# Patient Record
Sex: FEMALE | Race: WHITE | HISPANIC OR LATINO | Employment: UNEMPLOYED | URBAN - METROPOLITAN AREA
[De-identification: names, ages, dates, MRNs, and addresses within clinical notes are randomized per-mention and may not be internally consistent; named-entity substitution may affect disease eponyms.]

---

## 2020-01-14 NOTE — PROGRESS NOTES
Assessment/Plan:       Diagnoses and all orders for this visit:    Annual physical exam  -     Comprehensive metabolic panel; Future  -     Lipid panel; Future    Mild intermittent asthma without complication    Cigarette nicotine dependence without complication    Lipid screening  -     Comprehensive metabolic panel; Future  -     Lipid panel; Future    Screening for human immunodeficiency virus  -     Human Immunodeficiency Virus 1/2 Antigen / Antibody ( Fourth Generation) with Reflex Testing; Future  -     Comprehensive metabolic panel; Future  -     Lipid panel; Future    Need for pneumococcal vaccination  -     PNEUMOCOCCAL POLYSACCHARIDE VACCINE 23-VALENT =>1YO SQ IM    Screening for diabetes mellitus  -     Hemoglobin A1C; Future    Other orders  -     Cancel: Mammo screening bilateral w 3d & cad; Future  -     albuterol (PROVENTIL HFA,VENTOLIN HFA) 90 mcg/act inhaler; Inhale 2 puffs every 6 (six) hours as needed for wheezing        No problem-specific Assessment & Plan notes found for this encounter  Subjective:      Patient ID: Maryann Moralez is a 45 y o  female  Patient is here to establish Care  NO issues or concerns  She does smoke cigarettes and is not interested in quitting  Overweight- no purposeful exercise  She does consume fruits and veggies  She has had a recent URI with cough  She has been wheezing  She does smoke cigarrettes and she is asthmatic      The following portions of the patient's history were reviewed and updated as appropriate:   She has a past medical history of Arthritis and Asthma ,  does not have any pertinent problems on file  ,   has a past surgical history that includes  section and Tubal ligation  ,  family history includes Breast cancer in her mother; Cancer in her father and mother; Colon cancer in her mother; Coronary artery disease in her mother; Diabetes in her mother; Prostate cancer in her father  ,   reports that she has been smoking cigarettes   She has been smoking about 0 50 packs per day  She has never used smokeless tobacco  She reports that she does not drink alcohol or use drugs  ,  has No Known Allergies     Current Outpatient Medications   Medication Sig Dispense Refill    albuterol (PROVENTIL HFA,VENTOLIN HFA) 90 mcg/act inhaler Inhale 2 puffs every 6 (six) hours as needed for wheezing       No current facility-administered medications for this visit  Review of Systems   Constitutional: Negative  Negative for fatigue and fever  HENT: Negative  Negative for congestion  Eyes: Negative  Negative for visual disturbance  Respiratory: Negative for cough, chest tightness, shortness of breath and wheezing  Cardiovascular: Negative  Gastrointestinal: Negative  Negative for abdominal pain, blood in stool, diarrhea and nausea  Endocrine: Negative for polydipsia, polyphagia and polyuria  Genitourinary: Negative for difficulty urinating and flank pain  Musculoskeletal: Negative  Negative for arthralgias, back pain and myalgias  Skin: Negative  Negative for color change, pallor and rash  Allergic/Immunologic: Negative for immunocompromised state  Neurological: Negative  Negative for dizziness, weakness, light-headedness, numbness and headaches  Hematological: Negative for adenopathy  Psychiatric/Behavioral: Negative  Negative for confusion, decreased concentration and sleep disturbance  All other systems reviewed and are negative  Objective:  Vitals:    01/15/20 0913   BP: 122/76   BP Location: Left arm   Patient Position: Sitting   Pulse: 77   Resp: 18   Temp: 98 4 °F (36 9 °C)   SpO2: 97%   Weight: 81 2 kg (179 lb)   Height: 5' 6" (1 676 m)     Body mass index is 28 89 kg/m²  Physical Exam   Constitutional: She is oriented to person, place, and time  She appears well-developed and well-nourished  No distress  HENT:   Head: Normocephalic and atraumatic     Right Ear: External ear normal    Left Ear: External ear normal    Nose: Nose normal    Mouth/Throat: Oropharynx is clear and moist  No oropharyngeal exudate  Eyes: Pupils are equal, round, and reactive to light  Conjunctivae and EOM are normal  Right eye exhibits no discharge  Left eye exhibits no discharge  No scleral icterus  Neck: Normal range of motion  Neck supple  No JVD present  No tracheal deviation present  No thyromegaly present  Cardiovascular: Normal rate, regular rhythm, normal heart sounds and intact distal pulses  Exam reveals no gallop and no friction rub  No murmur heard  Pulmonary/Chest: Effort normal  No respiratory distress  She has wheezes  She exhibits no tenderness  Expiratory wheezing throughout   Abdominal: Soft  Bowel sounds are normal  She exhibits no distension and no mass  There is no tenderness  There is no rebound and no guarding  No hernia  Musculoskeletal: Normal range of motion  She exhibits no edema, tenderness or deformity  Lymphadenopathy:     She has no cervical adenopathy  Neurological: She is alert and oriented to person, place, and time  She displays normal reflexes  No cranial nerve deficit or sensory deficit  She exhibits normal muscle tone  Coordination normal    Skin: Skin is warm and dry  Capillary refill takes less than 2 seconds  No rash noted  She is not diaphoretic  Psychiatric: She has a normal mood and affect  Her behavior is normal  Judgment and thought content normal    Nursing note and vitals reviewed  BMI Counseling: Body mass index is 28 89 kg/m²  The BMI is above normal  Nutrition recommendations include decreasing portion sizes, encouraging healthy choices of fruits and vegetables, decreasing fast food intake, consuming healthier snacks, limiting drinks that contain sugar, moderation in carbohydrate intake, increasing intake of lean protein, reducing intake of saturated and trans fat and reducing intake of cholesterol   Exercise recommendations include exercising 3-5 times per week and strength training exercises  No pharmacotherapy was ordered  Tobacco Cessation Counseling: Tobacco cessation counseling was provided  The patient is sincerely urged to quit consumption of tobacco  She is not ready to quit tobacco  Medication options and side effects of medication discussed  Patient refused medication

## 2020-01-15 ENCOUNTER — OFFICE VISIT (OUTPATIENT)
Dept: FAMILY MEDICINE CLINIC | Facility: CLINIC | Age: 45
End: 2020-01-15
Payer: COMMERCIAL

## 2020-01-15 VITALS
DIASTOLIC BLOOD PRESSURE: 76 MMHG | OXYGEN SATURATION: 97 % | HEIGHT: 66 IN | HEART RATE: 77 BPM | RESPIRATION RATE: 18 BRPM | SYSTOLIC BLOOD PRESSURE: 122 MMHG | BODY MASS INDEX: 28.77 KG/M2 | TEMPERATURE: 98.4 F | WEIGHT: 179 LBS

## 2020-01-15 DIAGNOSIS — Z00.00 ANNUAL PHYSICAL EXAM: Primary | ICD-10-CM

## 2020-01-15 DIAGNOSIS — Z13.1 SCREENING FOR DIABETES MELLITUS: ICD-10-CM

## 2020-01-15 DIAGNOSIS — F17.210 CIGARETTE NICOTINE DEPENDENCE WITHOUT COMPLICATION: ICD-10-CM

## 2020-01-15 DIAGNOSIS — Z11.4 SCREENING FOR HUMAN IMMUNODEFICIENCY VIRUS: ICD-10-CM

## 2020-01-15 DIAGNOSIS — J45.20 MILD INTERMITTENT ASTHMA WITHOUT COMPLICATION: ICD-10-CM

## 2020-01-15 DIAGNOSIS — Z23 NEED FOR PNEUMOCOCCAL VACCINATION: ICD-10-CM

## 2020-01-15 DIAGNOSIS — Z13.220 LIPID SCREENING: ICD-10-CM

## 2020-01-15 PROBLEM — Z12.39 SCREENING FOR BREAST CANCER: Status: ACTIVE | Noted: 2020-01-15

## 2020-01-15 PROBLEM — Z76.89 ENCOUNTER TO ESTABLISH CARE: Status: ACTIVE | Noted: 2020-01-15

## 2020-01-15 PROCEDURE — 90471 IMMUNIZATION ADMIN: CPT

## 2020-01-15 PROCEDURE — 90732 PPSV23 VACC 2 YRS+ SUBQ/IM: CPT

## 2020-01-15 PROCEDURE — 99386 PREV VISIT NEW AGE 40-64: CPT | Performed by: NURSE PRACTITIONER

## 2020-01-15 RX ORDER — ALBUTEROL SULFATE 90 UG/1
2 AEROSOL, METERED RESPIRATORY (INHALATION) EVERY 6 HOURS PRN
COMMUNITY
End: 2021-01-02 | Stop reason: SDUPTHER

## 2020-01-15 NOTE — PATIENT INSTRUCTIONS
Establish care visit  Normal CPE  Obtain labs and our office will call with results  Pneumococcal vaccination given today  Patient to bring report indicating that she has osteoporosis      Wellness Visit for Adults   AMBULATORY CARE:   A wellness visit  is when you see your healthcare provider to get screened for health problems  You can also get advice on how to stay healthy  Write down your questions so you remember to ask them  Ask your healthcare provider how often you should have a wellness visit  What happens at a wellness visit:  Your healthcare provider will ask about your health, and your family history of health problems  This includes high blood pressure, heart disease, and cancer  He or she will ask if you have symptoms that concern you, if you smoke, and about your mood  You may also be asked about your intake of medicines, supplements, food, and alcohol  Any of the following may be done:  · Your weight  will be checked  Your height may also be checked so your body mass index (BMI) can be calculated  Your BMI shows if you are at a healthy weight  · Your blood pressure  and heart rate will be checked  Your temperature may also be checked  · Blood and urine tests  may be done  Blood tests may be done to check your cholesterol levels  Abnormal cholesterol levels increase your risk for heart disease and stroke  You may also need a blood or urine test to check for diabetes if you are at increased risk  Urine tests may be done to look for signs of an infection or kidney disease  · A physical exam  includes checking your heartbeat and lungs with a stethoscope  Your healthcare provider may also check your skin to look for sun damage  · Screening tests  may be recommended  A screening test is done to check for diseases that may not cause symptoms  The screening tests you may need depend on your age, gender, family history, and lifestyle habits   For example, colorectal screening may be recommended if you are 48years old or older  Screening tests you need if you are a woman:   · A Pap smear  is used to screen for cervical cancer  Pap smears are usually done every 3 to 5 years depending on your age  You may need them more often if you have had abnormal Pap smear test results in the past  Ask your healthcare provider how often you should have a Pap smear  · A mammogram  is an x-ray of your breasts to screen for breast cancer  Experts recommend mammograms every 2 years starting at age 48 years  You may need a mammogram at age 52 years or younger if you have an increased risk for breast cancer  Talk to your healthcare provider about when you should start having mammograms and how often you need them  Vaccines you may need:   · Get an influenza vaccine  every year  The influenza vaccine protects you from the flu  Several types of viruses cause the flu  The viruses change over time, so new vaccines are made each year  · Get a tetanus-diphtheria (Td) booster vaccine  every 10 years  This vaccine protects you against tetanus and diphtheria  Tetanus is a severe infection that may cause painful muscle spasms and lockjaw  Diphtheria is a severe bacterial infection that causes a thick covering in the back of your mouth and throat  · Get a human papillomavirus (HPV) vaccine  if you are female and aged 23 to 32 or male 23 to 24 and never received it  This vaccine protects you from HPV infection  HPV is the most common infection spread by sexual contact  HPV may also cause vaginal, penile, and anal cancers  · Get a pneumococcal vaccine  if you are aged 72 years or older  The pneumococcal vaccine is an injection given to protect you from pneumococcal disease  Pneumococcal disease is an infection caused by pneumococcal bacteria  The infection may cause pneumonia, meningitis, or an ear infection  · Get a shingles vaccine  if you are aged 61 or older, even if you have had shingles before   The shingles vaccine is an injection to protect you from the varicella-zoster virus  This is the same virus that causes chickenpox  Shingles is a painful rash that develops in people who had chickenpox or have been exposed to the virus  How to eat healthy:  My Plate is a model for planning healthy meals  It shows the types and amounts of foods that should go on your plate  Fruits and vegetables make up about half of your plate, and grains and protein make up the other half  A serving of dairy is included on the side of your plate  The amount of calories and serving sizes you need depends on your age, gender, weight, and height  Examples of healthy foods are listed below:  · Eat a variety of vegetables  such as dark green, red, and orange vegetables  You can also include canned vegetables low in sodium (salt) and frozen vegetables without added butter or sauces  · Eat a variety of fresh fruits , canned fruit in 100% juice, frozen fruit, and dried fruit  · Include whole grains  At least half of the grains you eat should be whole grains  Examples include whole-wheat bread, wheat pasta, brown rice, and whole-grain cereals such as oatmeal     · Eat a variety of protein foods such as seafood (fish and shellfish), lean meat, and poultry without skin (turkey and chicken)  Examples of lean meats include pork leg, shoulder, or tenderloin, and beef round, sirloin, tenderloin, and extra lean ground beef  Other protein foods include eggs and egg substitutes, beans, peas, soy products, nuts, and seeds  · Choose low-fat dairy products such as skim or 1% milk or low-fat yogurt, cheese, and cottage cheese  · Limit unhealthy fats  such as butter, hard margarine, and shortening  Exercise:  Exercise at least 30 minutes per day on most days of the week  Some examples of exercise include walking, biking, dancing, and swimming   You can also fit in more physical activity by taking the stairs instead of the elevator or parking farther away from stores  Include muscle strengthening activities 2 days each week  Regular exercise provides many health benefits  It helps you manage your weight, and decreases your risk for type 2 diabetes, heart disease, stroke, and high blood pressure  Exercise can also help improve your mood  Ask your healthcare provider about the best exercise plan for you  General health and safety guidelines:   · Do not smoke  Nicotine and other chemicals in cigarettes and cigars can cause lung damage  Ask your healthcare provider for information if you currently smoke and need help to quit  E-cigarettes or smokeless tobacco still contain nicotine  Talk to your healthcare provider before you use these products  · Limit alcohol  A drink of alcohol is 12 ounces of beer, 5 ounces of wine, or 1½ ounces of liquor  · Lose weight, if needed  Being overweight increases your risk of certain health conditions  These include heart disease, high blood pressure, type 2 diabetes, and certain types of cancer  · Protect your skin  Do not sunbathe or use tanning beds  Use sunscreen with a SPF 15 or higher  Apply sunscreen at least 15 minutes before you go outside  Reapply sunscreen every 2 hours  Wear protective clothing, hats, and sunglasses when you are outside  · Drive safely  Always wear your seatbelt  Make sure everyone in your car wears a seatbelt  A seatbelt can save your life if you are in an accident  Do not use your cell phone when you are driving  This could distract you and cause an accident  Pull over if you need to make a call or send a text message  · Practice safe sex  Use latex condoms if are sexually active and have more than one partner  Your healthcare provider may recommend screening tests for sexually transmitted infections (STIs)  · Wear helmets, lifejackets, and protective gear    Always wear a helmet when you ride a bike or motorcycle, go skiing, or play sports that could cause a head injury  Wear protective equipment when you play sports  Wear a lifejacket when you are on a boat or doing water sports  © 2017 [employer]  Information is for End User's use only and may not be sold, redistributed or otherwise used for commercial purposes  All illustrations and images included in CareNotes® are the copyrighted property of [facility] , [employer]  or [employer]old[employer]iles  The above information is an  only  It is not intended as medical advice for individual conditions or treatments  Talk to your doctor, nurse or pharmacist before following any medical regimen to see if it is safe and effective for you

## 2020-01-16 ENCOUNTER — LAB (OUTPATIENT)
Dept: LAB | Facility: CLINIC | Age: 45
End: 2020-01-16
Payer: COMMERCIAL

## 2020-01-16 DIAGNOSIS — Z13.220 LIPID SCREENING: ICD-10-CM

## 2020-01-16 DIAGNOSIS — Z11.4 SCREENING FOR HUMAN IMMUNODEFICIENCY VIRUS: ICD-10-CM

## 2020-01-16 DIAGNOSIS — Z13.1 SCREENING FOR DIABETES MELLITUS: ICD-10-CM

## 2020-01-16 DIAGNOSIS — Z00.00 ANNUAL PHYSICAL EXAM: ICD-10-CM

## 2020-01-16 LAB
ALBUMIN SERPL BCP-MCNC: 3.9 G/DL (ref 3.5–5)
ALP SERPL-CCNC: 84 U/L (ref 46–116)
ALT SERPL W P-5'-P-CCNC: 25 U/L (ref 12–78)
ANION GAP SERPL CALCULATED.3IONS-SCNC: 4 MMOL/L (ref 4–13)
AST SERPL W P-5'-P-CCNC: 9 U/L (ref 5–45)
BILIRUB SERPL-MCNC: 0.34 MG/DL (ref 0.2–1)
BUN SERPL-MCNC: 11 MG/DL (ref 5–25)
CALCIUM SERPL-MCNC: 9.4 MG/DL (ref 8.3–10.1)
CHLORIDE SERPL-SCNC: 105 MMOL/L (ref 100–108)
CHOLEST SERPL-MCNC: 169 MG/DL (ref 50–200)
CO2 SERPL-SCNC: 27 MMOL/L (ref 21–32)
CREAT SERPL-MCNC: 0.59 MG/DL (ref 0.6–1.3)
EST. AVERAGE GLUCOSE BLD GHB EST-MCNC: 103 MG/DL
GFR SERPL CREATININE-BSD FRML MDRD: 113 ML/MIN/1.73SQ M
GLUCOSE P FAST SERPL-MCNC: 88 MG/DL (ref 65–99)
HBA1C MFR BLD: 5.2 % (ref 4.2–6.3)
HDLC SERPL-MCNC: 49 MG/DL
LDLC SERPL CALC-MCNC: 104 MG/DL (ref 0–100)
NONHDLC SERPL-MCNC: 120 MG/DL
POTASSIUM SERPL-SCNC: 3.9 MMOL/L (ref 3.5–5.3)
PROT SERPL-MCNC: 8 G/DL (ref 6.4–8.2)
SODIUM SERPL-SCNC: 136 MMOL/L (ref 136–145)
TRIGL SERPL-MCNC: 80 MG/DL

## 2020-01-16 PROCEDURE — 36415 COLL VENOUS BLD VENIPUNCTURE: CPT

## 2020-01-16 PROCEDURE — 83036 HEMOGLOBIN GLYCOSYLATED A1C: CPT

## 2020-01-16 PROCEDURE — 80061 LIPID PANEL: CPT

## 2020-01-16 PROCEDURE — 87389 HIV-1 AG W/HIV-1&-2 AB AG IA: CPT

## 2020-01-16 PROCEDURE — 80053 COMPREHEN METABOLIC PANEL: CPT

## 2020-01-18 LAB — HIV 1+2 AB+HIV1 P24 AG SERPL QL IA: NORMAL

## 2020-01-22 ENCOUNTER — OFFICE VISIT (OUTPATIENT)
Dept: OBGYN CLINIC | Facility: CLINIC | Age: 45
End: 2020-01-22
Payer: COMMERCIAL

## 2020-01-22 VITALS — BODY MASS INDEX: 30.67 KG/M2 | WEIGHT: 190 LBS | SYSTOLIC BLOOD PRESSURE: 128 MMHG | DIASTOLIC BLOOD PRESSURE: 76 MMHG

## 2020-01-22 DIAGNOSIS — Z11.51 ENCOUNTER FOR SCREENING FOR HUMAN PAPILLOMAVIRUS (HPV): ICD-10-CM

## 2020-01-22 DIAGNOSIS — Z11.3 SCREENING FOR STDS (SEXUALLY TRANSMITTED DISEASES): ICD-10-CM

## 2020-01-22 DIAGNOSIS — Z01.419 ENCOUNTER FOR GYNECOLOGICAL EXAMINATION (GENERAL) (ROUTINE) WITHOUT ABNORMAL FINDINGS: Primary | ICD-10-CM

## 2020-01-22 DIAGNOSIS — Z80.3 FAMILY HISTORY OF BREAST CANCER: ICD-10-CM

## 2020-01-22 DIAGNOSIS — Z12.31 ENCOUNTER FOR SCREENING MAMMOGRAM FOR MALIGNANT NEOPLASM OF BREAST: ICD-10-CM

## 2020-01-22 PROCEDURE — 87624 HPV HI-RISK TYP POOLED RSLT: CPT | Performed by: STUDENT IN AN ORGANIZED HEALTH CARE EDUCATION/TRAINING PROGRAM

## 2020-01-22 PROCEDURE — 99386 PREV VISIT NEW AGE 40-64: CPT | Performed by: STUDENT IN AN ORGANIZED HEALTH CARE EDUCATION/TRAINING PROGRAM

## 2020-01-22 PROCEDURE — 87591 N.GONORRHOEAE DNA AMP PROB: CPT | Performed by: STUDENT IN AN ORGANIZED HEALTH CARE EDUCATION/TRAINING PROGRAM

## 2020-01-22 PROCEDURE — G0145 SCR C/V CYTO,THINLAYER,RESCR: HCPCS | Performed by: STUDENT IN AN ORGANIZED HEALTH CARE EDUCATION/TRAINING PROGRAM

## 2020-01-22 PROCEDURE — 87491 CHLMYD TRACH DNA AMP PROBE: CPT | Performed by: STUDENT IN AN ORGANIZED HEALTH CARE EDUCATION/TRAINING PROGRAM

## 2020-01-22 RX ORDER — MULTIVITAMIN
1 CAPSULE ORAL DAILY
COMMUNITY

## 2020-01-22 NOTE — PROGRESS NOTES
Assessment/Plan:     40 yo F here for annual exam, doing well  F/u pap smear and STD testing  Mammo rx given  Extensive family h/o cancer (see HPI) - referral for genetic counseling given  F/u in 1 yr or PRN  Encounter for gynecological examination (general) (routine) without abnormal findings  -     Liquid-based pap, screening    Encounter for screening mammogram for malignant neoplasm of breast  -     Mammo screening bilateral w 3d & cad; Future    Screening for STDs (sexually transmitted diseases)  -     Chlamydia/GC amplified DNA by PCR  -     Hepatitis B surface antigen; Future  -     Hepatitis C antibody; Future  -     RPR; Future    Encounter for screening for human papillomavirus (HPV)  -     Liquid-based pap, screening    Family history of breast cancer  -     Ambulatory referral to Gynecologic Oncology; Future    Other orders  -     Multiple Vitamin (MULTIVITAMIN) capsule; Take 1 capsule by mouth daily        Subjective:      Patient ID: Alcides Kinney is a 39 y o  female  This is a 39 y o  [id number] with LMP of 12/20/19 currently using tubal ligation for contraception  She has regular periods of normal amount and duration  She is sexually active and requests STD testing today  She has does report vaginal dryness and irritation, especially with intercourse  She has changed soaps to Aveeno sensitive, wears cotton underwear  Reports she is allergic to lubricants so is unable to use these  Last pap smear: 2018, negative per pt report  Did have abnormal several years ago and is requesting repeat pap today  Last mammo: 2018 - normal per pt    Pt reports extensive hx of cancer in her family - unsure of specific details: Mother: breast ca in her 46s, colon ca, and ? ovarian ca - had cervix/uterus/ovaries removed - and reports she had "brackets" placed in vagina for radiation for 3 days - which sounds more consistent with brachytherapy for cervical cancer?  Pt thought cervical/ovarian were the same and so unclear  Mother is still alive  Did not have genetic testing that she is aware of  Maternal aunt - cancer of unknown type  Maternal uncle - brain cancer  Sister had ovaries removed - per pt had multiple cysts and due to fam hx had ovaries removed for prophylaxis          The following portions of the patient's history were reviewed and updated as appropriate: allergies, current medications, past family history, past medical history, past social history, past surgical history and problem list     Review of Systems   Constitutional: Negative  HENT: Negative  Eyes: Negative  Respiratory: Negative  Cardiovascular: Negative  Gastrointestinal: Negative  Endocrine: Negative  Genitourinary: Negative for dyspareunia, dysuria, frequency, menstrual problem, pelvic pain, vaginal discharge and vaginal pain  Musculoskeletal: Negative  Skin: Negative  Allergic/Immunologic: Negative  Neurological: Negative  Hematological: Negative  Psychiatric/Behavioral: Negative  Objective:      /76 (BP Location: Left arm, Patient Position: Sitting, Cuff Size: Standard)   Wt 86 2 kg (190 lb)   LMP 12/20/2019 (Exact Date)   BMI 30 67 kg/m²          Physical Exam   Constitutional: She is oriented to person, place, and time  She appears well-nourished  Neck: Normal range of motion  Cardiovascular: Normal rate  Pulmonary/Chest: Effort normal  Right breast exhibits no mass, no nipple discharge, no skin change and no tenderness  Left breast exhibits no mass, no nipple discharge, no skin change and no tenderness  Breasts are symmetrical    Abdominal: Soft  Genitourinary: Vagina normal and uterus normal  Rectal exam shows no external hemorrhoid  There is no rash or lesion on the right labia  There is no rash or lesion on the left labia  Uterus is not enlarged and not fixed  Cervix exhibits no motion tenderness, no discharge and no friability  Right adnexum displays no mass   Left adnexum displays no mass  No signs of injury around the vagina  No vaginal discharge found  Neurological: She is alert and oriented to person, place, and time  Skin: Skin is warm and dry  Psychiatric: She has a normal mood and affect  Vitals reviewed

## 2020-01-23 LAB
HPV HR 12 DNA CVX QL NAA+PROBE: NEGATIVE
HPV16 DNA CVX QL NAA+PROBE: NEGATIVE
HPV18 DNA CVX QL NAA+PROBE: NEGATIVE

## 2020-01-24 ENCOUNTER — TELEPHONE (OUTPATIENT)
Dept: SURGICAL ONCOLOGY | Facility: CLINIC | Age: 45
End: 2020-01-24

## 2020-01-24 ENCOUNTER — APPOINTMENT (OUTPATIENT)
Dept: LAB | Facility: CLINIC | Age: 45
End: 2020-01-24
Payer: COMMERCIAL

## 2020-01-24 DIAGNOSIS — Z11.3 SCREENING FOR STDS (SEXUALLY TRANSMITTED DISEASES): ICD-10-CM

## 2020-01-24 LAB
C TRACH DNA SPEC QL NAA+PROBE: NEGATIVE
HBV SURFACE AG SER QL: NORMAL
HCV AB SER QL: NORMAL
N GONORRHOEA DNA SPEC QL NAA+PROBE: NEGATIVE

## 2020-01-24 PROCEDURE — 36415 COLL VENOUS BLD VENIPUNCTURE: CPT

## 2020-01-24 PROCEDURE — 86592 SYPHILIS TEST NON-TREP QUAL: CPT

## 2020-01-24 PROCEDURE — 87340 HEPATITIS B SURFACE AG IA: CPT

## 2020-01-24 PROCEDURE — 86803 HEPATITIS C AB TEST: CPT

## 2020-01-24 NOTE — TELEPHONE ENCOUNTER
Genetics New Patient Intake Form   Patient Details:     Jovon Burroughs    1975    [id number]    Background Information:       Who is calling to schedule? If not self, relationship to patient? self   Referring Provider Yris Zayas    Is this a personal or family history?  family   If personal history, what age were you at diagnosis? Breast and ovarian    What is the type of tumor? breast   Did the patient schedule an appointment? Yes   Date and Provider Name  Anali cho    Scheduling Information:   Preferred [place]: [place]   Are there any dates/time the patient cannot be seen?  Tuesday and Thursday    Miscellaneous: na   After completing the above information, please route to Oncology Genetics

## 2020-01-27 LAB — RPR SER QL: NORMAL

## 2020-01-28 ENCOUNTER — TELEPHONE (OUTPATIENT)
Dept: OBGYN CLINIC | Facility: CLINIC | Age: 45
End: 2020-01-28

## 2020-01-28 DIAGNOSIS — N83.202 LEFT OVARIAN CYST: Primary | ICD-10-CM

## 2020-01-28 LAB
LAB AP GYN PRIMARY INTERPRETATION: NORMAL
Lab: NORMAL
PATH INTERP SPEC-IMP: NORMAL

## 2020-01-28 NOTE — TELEPHONE ENCOUNTER
----- Message from Tracy Hameed MD sent at 1/28/2020 12:55 PM EST -----  Regarding: pelvic US  Please let Kamini Collins know that I reviewed her records from her previous doctor  She had an ovarian cyst back in 2017  It was recommended that she have a repeat US, but I don't see that one was ever completed  I have ordered a pelvic US to be completed at her convenience   Thanks!  ----- Message -----  From: Josiane Transcription Incoming  Sent: 1/22/2020   5:34 PM EST  To: Tracy Hameed MD

## 2020-01-29 ENCOUNTER — TELEPHONE (OUTPATIENT)
Dept: OBGYN CLINIC | Facility: CLINIC | Age: 45
End: 2020-01-29

## 2020-01-29 NOTE — TELEPHONE ENCOUNTER
----- Message from Kimberlee Gutierrez MD sent at 1/28/2020 12:17 PM EST -----  Please let Donya Us know that her pap smear was normal  I spoke to her about the rest of her test results  Thanks!

## 2020-02-06 ENCOUNTER — HOSPITAL ENCOUNTER (OUTPATIENT)
Dept: ULTRASOUND IMAGING | Facility: HOSPITAL | Age: 45
Discharge: HOME | End: 2020-02-06
Attending: STUDENT IN AN ORGANIZED HEALTH CARE EDUCATION/TRAINING PROGRAM
Payer: COMMERCIAL

## 2020-02-06 DIAGNOSIS — N83.202 LEFT OVARIAN CYST: ICD-10-CM

## 2020-02-06 PROCEDURE — 76830 TRANSVAGINAL US NON-OB: CPT

## 2020-02-06 PROCEDURE — 76856 US EXAM PELVIC COMPLETE: CPT

## 2020-03-09 ENCOUNTER — TELEPHONE (OUTPATIENT)
Dept: HEMATOLOGY ONCOLOGY | Facility: CLINIC | Age: 45
End: 2020-03-09

## 2020-03-09 NOTE — TELEPHONE ENCOUNTER
I called and spoke to patient to obtain fhx to complete pedigree for tomorrow's appointment with [facility], patient said this was not a good time if I can call her later   Will call patient around 10:00 am 
I called patient back as directed by patient to obtain fhx, no answer left message for patient to return phone call at [phone]
I will SWITCH the dose or number of times a day I take the medications listed below when I get home from the hospital:  None

## 2020-05-08 ENCOUNTER — TELEMEDICINE (OUTPATIENT)
Dept: GYNECOLOGIC ONCOLOGY | Facility: CLINIC | Age: 45
End: 2020-05-08

## 2020-05-08 DIAGNOSIS — Z80.41 FAMILY HISTORY OF OVARIAN CANCER: ICD-10-CM

## 2020-05-08 DIAGNOSIS — Z80.0 FAMILY HISTORY OF COLON CANCER: Primary | ICD-10-CM

## 2020-05-08 DIAGNOSIS — Z80.42 FAMILY HISTORY OF PROSTATE CANCER: ICD-10-CM

## 2020-05-08 DIAGNOSIS — Z80.3 FAMILY HISTORY OF BREAST CANCER: ICD-10-CM

## 2020-05-08 PROCEDURE — NC001 PR NO CHARGE: Performed by: GENETIC COUNSELOR, MS

## 2020-06-01 ENCOUNTER — TELEPHONE (OUTPATIENT)
Dept: HEMATOLOGY ONCOLOGY | Facility: CLINIC | Age: 45
End: 2020-06-01

## 2020-07-08 ENCOUNTER — TELEMEDICINE (OUTPATIENT)
Dept: FAMILY MEDICINE CLINIC | Facility: CLINIC | Age: 45
End: 2020-07-08
Payer: COMMERCIAL

## 2020-07-08 DIAGNOSIS — H10.33 ACUTE BACTERIAL CONJUNCTIVITIS OF BOTH EYES: Primary | ICD-10-CM

## 2020-07-08 PROCEDURE — 99213 OFFICE O/P EST LOW 20 MIN: CPT | Performed by: PHYSICIAN ASSISTANT

## 2020-07-08 PROCEDURE — 4004F PT TOBACCO SCREEN RCVD TLK: CPT | Performed by: PHYSICIAN ASSISTANT

## 2020-07-08 RX ORDER — OFLOXACIN 3 MG/ML
1 SOLUTION/ DROPS OPHTHALMIC 4 TIMES DAILY
Qty: 5 ML | Refills: 0 | Status: SHIPPED | OUTPATIENT
Start: 2020-07-08 | End: 2020-07-15

## 2020-07-08 NOTE — PROGRESS NOTES
Virtual Regular Visit      Assessment/Plan:    Problem List Items Addressed This Visit        Other    Acute bacterial conjunctivitis of both eyes - Primary     ofloxacin ggts for 1 week, avoid contact lens wearing until sx resolve  Wash hands thoroughly after handling animals  Follow up with PCP as needed if sx persist or worsen  Relevant Medications    ofloxacin (OCUFLOX) 0 3 % ophthalmic solution               Reason for visit is   Chief Complaint   Patient presents with    Conjunctivitis    Virtual Regular Visit        Encounter provider Alena Nj PA-C    Provider located at [address]  [address] [place] [phone]      Recent Visits  No visits were found meeting these conditions  Showing recent visits within past 7 days and meeting all other requirements     Today's Visits  Date Type Provider Dept   07/08/20 Telemedicine [facility]    Showing today's visits and meeting all other requirements     Future Appointments  No visits were found meeting these conditions  Showing future appointments within next 150 days and meeting all other requirements        The patient was identified by name and date of birth  Shweta Leonard was informed that this is a telemedicine visit and that the visit is being conducted through [address] [place] [address] and patient was informed that this is not a secure, HIPAA-complaint platform  She agrees to proceed     My office door was closed  No one else was in the room  She acknowledged consent and understanding of privacy and security of the video platform  The patient has agreed to participate and understands they can discontinue the visit at any time  Patient is aware this is a billable service  Subjective  Shweta Leonard is a 39 y o  female    Pt presents with complaints of redness and itching of both eyes x 5 days  Shares this started since she bought a pet lizard   She shares her eyes became very itchy and burned over the holiday weekend  She wakes in the morning with her eyes crusted  She has been using OTC drops with some releif but is still experiencing bilateral eye redness, itching, and discharge  Her vision is occasionally blurry with the tearing/discharge  No pain with eye movement, no periorbital swelling/redness, no fevers, chills, HA  She does wear contacts but has been wearing glasses since sx onset  Past Medical History:   Diagnosis Date    Anxiety     Arthritis     Asthma     Depression     Osteoporosis     Urinary tract infection        Past Surgical History:   Procedure Laterality Date     SECTION      TUBAL LIGATION         Current Outpatient Medications   Medication Sig Dispense Refill    albuterol (PROVENTIL HFA,VENTOLIN HFA) 90 mcg/act inhaler Inhale 2 puffs every 6 (six) hours as needed for wheezing      Multiple Vitamin (MULTIVITAMIN) capsule Take 1 capsule by mouth daily      ofloxacin (OCUFLOX) 0 3 % ophthalmic solution Administer 1 drop to both eyes 4 (four) times a day for 7 days 5 mL 0     No current facility-administered medications for this visit  No Known Allergies    Review of Systems   Constitutional: Negative for chills, fatigue and fever  HENT: Negative for congestion, ear pain, hearing loss, nosebleeds, postnasal drip, rhinorrhea, sinus pressure, sinus pain, sneezing and sore throat  Eyes: Positive for discharge, redness and itching  Negative for pain and visual disturbance  Respiratory: Negative for cough, chest tightness, shortness of breath and wheezing  Cardiovascular: Negative for chest pain, palpitations and leg swelling  Gastrointestinal: Negative for abdominal pain, blood in stool, constipation, diarrhea, nausea and vomiting  Genitourinary: Negative for frequency and urgency  Neurological: Negative for dizziness, light-headedness and numbness         Video Exam    There were no vitals filed for this visit     Physical Exam   Constitutional: She appears well-developed and well-nourished  No distress  HENT:   Head: Normocephalic and atraumatic  Eyes: EOM are normal  Right eye exhibits discharge  Left eye exhibits discharge  Right conjunctiva is injected  Left conjunctiva is injected  Neck: Normal range of motion  Pulmonary/Chest: Effort normal  No respiratory distress  Musculoskeletal: Normal range of motion  Neurological: She is alert  Psychiatric: She has a normal mood and affect  Her behavior is normal    Nursing note and vitals reviewed  I spent 15 minutes directly with the patient during this visit      VIRTUAL VISIT DISCLAIMER    Nicole Lou acknowledges that she has consented to an online visit or consultation  She understands that the online visit is based solely on information provided by her, and that, in the absence of a face-to-face physical evaluation by the physician, the diagnosis she receives is both limited and provisional in terms of accuracy and completeness  This is not intended to replace a full medical face-to-face evaluation by the physician  Nicole Lou understands and accepts these terms

## 2020-07-08 NOTE — ASSESSMENT & PLAN NOTE
ofloxacin ggts for 1 week, avoid contact lens wearing until sx resolve  Wash hands thoroughly after handling animals  Follow up with PCP as needed if sx persist or worsen

## 2020-07-29 ENCOUNTER — OFFICE VISIT (OUTPATIENT)
Dept: FAMILY MEDICINE CLINIC | Facility: CLINIC | Age: 45
End: 2020-07-29
Payer: COMMERCIAL

## 2020-07-29 VITALS
WEIGHT: 178 LBS | SYSTOLIC BLOOD PRESSURE: 118 MMHG | OXYGEN SATURATION: 97 % | HEIGHT: 66 IN | BODY MASS INDEX: 28.61 KG/M2 | HEART RATE: 82 BPM | RESPIRATION RATE: 18 BRPM | DIASTOLIC BLOOD PRESSURE: 74 MMHG

## 2020-07-29 DIAGNOSIS — H10.13 ALLERGIC CONJUNCTIVITIS OF BOTH EYES: Primary | ICD-10-CM

## 2020-07-29 DIAGNOSIS — L25.9 CONTACT DERMATITIS, UNSPECIFIED CONTACT DERMATITIS TYPE, UNSPECIFIED TRIGGER: ICD-10-CM

## 2020-07-29 PROCEDURE — 4004F PT TOBACCO SCREEN RCVD TLK: CPT | Performed by: NURSE PRACTITIONER

## 2020-07-29 PROCEDURE — 99213 OFFICE O/P EST LOW 20 MIN: CPT | Performed by: NURSE PRACTITIONER

## 2020-07-29 PROCEDURE — 3008F BODY MASS INDEX DOCD: CPT | Performed by: NURSE PRACTITIONER

## 2020-07-29 RX ORDER — MOXIFLOXACIN 5 MG/ML
1 SOLUTION/ DROPS OPHTHALMIC 3 TIMES DAILY PRN
Qty: 3 ML | Refills: 3 | Status: SHIPPED | OUTPATIENT
Start: 2020-07-29 | End: 2020-09-04 | Stop reason: ALTCHOICE

## 2020-07-29 RX ORDER — CLOBETASOL PROPIONATE 0.5 MG/G
OINTMENT TOPICAL 2 TIMES DAILY
Qty: 30 G | Refills: 0 | Status: SHIPPED | OUTPATIENT
Start: 2020-07-29 | End: 2020-12-14

## 2020-07-29 NOTE — PROGRESS NOTES
Assessment/Plan:       Diagnoses and all orders for this visit:    Allergic conjunctivitis of both eyes  -     moxifloxacin (VIGAMOX) 0 5 % ophthalmic solution; Administer 1 drop to both eyes 3 (three) times a day as needed (itchy eyes)    Contact dermatitis, unspecified contact dermatitis type, unspecified trigger  -     clobetasol (TEMOVATE) 0 05 % ointment; Apply topically 2 (two) times a day        No problem-specific Assessment & Plan notes found for this encounter  Subjective:      Patient ID: Diego Williamson is a 39 y o  female  For the past three months, she has had blisters on her left hand palm, bottom of foot and right arm  The lesions itch  They then burst  They itch and bleed when scratched  She was treated for pink eye two weeks ago  She continues to have itching of the eyes and discharge at times  The following portions of the patient's history were reviewed and updated as appropriate:   She has a past medical history of Anxiety, Arthritis, Asthma, Depression, Osteoporosis, and Urinary tract infection  ,  does not have any pertinent problems on file  ,   has a past surgical history that includes  section and Tubal ligation  ,  family history includes Breast cancer in her mother; Cancer in her father and mother; Colon cancer in her mother; Coronary artery disease in her mother; Diabetes in her mother; Ovarian cancer in her mother; Prostate cancer in her father  ,   reports that she has been smoking cigarettes  She has been smoking about 0 50 packs per day  She has never used smokeless tobacco  She reports that she drinks alcohol  She reports that she has current or past drug history  ,  has No Known Allergies     Current Outpatient Medications   Medication Sig Dispense Refill    albuterol (PROVENTIL HFA,VENTOLIN HFA) 90 mcg/act inhaler Inhale 2 puffs every 6 (six) hours as needed for wheezing      clobetasol (TEMOVATE) 0 05 % ointment Apply topically 2 (two) times a day 30 g 0  moxifloxacin (VIGAMOX) 0 5 % ophthalmic solution Administer 1 drop to both eyes 3 (three) times a day as needed (itchy eyes) 3 mL 3    Multiple Vitamin (MULTIVITAMIN) capsule Take 1 capsule by mouth daily       No current facility-administered medications for this visit  Review of Systems   Constitutional: Negative  Negative for fatigue and fever  HENT: Negative  Negative for congestion  Eyes: Positive for discharge, redness and itching  Negative for visual disturbance  Respiratory: Negative for cough, chest tightness, shortness of breath and wheezing  Cardiovascular: Negative  Gastrointestinal: Negative  Negative for abdominal pain, blood in stool, diarrhea and nausea  Endocrine: Negative for polydipsia, polyphagia and polyuria  Genitourinary: Negative for difficulty urinating and flank pain  Musculoskeletal: Negative  Negative for arthralgias, back pain and myalgias  Skin: Positive for rash  Negative for color change and pallor  Clear fluid filled lesions on left hand    Allergic/Immunologic: Negative for immunocompromised state  Neurological: Negative  Negative for dizziness, weakness, light-headedness, numbness and headaches  Hematological: Negative for adenopathy  Psychiatric/Behavioral: Negative  Negative for confusion, decreased concentration and sleep disturbance  All other systems reviewed and are negative  Objective:  Vitals:    07/29/20 1500   BP: 118/74   BP Location: Left arm   Patient Position: Sitting   Pulse: 82   Resp: 18   SpO2: 97%   Weight: 80 7 kg (178 lb)   Height: 5' 6" (1 676 m)     Body mass index is 28 73 kg/m²  Physical Exam   Constitutional: She is oriented to person, place, and time  She appears well-developed and well-nourished  No distress  HENT:   Head: Normocephalic and atraumatic     Right Ear: External ear normal    Left Ear: External ear normal    Nose: Nose normal    Mouth/Throat: Oropharynx is clear and moist  No oropharyngeal exudate  Eyes: Pupils are equal, round, and reactive to light  Conjunctivae and EOM are normal  Right eye exhibits discharge  Left eye exhibits discharge  No scleral icterus  Watery eyes, puffiness extraorbital   Neck: Normal range of motion  Neck supple  Cardiovascular: Normal rate, regular rhythm and normal heart sounds  Exam reveals no gallop and no friction rub  No murmur heard  Pulmonary/Chest: Effort normal and breath sounds normal  No respiratory distress  She has no wheezes  She has no rales  Abdominal: Soft  Musculoskeletal: Normal range of motion  Lymphadenopathy:     She has no cervical adenopathy  Neurological: She is alert and oriented to person, place, and time  Skin: Skin is warm and dry  Rash noted  She is not diaphoretic  Clear filled vesicles left hand palm  Scabs on foot and forearm   Psychiatric: She has a normal mood and affect  Her behavior is normal  Judgment and thought content normal    Nursing note and vitals reviewed

## 2020-07-29 NOTE — PATIENT INSTRUCTIONS
Allergic conjunctivitis try to not rub the yes  May apply cool compresses for comfort  Use eye drops as directed  Dermatitis- unknown trigger  Clear filled vesicles  Start ointment as prescribed  Call office in one week if no improvement

## 2020-09-04 ENCOUNTER — TELEPHONE (OUTPATIENT)
Dept: FAMILY MEDICINE CLINIC | Facility: CLINIC | Age: 45
End: 2020-09-04

## 2020-09-04 DIAGNOSIS — T78.40XD ALLERGIC STATE, SUBSEQUENT ENCOUNTER: Primary | ICD-10-CM

## 2020-09-04 RX ORDER — OLOPATADINE HYDROCHLORIDE 1 MG/ML
1 SOLUTION/ DROPS OPHTHALMIC 2 TIMES DAILY
Qty: 5 ML | Refills: 3 | Status: SHIPPED | OUTPATIENT
Start: 2020-09-04 | End: 2021-11-09 | Stop reason: SDUPTHER

## 2020-11-19 ENCOUNTER — OFFICE VISIT (OUTPATIENT)
Dept: FAMILY MEDICINE CLINIC | Facility: CLINIC | Age: 45
End: 2020-11-19
Payer: COMMERCIAL

## 2020-11-19 VITALS
TEMPERATURE: 97.1 F | WEIGHT: 181 LBS | RESPIRATION RATE: 18 BRPM | HEIGHT: 66 IN | BODY MASS INDEX: 29.09 KG/M2 | HEART RATE: 102 BPM | SYSTOLIC BLOOD PRESSURE: 132 MMHG | DIASTOLIC BLOOD PRESSURE: 68 MMHG | OXYGEN SATURATION: 97 %

## 2020-11-19 DIAGNOSIS — G89.29 CHRONIC NECK PAIN: ICD-10-CM

## 2020-11-19 DIAGNOSIS — G89.29 CHRONIC PAIN OF MULTIPLE JOINTS: Primary | ICD-10-CM

## 2020-11-19 DIAGNOSIS — F17.200 NICOTINE USE DISORDER: ICD-10-CM

## 2020-11-19 DIAGNOSIS — G62.9 NEUROPATHY: ICD-10-CM

## 2020-11-19 DIAGNOSIS — L30.9 DERMATITIS: ICD-10-CM

## 2020-11-19 DIAGNOSIS — Z13.220 LIPID SCREENING: ICD-10-CM

## 2020-11-19 DIAGNOSIS — M25.50 CHRONIC PAIN OF MULTIPLE JOINTS: Primary | ICD-10-CM

## 2020-11-19 DIAGNOSIS — M54.2 CHRONIC NECK PAIN: ICD-10-CM

## 2020-11-19 PROCEDURE — 99214 OFFICE O/P EST MOD 30 MIN: CPT | Performed by: NURSE PRACTITIONER

## 2020-11-19 RX ORDER — GABAPENTIN 100 MG/1
100 CAPSULE ORAL 3 TIMES DAILY
Qty: 90 CAPSULE | Refills: 2 | Status: SHIPPED | OUTPATIENT
Start: 2020-11-19 | End: 2020-12-03

## 2020-11-19 RX ORDER — VARENICLINE TARTRATE 25 MG
KIT ORAL
Qty: 53 TABLET | Refills: 0 | Status: SHIPPED | OUTPATIENT
Start: 2020-11-19 | End: 2021-01-29

## 2020-11-20 ENCOUNTER — LAB (OUTPATIENT)
Dept: LAB | Facility: CLINIC | Age: 45
End: 2020-11-20
Payer: COMMERCIAL

## 2020-11-20 ENCOUNTER — APPOINTMENT (OUTPATIENT)
Dept: RADIOLOGY | Facility: CLINIC | Age: 45
End: 2020-11-20
Payer: COMMERCIAL

## 2020-11-20 DIAGNOSIS — Z13.220 LIPID SCREENING: ICD-10-CM

## 2020-11-20 DIAGNOSIS — G89.29 CHRONIC PAIN OF MULTIPLE JOINTS: ICD-10-CM

## 2020-11-20 DIAGNOSIS — Z13.1 SCREENING FOR DIABETES MELLITUS: ICD-10-CM

## 2020-11-20 DIAGNOSIS — G89.29 CHRONIC PAIN OF MULTIPLE JOINTS: Primary | ICD-10-CM

## 2020-11-20 DIAGNOSIS — G62.9 NEUROPATHY: ICD-10-CM

## 2020-11-20 DIAGNOSIS — M25.50 CHRONIC PAIN OF MULTIPLE JOINTS: Primary | ICD-10-CM

## 2020-11-20 DIAGNOSIS — G89.29 CHRONIC NECK PAIN: ICD-10-CM

## 2020-11-20 DIAGNOSIS — M54.2 CHRONIC NECK PAIN: ICD-10-CM

## 2020-11-20 DIAGNOSIS — M25.50 CHRONIC PAIN OF MULTIPLE JOINTS: ICD-10-CM

## 2020-11-20 LAB
ALBUMIN SERPL BCP-MCNC: 4 G/DL (ref 3.5–5)
ALP SERPL-CCNC: 81 U/L (ref 46–116)
ALT SERPL W P-5'-P-CCNC: 18 U/L (ref 12–78)
ANION GAP SERPL CALCULATED.3IONS-SCNC: 6 MMOL/L (ref 4–13)
AST SERPL W P-5'-P-CCNC: 6 U/L (ref 5–45)
BASOPHILS # BLD AUTO: 0.08 THOUSANDS/ΜL (ref 0–0.1)
BASOPHILS NFR BLD AUTO: 1 % (ref 0–1)
BILIRUB SERPL-MCNC: 0.46 MG/DL (ref 0.2–1)
BUN SERPL-MCNC: 12 MG/DL (ref 5–25)
CALCIUM SERPL-MCNC: 9.2 MG/DL (ref 8.3–10.1)
CHLORIDE SERPL-SCNC: 107 MMOL/L (ref 100–108)
CHOLEST SERPL-MCNC: 163 MG/DL (ref 50–200)
CO2 SERPL-SCNC: 26 MMOL/L (ref 21–32)
CREAT SERPL-MCNC: 0.61 MG/DL (ref 0.6–1.3)
CRP SERPL QL: 4.3 MG/L
EOSINOPHIL # BLD AUTO: 0.28 THOUSAND/ΜL (ref 0–0.61)
EOSINOPHIL NFR BLD AUTO: 4 % (ref 0–6)
ERYTHROCYTE [DISTWIDTH] IN BLOOD BY AUTOMATED COUNT: 13.6 % (ref 11.6–15.1)
ERYTHROCYTE [SEDIMENTATION RATE] IN BLOOD: 21 MM/HOUR (ref 0–19)
FERRITIN SERPL-MCNC: 47 NG/ML (ref 8–388)
GFR SERPL CREATININE-BSD FRML MDRD: 111 ML/MIN/1.73SQ M
GLUCOSE P FAST SERPL-MCNC: 88 MG/DL (ref 65–99)
HCT VFR BLD AUTO: 39.9 % (ref 34.8–46.1)
HDLC SERPL-MCNC: 40 MG/DL
HGB BLD-MCNC: 12.8 G/DL (ref 11.5–15.4)
IMM GRANULOCYTES # BLD AUTO: 0.02 THOUSAND/UL (ref 0–0.2)
IMM GRANULOCYTES NFR BLD AUTO: 0 % (ref 0–2)
IRON SATN MFR SERPL: 33 %
IRON SERPL-MCNC: 95 UG/DL (ref 50–170)
LDLC SERPL CALC-MCNC: 105 MG/DL (ref 0–100)
LYMPHOCYTES # BLD AUTO: 2.46 THOUSANDS/ΜL (ref 0.6–4.47)
LYMPHOCYTES NFR BLD AUTO: 31 % (ref 14–44)
MCH RBC QN AUTO: 27.4 PG (ref 26.8–34.3)
MCHC RBC AUTO-ENTMCNC: 32.1 G/DL (ref 31.4–37.4)
MCV RBC AUTO: 85 FL (ref 82–98)
MONOCYTES # BLD AUTO: 0.68 THOUSAND/ΜL (ref 0.17–1.22)
MONOCYTES NFR BLD AUTO: 9 % (ref 4–12)
NEUTROPHILS # BLD AUTO: 4.33 THOUSANDS/ΜL (ref 1.85–7.62)
NEUTS SEG NFR BLD AUTO: 55 % (ref 43–75)
NONHDLC SERPL-MCNC: 123 MG/DL
NRBC BLD AUTO-RTO: 0 /100 WBCS
PLATELET # BLD AUTO: 318 THOUSANDS/UL (ref 149–390)
PMV BLD AUTO: 10.8 FL (ref 8.9–12.7)
POTASSIUM SERPL-SCNC: 3.8 MMOL/L (ref 3.5–5.3)
PROT SERPL-MCNC: 7.7 G/DL (ref 6.4–8.2)
RBC # BLD AUTO: 4.67 MILLION/UL (ref 3.81–5.12)
SODIUM SERPL-SCNC: 139 MMOL/L (ref 136–145)
TIBC SERPL-MCNC: 289 UG/DL (ref 250–450)
TRIGL SERPL-MCNC: 88 MG/DL
URATE SERPL-MCNC: 3.8 MG/DL (ref 2–6.8)
WBC # BLD AUTO: 7.85 THOUSAND/UL (ref 4.31–10.16)

## 2020-11-20 PROCEDURE — 86038 ANTINUCLEAR ANTIBODIES: CPT

## 2020-11-20 PROCEDURE — 86430 RHEUMATOID FACTOR TEST QUAL: CPT

## 2020-11-20 PROCEDURE — 83550 IRON BINDING TEST: CPT

## 2020-11-20 PROCEDURE — 85652 RBC SED RATE AUTOMATED: CPT

## 2020-11-20 PROCEDURE — 84550 ASSAY OF BLOOD/URIC ACID: CPT

## 2020-11-20 PROCEDURE — 86200 CCP ANTIBODY: CPT

## 2020-11-20 PROCEDURE — 80053 COMPREHEN METABOLIC PANEL: CPT

## 2020-11-20 PROCEDURE — 72050 X-RAY EXAM NECK SPINE 4/5VWS: CPT

## 2020-11-20 PROCEDURE — 36415 COLL VENOUS BLD VENIPUNCTURE: CPT

## 2020-11-20 PROCEDURE — 82728 ASSAY OF FERRITIN: CPT

## 2020-11-20 PROCEDURE — 86618 LYME DISEASE ANTIBODY: CPT

## 2020-11-20 PROCEDURE — 86140 C-REACTIVE PROTEIN: CPT

## 2020-11-20 PROCEDURE — 85025 COMPLETE CBC W/AUTO DIFF WBC: CPT

## 2020-11-20 PROCEDURE — 86592 SYPHILIS TEST NON-TREP QUAL: CPT

## 2020-11-20 PROCEDURE — 83540 ASSAY OF IRON: CPT

## 2020-11-20 PROCEDURE — 80061 LIPID PANEL: CPT

## 2020-11-21 DIAGNOSIS — G89.29 CHRONIC PAIN OF MULTIPLE JOINTS: ICD-10-CM

## 2020-11-21 DIAGNOSIS — R79.82 ELEVATED C-REACTIVE PROTEIN (CRP): ICD-10-CM

## 2020-11-21 DIAGNOSIS — M25.50 CHRONIC PAIN OF MULTIPLE JOINTS: ICD-10-CM

## 2020-11-21 DIAGNOSIS — R70.0 ELEVATED SED RATE: Primary | ICD-10-CM

## 2020-11-21 LAB — B BURGDOR IGG+IGM SER-ACNC: 70

## 2020-11-23 ENCOUNTER — LAB (OUTPATIENT)
Dept: LAB | Facility: CLINIC | Age: 45
End: 2020-11-23
Payer: COMMERCIAL

## 2020-11-23 LAB
RHEUMATOID FACT SER QL LA: NEGATIVE
RPR SER QL: NORMAL
RYE IGE QN: NEGATIVE

## 2020-11-23 PROCEDURE — 36415 COLL VENOUS BLD VENIPUNCTURE: CPT

## 2020-11-23 PROCEDURE — 81372 HLA I TYPING COMPLETE LR: CPT

## 2020-11-24 LAB — CCP IGA+IGG SERPL IA-ACNC: 5 UNITS (ref 0–19)

## 2020-12-01 LAB
HLA-A: NORMAL
HLA-A: NORMAL
HLA-B: NORMAL
HLA-B: NORMAL
HLA-CW: NORMAL
HLA-CW: NORMAL
REF LAB TEST METHOD: NORMAL

## 2020-12-03 ENCOUNTER — TELEPHONE (OUTPATIENT)
Dept: FAMILY MEDICINE CLINIC | Facility: CLINIC | Age: 45
End: 2020-12-03

## 2020-12-03 DIAGNOSIS — G89.29 CHRONIC PAIN OF MULTIPLE JOINTS: ICD-10-CM

## 2020-12-03 DIAGNOSIS — R79.82 ELEVATED C-REACTIVE PROTEIN (CRP): ICD-10-CM

## 2020-12-03 DIAGNOSIS — M25.50 CHRONIC PAIN OF MULTIPLE JOINTS: ICD-10-CM

## 2020-12-03 DIAGNOSIS — R70.0 ELEVATED SED RATE: Primary | ICD-10-CM

## 2020-12-12 DIAGNOSIS — L25.9 CONTACT DERMATITIS, UNSPECIFIED CONTACT DERMATITIS TYPE, UNSPECIFIED TRIGGER: ICD-10-CM

## 2020-12-14 RX ORDER — CLOBETASOL PROPIONATE 0.5 MG/G
OINTMENT TOPICAL
Qty: 30 G | Refills: 0 | Status: SHIPPED | OUTPATIENT
Start: 2020-12-14 | End: 2021-03-31 | Stop reason: SDUPTHER

## 2021-01-02 ENCOUNTER — OFFICE VISIT (OUTPATIENT)
Dept: URGENT CARE | Facility: CLINIC | Age: 46
End: 2021-01-02
Payer: COMMERCIAL

## 2021-01-02 ENCOUNTER — APPOINTMENT (OUTPATIENT)
Dept: RADIOLOGY | Facility: CLINIC | Age: 46
End: 2021-01-02
Payer: COMMERCIAL

## 2021-01-02 VITALS
HEART RATE: 77 BPM | HEIGHT: 66 IN | TEMPERATURE: 97.3 F | OXYGEN SATURATION: 100 % | BODY MASS INDEX: 28.93 KG/M2 | WEIGHT: 180 LBS

## 2021-01-02 DIAGNOSIS — R43.2 LOSS OF TASTE: ICD-10-CM

## 2021-01-02 DIAGNOSIS — R06.2 WHEEZING: ICD-10-CM

## 2021-01-02 DIAGNOSIS — M79.671 RIGHT FOOT PAIN: ICD-10-CM

## 2021-01-02 DIAGNOSIS — R09.81 NASAL CONGESTION: Primary | ICD-10-CM

## 2021-01-02 DIAGNOSIS — R05.9 COUGH: ICD-10-CM

## 2021-01-02 PROCEDURE — U0003 INFECTIOUS AGENT DETECTION BY NUCLEIC ACID (DNA OR RNA); SEVERE ACUTE RESPIRATORY SYNDROME CORONAVIRUS 2 (SARS-COV-2) (CORONAVIRUS DISEASE [COVID-19]), AMPLIFIED PROBE TECHNIQUE, MAKING USE OF HIGH THROUGHPUT TECHNOLOGIES AS DESCRIBED BY CMS-2020-01-R: HCPCS

## 2021-01-02 PROCEDURE — G0382 LEV 3 HOSP TYPE B ED VISIT: HCPCS | Performed by: NURSE PRACTITIONER

## 2021-01-02 PROCEDURE — 99283 EMERGENCY DEPT VISIT LOW MDM: CPT | Performed by: NURSE PRACTITIONER

## 2021-01-02 PROCEDURE — 73630 X-RAY EXAM OF FOOT: CPT

## 2021-01-02 PROCEDURE — 71046 X-RAY EXAM CHEST 2 VIEWS: CPT

## 2021-01-02 RX ORDER — VARENICLINE TARTRATE
KIT
COMMUNITY
Start: 2020-11-19 | End: 2021-01-29

## 2021-01-02 RX ORDER — ALBUTEROL SULFATE 90 UG/1
2 AEROSOL, METERED RESPIRATORY (INHALATION) EVERY 6 HOURS PRN
Qty: 1 INHALER | Refills: 0 | Status: SHIPPED | OUTPATIENT
Start: 2021-01-02

## 2021-01-02 RX ORDER — AZITHROMYCIN 250 MG/1
TABLET, FILM COATED ORAL
Qty: 6 TABLET | Refills: 0 | Status: SHIPPED | OUTPATIENT
Start: 2021-01-02 | End: 2021-01-06

## 2021-01-02 RX ORDER — GABAPENTIN 100 MG/1
100 CAPSULE ORAL
COMMUNITY
Start: 2020-11-19 | End: 2021-01-29

## 2021-01-02 RX ORDER — MULTIVITAMIN
1 CAPSULE ORAL
COMMUNITY
End: 2021-01-02 | Stop reason: SDUPTHER

## 2021-01-02 RX ORDER — BIOTIN 1 MG
TABLET ORAL
COMMUNITY

## 2021-01-02 NOTE — PROGRESS NOTES
[facility]        NAME: Madelaine Johnson is a 39 y o  female  : 1975    MRN: [id number]  DATE: 2021  TIME: 7:32 PM    Assessment and Plan   Nasal congestion [R09 81]  1  Nasal congestion  Novel Coronavirus (COVID-19), PCR [facility] - Collected in Office    azithromycin (ZITHROMAX) 250 mg tablet   2  Loss of taste  Novel Coronavirus (COVID-19), PCR [facility] - Collected in Office   3  Cough  Novel Coronavirus (COVID-19), PCR [facility] - Collected in Office    XR chest pa & lateral    albuterol (PROVENTIL HFA,VENTOLIN HFA) 90 mcg/act inhaler    azithromycin (ZITHROMAX) 250 mg tablet   4  Wheezing  XR chest pa & lateral    albuterol (PROVENTIL HFA,VENTOLIN HFA) 90 mcg/act inhaler    azithromycin (ZITHROMAX) 250 mg tablet   5  Right foot pain  XR foot 3+ vw right         Patient Instructions       Follow up with PCP in 3-5 days  Proceed to  ER if symptoms worsen  Patient Instructions    Suspicious findings on chest x-ray with concern of wheezing, cough  Please begin oral antibiotic therapy as prescribed  CoVid swab performed in office today  Results to be reviewed and treatment plan adjusted as needed  Encouraged adequate hydration  Monitor for persistent or worsening symptoms including cough, shortness of breath and follow up with PCP as needed  Right foot x-ray appearance of severe arthritis symptoms  Advised to follow-up with PCP for consideration of MRI to rule out stress fracture  Contact pain management on Monday for virtual visit to discuss medication management to reduce chronic pain symptoms  Chief Complaint     Chief Complaint   Patient presents with    Foot Pain     RIGHT 4 DAYS         History of Present Illness       Here today with c/o nasal congestion, cough, loss of taste and smell x 2-3 days ago  Also c/o chronic foot pain currently taking gabapentin          Review of Systems   Review of Systems   Constitutional: Negative for activity change, appetite change and fever  HENT: Positive for congestion  Negative for ear pain, sneezing and sore throat  Loss of taste and smell     Eyes: Negative for discharge and redness  Respiratory: Positive for cough  Negative for chest tightness and shortness of breath  Cardiovascular: Negative for chest pain  Gastrointestinal: Negative for abdominal pain, diarrhea, nausea and vomiting  Genitourinary: Negative for decreased urine volume  Musculoskeletal: Negative for myalgias  Skin: Negative for rash  Allergic/Immunologic: Negative for environmental allergies  Neurological: Negative for dizziness, syncope and headaches  Hematological: Negative for adenopathy  Psychiatric/Behavioral: Negative for sleep disturbance  Current Medications       Current Outpatient Medications:     albuterol (PROVENTIL HFA,VENTOLIN HFA) 90 mcg/act inhaler, Inhale 2 puffs every 6 (six) hours as needed for wheezing (cough), Disp: 1 Inhaler, Rfl: 0    Cholecalciferol (Vitamin D3) 25 MCG (1000 UT) CAPS, Take by mouth, Disp: , Rfl:     clobetasol (TEMOVATE) 0 05 % ointment, APPLY TO AFFECTED AREA TWICE A DAY, Disp: 30 g, Rfl: 0    Cyanocobalamin 1000 MCG/ML LIQD, Take by mouth, Disp: , Rfl:     gabapentin (NEURONTIN) 100 mg capsule, Take 100 mg by mouth, Disp: , Rfl:     Multiple Vitamin (MULTIVITAMIN) capsule, Take 1 capsule by mouth daily, Disp: , Rfl:     olopatadine (PATANOL) 0 1 % ophthalmic solution, Administer 1 drop to both eyes 2 (two) times a day, Disp: 5 mL, Rfl: 3    varenicline (CHANTIX JAY) 0 5 MG X 11 & 1 MG X 42 tablet, Take one 0 5 mg tablet by mouth once daily for 3 days, then one 0 5 mg tablet by mouth twice daily for 4 days, then one 1 mg tablet by mouth twice daily  , Disp: 53 tablet, Rfl: 0    azithromycin (ZITHROMAX) 250 mg tablet, Take 2 tablets today then 1 tablet daily x 4 days, Disp: 6 tablet, Rfl: 0    varenicline (Chantix Starting Month Jay) 0 5 MG X 11 & 1 MG X 42 tablet, PLEASE SEE ATTACHED FOR DETAILED DIRECTIONS, Disp: , Rfl:     Vit C-Cholecalciferol-Alessandra Hip (Vitamin C & D3/Alessandra Hips) 500-1000-20 MG-UNIT-MG CAPS, Take by mouth, Disp: , Rfl:     Current Allergies     Allergies as of 2021 - Reviewed 2021   Allergen Reaction Noted    Pollen extract  12/10/2020            The following portions of the patient's history were reviewed and updated as appropriate: allergies, current medications, past family history, past medical history, past social history, past surgical history and problem list      Past Medical History:   Diagnosis Date    Anxiety     Arthritis     Asthma     Depression     Osteoporosis     Urinary tract infection        Past Surgical History:   Procedure Laterality Date     SECTION      TUBAL LIGATION         Family History   Problem Relation Age of Onset    Cancer Mother     Colon cancer Mother     Breast cancer Mother     Diabetes Mother     Coronary artery disease Mother     Ovarian cancer Mother     Cancer Father     Prostate cancer Father          Medications have been verified  Objective   Pulse 77   Temp (!) 97 3 °F (36 3 °C)   Ht 5' 6" (1 676 m)   Wt 81 6 kg (180 lb)   SpO2 100%   BMI 29 05 kg/m²   No LMP recorded  Physical Exam     Physical Exam  Vitals signs reviewed  Constitutional:       General: She is not in acute distress  Appearance: She is well-developed and well-groomed  She is not ill-appearing  HENT:      Head: Normocephalic  Right Ear: Tympanic membrane and ear canal normal       Left Ear: Tympanic membrane and ear canal normal       Nose: Congestion present  Mouth/Throat:      Lips: Pink  Mouth: Mucous membranes are moist       Pharynx: Oropharynx is clear  Eyes:      General: Lids are normal          Right eye: No discharge  Left eye: No discharge  Neck:      Musculoskeletal: Normal range of motion  Cardiovascular:      Rate and Rhythm: Regular rhythm  Pulses:           Dorsalis pedis pulses are 2+ on the right side and 2+ on the left side  Heart sounds: Normal heart sounds, S1 normal and S2 normal    Pulmonary:      Effort: Pulmonary effort is normal  No respiratory distress  Breath sounds: Examination of the right-lower field reveals wheezing and rhonchi  Examination of the left-lower field reveals wheezing and rhonchi  Wheezing and rhonchi (faint) present  No decreased breath sounds  Musculoskeletal:      Right foot: Normal range of motion and normal capillary refill  Bony tenderness present  Feet:    Lymphadenopathy:      Cervical: No cervical adenopathy  Skin:     General: Skin is warm and dry  Capillary Refill: Capillary refill takes less than 2 seconds  Neurological:      Mental Status: She is alert  Psychiatric:         Attention and Perception: Attention normal          Mood and Affect: Mood normal          Speech: Speech normal          Behavior: Behavior is cooperative

## 2021-01-03 NOTE — PATIENT INSTRUCTIONS
Suspicious findings on chest x-ray with concern of wheezing, cough  Please begin oral antibiotic therapy as prescribed  CoVid swab performed in office today  Results to be reviewed and treatment plan adjusted as needed  Encouraged adequate hydration  Monitor for persistent or worsening symptoms including cough, shortness of breath and follow up with PCP as needed  Right foot x-ray appearance of severe arthritis symptoms  Advised to follow-up with PCP for consideration of MRI to rule out stress fracture  Contact pain management on Monday for virtual visit to discuss medication management to reduce chronic pain symptoms

## 2021-01-05 ENCOUNTER — TELEMEDICINE (OUTPATIENT)
Dept: FAMILY MEDICINE CLINIC | Facility: CLINIC | Age: 46
End: 2021-01-05
Payer: COMMERCIAL

## 2021-01-05 DIAGNOSIS — M25.571 CHRONIC PAIN OF RIGHT ANKLE: ICD-10-CM

## 2021-01-05 DIAGNOSIS — U07.1 COVID-19: ICD-10-CM

## 2021-01-05 DIAGNOSIS — M79.7 FIBROMYALGIA: Primary | ICD-10-CM

## 2021-01-05 DIAGNOSIS — G89.29 CHRONIC PAIN OF RIGHT ANKLE: ICD-10-CM

## 2021-01-05 LAB — SARS-COV-2 RNA SPEC QL NAA+PROBE: DETECTED

## 2021-01-05 PROCEDURE — 99214 OFFICE O/P EST MOD 30 MIN: CPT | Performed by: PHYSICIAN ASSISTANT

## 2021-01-05 RX ORDER — DULOXETIN HYDROCHLORIDE 30 MG/1
30 CAPSULE, DELAYED RELEASE ORAL DAILY
Qty: 30 CAPSULE | Refills: 0 | Status: SHIPPED | OUTPATIENT
Start: 2021-01-05 | End: 2021-01-29

## 2021-01-05 NOTE — PROGRESS NOTES
COVID-19 Virtual Visit     Assessment/Plan:    Problem List Items Addressed This Visit        Other    COVID-19    Fibromyalgia - Primary    Relevant Medications    DULoxetine (CYMBALTA) 30 mg delayed release capsule    Chronic pain of right ankle    Relevant Orders    Ambulatory referral to Physical Therapy    Ambulatory referral to Sports Medicine         Disposition:     I recommended continued isolation until at least 24 hours have passed since recovery defined as resolution of fever without the use of fever-reducing medications and improvement in respiratory symptoms (e g , cough, shortness of breath) AND 10 days have passed since onset of symptoms  Sx day 7 covid day 5, continue albuterol and supportive care, close follow ups     As for her ankle pain and fibromyalgia, discussed starting cymbalta, PT and referrals to ortho for further evaluation     I have spent 20 minutes directly with the patient  Encounter provider Paxton Oconnor PA-C    Provider located at [address]  [address] [place] [address]    Recent Visits  No visits were found meeting these conditions  Showing recent visits within past 7 days and meeting all other requirements     Today's Visits  Date Type Provider Dept   01/05/21 Telemedicine [facility]ll[facility]    Showing today's visits and meeting all other requirements     Future Appointments  No visits were found meeting these conditions  Showing future appointments within next 150 days and meeting all other requirements        Patient agrees to participate in a virtual check in via telephone or video visit instead of presenting to the office to address urgent/immediate medical needs  Patient is aware this is a billable service  After connecting through Telephone, the patient was identified by name and date of birth   James Ellington was informed that this was a telemedicine visit and that the exam was being conducted confidentially over secure lines  My office door was closed  No one else was in the room  Bethany Shetty acknowledged consent and understanding of privacy and security of the telemedicine visit  I informed the patient that I have reviewed her record in Epic and presented the opportunity for her to ask any questions regarding the visit today  The patient agreed to participate  It was my intent to perform this visit via video technology but the patient was not able to do a video connection so the visit was completed via audio telephone only  Subjective:   Bethany Shetty is a 39 y o  female who has been screened for COVID-19  Symptom change since last report: unchanged  Patient is asymptomatic  Date of symptom onset: 12/29/2020    Patient's symptoms include fatigue, anosmia, loss of taste, cough and chest tightness  Patient denies fever, chills, malaise, congestion, rhinorrhea, sore throat, shortness of breath, abdominal pain, nausea, vomiting, diarrhea, myalgias and headaches  Tony Cobian has been staying home and has isolated themselves in her home  She is taking care to not share personal items and is cleaning all surfaces that are touched often, like counters, tabletops, and doorknobs using household cleaning sprays or wipes  She is wearing a mask when she leaves her room  Sx day 7 covid day 5  Some chest tightness and cough continuing  Ongoing loss of taste and smell with fatigue  Afebrile  No overt SOB or acute distress      Ongoing pain of R ankle, chronic since childhood but worsened since her diagnosis of fibromyalgia  She does not like the way gabapentin makes her feel and it does not help her pain  She has been avoiding NSAIDS due to stomach pain   She has a normal XRay of the R ankle 1/2/21    Lab Results   Component Value Date    SARSCOV2 Detected (A) 01/02/2021     Past Medical History:   Diagnosis Date    Anxiety     Arthritis     Asthma     Depression     Osteoporosis     Urinary tract infection      Past Surgical History:   Procedure Laterality Date     SECTION      TUBAL LIGATION       Current Outpatient Medications   Medication Sig Dispense Refill    albuterol (PROVENTIL HFA,VENTOLIN HFA) 90 mcg/act inhaler Inhale 2 puffs every 6 (six) hours as needed for wheezing (cough) 1 Inhaler 0    azithromycin (ZITHROMAX) 250 mg tablet Take 2 tablets today then 1 tablet daily x 4 days 6 tablet 0    Cholecalciferol (Vitamin D3) 25 MCG (1000 UT) CAPS Take by mouth      clobetasol (TEMOVATE) 0 05 % ointment APPLY TO AFFECTED AREA TWICE A DAY 30 g 0    Cyanocobalamin 1000 MCG/ML LIQD Take by mouth      DULoxetine (CYMBALTA) 30 mg delayed release capsule Take 1 capsule (30 mg total) by mouth daily 30 capsule 0    gabapentin (NEURONTIN) 100 mg capsule Take 100 mg by mouth      Multiple Vitamin (MULTIVITAMIN) capsule Take 1 capsule by mouth daily      olopatadine (PATANOL) 0 1 % ophthalmic solution Administer 1 drop to both eyes 2 (two) times a day 5 mL 3    varenicline (CHANTIX JAY) 0 5 MG X 11 & 1 MG X 42 tablet Take one 0 5 mg tablet by mouth once daily for 3 days, then one 0 5 mg tablet by mouth twice daily for 4 days, then one 1 mg tablet by mouth twice daily  53 tablet 0    varenicline (Chantix Starting Month Jay) 0 5 MG X 11 & 1 MG X 42 tablet PLEASE SEE ATTACHED FOR DETAILED DIRECTIONS      Vit C-Cholecalciferol-Alessandra Hip (Vitamin C & D3/Alessandra Hips) 500-1000-20 MG-UNIT-MG CAPS Take by mouth       No current facility-administered medications for this visit  Allergies   Allergen Reactions    Pollen Extract        Review of Systems   Constitutional: Positive for fatigue  Negative for chills and fever  HENT: Negative for congestion, rhinorrhea and sore throat  Respiratory: Positive for cough and chest tightness  Negative for shortness of breath  Gastrointestinal: Negative for abdominal pain, diarrhea, nausea and vomiting     Musculoskeletal: Positive for arthralgias  Negative for myalgias  Neurological: Negative for headaches  Objective: There were no vitals filed for this visit  Physical Exam  Vitals signs and nursing note reviewed  Constitutional:       General: She is not in acute distress  Pulmonary:      Effort: Pulmonary effort is normal  No respiratory distress  Neurological:      Mental Status: She is alert and oriented to person, place, and time  Psychiatric:         Mood and Affect: Mood normal          Behavior: Behavior normal        VIRTUAL VISIT DISCLAIMER    Jesse Rey acknowledges that she has consented to an online visit or consultation  She understands that the online visit is based solely on information provided by her, and that, in the absence of a face-to-face physical evaluation by the physician, the diagnosis she receives is both limited and provisional in terms of accuracy and completeness  This is not intended to replace a full medical face-to-face evaluation by the physician  Jesse Rey understands and accepts these terms

## 2021-01-07 ENCOUNTER — TELEMEDICINE (OUTPATIENT)
Dept: FAMILY MEDICINE CLINIC | Facility: CLINIC | Age: 46
End: 2021-01-07
Payer: COMMERCIAL

## 2021-01-07 DIAGNOSIS — U07.1 COVID-19: Primary | ICD-10-CM

## 2021-01-07 PROCEDURE — 99213 OFFICE O/P EST LOW 20 MIN: CPT | Performed by: PHYSICIAN ASSISTANT

## 2021-01-07 NOTE — PROGRESS NOTES
COVID-19 Virtual Visit     Assessment/Plan:    Problem List Items Addressed This Visit        Other    COVID-19 - Primary         Disposition:     I recommended continued isolation until at least 24 hours have passed since recovery defined as resolution of fever without the use of fever-reducing medications AND improvement in COVID symptoms AND 10 days have passed since onset of symptoms (or 10 days have passed since date of first positive viral diagnostic test for asymptomatic patients)  Sx day 9 covid day 7, N/V/D persisting, some chest tightness  Continue supportive care, isolation, follow ups     I have spent 10 minutes directly with the patient  Encounter provider Feliberto Pisano PA-C    Provider located at [place] [address]  [address] [place] [id number]    Recent Visits  Date Type Provider Dept   01/05/21 Telemedicine [facility]IAM [facility]   Showing recent visits within past 7 days and meeting all other requirements     Today's Visits  Date Type Provider Dept   01/07/21 Telemedicine [facility]   Showing today's visits and meeting all other requirements     Future Appointments  No visits were found meeting these conditions  Showing future appointments within next 150 days and meeting all other requirements      This virtual check-in was done via [employer] and patient was informed that this is not a secure, HIPAA-compliant platform  She agrees to proceed  Patient agrees to participate in a virtual check in via telephone or video visit instead of presenting to the office to address urgent/immediate medical needs  Patient is aware this is a billable service  After connecting through [facility], the patient was identified by name and date of birth  Abdirahman Pennington was informed that this was a telemedicine visit and that the exam was being conducted confidentially over secure lines   My office door was closed  No one else was in the room  Lizzie Cornell acknowledged consent and understanding of privacy and security of the telemedicine visit  I informed the patient that I have reviewed her record in Epic and presented the opportunity for her to ask any questions regarding the visit today  The patient agreed to participate  Subjective:   Lizzie Cornell is a 39 y o  female who has been screened for COVID-19  Symptom change since last report: unchanged  Patient is currently asymptomatic  Patient's symptoms include anosmia, loss of taste, chest tightness, nausea, vomiting and diarrhea  Patient denies fever, chills, fatigue, malaise, congestion, rhinorrhea, sore throat, cough, shortness of breath, abdominal pain, myalgias and headaches  Jose Holder has been staying home and has isolated themselves in her home  She is taking care to not share personal items and is cleaning all surfaces that are touched often, like counters, tabletops, and doorknobs using household cleaning sprays or wipes  She is wearing a mask when she leaves her room  Date of symptom onset: 2020    Sx day 9 covid day 7  Pt experiencing N/V/D, although she has been eating a bland diet with fewer sx  Chest tightness relieved with inhalers  Has been afebrile       Lab Results   Component Value Date    SARSCOV2 Detected (A) 2021     Past Medical History:   Diagnosis Date    Anxiety     Arthritis     Asthma     Depression     Osteoporosis     Urinary tract infection      Past Surgical History:   Procedure Laterality Date     SECTION      TUBAL LIGATION       Current Outpatient Medications   Medication Sig Dispense Refill    albuterol (PROVENTIL HFA,VENTOLIN HFA) 90 mcg/act inhaler Inhale 2 puffs every 6 (six) hours as needed for wheezing (cough) 1 Inhaler 0    Cholecalciferol (Vitamin D3) 25 MCG (1000 UT) CAPS Take by mouth      clobetasol (TEMOVATE) 0 05 % ointment APPLY TO AFFECTED AREA TWICE A DAY 30 g 0    Cyanocobalamin 1000 MCG/ML LIQD Take by mouth      DULoxetine (CYMBALTA) 30 mg delayed release capsule Take 1 capsule (30 mg total) by mouth daily 30 capsule 0    gabapentin (NEURONTIN) 100 mg capsule Take 100 mg by mouth      Multiple Vitamin (MULTIVITAMIN) capsule Take 1 capsule by mouth daily      olopatadine (PATANOL) 0 1 % ophthalmic solution Administer 1 drop to both eyes 2 (two) times a day 5 mL 3    varenicline (CHANTIX JAY) 0 5 MG X 11 & 1 MG X 42 tablet Take one 0 5 mg tablet by mouth once daily for 3 days, then one 0 5 mg tablet by mouth twice daily for 4 days, then one 1 mg tablet by mouth twice daily  53 tablet 0    varenicline (Chantix Starting Month Jay) 0 5 MG X 11 & 1 MG X 42 tablet PLEASE SEE ATTACHED FOR DETAILED DIRECTIONS      Vit C-Cholecalciferol-Alessandra Hip (Vitamin C & D3/Alessandra Hips) 500-1000-20 MG-UNIT-MG CAPS Take by mouth       No current facility-administered medications for this visit  Allergies   Allergen Reactions    Pollen Extract        Review of Systems   Constitutional: Negative for chills, fatigue and fever  HENT: Negative for congestion, rhinorrhea and sore throat  Respiratory: Positive for chest tightness  Negative for cough and shortness of breath  Gastrointestinal: Positive for diarrhea, nausea and vomiting  Negative for abdominal pain  Musculoskeletal: Negative for myalgias  Neurological: Negative for headaches  Objective: There were no vitals filed for this visit  Physical Exam  Vitals signs and nursing note reviewed  Constitutional:       General: She is not in acute distress  Appearance: Normal appearance  She is not ill-appearing  HENT:      Head: Normocephalic and atraumatic  Nose: Nose normal    Eyes:      Conjunctiva/sclera: Conjunctivae normal    Neck:      Musculoskeletal: Normal range of motion  Cardiovascular:      Pulses: Normal pulses  Pulmonary:      Effort: Pulmonary effort is normal  No respiratory distress  Skin:     Coloration: Skin is not pale  Findings: No erythema or rash  Neurological:      Mental Status: She is alert and oriented to person, place, and time  Psychiatric:         Mood and Affect: Mood normal          Behavior: Behavior normal        VIRTUAL VISIT DISCLAIMER    Abdirahman Aditi acknowledges that she has consented to an online visit or consultation  She understands that the online visit is based solely on information provided by her, and that, in the absence of a face-to-face physical evaluation by the physician, the diagnosis she receives is both limited and provisional in terms of accuracy and completeness  This is not intended to replace a full medical face-to-face evaluation by the physician  Abdirahman Pennington understands and accepts these terms

## 2021-01-08 ENCOUNTER — TELEMEDICINE (OUTPATIENT)
Dept: FAMILY MEDICINE CLINIC | Facility: CLINIC | Age: 46
End: 2021-01-08
Payer: COMMERCIAL

## 2021-01-08 DIAGNOSIS — U07.1 COVID-19: Primary | ICD-10-CM

## 2021-01-08 PROCEDURE — 99213 OFFICE O/P EST LOW 20 MIN: CPT | Performed by: PHYSICIAN ASSISTANT

## 2021-01-08 NOTE — PROGRESS NOTES
COVID-19 Virtual Visit     Assessment/Plan:    Problem List Items Addressed This Visit        Other    COVID-19 - Primary         Disposition:         Sx day 10 covid day 7  Sx much improved today  Now meets criteria for lifting of isolation  Continue social precautions and follow up as needed  I have spent 10 minutes directly with the patient  Encounter provider Chloe Hadley PA-C    Provider located at [address]  [address] [place] [phone]    Recent Visits  Date Type Provider Dept   01/07/21 Telemedicine [facility]IAM [facility]   01/05/21 Telemedicine WILLIAM Fuentes [facility]   Showing recent visits within past 7 days and meeting all other requirements     Today's Visits  Date Type Provider Dept   01/08/21 Telemedicine WILLIAM [facility]   Showing today's visits and meeting all other requirements     Future Appointments  No visits were found meeting these conditions  Showing future appointments within next 150 days and meeting all other requirements      This virtual check-in was done via [employer] and patient was informed that this is not a secure, HIPAA-compliant platform  She agrees to proceed  Patient agrees to participate in a virtual check in via telephone or video visit instead of presenting to the office to address urgent/immediate medical needs  Patient is aware this is a billable service  After connecting through [facility], the patient was identified by name and date of birth  Steve Cervantes was informed that this was a telemedicine visit and that the exam was being conducted confidentially over secure lines  My office door was closed  No one else was in the room  Steve Cervantes acknowledged consent and understanding of privacy and security of the telemedicine visit   I informed the patient that I have reviewed her record in Epic and presented the opportunity for her to ask any questions regarding the visit today  The patient agreed to participate  Subjective:   Cate Spears is a 39 y o  female who has been screened for COVID-19  Symptom change since last report: resolving  Patient is currently asymptomatic  Patient's symptoms include cough  Patient denies fever, chills, fatigue, malaise, congestion, rhinorrhea, sore throat, anosmia, loss of taste, shortness of breath, chest tightness, abdominal pain, nausea, vomiting, diarrhea, myalgias and headaches  Radha Siegel has been staying home and has isolated themselves in her home  She is taking care to not share personal items and is cleaning all surfaces that are touched often, like counters, tabletops, and doorknobs using household cleaning sprays or wipes  She is wearing a mask when she leaves her room  Date of symptom onset: 2020    Sx day 10 covid day 7  N/V/D resolved, feels much improved  Lingering slight cough, continues to improve  No SOB  No fevers in 3+ days       Lab Results   Component Value Date    SARSCOV2 Detected (A) 2021     Past Medical History:   Diagnosis Date    Anxiety     Arthritis     Asthma     Depression     Osteoporosis     Urinary tract infection      Past Surgical History:   Procedure Laterality Date     SECTION      TUBAL LIGATION       Current Outpatient Medications   Medication Sig Dispense Refill    albuterol (PROVENTIL HFA,VENTOLIN HFA) 90 mcg/act inhaler Inhale 2 puffs every 6 (six) hours as needed for wheezing (cough) 1 Inhaler 0    Cholecalciferol (Vitamin D3) 25 MCG (1000 UT) CAPS Take by mouth      clobetasol (TEMOVATE) 0 05 % ointment APPLY TO AFFECTED AREA TWICE A DAY 30 g 0    Cyanocobalamin 1000 MCG/ML LIQD Take by mouth      DULoxetine (CYMBALTA) 30 mg delayed release capsule Take 1 capsule (30 mg total) by mouth daily 30 capsule 0    gabapentin (NEURONTIN) 100 mg capsule Take 100 mg by mouth      Multiple Vitamin (MULTIVITAMIN) capsule Take 1 capsule by mouth daily      olopatadine (PATANOL) 0 1 % ophthalmic solution Administer 1 drop to both eyes 2 (two) times a day 5 mL 3    varenicline (CHANTIX ANSELMO) 0 5 MG X 11 & 1 MG X 42 tablet Take one 0 5 mg tablet by mouth once daily for 3 days, then one 0 5 mg tablet by mouth twice daily for 4 days, then one 1 mg tablet by mouth twice daily  53 tablet 0    varenicline (Chantix Starting Month Anselmo) 0 5 MG X 11 & 1 MG X 42 tablet PLEASE SEE ATTACHED FOR DETAILED DIRECTIONS      Vit C-Cholecalciferol-Alessandra Hip (Vitamin C & D3/Alessandra Hips) 500-1000-20 MG-UNIT-MG CAPS Take by mouth       No current facility-administered medications for this visit  Allergies   Allergen Reactions    Pollen Extract        Review of Systems   Constitutional: Negative for chills, fatigue and fever  HENT: Negative for congestion, rhinorrhea and sore throat  Respiratory: Positive for cough  Negative for chest tightness and shortness of breath  Gastrointestinal: Negative for abdominal pain, diarrhea, nausea and vomiting  Musculoskeletal: Negative for myalgias  Neurological: Negative for headaches  Objective: There were no vitals filed for this visit  Physical Exam  Vitals signs and nursing note reviewed  Constitutional:       General: She is not in acute distress  Appearance: Normal appearance  She is not ill-appearing  HENT:      Head: Normocephalic and atraumatic  Nose: Nose normal    Eyes:      Conjunctiva/sclera: Conjunctivae normal    Neck:      Musculoskeletal: Normal range of motion  Pulmonary:      Effort: Pulmonary effort is normal  No respiratory distress  Skin:     Coloration: Skin is not pale  Findings: No erythema or rash  Neurological:      Mental Status: She is alert and oriented to person, place, and time     Psychiatric:         Mood and Affect: Mood normal          Behavior: Behavior normal        VIRTUAL VISIT DISCLAIMER    Sharath Duffsonido acknowledges that she has consented to an online visit or consultation  She understands that the online visit is based solely on information provided by her, and that, in the absence of a face-to-face physical evaluation by the physician, the diagnosis she receives is both limited and provisional in terms of accuracy and completeness  This is not intended to replace a full medical face-to-face evaluation by the physician  Steve Cervantes understands and accepts these terms

## 2021-01-21 ENCOUNTER — EVALUATION (OUTPATIENT)
Dept: PHYSICAL THERAPY | Facility: CLINIC | Age: 46
End: 2021-01-21
Payer: COMMERCIAL

## 2021-01-21 DIAGNOSIS — M25.571 CHRONIC PAIN OF RIGHT ANKLE: ICD-10-CM

## 2021-01-21 DIAGNOSIS — G89.29 CHRONIC PAIN OF RIGHT ANKLE: ICD-10-CM

## 2021-01-21 DIAGNOSIS — M79.671 RIGHT FOOT PAIN: Primary | ICD-10-CM

## 2021-01-21 PROCEDURE — 97161 PT EVAL LOW COMPLEX 20 MIN: CPT | Performed by: PHYSICAL THERAPIST

## 2021-01-21 PROCEDURE — 97110 THERAPEUTIC EXERCISES: CPT | Performed by: PHYSICAL THERAPIST

## 2021-01-21 NOTE — LETTER
2021    Se Smith, [facility]  [address]    Patient: Jesse Rey   YOB: 1975   Date of Visit: 2021     Encounter Diagnosis     ICD-10-CM    1  Right foot pain  M79 671    2  Chronic pain of right ankle  M25 571 Ambulatory referral to Physical Therapy    G89 29        Dear Dr Seven Leggett: Thank you for your recent referral of Jesse Rey  Please review the attached evaluation summary from Jami's recent visit  Please verify that you agree with the plan of care by signing the attached order  If you have any questions or concerns, please do not hesitate to call  I sincerely appreciate the opportunity to share in the care of one of your patients and hope to have another opportunity to work with you in the near future  Sincerely,    Keyur Nye, PT      Referring Provider:      I certify that I have read the below Plan of Care and certify the need for these services furnished under this plan of treatment while under my care  Se Smith PA-C  [address] [facility]  [address]  [place] [place]          PT Evaluation     Today's date: 2021  Patient name: Jesse Rey  : 1975  MRN: [id number]  Referring provider: Tang Davenport PA-C  Dx:   Encounter Diagnosis     ICD-10-CM    1  Right foot pain  M79 671    2   Chronic pain of right ankle  M25 571 Ambulatory referral to Physical Therapy    G89 29        Start Time: 1100  Stop Time: 1145  Total time in clinic (min): 45 minutes    Assessment  Assessment details: Patient presents with chronic R ankle/foot pain which recently worsened, demonstrates (+) tenderness R dorsum of foot, decreased Arom and Strength, reports difficulty driving, walking, stairclimbing; patient would benefit from therapeutic exercise, manual therapy, gait, balance, and stability training, patient education regarding joint protection, modalities PRN; patient issued HEP  Impairments: abnormal or restricted ROM, impaired physical strength and pain with function  Understanding of Dx/Px/POC: good   Prognosis: good    Goals  STG  1  Decrease pain 50% in 3 weeks  2  Increase Rom 50% in 3 weeks  3  Increase Strength half grade in 3 weeks  4  Compliant with HEP in 3 weeks  LTG  1  Decrease pain to mild to none in 6 weeks  2  Increase Rom WNL in 6 weeks  3  Increase Strength WNL in 6 weeks  4  Able to drive with mild to no difficulty in 6 weeks  5  Able to walk with mild to no difficulty in 6 weeks  6  Able to ascend/descend stairs with mild to no difficulty in 6 weeks    Plan  Patient would benefit from: skilled physical therapy  Planned modality interventions: unattended electrical stimulation  Planned therapy interventions: patient education, neuromuscular re-education, manual therapy, therapeutic exercise, home exercise program, functional ROM exercises and balance  Frequency: 2x week  Duration in weeks: 6        Subjective Evaluation    History of Present Illness  Mechanism of injury: Chronic, recently worsened          Recurrent probem    Quality of life: good    Pain  Current pain ratin  At best pain rating: 3  At worst pain rating: 10  Quality: squeezing, pressure and sharp  Relieving factors: medications  Aggravating factors: walking and stair climbing    Social Support  Steps to enter house: yes  Stairs in house: yes   Lives in: multiple-level home  Lives with: spouse and adult children    Employment status: working    Diagnostic Tests  X-ray: abnormal  Treatments  Current treatment: medication  Patient Goals  Patient goals for therapy: increased strength, decreased pain and increased motion  Patient goal: drive, walk, ascend/descend stairs        Objective     Tenderness     Right Ankle/Foot   Tenderness in the dorsum foot       Active Range of Motion     Right Ankle/Foot   Great toe flexion: 10 degrees     Additional Active Range of Motion Details  R ankle Df 0    Strength/Myotome Testing     Right Ankle/Foot   Dorsiflexion: 4-  Plantar flexion: 4+  Inversion: 4  Eversion: 4  Great toe flexion: 4-  Great toe extension: 4+      Flowsheet Rows      Most Recent Value   PT/OT G-Codes   Current Score  48   Projected Score  65             Precautions: RA, no heat      Manual 1/21            MFR             Jt mobs                                       Neuro Re-Ed             Stance on foam             Stability disc             Foam balance beam                                                                 Ther Ex             Pro stretch             Prom/stretch             Bike             CS/HS             // bars 5-way             Pt ed - HEP 20                                      Ther Activity                                       Gait Training             gym             TM             Modalities             E-Stim

## 2021-01-21 NOTE — PROGRESS NOTES
PT Evaluation     Today's date: 2021  Patient name: Steve Cervantes  : 1975  MRN: [id number]  Referring provider: Christina Nogueira PA-C  Dx:   Encounter Diagnosis     ICD-10-CM    1  Right foot pain  M79 671    2  Chronic pain of right ankle  M25 571 Ambulatory referral to Physical Therapy    G89 29        Start Time: 1100  Stop Time: 1145  Total time in clinic (min): 45 minutes    Assessment  Assessment details: Patient presents with chronic R ankle/foot pain which recently worsened, demonstrates (+) tenderness R dorsum of foot, decreased Arom and Strength, reports difficulty driving, walking, stairclimbing; patient would benefit from therapeutic exercise, manual therapy, gait, balance, and stability training, patient education regarding joint protection, modalities PRN; patient issued HEP  Impairments: abnormal or restricted ROM, impaired physical strength and pain with function  Understanding of Dx/Px/POC: good   Prognosis: good    Goals  STG  1  Decrease pain 50% in 3 weeks  2  Increase Rom 50% in 3 weeks  3  Increase Strength half grade in 3 weeks  4  Compliant with HEP in 3 weeks  LTG  1  Decrease pain to mild to none in 6 weeks  2  Increase Rom WNL in 6 weeks  3  Increase Strength WNL in 6 weeks  4  Able to drive with mild to no difficulty in 6 weeks  5  Able to walk with mild to no difficulty in 6 weeks  6   Able to ascend/descend stairs with mild to no difficulty in 6 weeks    Plan  Patient would benefit from: skilled physical therapy  Planned modality interventions: unattended electrical stimulation  Planned therapy interventions: patient education, neuromuscular re-education, manual therapy, therapeutic exercise, home exercise program, functional ROM exercises and balance  Frequency: 2x week  Duration in weeks: 6        Subjective Evaluation    History of Present Illness  Mechanism of injury: Chronic, recently worsened          Recurrent probem    Quality of life: good    Pain  Current pain ratin  At best pain rating: 3  At worst pain rating: 10  Quality: squeezing, pressure and sharp  Relieving factors: medications  Aggravating factors: walking and stair climbing    Social Support  Steps to enter house: yes  Stairs in house: yes   Lives in: multiple-level home  Lives with: spouse and adult children    Employment status: working    Diagnostic Tests  X-ray: abnormal  Treatments  Current treatment: medication  Patient Goals  Patient goals for therapy: increased strength, decreased pain and increased motion  Patient goal: drive, walk, ascend/descend stairs        Objective     Tenderness     Right Ankle/Foot   Tenderness in the dorsum foot       Active Range of Motion     Right Ankle/Foot   Great toe flexion: 10 degrees     Additional Active Range of Motion Details  R ankle Df 0    Strength/Myotome Testing     Right Ankle/Foot   Dorsiflexion: 4-  Plantar flexion: 4+  Inversion: 4  Eversion: 4  Great toe flexion: 4-  Great toe extension: 4+      Flowsheet Rows      Most Recent Value   PT/OT G-Codes   Current Score  48   Projected Score  65             Precautions: RA, no heat      Manual             MFR             Jt mobs                                       Neuro Re-Ed             Stance on foam             Stability disc             Foam balance beam                                                                 Ther Ex             Pro stretch             Prom/stretch             Bike             CS/HS             // bars 5-way             Pt ed - HEP 20                                      Ther Activity                                       Gait Training             gym             TM             Modalities             E-Stim

## 2021-01-25 ENCOUNTER — ANNUAL EXAM (OUTPATIENT)
Dept: OBGYN CLINIC | Facility: CLINIC | Age: 46
End: 2021-01-25
Payer: COMMERCIAL

## 2021-01-25 VITALS
HEIGHT: 66 IN | WEIGHT: 189 LBS | BODY MASS INDEX: 30.37 KG/M2 | DIASTOLIC BLOOD PRESSURE: 84 MMHG | SYSTOLIC BLOOD PRESSURE: 128 MMHG

## 2021-01-25 DIAGNOSIS — Z12.31 ENCOUNTER FOR SCREENING MAMMOGRAM FOR MALIGNANT NEOPLASM OF BREAST: ICD-10-CM

## 2021-01-25 DIAGNOSIS — Z80.9 MATERNAL FAMILY HISTORY OF CANCER: ICD-10-CM

## 2021-01-25 DIAGNOSIS — Z01.419 ENCOUNTER FOR ANNUAL ROUTINE GYNECOLOGICAL EXAMINATION: Primary | ICD-10-CM

## 2021-01-25 PROCEDURE — 99396 PREV VISIT EST AGE 40-64: CPT | Performed by: STUDENT IN AN ORGANIZED HEALTH CARE EDUCATION/TRAINING PROGRAM

## 2021-01-25 NOTE — PROGRESS NOTES
Assessment/Plan:    41 yo  here for annual exam      Problem List Items Addressed This Visit     None      Visit Diagnoses     Encounter for annual routine gynecological examination    -  Primary  Pap due     Encounter for screening mammogram for malignant neoplasm of breast        Relevant Orders    Mammo screening bilateral w 3d & cad    Maternal family history of cancer   Had genetic counseling/testing over the past year but returned insufficient  Will contact [facility] to figure out next steps - pt would like to be retested  Subjective:      Patient ID: Nela Yao is a 42 y o  female  This is a 42 y o  [id number] with last menstrual period was 01/15/2021 (exact date) currently using tubal for contraception  She has regular periods of normal amount and duration  She is sexually active and declines STD testing today  She has no issues with intercourse  No bowel or bladder issues  Recently recovered from covid  Quit smoking on 21 and is doing well!!    Screening:  Last pap smear: 2020 neg/neg  Last mammo:  - negative    Family history: Mom - breast/colon/ovarian cancer  Genetic testing returned insufficient  The following portions of the patient's history were reviewed and updated as appropriate: allergies, current medications, past family history, past medical history, past social history, past surgical history and problem list     Review of Systems   Constitutional: Negative  HENT: Negative  Eyes: Negative  Respiratory: Negative  Cardiovascular: Negative  Gastrointestinal: Negative  Endocrine: Negative  Genitourinary: Negative for dyspareunia, dysuria, frequency, menstrual problem, pelvic pain, vaginal discharge and vaginal pain  Musculoskeletal: Negative  Skin: Negative  Allergic/Immunologic: Negative  Neurological: Negative  Hematological: Negative  Psychiatric/Behavioral: Negative            Objective:      /84 (BP Location: Left arm, Patient Position: Sitting, Cuff Size: Standard)   Ht 5' 6" (1 676 m)   Wt 85 7 kg (189 lb)   LMP 01/15/2021 (Exact Date)   BMI 30 51 kg/m²          Physical Exam  Vitals signs reviewed  Exam conducted with a chaperone present  Neck:      Musculoskeletal: Normal range of motion  Cardiovascular:      Rate and Rhythm: Normal rate  Pulmonary:      Effort: Pulmonary effort is normal    Chest:      Breasts: Breasts are symmetrical          Right: No mass, nipple discharge, skin change or tenderness  Left: No mass, nipple discharge, skin change or tenderness  Abdominal:      Palpations: Abdomen is soft  Genitourinary:     Labia:         Right: No rash  Left: No rash  Vagina: Normal  No signs of injury  Cervix: No cervical motion tenderness or friability  Uterus: Not enlarged and not fixed  Adnexa:         Right: No mass  Left: No mass  Comments: +nabothian cysts  Skin:     General: Skin is warm and dry  Neurological:      Mental Status: She is alert and oriented to person, place, and time

## 2021-01-26 ENCOUNTER — APPOINTMENT (OUTPATIENT)
Dept: PHYSICAL THERAPY | Facility: CLINIC | Age: 46
End: 2021-01-26
Payer: COMMERCIAL

## 2021-01-28 ENCOUNTER — TELEPHONE (OUTPATIENT)
Dept: GENETICS | Facility: CLINIC | Age: 46
End: 2021-01-28

## 2021-01-28 ENCOUNTER — OFFICE VISIT (OUTPATIENT)
Dept: PHYSICAL THERAPY | Facility: CLINIC | Age: 46
End: 2021-01-28
Payer: COMMERCIAL

## 2021-01-28 DIAGNOSIS — M25.571 CHRONIC PAIN OF RIGHT ANKLE: Primary | ICD-10-CM

## 2021-01-28 DIAGNOSIS — M79.671 RIGHT FOOT PAIN: ICD-10-CM

## 2021-01-28 DIAGNOSIS — G89.29 CHRONIC PAIN OF RIGHT ANKLE: Primary | ICD-10-CM

## 2021-01-28 PROCEDURE — 97110 THERAPEUTIC EXERCISES: CPT

## 2021-01-28 PROCEDURE — 97140 MANUAL THERAPY 1/> REGIONS: CPT

## 2021-01-28 NOTE — TELEPHONE ENCOUNTER
Contacted patient to discuss obtaining a sample for Genetic Testing  Patient would like blood kit sent  Email sent to [employer] with [employer], [place], [place] and [employer], and Cover letter

## 2021-01-28 NOTE — PROGRESS NOTES
Daily Note     Today's date: 2021  Patient name: Noe Zarate  : 1975  MRN: [id number]  Referring provider: Fito Valdivia PA-C  Dx:   Encounter Diagnosis     ICD-10-CM    1  Chronic pain of right ankle  M25 571     G89 29    2  Right foot pain  M79 671        Start Time: 1500  Stop Time: 1545  Total time in clinic (min): 45 minutes    Subjective: Patient stated pain in ankle/foot is about a 7/10  Objective: See treatment diary below      Assessment: Patient tolerated session well  MFR and PROM to ankle and foot to reduce pain and tightness  Improved ankle mobility post PROM  Plan: Continue per plan of care        Precautions: RA, no heat      Manual            MFR  15'           Jt mobs                                       Neuro Re-Ed             Stance on foam             Stability disc             Foam balance beam                                                                 Ther Ex             Pro stretch  30" 3x           Prom/stretch  10'           Bike  12'           CS/HS  10x           // bars 5-way             Pt ed - HEP 20                                      Ther Activity                                       Gait Training             gym             TM             Modalities             E-Stim

## 2021-01-29 ENCOUNTER — OFFICE VISIT (OUTPATIENT)
Dept: FAMILY MEDICINE CLINIC | Facility: CLINIC | Age: 46
End: 2021-01-29
Payer: COMMERCIAL

## 2021-01-29 VITALS
OXYGEN SATURATION: 98 % | WEIGHT: 190 LBS | BODY MASS INDEX: 30.53 KG/M2 | SYSTOLIC BLOOD PRESSURE: 122 MMHG | TEMPERATURE: 96.9 F | HEART RATE: 72 BPM | DIASTOLIC BLOOD PRESSURE: 86 MMHG | HEIGHT: 66 IN

## 2021-01-29 DIAGNOSIS — G62.9 NEUROPATHY: ICD-10-CM

## 2021-01-29 DIAGNOSIS — H61.21 HEARING LOSS OF RIGHT EAR DUE TO CERUMEN IMPACTION: Primary | ICD-10-CM

## 2021-01-29 DIAGNOSIS — G89.29 CHRONIC PAIN OF RIGHT ANKLE: ICD-10-CM

## 2021-01-29 DIAGNOSIS — M25.571 CHRONIC PAIN OF RIGHT ANKLE: ICD-10-CM

## 2021-01-29 DIAGNOSIS — M79.7 FIBROMYALGIA: ICD-10-CM

## 2021-01-29 PROCEDURE — 99214 OFFICE O/P EST MOD 30 MIN: CPT | Performed by: PHYSICIAN ASSISTANT

## 2021-01-29 PROCEDURE — 69210 REMOVE IMPACTED EAR WAX UNI: CPT | Performed by: PHYSICIAN ASSISTANT

## 2021-01-29 RX ORDER — DULOXETIN HYDROCHLORIDE 30 MG/1
30 CAPSULE, DELAYED RELEASE ORAL DAILY
COMMUNITY
End: 2021-03-31 | Stop reason: ALTCHOICE

## 2021-01-29 NOTE — PROGRESS NOTES
Assessment/Plan:         Problem List Items Addressed This Visit        Nervous and Auditory    Neuropathy       Other    Fibromyalgia    Chronic pain of right ankle      Other Visit Diagnoses     Hearing loss of right ear due to cerumen impaction    -  Primary    Relevant Orders    Ear cerumen removal        r ear cerumen impaction irrigated and resolved without complication  Doing well on cymbalta 30mg, will continue, continue PT and sports med referral  Regular follow ups    Subjective:      Patient ID: Lizzie Cornell is a 42 y o  female  Pt presents for follow up    She has been doing well on newly initiated cymbalta 30mg for her fibromyalgia and foot pain, she continues with PT for chronic right foot and ankle pain    She recently had covid, now feels well and is without acute complaints related to covid    Her R ear continues to feel "clogged"    Otherwise no major changes  Recently had unremarkable labs in 2020  The following portions of the patient's history were reviewed and updated as appropriate:   She  has a past medical history of Anxiety, Arthritis, Asthma, Depression, Osteoporosis, and Urinary tract infection  She   Patient Active Problem List    Diagnosis Date Noted    COVID-19 2021    Fibromyalgia 2021    Chronic pain of right ankle 2021    Chronic pain of multiple joints 2020    BMI 29 0-29 9,adult 2020    Neuropathy 2020    Chronic neck pain 2020    Dermatitis 2020    Allergic conjunctivitis of both eyes 2020    Contact dermatitis 2020    Acute bacterial conjunctivitis of both eyes 2020    Lipid screening 01/15/2020    Need for pneumococcal vaccination 01/15/2020    Mild intermittent asthma without complication     Nicotine use disorder 01/15/2020     She  has a past surgical history that includes  section and Tubal ligation    Her family history includes Breast cancer in her mother; Cancer in her father and mother; Colon cancer in her mother; Coronary artery disease in her mother; Diabetes in her mother; Ovarian cancer in her mother; Prostate cancer in her father  She  reports that she has quit smoking  Her smoking use included cigarettes  She smoked 0 50 packs per day  She has never used smokeless tobacco  She reports current alcohol use  She reports previous drug use  Current Outpatient Medications   Medication Sig Dispense Refill    DULoxetine (CYMBALTA) 30 mg delayed release capsule Take 30 mg by mouth daily      albuterol (PROVENTIL HFA,VENTOLIN HFA) 90 mcg/act inhaler Inhale 2 puffs every 6 (six) hours as needed for wheezing (cough) 1 Inhaler 0    Cholecalciferol (Vitamin D3) 25 MCG (1000 UT) CAPS Take by mouth      clobetasol (TEMOVATE) 0 05 % ointment APPLY TO AFFECTED AREA TWICE A DAY 30 g 0    Cyanocobalamin 1000 MCG/ML LIQD Take by mouth      Multiple Vitamin (MULTIVITAMIN) capsule Take 1 capsule by mouth daily      olopatadine (PATANOL) 0 1 % ophthalmic solution Administer 1 drop to both eyes 2 (two) times a day 5 mL 3    Vit C-Cholecalciferol-Alessandra Hip (Vitamin C & D3/Alessandra Hips) 500-1000-20 MG-UNIT-MG CAPS Take by mouth       No current facility-administered medications for this visit        Current Outpatient Medications on File Prior to Visit   Medication Sig    DULoxetine (CYMBALTA) 30 mg delayed release capsule Take 30 mg by mouth daily    albuterol (PROVENTIL HFA,VENTOLIN HFA) 90 mcg/act inhaler Inhale 2 puffs every 6 (six) hours as needed for wheezing (cough)    Cholecalciferol (Vitamin D3) 25 MCG (1000 UT) CAPS Take by mouth    clobetasol (TEMOVATE) 0 05 % ointment APPLY TO AFFECTED AREA TWICE A DAY    Cyanocobalamin 1000 MCG/ML LIQD Take by mouth    Multiple Vitamin (MULTIVITAMIN) capsule Take 1 capsule by mouth daily    olopatadine (PATANOL) 0 1 % ophthalmic solution Administer 1 drop to both eyes 2 (two) times a day    Vit C-Cholecalciferol-Alessandra Hip (Vitamin C & D3/Alessandra Hips) 500-1000-20 MG-UNIT-MG CAPS Take by mouth    [DISCONTINUED] DULoxetine (CYMBALTA) 30 mg delayed release capsule Take 1 capsule (30 mg total) by mouth daily (Patient not taking: Reported on 1/25/2021)    [DISCONTINUED] gabapentin (NEURONTIN) 100 mg capsule Take 100 mg by mouth    [DISCONTINUED] varenicline (CHANTIX JAY) 0 5 MG X 11 & 1 MG X 42 tablet Take one 0 5 mg tablet by mouth once daily for 3 days, then one 0 5 mg tablet by mouth twice daily for 4 days, then one 1 mg tablet by mouth twice daily  (Patient not taking: Reported on 1/25/2021)    [DISCONTINUED] varenicline (Chantix Starting Month Pak) 0 5 MG X 11 & 1 MG X 42 tablet PLEASE SEE ATTACHED FOR DETAILED DIRECTIONS     No current facility-administered medications on file prior to visit  She is allergic to pollen extract       Review of Systems   Constitutional: Negative for chills, fatigue and fever  HENT: Positive for ear pain  Negative for congestion, hearing loss, nosebleeds, postnasal drip, rhinorrhea, sinus pressure, sinus pain, sneezing and sore throat  Eyes: Negative for pain, discharge, itching and visual disturbance  Respiratory: Negative for cough, chest tightness, shortness of breath and wheezing  Cardiovascular: Negative for chest pain, palpitations and leg swelling  Gastrointestinal: Negative for abdominal pain, blood in stool, constipation, diarrhea, nausea and vomiting  Genitourinary: Negative for frequency and urgency  Musculoskeletal: Positive for arthralgias (R foot/ankle)  Neurological: Negative for dizziness, light-headedness and numbness  Objective:      /86   Pulse 72   Temp (!) 96 9 °F (36 1 °C)   Ht 5' 6" (1 676 m)   Wt 86 2 kg (190 lb)   LMP 01/15/2021 (Exact Date)   SpO2 98%   BMI 30 67 kg/m²          Physical Exam  Vitals signs and nursing note reviewed  Constitutional:       General: She is not in acute distress  Appearance: Normal appearance  HENT:      Head: Normocephalic and atraumatic  Right Ear: Tympanic membrane, ear canal and external ear normal  There is impacted cerumen  Left Ear: Tympanic membrane, ear canal and external ear normal       Nose: Nose normal  No congestion or rhinorrhea  Mouth/Throat:      Mouth: Mucous membranes are moist       Pharynx: Oropharynx is clear  No oropharyngeal exudate or posterior oropharyngeal erythema  Eyes:      Conjunctiva/sclera: Conjunctivae normal       Pupils: Pupils are equal, round, and reactive to light  Neck:      Musculoskeletal: Normal range of motion and neck supple  Cardiovascular:      Rate and Rhythm: Normal rate and regular rhythm  Heart sounds: Normal heart sounds  No murmur  Pulmonary:      Effort: Pulmonary effort is normal  No respiratory distress  Breath sounds: Normal breath sounds  No wheezing, rhonchi or rales  Abdominal:      General: Bowel sounds are normal  There is no distension  Palpations: Abdomen is soft  There is no mass  Tenderness: There is no abdominal tenderness  There is no guarding  Musculoskeletal: Normal range of motion  Lymphadenopathy:      Cervical: No cervical adenopathy  Skin:     General: Skin is warm and dry  Neurological:      Mental Status: She is alert and oriented to person, place, and time  Psychiatric:         Mood and Affect: Mood and affect normal              Ear cerumen removal    Date/Time: 1/29/2021 9:14 AM  Performed by: Fouzia Gauthier PA-C  Authorized by: Fouzia Gauthier PA-C   Universal Protocol:  Consent: Verbal consent obtained    Risks and benefits: risks, benefits and alternatives were discussed  Consent given by: patient  Timeout called at: 1/29/2021 9:14 AM   Patient understanding: patient states understanding of the procedure being performed  Patient consent: the patient's understanding of the procedure matches consent given  Patient identity confirmed: verbally with patient      Patient location:  Bedside  Procedure details:     Location:  R ear    Procedure type: irrigation with instrumentation      Instrumentation: loop      Approach:  External  Post-procedure details:     Complication:  None    Hearing quality:  Normal    Patient tolerance of procedure:   Tolerated well, no immediate complications

## 2021-02-04 ENCOUNTER — OFFICE VISIT (OUTPATIENT)
Dept: PHYSICAL THERAPY | Facility: CLINIC | Age: 46
End: 2021-02-04
Payer: COMMERCIAL

## 2021-02-04 DIAGNOSIS — M79.671 RIGHT FOOT PAIN: ICD-10-CM

## 2021-02-04 DIAGNOSIS — G89.29 CHRONIC PAIN OF RIGHT ANKLE: Primary | ICD-10-CM

## 2021-02-04 DIAGNOSIS — M25.571 CHRONIC PAIN OF RIGHT ANKLE: Primary | ICD-10-CM

## 2021-02-04 PROCEDURE — 97140 MANUAL THERAPY 1/> REGIONS: CPT | Performed by: PHYSICAL THERAPIST

## 2021-02-04 PROCEDURE — 97110 THERAPEUTIC EXERCISES: CPT | Performed by: PHYSICAL THERAPIST

## 2021-02-04 NOTE — PROGRESS NOTES
Daily Note     Today's date: 2021  Patient name: Irina Jeter  : 1975  MRN: [id number]  Referring provider: Michaela Olivas PA-C  Dx:   Encounter Diagnosis     ICD-10-CM    1  Chronic pain of right ankle  M25 571     G89 29    2  Right foot pain  M79 671        Start Time: 1230  Stop Time: 1315  Total time in clinic (min): 45 minutes    Subjective: patient reports R ankle/foot pain -8/10 today      Objective: See treatment diary below      Assessment: increased ankle and foot mobility post manual therapy, great toe aligned post joint distraction; tolerated exercise without increase in pain      Plan: Continue per plan of care        Precautions: RA, no heat      Manual  2/4          MFR  15' 10          Jt mobs   5                                    Neuro Re-Ed             Stance on foam             Stability disc             Foam balance beam                                                                 Ther Ex             Pro stretch  30" 3x           Prom/stretch  10' 10'          Bike  12' 15'          CS/HS  10x 10x          // bars 5-way             Pt ed - HEP 20                                      Ther Activity                                       Gait Training             gym             TM             Modalities             E-Stim

## 2021-02-08 ENCOUNTER — OFFICE VISIT (OUTPATIENT)
Dept: OBGYN CLINIC | Facility: CLINIC | Age: 46
End: 2021-02-08
Payer: COMMERCIAL

## 2021-02-08 VITALS
WEIGHT: 190 LBS | HEIGHT: 66 IN | DIASTOLIC BLOOD PRESSURE: 67 MMHG | HEART RATE: 65 BPM | SYSTOLIC BLOOD PRESSURE: 100 MMHG | BODY MASS INDEX: 30.53 KG/M2

## 2021-02-08 DIAGNOSIS — M53.3 SACROILIAC JOINT DYSFUNCTION OF BOTH SIDES: ICD-10-CM

## 2021-02-08 DIAGNOSIS — R29.898 WEAKNESS OF BOTH HIPS: ICD-10-CM

## 2021-02-08 DIAGNOSIS — S96.911A STRAIN OF RIGHT ANKLE, INITIAL ENCOUNTER: ICD-10-CM

## 2021-02-08 DIAGNOSIS — M54.16 RADICULOPATHY, LUMBAR REGION: ICD-10-CM

## 2021-02-08 DIAGNOSIS — M22.2X1 PATELLOFEMORAL SYNDROME OF BOTH KNEES: Primary | ICD-10-CM

## 2021-02-08 DIAGNOSIS — M22.2X2 PATELLOFEMORAL SYNDROME OF BOTH KNEES: Primary | ICD-10-CM

## 2021-02-08 PROCEDURE — 99243 OFF/OP CNSLTJ NEW/EST LOW 30: CPT | Performed by: FAMILY MEDICINE

## 2021-02-08 NOTE — PROGRESS NOTES
Assessment/Plan:  Assessment/Plan   Diagnoses and all orders for this visit:    Patellofemoral syndrome of both knees  -     Ambulatory referral to Physical Therapy; Future    Radiculopathy, lumbar region  -     Ambulatory referral to Physical Therapy; Future    Sacroiliac joint dysfunction of both sides  -     Ambulatory referral to Physical Therapy; Future    Weakness of both hips  -     Ambulatory referral to Physical Therapy; Future    Strain of right ankle, initial encounter  -     Ambulatory referral to Physical Therapy; Future        80-year-old female bilateral foot bilateral knee pain of many years duration  Discussed with patient physical exam, radiographs, impression and plan  X-rays right foot are unremarkable for osseous abnormality  Exam both feet noted for maintained arches  She has tenderness right foot dorsum of the foot over the navicular and proximal aspect of the 1st metatarsal   There is mild tenderness plantar aspect of the right foot  She has intact range of motion, strength, sensation, and dorsalis pedis pulse in both feet  She has tenderness both knees medial joint line and patellar undersurface  There is positive patellar inhibition and grind of both knees  Clinical impression that she is symptomatic from combination of generalized inflammation and abnormal musculoskeletal mechanics  Recommend she continue with formal physical therapy home exercises  She is also to continue with taking tumeric supplement  She is to start taking glucosamine-chondroitin at least 2 to 3 times a day and start taking tart cherry supplement at least 1000 mg daily  She may also consider using topical CBD containing less than 0 3% THC or she may also try CBD gummies  She will follow up in 8 weeks at which point she will be re-evaluated  Subjective:   Patient ID: Xi Morin is a 42 y o  female    Chief Complaint   Patient presents with    Left Foot - Pain    Right Foot - Pain 57-year-old female presents for evaluation of pain and both ankles and feet, right worse than left of many years duration  She denies any particular trauma or inciting event  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  She has been experiencing pain described as generalized to the dorsum of the right foot, achy and throbbing, worse with standing and ambulation, associated numbness/tingling, and improved with resting  She reports have been diagnosed with fibromyalgia and is also symptomatic from restless leg  She was also diagnosed with lumbar spine pathology and underwent injections to the lumbar spine  She was recently seen by primary care provider and referred to formal physical therapy  She was also started on Cymbalta  She has been attending physical therapy and do home exercises as directed  She reports that since starting physical therapy range of motion in the right great toe has improved, as previously she was unable to flex at the IP joint  She does report that she typically ambulates with most of her weight shifted to the lateral aspect of her feet  The following portions of the patient's history were reviewed and updated as appropriate: She  has a past medical history of Anxiety, Arthritis, Asthma, Depression, Osteoporosis, and Urinary tract infection  She  has a past surgical history that includes  section and Tubal ligation  Her family history includes Breast cancer in her mother; Cancer in her father and mother; Colon cancer in her mother; Coronary artery disease in her mother; Diabetes in her mother; Ovarian cancer in her mother; Prostate cancer in her father  She  reports that she has quit smoking  Her smoking use included cigarettes  She smoked 0 50 packs per day  She has never used smokeless tobacco  She reports current alcohol use  She reports previous drug use  She is allergic to pollen extract       Review of Systems   Constitutional: Negative for chills and fever  HENT: Negative for sore throat  Eyes: Negative for visual disturbance  Respiratory: Negative for shortness of breath  Cardiovascular: Negative for chest pain  Gastrointestinal: Negative for abdominal pain  Genitourinary: Negative for flank pain  Musculoskeletal: Positive for arthralgias and joint swelling  Skin: Negative for rash and wound  Neurological: Positive for numbness  Negative for weakness  Hematological: Does not bruise/bleed easily  Psychiatric/Behavioral: Negative for self-injury  Objective:  Vitals:    02/08/21 1444   BP: 100/67   Pulse: 65   Weight: 86 2 kg (190 lb)   Height: 5' 6" (1 676 m)     Right Ankle Exam     Muscle Strength   Dorsiflexion:  5/5  Plantar flexion:  5/5      Left Ankle Exam     Muscle Strength   Dorsiflexion:  5/5   Plantar flexion:  5/5       Right Knee Exam     Muscle Strength   The patient has normal right knee strength  Tenderness   The patient is experiencing tenderness in the lateral joint line and medial joint line (Patellar undersurface)  Range of Motion   Extension: normal   Flexion: 110     Other   Swelling: none    Comments:  Positive patellar inhibition and grind      Left Knee Exam     Muscle Strength   The patient has normal left knee strength  Tenderness   The patient is experiencing tenderness in the lateral joint line and medial joint line (Patellar undersurface)  Range of Motion   Extension: normal   Flexion: 110     Other   Swelling: none    Comments:  Positive patellar inhibition and grind          Observations   Left Ankle/Foot   Negative for deformity  Right Ankle/Foot   Negative for deformity       Tenderness   Left Ankle/Foot   No tenderness in the Achilles insertion, anterior ankle, anterior talofibular ligament, fifth metatarsal base, calcaneofibular ligament, deltoid ligament, dorsum foot, first metatarsal head, lateral malleolus, medial malleolus, mid-plantar aspect, navicular, peroneal tendon, plantar fascia, posterior tibial tendon, posterior talofibular ligament, proximal Achilles and talar dome  Right Ankle/Foot   Tenderness in the dorsum foot and navicular  No tenderness in the Achilles insertion, anterior ankle, anterior talofibular ligament, fifth metatarsal base, calcaneofibular ligament, deltoid ligament, first metatarsal head, lateral malleolus, medial malleolus, metatarsal head, mid-plantar aspect, peroneal tendon, plantar fascia, posterior tibial tendon, posterior talofibular ligament, proximal Achilles and talar dome  Active Range of Motion   Left Ankle/Foot   Normal active range of motion    Right Ankle/Foot   Normal active range of motion    Strength/Myotome Testing     Left Ankle/Foot   Dorsiflexion: 5  Plantar flexion: 5  Inversion: 4+  Eversion: 4+  Great toe flexion: 4+    Right Ankle/Foot   Dorsiflexion: 5  Plantar flexion: 5  Inversion: 4+  Eversion: 4+  Great toe flexion: 4+  Great toe extension: 4+    Tests   Left Ankle/Foot   Negative for anterior drawer, calcaneal squeeze, metatarsal squeeze, posterior drawer, syndesmosis squeeze and syndesmosis external rotation  Right Ankle/Foot   Positive for metatarsal squeeze  Negative for anterior drawer, calcaneal squeeze, posterior drawer, syndesmosis squeeze and syndesmosis external rotation  Physical Exam  Vitals signs and nursing note reviewed  Constitutional:       General: She is not in acute distress  Appearance: She is well-developed  HENT:      Head: Normocephalic  Right Ear: External ear normal       Left Ear: External ear normal    Eyes:      Conjunctiva/sclera: Conjunctivae normal    Neck:      Trachea: No tracheal deviation  Cardiovascular:      Rate and Rhythm: Normal rate  Pulmonary:      Effort: Pulmonary effort is normal  No respiratory distress  Abdominal:      General: There is no distension     Musculoskeletal:         General: Tenderness present  No swelling  Right ankle: No lateral malleolus, no medial malleolus, no CF ligament and no posterior TFL tenderness found  Left ankle: No lateral malleolus, no medial malleolus, no CF ligament and no posterior TFL tenderness found  Right foot: No deformity  Left foot: No deformity  Skin:     General: Skin is warm and dry  Neurological:      Mental Status: She is alert and oriented to person, place, and time  Psychiatric:         Behavior: Behavior normal          I have personally reviewed pertinent films in PACS and my interpretation is No osseous abnormality of the right foot

## 2021-02-08 NOTE — LETTER
February 8, 2021     Marisa Laird, [address] [facility]  [address]    Patient: Griffin Brooke   YOB: 1975   Date of Visit: 2/8/2021       Dear Dr Sunni Herrmann: Thank you for referring Griffin Brooke to me for evaluation  Below are my notes for this consultation  If you have questions, please do not hesitate to call me  I look forward to following your patient along with you  Sincerely,        [employer], DO        CC: No Recipients  [employer], DO  2/8/2021  4:55 PM  Sign when Signing Visit  Assessment/Plan:  Assessment/Plan   Diagnoses and all orders for this visit:    Patellofemoral syndrome of both knees  -     Ambulatory referral to Physical Therapy; Future    Radiculopathy, lumbar region  -     Ambulatory referral to Physical Therapy; Future    Sacroiliac joint dysfunction of both sides  -     Ambulatory referral to Physical Therapy; Future    Weakness of both hips  -     Ambulatory referral to Physical Therapy; Future    Strain of right ankle, initial encounter  -     Ambulatory referral to Physical Therapy; Future        81-year-old female bilateral foot bilateral knee pain of many years duration  Discussed with patient physical exam, radiographs, impression and plan  X-rays right foot are unremarkable for osseous abnormality  Exam both feet noted for maintained arches  She has tenderness right foot dorsum of the foot over the navicular and proximal aspect of the 1st metatarsal   There is mild tenderness plantar aspect of the right foot  She has intact range of motion, strength, sensation, and dorsalis pedis pulse in both feet  She has tenderness both knees medial joint line and patellar undersurface  There is positive patellar inhibition and grind of both knees  Clinical impression that she is symptomatic from combination of generalized inflammation and abnormal musculoskeletal mechanics    Recommend she continue with formal physical therapy home exercises  She is also to continue with taking tumeric supplement  She is to start taking glucosamine-chondroitin at least 2 to 3 times a day and start taking tart cherry supplement at least 1000 mg daily  She may also consider using topical CBD containing less than 0 3% THC or she may also try CBD gummies  She will follow up in 8 weeks at which point she will be re-evaluated  Subjective:   Patient ID: Abdirahman Pennington is a 42 y o  female  Chief Complaint   Patient presents with    Left Foot - Pain    Right Foot - Pain       65-year-old female presents for evaluation of pain and both ankles and feet, right worse than left of many years duration  She denies any particular trauma or inciting event  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  She has been experiencing pain described as generalized to the dorsum of the right foot, achy and throbbing, worse with standing and ambulation, associated numbness/tingling, and improved with resting  She reports have been diagnosed with fibromyalgia and is also symptomatic from restless leg  She was also diagnosed with lumbar spine pathology and underwent injections to the lumbar spine  She was recently seen by primary care provider and referred to formal physical therapy  She was also started on Cymbalta  She has been attending physical therapy and do home exercises as directed  She reports that since starting physical therapy range of motion in the right great toe has improved, as previously she was unable to flex at the IP joint  She does report that she typically ambulates with most of her weight shifted to the lateral aspect of her feet  The following portions of the patient's history were reviewed and updated as appropriate: She  has a past medical history of Anxiety, Arthritis, Asthma, Depression, Osteoporosis, and Urinary tract infection    She  has a past surgical history that includes  section and Tubal ligation  Her family history includes Breast cancer in her mother; Cancer in her father and mother; Colon cancer in her mother; Coronary artery disease in her mother; Diabetes in her mother; Ovarian cancer in her mother; Prostate cancer in her father  She  reports that she has quit smoking  Her smoking use included cigarettes  She smoked 0 50 packs per day  She has never used smokeless tobacco  She reports current alcohol use  She reports previous drug use  She is allergic to pollen extract       Review of Systems   Constitutional: Negative for chills and fever  HENT: Negative for sore throat  Eyes: Negative for visual disturbance  Respiratory: Negative for shortness of breath  Cardiovascular: Negative for chest pain  Gastrointestinal: Negative for abdominal pain  Genitourinary: Negative for flank pain  Musculoskeletal: Positive for arthralgias and joint swelling  Skin: Negative for rash and wound  Neurological: Positive for numbness  Negative for weakness  Hematological: Does not bruise/bleed easily  Psychiatric/Behavioral: Negative for self-injury  Objective:  Vitals:    21 1444   BP: 100/67   Pulse: 65   Weight: 86 2 kg (190 lb)   Height: 5' 6" (1 676 m)     Right Ankle Exam     Muscle Strength   Dorsiflexion:  5/5  Plantar flexion:  5/5      Left Ankle Exam     Muscle Strength   Dorsiflexion:  5/5   Plantar flexion:  5/5       Right Knee Exam     Muscle Strength   The patient has normal right knee strength  Tenderness   The patient is experiencing tenderness in the lateral joint line and medial joint line (Patellar undersurface)  Range of Motion   Extension: normal   Flexion: 110     Other   Swelling: none    Comments:  Positive patellar inhibition and grind      Left Knee Exam     Muscle Strength   The patient has normal left knee strength      Tenderness   The patient is experiencing tenderness in the lateral joint line and medial joint line (Patellar undersurface)  Range of Motion   Extension: normal   Flexion: 110     Other   Swelling: none    Comments:  Positive patellar inhibition and grind          Observations   Left Ankle/Foot   Negative for deformity  Right Ankle/Foot   Negative for deformity  Tenderness   Left Ankle/Foot   No tenderness in the Achilles insertion, anterior ankle, anterior talofibular ligament, fifth metatarsal base, calcaneofibular ligament, deltoid ligament, dorsum foot, first metatarsal head, lateral malleolus, medial malleolus, mid-plantar aspect, navicular, peroneal tendon, plantar fascia, posterior tibial tendon, posterior talofibular ligament, proximal Achilles and talar dome  Right Ankle/Foot   Tenderness in the dorsum foot and navicular  No tenderness in the Achilles insertion, anterior ankle, anterior talofibular ligament, fifth metatarsal base, calcaneofibular ligament, deltoid ligament, first metatarsal head, lateral malleolus, medial malleolus, metatarsal head, mid-plantar aspect, peroneal tendon, plantar fascia, posterior tibial tendon, posterior talofibular ligament, proximal Achilles and talar dome  Active Range of Motion   Left Ankle/Foot   Normal active range of motion    Right Ankle/Foot   Normal active range of motion    Strength/Myotome Testing     Left Ankle/Foot   Dorsiflexion: 5  Plantar flexion: 5  Inversion: 4+  Eversion: 4+  Great toe flexion: 4+    Right Ankle/Foot   Dorsiflexion: 5  Plantar flexion: 5  Inversion: 4+  Eversion: 4+  Great toe flexion: 4+  Great toe extension: 4+    Tests   Left Ankle/Foot   Negative for anterior drawer, calcaneal squeeze, metatarsal squeeze, posterior drawer, syndesmosis squeeze and syndesmosis external rotation  Right Ankle/Foot   Positive for metatarsal squeeze  Negative for anterior drawer, calcaneal squeeze, posterior drawer, syndesmosis squeeze and syndesmosis external rotation  Physical Exam  Vitals signs and nursing note reviewed  Constitutional:       General: She is not in acute distress  Appearance: She is well-developed  HENT:      Head: Normocephalic  Right Ear: External ear normal       Left Ear: External ear normal    Eyes:      Conjunctiva/sclera: Conjunctivae normal    Neck:      Trachea: No tracheal deviation  Cardiovascular:      Rate and Rhythm: Normal rate  Pulmonary:      Effort: Pulmonary effort is normal  No respiratory distress  Abdominal:      General: There is no distension  Musculoskeletal:         General: Tenderness present  No swelling  Right ankle: No lateral malleolus, no medial malleolus, no CF ligament and no posterior TFL tenderness found  Left ankle: No lateral malleolus, no medial malleolus, no CF ligament and no posterior TFL tenderness found  Right foot: No deformity  Left foot: No deformity  Skin:     General: Skin is warm and dry  Neurological:      Mental Status: She is alert and oriented to person, place, and time  Psychiatric:         Behavior: Behavior normal          I have personally reviewed pertinent films in PACS and my interpretation is No osseous abnormality of the right foot

## 2021-02-09 ENCOUNTER — APPOINTMENT (OUTPATIENT)
Dept: PHYSICAL THERAPY | Facility: CLINIC | Age: 46
End: 2021-02-09
Payer: COMMERCIAL

## 2021-02-11 ENCOUNTER — OFFICE VISIT (OUTPATIENT)
Dept: PHYSICAL THERAPY | Facility: CLINIC | Age: 46
End: 2021-02-11
Payer: COMMERCIAL

## 2021-02-11 DIAGNOSIS — M79.671 RIGHT FOOT PAIN: ICD-10-CM

## 2021-02-11 DIAGNOSIS — S96.911A STRAIN OF RIGHT ANKLE, INITIAL ENCOUNTER: ICD-10-CM

## 2021-02-11 DIAGNOSIS — M22.2X2 PATELLOFEMORAL SYNDROME OF BOTH KNEES: ICD-10-CM

## 2021-02-11 DIAGNOSIS — M53.3 SACROILIAC JOINT DYSFUNCTION OF BOTH SIDES: ICD-10-CM

## 2021-02-11 DIAGNOSIS — M22.2X1 PATELLOFEMORAL SYNDROME OF BOTH KNEES: ICD-10-CM

## 2021-02-11 DIAGNOSIS — M25.571 CHRONIC PAIN OF RIGHT ANKLE: Primary | ICD-10-CM

## 2021-02-11 DIAGNOSIS — M54.16 RADICULOPATHY, LUMBAR REGION: ICD-10-CM

## 2021-02-11 DIAGNOSIS — R29.898 WEAKNESS OF BOTH HIPS: ICD-10-CM

## 2021-02-11 DIAGNOSIS — G89.29 CHRONIC PAIN OF RIGHT ANKLE: Primary | ICD-10-CM

## 2021-02-11 PROCEDURE — 97140 MANUAL THERAPY 1/> REGIONS: CPT | Performed by: PHYSICAL THERAPIST

## 2021-02-11 PROCEDURE — 97110 THERAPEUTIC EXERCISES: CPT | Performed by: PHYSICAL THERAPIST

## 2021-02-16 ENCOUNTER — APPOINTMENT (OUTPATIENT)
Dept: PHYSICAL THERAPY | Facility: CLINIC | Age: 46
End: 2021-02-16
Payer: COMMERCIAL

## 2021-02-17 ENCOUNTER — APPOINTMENT (OUTPATIENT)
Dept: PHYSICAL THERAPY | Facility: CLINIC | Age: 46
End: 2021-02-17
Payer: COMMERCIAL

## 2021-02-18 ENCOUNTER — APPOINTMENT (OUTPATIENT)
Dept: PHYSICAL THERAPY | Facility: CLINIC | Age: 46
End: 2021-02-18
Payer: COMMERCIAL

## 2021-02-23 ENCOUNTER — APPOINTMENT (OUTPATIENT)
Dept: PHYSICAL THERAPY | Facility: CLINIC | Age: 46
End: 2021-02-23
Payer: COMMERCIAL

## 2021-02-25 ENCOUNTER — APPOINTMENT (OUTPATIENT)
Dept: PHYSICAL THERAPY | Facility: CLINIC | Age: 46
End: 2021-02-25
Payer: COMMERCIAL

## 2021-03-01 ENCOUNTER — APPOINTMENT (OUTPATIENT)
Dept: PHYSICAL THERAPY | Facility: CLINIC | Age: 46
End: 2021-03-01
Payer: COMMERCIAL

## 2021-03-01 ENCOUNTER — TELEPHONE (OUTPATIENT)
Dept: GENETICS | Facility: CLINIC | Age: 46
End: 2021-03-01

## 2021-03-01 NOTE — TELEPHONE ENCOUNTER
Spoke with patient regarding sample collection  She will have blood sample drawn on 3 4 since she has an appointment that day

## 2021-03-03 ENCOUNTER — OFFICE VISIT (OUTPATIENT)
Dept: PHYSICAL THERAPY | Facility: CLINIC | Age: 46
End: 2021-03-03
Payer: COMMERCIAL

## 2021-03-03 DIAGNOSIS — M54.16 RADICULOPATHY, LUMBAR REGION: ICD-10-CM

## 2021-03-03 DIAGNOSIS — R29.898 WEAKNESS OF BOTH HIPS: ICD-10-CM

## 2021-03-03 DIAGNOSIS — M22.2X2 PATELLOFEMORAL SYNDROME OF BOTH KNEES: Primary | ICD-10-CM

## 2021-03-03 DIAGNOSIS — S96.911A STRAIN OF RIGHT ANKLE, INITIAL ENCOUNTER: ICD-10-CM

## 2021-03-03 DIAGNOSIS — M53.3 SACROILIAC JOINT DYSFUNCTION OF BOTH SIDES: ICD-10-CM

## 2021-03-03 DIAGNOSIS — G89.29 CHRONIC PAIN OF RIGHT ANKLE: ICD-10-CM

## 2021-03-03 DIAGNOSIS — M25.571 CHRONIC PAIN OF RIGHT ANKLE: ICD-10-CM

## 2021-03-03 DIAGNOSIS — M22.2X1 PATELLOFEMORAL SYNDROME OF BOTH KNEES: Primary | ICD-10-CM

## 2021-03-03 DIAGNOSIS — M79.671 RIGHT FOOT PAIN: ICD-10-CM

## 2021-03-03 PROCEDURE — 97140 MANUAL THERAPY 1/> REGIONS: CPT | Performed by: PHYSICAL THERAPIST

## 2021-03-03 PROCEDURE — 97110 THERAPEUTIC EXERCISES: CPT | Performed by: PHYSICAL THERAPIST

## 2021-03-03 NOTE — PROGRESS NOTES
RE-CERTIFICATION    Today's date: 3/3/2021  Patient name: James Ellington  : 1975  MRN: [id number]  Referring provider: Radha Rodriguez PA-C  Dx:   Encounter Diagnosis     ICD-10-CM    1  Patellofemoral syndrome of both knees  M22 2X1     M22 2X2    2  Strain of right ankle, initial encounter  S96 911A    3  Radiculopathy, lumbar region  M54 16    4  Chronic pain of right ankle  M25 571     G89 29    5  Sacroiliac joint dysfunction of both sides  M53 3    6  Right foot pain  M79 671    7  Weakness of both hips  R29 898        Start Time: 1015  Stop Time: 1100  Total time in clinic (min): 45 minutes    Subjective: patient reports 3/10 foot pain, 7/10 LE pain, 10/10 low back      Objective: R LE ankle foot Arom great toe extension 50 degrees; Df 0 degrees; Strength Df 4/5; Pf 4+/5; ev 4+/5 inv 4+/5; great toe extension 5/5; flexion4+/5; BL LE Knee flexion 4+/5; extension 4+/5;  Hip flexion 4-/5; abd 4/5; add 4+/5; lumbar Arom extension max restriction; decreased lumbar lordosis; SI aligned; (+) paraspinal tightness      Assessment: increased R ankle/foot Arom and Strength; BL knee, hip, and lumbar spine tested secondary to new script from physician      Plan: Cont POC 2x/week-6 weeks; STGs 75% Met; LTGs 30% Met (goals remain the same)     Precautions: RA, no heat      Manual  2/ 33        MFR  15' 10 10 30        Jt mobs   5 5                                   Neuro Re-Ed             Stance on foam             Stability disc             Foam balance beam             Glides with ball    x30ea         LTR     x30                                  Ther Ex             Pro stretch  30" 3x           Prom/stretch  10' 10' 5         Bike  12' 15' 10         CS/HS  10x 10x 10x         // bars 5-way             Pt ed - HEP 20    10        Circuit LEs    x20                      Ther Activity                                       Gait Training             gym             TM             Modalities E-Stim

## 2021-03-03 NOTE — LETTER
March 3, 2021    Stefanie Robins, [address] [facility] [address]    Patient: Cate Spears   YOB: 1975   Date of Visit: 3/3/2021     Encounter Diagnosis     ICD-10-CM    1  Patellofemoral syndrome of both knees  M22 2X1     M22 2X2    2  Strain of right ankle, initial encounter  S96 911A    3  Radiculopathy, lumbar region  M54 16    4  Chronic pain of right ankle  M25 571     G89 29    5  Sacroiliac joint dysfunction of both sides  M53 3    6  Right foot pain  M79 671    7  Weakness of both hips  R29 898        Dear Dr Danette Woo: Thank you for your recent referral of Cate Spears  Please review the attached evaluation summary from Jami's recent visit  Please verify that you agree with the plan of care by signing the attached order  If you have any questions or concerns, please do not hesitate to call  I sincerely appreciate the opportunity to share in the care of one of your patients and hope to have another opportunity to work with you in the near future  Sincerely,    Sushma Richards PT      Referring Provider:      I certify that I have read the below Plan of Care and certify the need for these services furnished under this plan of treatment while under my care  Stefanie Robins PA-C  [address] [address]  [place] [place]          RE-CERTIFICATION    Today's date: 3/3/2021  Patient name: Cate Spears  : 1975  MRN: [id number]  Referring provider: Tiffany Jackson PA-C  Dx:   Encounter Diagnosis     ICD-10-CM    1  Patellofemoral syndrome of both knees  M22 2X1     M22 2X2    2  Strain of right ankle, initial encounter  S96 911A    3  Radiculopathy, lumbar region  M54 16    4  Chronic pain of right ankle  M25 571     G89 29    5  Sacroiliac joint dysfunction of both sides  M53 3    6  Right foot pain  M79 671    7   Weakness of both hips  R29 898        Start Time: 1015  Stop Time: 1100  Total time in clinic (min): 45 minutes    Subjective: patient reports 3/10 foot pain, 7/10 LE pain, 10/10 low back      Objective: R LE ankle foot Arom great toe extension 50 degrees; Df 0 degrees; Strength Df 4/5; Pf 4+/5; ev 4+/5 inv 4+/5; great toe extension 5/5; flexion4+/5; BL LE Knee flexion 4+/5; extension 4+/5;  Hip flexion 4-/5; abd 4/5; add 4+/5; lumbar Arom extension max restriction; decreased lumbar lordosis; SI aligned; (+) paraspinal tightness      Assessment: increased R ankle/foot Arom and Strength; BL knee, hip, and lumbar spine tested secondary to new script from physician      Plan: Cont POC 2x/week-6 weeks; STGs 75% Met; LTGs 30% Met (goals remain the same)     Precautions: RA, no heat      Manual 1/21 1/28 2/4 2/11 3/3        MFR  15' 10 10 30        Jt mobs   5 5                                   Neuro Re-Ed             Stance on foam             Stability disc             Foam balance beam             Glides with ball    x30ea         LTR     x30                                  Ther Ex             Pro stretch  30" 3x           Prom/stretch  10' 10' 5         Bike  12' 15' 10         CS/HS  10x 10x 10x         // bars 5-way             Pt ed - HEP 20    10        Circuit LEs    x20                      Ther Activity                                       Gait Training             gym             TM             Modalities             E-Stim

## 2021-03-15 ENCOUNTER — OFFICE VISIT (OUTPATIENT)
Dept: PHYSICAL THERAPY | Facility: CLINIC | Age: 46
End: 2021-03-15
Payer: COMMERCIAL

## 2021-03-15 DIAGNOSIS — M53.3 SACROILIAC JOINT DYSFUNCTION OF BOTH SIDES: ICD-10-CM

## 2021-03-15 DIAGNOSIS — M25.571 CHRONIC PAIN OF RIGHT ANKLE: ICD-10-CM

## 2021-03-15 DIAGNOSIS — G89.29 CHRONIC PAIN OF RIGHT ANKLE: ICD-10-CM

## 2021-03-15 DIAGNOSIS — M79.671 RIGHT FOOT PAIN: ICD-10-CM

## 2021-03-15 DIAGNOSIS — S96.911A STRAIN OF RIGHT ANKLE, INITIAL ENCOUNTER: ICD-10-CM

## 2021-03-15 DIAGNOSIS — M22.2X1 PATELLOFEMORAL SYNDROME OF BOTH KNEES: Primary | ICD-10-CM

## 2021-03-15 DIAGNOSIS — M54.16 RADICULOPATHY, LUMBAR REGION: ICD-10-CM

## 2021-03-15 DIAGNOSIS — R29.898 WEAKNESS OF BOTH HIPS: ICD-10-CM

## 2021-03-15 DIAGNOSIS — M22.2X2 PATELLOFEMORAL SYNDROME OF BOTH KNEES: Primary | ICD-10-CM

## 2021-03-15 PROCEDURE — 97140 MANUAL THERAPY 1/> REGIONS: CPT | Performed by: PHYSICAL THERAPIST

## 2021-03-15 PROCEDURE — 97110 THERAPEUTIC EXERCISES: CPT | Performed by: PHYSICAL THERAPIST

## 2021-03-15 PROCEDURE — 97112 NEUROMUSCULAR REEDUCATION: CPT | Performed by: PHYSICAL THERAPIST

## 2021-03-15 NOTE — PROGRESS NOTES
Daily Note    Today's date: 3/15/2021  Patient name: Ann-Marie Hills  : 1975  MRN: [id number]  Referring provider: Zee Motley PA-C  Dx:   Encounter Diagnosis     ICD-10-CM    1  Patellofemoral syndrome of both knees  M22 2X1     M22 2X2    2  Sacroiliac joint dysfunction of both sides  M53 3    3  Strain of right ankle, initial encounter  S96 911A    4  Right foot pain  M79 671    5  Radiculopathy, lumbar region  M54 16    6  Weakness of both hips  R29 898    7   Chronic pain of right ankle  M25 571     G89 29        Start Time: 0930  Stop Time: 1015  Total time in clinic (min): 45 minutes    Subjective: patient reports 8/10 pain - all areas      Objective: see treatment diary below      Assessment: decreased lumbar tightness post manual therapy, tolerated new exercises with tactile cueing for proper muscle activation      Plan: Cont POC     Precautions: RA, no heat      Manual 1/21 1/28 2/4 2/11 3/3 3/15       MFR  15' 10 10 30 15       Jt mobs   5 5                                   Neuro Re-Ed             Stance on foam             Stability disc             Foam balance beam             Glides with ball    x30ea  x30       LTR     x30 x30       Bridges with ball      x30                    Ther Ex             Pro stretch  30" 3x           Prom/stretch  10' 10' 5  5'       Bike  12' 15' 10  10       CS/HS  10x 10x 10x         // bars 5-way             Pt ed - HEP 20    10        Circuit LEs    x20                      Ther Activity                                       Gait Training             gym             TM             Modalities             E-Stim

## 2021-03-17 ENCOUNTER — APPOINTMENT (OUTPATIENT)
Dept: PHYSICAL THERAPY | Facility: CLINIC | Age: 46
End: 2021-03-17
Payer: COMMERCIAL

## 2021-03-18 ENCOUNTER — OFFICE VISIT (OUTPATIENT)
Dept: PHYSICAL THERAPY | Facility: CLINIC | Age: 46
End: 2021-03-18
Payer: COMMERCIAL

## 2021-03-18 DIAGNOSIS — R29.898 WEAKNESS OF BOTH HIPS: ICD-10-CM

## 2021-03-18 DIAGNOSIS — S96.911A STRAIN OF RIGHT ANKLE, INITIAL ENCOUNTER: ICD-10-CM

## 2021-03-18 DIAGNOSIS — M54.16 RADICULOPATHY, LUMBAR REGION: ICD-10-CM

## 2021-03-18 DIAGNOSIS — M79.671 RIGHT FOOT PAIN: ICD-10-CM

## 2021-03-18 DIAGNOSIS — M25.571 CHRONIC PAIN OF RIGHT ANKLE: ICD-10-CM

## 2021-03-18 DIAGNOSIS — M22.2X1 PATELLOFEMORAL SYNDROME OF BOTH KNEES: Primary | ICD-10-CM

## 2021-03-18 DIAGNOSIS — G89.29 CHRONIC PAIN OF RIGHT ANKLE: ICD-10-CM

## 2021-03-18 DIAGNOSIS — M22.2X2 PATELLOFEMORAL SYNDROME OF BOTH KNEES: Primary | ICD-10-CM

## 2021-03-18 DIAGNOSIS — M53.3 SACROILIAC JOINT DYSFUNCTION OF BOTH SIDES: ICD-10-CM

## 2021-03-18 PROCEDURE — 97110 THERAPEUTIC EXERCISES: CPT | Performed by: PHYSICAL THERAPIST

## 2021-03-18 PROCEDURE — 97140 MANUAL THERAPY 1/> REGIONS: CPT | Performed by: PHYSICAL THERAPIST

## 2021-03-18 NOTE — PROGRESS NOTES
Daily Note     Today's date: 3/18/2021  Patient name: Flavia Anderson  : 1975  MRN: [id number]  Referring provider: Dayami Verdin PA-C  Dx:   Encounter Diagnosis     ICD-10-CM    1  Patellofemoral syndrome of both knees  M22 2X1     M22 2X2    2  Sacroiliac joint dysfunction of both sides  M53 3    3  Strain of right ankle, initial encounter  S96 911A    4  Right foot pain  M79 671    5  Radiculopathy, lumbar region  M54 16    6  Weakness of both hips  R29 898    7  Chronic pain of right ankle  M25 571     G89 29                   Subjective: The patient reports that she is sore today from the rainy weather and her fibromyalgia is acting up  Objective: See treatment diary below  Assessment: Good tolerance to TE as outlined below  She requested to hold MT on her lower back today, only completed on her R foot  No increase in pain noted during session today  Continued PT would be beneficial to improve function  Plan: Continue per plan of care  Progress as able in upcoming visits         Precautions: RA, no heat      Manual  3/3 3/15 3/18      MFR  15' 10 10 30 15 10' STM R foot      Jt mobs   5 5                                   Neuro Re-Ed             Stance on foam             Stability disc             Foam balance beam             Glides with ball    x30ea  x30 30x      LTR     x30 x30 30x      Bridges with ball      x30 30x                   Ther Ex             Pro stretch  30" 3x           Prom/stretch  10' 10' 5  5' 5' R foot      Bike  12' 15' 10  10 10'      CS/HS  10x 10x 10x         // bars 5-way             Pt ed - HEP 20    10        Circuit LEs    x20                      Ther Activity                                       Gait Training             gym             TM             Modalities             E-Stim

## 2021-03-22 ENCOUNTER — APPOINTMENT (OUTPATIENT)
Dept: PHYSICAL THERAPY | Facility: CLINIC | Age: 46
End: 2021-03-22
Payer: COMMERCIAL

## 2021-03-24 ENCOUNTER — OFFICE VISIT (OUTPATIENT)
Dept: PHYSICAL THERAPY | Facility: CLINIC | Age: 46
End: 2021-03-24
Payer: COMMERCIAL

## 2021-03-24 DIAGNOSIS — M53.3 SACROILIAC JOINT DYSFUNCTION OF BOTH SIDES: ICD-10-CM

## 2021-03-24 DIAGNOSIS — G89.29 CHRONIC PAIN OF RIGHT ANKLE: ICD-10-CM

## 2021-03-24 DIAGNOSIS — S96.911A STRAIN OF RIGHT ANKLE, INITIAL ENCOUNTER: ICD-10-CM

## 2021-03-24 DIAGNOSIS — M25.571 CHRONIC PAIN OF RIGHT ANKLE: ICD-10-CM

## 2021-03-24 DIAGNOSIS — M22.2X1 PATELLOFEMORAL SYNDROME OF BOTH KNEES: Primary | ICD-10-CM

## 2021-03-24 DIAGNOSIS — M54.16 RADICULOPATHY, LUMBAR REGION: ICD-10-CM

## 2021-03-24 DIAGNOSIS — M79.671 RIGHT FOOT PAIN: ICD-10-CM

## 2021-03-24 DIAGNOSIS — R29.898 WEAKNESS OF BOTH HIPS: ICD-10-CM

## 2021-03-24 DIAGNOSIS — M22.2X2 PATELLOFEMORAL SYNDROME OF BOTH KNEES: Primary | ICD-10-CM

## 2021-03-24 PROCEDURE — 97140 MANUAL THERAPY 1/> REGIONS: CPT

## 2021-03-24 PROCEDURE — 97112 NEUROMUSCULAR REEDUCATION: CPT

## 2021-03-25 ENCOUNTER — TRANSCRIBE ORDERS (OUTPATIENT)
Dept: ADMINISTRATIVE | Facility: HOSPITAL | Age: 46
End: 2021-03-25

## 2021-03-25 ENCOUNTER — APPOINTMENT (OUTPATIENT)
Dept: LAB | Facility: HOSPITAL | Age: 46
End: 2021-03-25
Attending: OBSTETRICS & GYNECOLOGY
Payer: COMMERCIAL

## 2021-03-25 DIAGNOSIS — Z80.3 FAMILY HISTORY OF MALIGNANT NEOPLASM OF BREAST: Primary | ICD-10-CM

## 2021-03-25 DIAGNOSIS — Z80.3 FAMILY HISTORY OF MALIGNANT NEOPLASM OF BREAST: ICD-10-CM

## 2021-03-25 DIAGNOSIS — Z80.41 FAMILY HISTORY OF MALIGNANT NEOPLASM OF OVARY: ICD-10-CM

## 2021-03-25 DIAGNOSIS — Z80.42 FAMILY HISTORY OF MALIGNANT NEOPLASM OF PROSTATE: ICD-10-CM

## 2021-03-25 DIAGNOSIS — Z80.0 FAMILY HISTORY OF MALIGNANT NEOPLASM OF GASTROINTESTINAL TRACT: ICD-10-CM

## 2021-03-25 PROCEDURE — 36415 COLL VENOUS BLD VENIPUNCTURE: CPT

## 2021-03-29 ENCOUNTER — APPOINTMENT (OUTPATIENT)
Dept: PHYSICAL THERAPY | Facility: CLINIC | Age: 46
End: 2021-03-29
Payer: COMMERCIAL

## 2021-03-31 ENCOUNTER — OFFICE VISIT (OUTPATIENT)
Dept: PHYSICAL THERAPY | Facility: CLINIC | Age: 46
End: 2021-03-31
Payer: COMMERCIAL

## 2021-03-31 ENCOUNTER — OFFICE VISIT (OUTPATIENT)
Dept: FAMILY MEDICINE CLINIC | Facility: CLINIC | Age: 46
End: 2021-03-31
Payer: COMMERCIAL

## 2021-03-31 ENCOUNTER — APPOINTMENT (OUTPATIENT)
Dept: RADIOLOGY | Facility: CLINIC | Age: 46
End: 2021-03-31
Payer: COMMERCIAL

## 2021-03-31 VITALS
HEART RATE: 69 BPM | WEIGHT: 190.4 LBS | TEMPERATURE: 97.6 F | OXYGEN SATURATION: 99 % | BODY MASS INDEX: 30.6 KG/M2 | DIASTOLIC BLOOD PRESSURE: 72 MMHG | SYSTOLIC BLOOD PRESSURE: 119 MMHG | HEIGHT: 66 IN

## 2021-03-31 DIAGNOSIS — S96.911A STRAIN OF RIGHT ANKLE, INITIAL ENCOUNTER: ICD-10-CM

## 2021-03-31 DIAGNOSIS — M25.461 PAIN AND SWELLING OF RIGHT KNEE: ICD-10-CM

## 2021-03-31 DIAGNOSIS — M25.571 CHRONIC PAIN OF RIGHT ANKLE: ICD-10-CM

## 2021-03-31 DIAGNOSIS — L25.9 CONTACT DERMATITIS, UNSPECIFIED CONTACT DERMATITIS TYPE, UNSPECIFIED TRIGGER: ICD-10-CM

## 2021-03-31 DIAGNOSIS — M53.3 SACROILIAC JOINT DYSFUNCTION OF BOTH SIDES: ICD-10-CM

## 2021-03-31 DIAGNOSIS — M25.561 PAIN AND SWELLING OF RIGHT KNEE: ICD-10-CM

## 2021-03-31 DIAGNOSIS — R29.898 WEAKNESS OF BOTH HIPS: ICD-10-CM

## 2021-03-31 DIAGNOSIS — M22.2X2 PATELLOFEMORAL SYNDROME OF BOTH KNEES: Primary | ICD-10-CM

## 2021-03-31 DIAGNOSIS — G89.29 CHRONIC PAIN OF RIGHT ANKLE: ICD-10-CM

## 2021-03-31 DIAGNOSIS — R44.2 PHANTOSMIA: Primary | ICD-10-CM

## 2021-03-31 DIAGNOSIS — M79.7 FIBROMYALGIA: ICD-10-CM

## 2021-03-31 DIAGNOSIS — M79.671 RIGHT FOOT PAIN: ICD-10-CM

## 2021-03-31 DIAGNOSIS — M22.2X1 PATELLOFEMORAL SYNDROME OF BOTH KNEES: Primary | ICD-10-CM

## 2021-03-31 DIAGNOSIS — M54.16 RADICULOPATHY, LUMBAR REGION: ICD-10-CM

## 2021-03-31 DIAGNOSIS — R43.1 PAROSMIA: ICD-10-CM

## 2021-03-31 DIAGNOSIS — G89.29 CHRONIC PAIN OF MULTIPLE JOINTS: ICD-10-CM

## 2021-03-31 DIAGNOSIS — M25.50 CHRONIC PAIN OF MULTIPLE JOINTS: ICD-10-CM

## 2021-03-31 DIAGNOSIS — L30.9 IDIOPATHIC DERMATITIS: ICD-10-CM

## 2021-03-31 PROCEDURE — 87205 SMEAR GRAM STAIN: CPT | Performed by: PHYSICIAN ASSISTANT

## 2021-03-31 PROCEDURE — 97140 MANUAL THERAPY 1/> REGIONS: CPT | Performed by: PHYSICAL THERAPIST

## 2021-03-31 PROCEDURE — 73562 X-RAY EXAM OF KNEE 3: CPT

## 2021-03-31 PROCEDURE — 99214 OFFICE O/P EST MOD 30 MIN: CPT | Performed by: PHYSICIAN ASSISTANT

## 2021-03-31 PROCEDURE — 97112 NEUROMUSCULAR REEDUCATION: CPT | Performed by: PHYSICAL THERAPIST

## 2021-03-31 PROCEDURE — 87070 CULTURE OTHR SPECIMN AEROBIC: CPT | Performed by: PHYSICIAN ASSISTANT

## 2021-03-31 RX ORDER — CLOBETASOL PROPIONATE 0.5 MG/G
1 OINTMENT TOPICAL 2 TIMES DAILY
Qty: 30 G | Refills: 0 | Status: SHIPPED | OUTPATIENT
Start: 2021-03-31

## 2021-03-31 NOTE — LETTER
2021    Dominique Estrella, [address] [facility]  [address]    Patient: Bethany Shetty   YOB: 1975   Date of Visit: 3/31/2021     Encounter Diagnosis     ICD-10-CM    1  Patellofemoral syndrome of both knees  M22 2X1     M22 2X2    2  Weakness of both hips  R29 898    3  Sacroiliac joint dysfunction of both sides  M53 3    4  Radiculopathy, lumbar region  M54 16    5  Strain of right ankle, initial encounter  S96 911A    6  Right foot pain  M79 671    7  Chronic pain of right ankle  M25 571     G89 29        Dear Dr Shaila Devries: Thank you for your recent referral of Bethany Shetty  Please review the attached evaluation summary from Jami's recent visit  Please verify that you agree with the plan of care by signing the attached order  If you have any questions or concerns, please do not hesitate to call  I sincerely appreciate the opportunity to share in the care of one of your patients and hope to have another opportunity to work with you in the near future  Sincerely,    Rod Mcnulty, PT      Referring Provider:      I certify that I have read the below Plan of Care and certify the need for these services furnished under this plan of treatment while under my care  Dominique Estrella PA-C  [address] [address]  Via In Basket          RE-Certification     Today's date: 3/31/2021  Patient name: Bethany Shetty  : 1975  MRN: [id number]  Referring provider: Hans Mac PA-C  Dx:   Encounter Diagnosis     ICD-10-CM    1  Patellofemoral syndrome of both knees  M22 2X1     M22 2X2    2  Weakness of both hips  R29 898    3  Sacroiliac joint dysfunction of both sides  M53 3    4  Radiculopathy, lumbar region  M54 16    5  Strain of right ankle, initial encounter  S96 911A    6  Right foot pain  M79 671    7   Chronic pain of right ankle  M25 571     G89 29        Start Time: 1100  Stop Time: 1145  Total time in clinic (min): 45 minutes    Subjective: patient reports lumbar and R knee pain today      Objective: R LE Arom Great Toe flexion/extension WNL; Strength ankle WNL; great toe flexion/extension WNL;  Knee flexion 4+/5; extension 4-/5; hip flexion 4-/5; add 4+/5      Assessment: increased Arom and Strength R ankle and foot; R knee painful to touch - edema noted, lumbar mobility slightly improved; STGs 75% Met; LTGs not yet Met      Plan: Cont PT 2x/week - 6 weeks     Precautions: RA, no heat      Manual 1/21 1/28 2/4 2/11 3/3 3/15 3/18 3/24 3/31    MFR  15' 10 10 30 15 10' STM R foot 10' STM L/S 30'    Jt mobs   5 5                                   Neuro Re-Ed             Stance on foam             Stability disc        5'     Foam balance beam             Glides with ball    x30ea  x30 30x 30x 30x    LTR     x30 x30 30x 30x 30x    Bridges with ball      x30 30x 30x 30x                 Ther Ex             Pro stretch  30" 3x           Prom/stretch  10' 10' 5  5' 5' R foot      Bike  12' 15' 10  10 10' 10'     CS/HS  10x 10x 10x    10x     // bars 5-way             Pt ed - HEP 20    10        Circuit LEs    x20                      Ther Activity                                       Gait Training             gym             TM             Modalities             E-Stim

## 2021-03-31 NOTE — PROGRESS NOTES
RE-Certification     Today's date: 3/31/2021  Patient name: Yumiko Sanchez  : 1975  MRN: [id number]  Referring provider: Sarahy Clay PA-C  Dx:   Encounter Diagnosis     ICD-10-CM    1  Patellofemoral syndrome of both knees  M22 2X1     M22 2X2    2  Weakness of both hips  R29 898    3  Sacroiliac joint dysfunction of both sides  M53 3    4  Radiculopathy, lumbar region  M54 16    5  Strain of right ankle, initial encounter  S96 911A    6  Right foot pain  M79 671    7  Chronic pain of right ankle  M25 571     G89 29        Start Time: 1100  Stop Time: 1145  Total time in clinic (min): 45 minutes    Subjective: patient reports lumbar and R knee pain today      Objective: R LE Arom Great Toe flexion/extension WNL; Strength ankle WNL; great toe flexion/extension WNL;  Knee flexion 4+/5; extension 4-/5; hip flexion 4-/5; add 4+/5      Assessment: increased Arom and Strength R ankle and foot; R knee painful to touch - edema noted, lumbar mobility slightly improved; STGs 75% Met; LTGs not yet Met      Plan: Cont PT 2x/week - 6 weeks     Precautions: RA, no heat      Manual 1/21 1/28 2/4 2/11 3/3 3/15 3/18 3/24 3/31    MFR  15' 10 10 30 15 10' STM R foot 10' STM L/S 30'    Jt mobs   5 5                                   Neuro Re-Ed             Stance on foam             Stability disc        5'     Foam balance beam             Glides with ball    x30ea  x30 30x 30x 30x    LTR     x30 x30 30x 30x 30x    Bridges with ball      x30 30x 30x 30x                 Ther Ex             Pro stretch  30" 3x           Prom/stretch  10' 10' 5  5' 5' R foot      Bike  12' 15' 10  10 10' 10'     CS/HS  10x 10x 10x    10x     // bars 5-way             Pt ed - HEP 20    10        Circuit LEs    x20                      Ther Activity                                       Gait Training             gym             TM             Modalities             E-Stim

## 2021-03-31 NOTE — PROGRESS NOTES
Assessment/Plan:       Problem List Items Addressed This Visit        Musculoskeletal and Integument    Contact dermatitis    Relevant Medications    clobetasol (TEMOVATE) 0 05 % ointment       Other    Chronic pain of multiple joints    Fibromyalgia      Other Visit Diagnoses     Phantosmia    -  Primary    Relevant Orders    Wound culture and Gram stain    Parosmia        Relevant Orders    Wound culture and Gram stain    Idiopathic dermatitis        Relevant Medications    clobetasol (TEMOVATE) 0 05 % ointment    Other Relevant Orders    Ambulatory referral to Dermatology    Wound culture and Gram stain    Pain and swelling of right knee        Relevant Orders    XR knee 3 vw right non injury        unclear etiology of foul odor, unlike to be from the small clot removed from the nose, however will send this for culture  Likely phantosmia after covid 19  Stop cymbalta due to lack of benefit  Will xray R knee, suggest ice, compression, rest, tylenol  Referral to derm for recurrent rash not responsive to long course of topical high potency steroids     Subjective:      Patient ID: Yassine Jenkins is a 42 y o  female  Pt presents to the office for follow up and a few concerns    Hx of fibromyalgia, she is also going to PT for bilateral patellofemoral syndrome, today her right knee is acutely painful and swollen  The knee is TTP  She has limited ROM  Denies falls or imaging  No leg swelling or calf pain  She shares her knee has become swollen and painful seemingly at random in the past  No recent imaging    She also notes since she had covid in January 2021 she has been having a constant smell of rotting, eggs and "death"  Her smell has fully recovered  She shares she has to ask her daughters if she smells because the odor is so strong  No fevers, URI sx persisting  This has been ongoing for a period of weeks     She also shares she has a chronic rash that has been ongoing for years on her hands and elbows   She has hard blisters/nodules which then can errupt and then are extremly itchy and then can bleed  She has been using clobetasol with some benefit but the rash has since returned    She also had no benefit with Cymbalta and she would like to stop due to weight gain           The following portions of the patient's history were reviewed and updated as appropriate:   She  has a past medical history of Anxiety, Arthritis, Asthma, Depression, Osteoporosis, and Urinary tract infection  She   Patient Active Problem List    Diagnosis Date Noted    COVID-19 2021    Fibromyalgia 2021    Chronic pain of right ankle 2021    Chronic pain of multiple joints 2020    BMI 29 0-29 9,adult 2020    Neuropathy 2020    Chronic neck pain 2020    Dermatitis 2020    Allergic conjunctivitis of both eyes 2020    Contact dermatitis 2020    Acute bacterial conjunctivitis of both eyes 2020    Lipid screening 01/15/2020    Need for pneumococcal vaccination 01/15/2020    Mild intermittent asthma without complication     Nicotine use disorder 01/15/2020     She  has a past surgical history that includes  section and Tubal ligation  Her family history includes Breast cancer in her mother; Cancer in her father and mother; Colon cancer in her mother; Coronary artery disease in her mother; Diabetes in her mother; Ovarian cancer in her mother; Prostate cancer in her father  She  reports that she has quit smoking  Her smoking use included cigarettes  She smoked 0 50 packs per day  She has never used smokeless tobacco  She reports current alcohol use  She reports previous drug use    Current Outpatient Medications   Medication Sig Dispense Refill    albuterol (PROVENTIL HFA,VENTOLIN HFA) 90 mcg/act inhaler Inhale 2 puffs every 6 (six) hours as needed for wheezing (cough) 1 Inhaler 0    Cholecalciferol (Vitamin D3) 25 MCG (1000 UT) CAPS Take by mouth      clobetasol (TEMOVATE) 0 05 % ointment Apply 1 application topically 2 (two) times a day To affected area 30 g 0    Cyanocobalamin 1000 MCG/ML LIQD Take by mouth      Multiple Vitamin (MULTIVITAMIN) capsule Take 1 capsule by mouth daily      olopatadine (PATANOL) 0 1 % ophthalmic solution Administer 1 drop to both eyes 2 (two) times a day 5 mL 3    TURMERIC PO Take by mouth      Vit C-Cholecalciferol-Alessandra Hip (Vitamin C & D3/Alessandra Hips) 500-1000-20 MG-UNIT-MG CAPS Take by mouth       No current facility-administered medications for this visit  Current Outpatient Medications on File Prior to Visit   Medication Sig    albuterol (PROVENTIL HFA,VENTOLIN HFA) 90 mcg/act inhaler Inhale 2 puffs every 6 (six) hours as needed for wheezing (cough)    Cholecalciferol (Vitamin D3) 25 MCG (1000 UT) CAPS Take by mouth    Cyanocobalamin 1000 MCG/ML LIQD Take by mouth    Multiple Vitamin (MULTIVITAMIN) capsule Take 1 capsule by mouth daily    olopatadine (PATANOL) 0 1 % ophthalmic solution Administer 1 drop to both eyes 2 (two) times a day    TURMERIC PO Take by mouth    Vit C-Cholecalciferol-Alessandra Hip (Vitamin C & D3/Alessandra Hips) 500-1000-20 MG-UNIT-MG CAPS Take by mouth    [DISCONTINUED] clobetasol (TEMOVATE) 0 05 % ointment APPLY TO AFFECTED AREA TWICE A DAY    [DISCONTINUED] DULoxetine (CYMBALTA) 30 mg delayed release capsule Take 30 mg by mouth daily     No current facility-administered medications on file prior to visit  She is allergic to pollen extract       Review of Systems   Constitutional: Negative for chills, fatigue and fever  HENT: Negative for congestion, ear pain, hearing loss, nosebleeds, postnasal drip, rhinorrhea, sinus pressure, sinus pain, sneezing and sore throat  Foul odor   Eyes: Negative for pain, discharge, itching and visual disturbance  Respiratory: Negative for cough, chest tightness, shortness of breath and wheezing      Cardiovascular: Negative for chest pain, palpitations and leg swelling  Gastrointestinal: Negative for abdominal pain, blood in stool, constipation, diarrhea, nausea and vomiting  Genitourinary: Negative for frequency and urgency  Musculoskeletal: Positive for arthralgias, gait problem and joint swelling  Skin: Negative  Neurological: Negative for dizziness, light-headedness and numbness  Psychiatric/Behavioral: Negative  Objective:      /72   Pulse 69   Temp 97 6 °F (36 4 °C)   Ht 5' 6" (1 676 m)   Wt 86 4 kg (190 lb 6 4 oz)   SpO2 99%   BMI 30 73 kg/m²          Physical Exam  Vitals signs and nursing note reviewed  Constitutional:       General: She is not in acute distress  Appearance: Normal appearance  HENT:      Head: Normocephalic and atraumatic  Nose: Nose normal  No nasal deformity, congestion or rhinorrhea  Right Nostril: No foreign body, epistaxis, septal hematoma or occlusion  Left Nostril: No foreign body, epistaxis, septal hematoma or occlusion  Comments: There is a small amount of friable tissue with small clot-appearing substance with some mucous of the R nasal cavity, removed and the passages are widely patent  Not an obvious foreign body      Mouth/Throat:      Mouth: Mucous membranes are moist       Pharynx: Oropharynx is clear  No oropharyngeal exudate or posterior oropharyngeal erythema  Eyes:      Pupils: Pupils are equal, round, and reactive to light  Neck:      Musculoskeletal: Normal range of motion and neck supple  Cardiovascular:      Rate and Rhythm: Normal rate and regular rhythm  Heart sounds: Normal heart sounds  No murmur  Pulmonary:      Effort: Pulmonary effort is normal  No respiratory distress  Breath sounds: Normal breath sounds  No wheezing, rhonchi or rales  Abdominal:      General: Bowel sounds are normal       Palpations: Abdomen is soft  Musculoskeletal:      Right knee: She exhibits decreased range of motion and swelling  Tenderness found  Medial joint line and lateral joint line tenderness noted  Skin:     General: Skin is warm and dry  Neurological:      Mental Status: She is alert and oriented to person, place, and time     Psychiatric:         Mood and Affect: Mood and affect normal

## 2021-04-01 ENCOUNTER — OFFICE VISIT (OUTPATIENT)
Dept: OBGYN CLINIC | Facility: CLINIC | Age: 46
End: 2021-04-01
Payer: COMMERCIAL

## 2021-04-01 VITALS
HEART RATE: 61 BPM | SYSTOLIC BLOOD PRESSURE: 117 MMHG | HEIGHT: 66 IN | WEIGHT: 190 LBS | BODY MASS INDEX: 30.53 KG/M2 | DIASTOLIC BLOOD PRESSURE: 75 MMHG

## 2021-04-01 DIAGNOSIS — M53.3 SACROILIAC JOINT DYSFUNCTION OF BOTH SIDES: ICD-10-CM

## 2021-04-01 DIAGNOSIS — R29.898 WEAKNESS OF BOTH HIPS: ICD-10-CM

## 2021-04-01 DIAGNOSIS — M17.11 PRIMARY OSTEOARTHRITIS OF RIGHT KNEE: Primary | ICD-10-CM

## 2021-04-01 DIAGNOSIS — M22.2X2 PATELLOFEMORAL SYNDROME OF LEFT KNEE: ICD-10-CM

## 2021-04-01 DIAGNOSIS — M22.2X1 PATELLOFEMORAL SYNDROME OF RIGHT KNEE: ICD-10-CM

## 2021-04-01 DIAGNOSIS — M25.461 EFFUSION OF RIGHT KNEE: ICD-10-CM

## 2021-04-01 DIAGNOSIS — S96.911D RIGHT ANKLE STRAIN, SUBSEQUENT ENCOUNTER: ICD-10-CM

## 2021-04-01 DIAGNOSIS — M54.16 RADICULOPATHY, LUMBAR REGION: ICD-10-CM

## 2021-04-01 PROCEDURE — 20610 DRAIN/INJ JOINT/BURSA W/O US: CPT | Performed by: FAMILY MEDICINE

## 2021-04-01 PROCEDURE — 99214 OFFICE O/P EST MOD 30 MIN: CPT | Performed by: FAMILY MEDICINE

## 2021-04-01 RX ORDER — LIDOCAINE HYDROCHLORIDE 10 MG/ML
5 INJECTION, SOLUTION INFILTRATION; PERINEURAL
Status: COMPLETED | OUTPATIENT
Start: 2021-04-01 | End: 2021-04-01

## 2021-04-01 RX ORDER — LIDOCAINE HYDROCHLORIDE 10 MG/ML
4 INJECTION, SOLUTION INFILTRATION; PERINEURAL
Status: COMPLETED | OUTPATIENT
Start: 2021-04-01 | End: 2021-04-01

## 2021-04-01 RX ORDER — TRIAMCINOLONE ACETONIDE 40 MG/ML
40 INJECTION, SUSPENSION INTRA-ARTICULAR; INTRAMUSCULAR
Status: COMPLETED | OUTPATIENT
Start: 2021-04-01 | End: 2021-04-01

## 2021-04-01 RX ADMIN — LIDOCAINE HYDROCHLORIDE 5 ML: 10 INJECTION, SOLUTION INFILTRATION; PERINEURAL at 09:19

## 2021-04-01 RX ADMIN — LIDOCAINE HYDROCHLORIDE 4 ML: 10 INJECTION, SOLUTION INFILTRATION; PERINEURAL at 09:19

## 2021-04-01 RX ADMIN — TRIAMCINOLONE ACETONIDE 40 MG: 40 INJECTION, SUSPENSION INTRA-ARTICULAR; INTRAMUSCULAR at 09:19

## 2021-04-01 NOTE — PROGRESS NOTES
Assessment/Plan:  Assessment/Plan   Diagnoses and all orders for this visit:    Primary osteoarthritis of right knee  -     Large joint arthrocentesis: R knee  -     Ambulatory referral to Physical Therapy; Future    Effusion of right knee  -     Large joint arthrocentesis: R knee    Patellofemoral syndrome of right knee  -     Brace  -     Ambulatory referral to Physical Therapy; Future    Patellofemoral syndrome of left knee  -     Ambulatory referral to Physical Therapy; Future    Radiculopathy, lumbar region  -     Ambulatory referral to Physical Therapy; Future    Sacroiliac joint dysfunction of both sides  -     Ambulatory referral to Physical Therapy; Future    Weakness of both hips  -     Ambulatory referral to Physical Therapy; Future    Right ankle strain, subsequent encounter  -     Ambulatory referral to Physical Therapy; Future               62-year-old female with bilateral lower extremity pain of many years duration  Discussed with patient physical exam, impression and plan  Right knee is noted for effusion  She has tenderness at the medial and lateral joint line  She has range of motion limited to extension of -5° and flexion to 90°  There is no appreciable collateral ligament laxity  Clinical impression that she is symptomatic from degenerative changes in the knee and possible internal derangement such as meniscus tear  I discussed treatment in form of aspiration and corticosteroid injection to which she agreed  I aspirated 27 cc of yellow fluid and administered mixture of 4 cc 1% lidocaine and 1 cc Kenalog to right knee without complication  I provided with patellar stabilizing knee brace  She is to continue with formal physical therapy and doing home exercises as directed  She will follow up in 6 weeks at which point she will be re-evaluated  Subjective:   Patient ID: Izabela Andrea is a 42 y o  female    Chief Complaint   Patient presents with    Right Knee - Follow-up, Pain 30-year-old female following up for bilateral lower extremity pain of many years duration  She was last seen by me 8 weeks ago at which point she was referred to physical therapy  She was advised on supplements and topical CBD  She has been attending formal physical therapy and doing home exercises since 01/21/2021  She states the right ankle pain has been improving  Right knee pain has worsened  She has pain described as generalized to the knee but worse at the anterior and lateral aspects, throbbing and sometimes sharp, associated swelling, worse with bearing weight and ambulating, worse with bending and twisting, and improved with rest   She had difficulty with physical therapy yesterday, and massaging was done to help relieve swelling and pain  Knee Pain  This is a chronic problem  The current episode started more than 1 year ago  The problem occurs daily  The problem has been gradually worsening  Associated symptoms include arthralgias and joint swelling  Pertinent negatives include no numbness or weakness  The symptoms are aggravated by twisting and bending  She has tried rest and position changes (Physical therapy, home exercise) for the symptoms  The treatment provided mild relief  Review of Systems   Musculoskeletal: Positive for arthralgias and joint swelling  Neurological: Negative for weakness and numbness  Objective:  Vitals:    04/01/21 0846   BP: 117/75   Pulse: 61   Weight: 86 2 kg (190 lb)   Height: 5' 6" (1 676 m)     Right Knee Exam     Muscle Strength   The patient has normal right knee strength  Tenderness   The patient is experiencing tenderness in the lateral joint line and medial joint line  Range of Motion   Extension: -5   Flexion: 90     Tests   Varus: negative Valgus: negative    Other   Effusion: effusion present          Observations     Right Knee   Positive for effusion  Physical Exam  Vitals signs and nursing note reviewed  Constitutional:       General: She is not in acute distress  Appearance: She is well-developed  She is not ill-appearing or diaphoretic  HENT:      Head: Normocephalic  Right Ear: External ear normal       Left Ear: External ear normal    Eyes:      Conjunctiva/sclera: Conjunctivae normal    Neck:      Trachea: No tracheal deviation  Cardiovascular:      Rate and Rhythm: Normal rate  Pulmonary:      Effort: Pulmonary effort is normal  No respiratory distress  Abdominal:      General: There is no distension  Musculoskeletal:         General: Swelling and tenderness present  No deformity or signs of injury  Right knee: She exhibits effusion  Skin:     General: Skin is warm and dry  Coloration: Skin is not jaundiced or pale  Neurological:      Mental Status: She is alert and oriented to person, place, and time  Psychiatric:         Mood and Affect: Mood normal          Behavior: Behavior normal          Thought Content: Thought content normal          Judgment: Judgment normal            I have personally reviewed pertinent films in PACS and my interpretation is Degenerative changes of the right knee  Large joint arthrocentesis: R knee  Universal Protocol:  Consent: Verbal consent obtained  Risks and benefits: risks, benefits and alternatives were discussed  Consent given by: patient  Time out: Immediately prior to procedure a "time out" was called to verify the correct patient, procedure, equipment, support staff and site/side marked as required    Timeout called at: 4/1/2021 9:00 AM   Patient understanding: patient states understanding of the procedure being performed  Patient consent: the patient's understanding of the procedure matches consent given  Procedure consent: procedure consent matches procedure scheduled  Relevant documents: relevant documents present and verified  Test results: test results available and properly labeled  Site marked: the operative site was marked  Radiology Images displayed and confirmed   If images not available, report reviewed: imaging studies available  Required items: required blood products, implants, devices, and special equipment available  Patient identity confirmed: verbally with patient    Supporting Documentation  Indications: pain   Procedure Details  Location: knee - R knee  Needle size: 18 G  Ultrasound guidance: no  Approach: superior  Medications administered: 4 mL lidocaine 1 %; 5 mL lidocaine 1 %; 40 mg triamcinolone acetonide 40 mg/mL    Aspirate amount: 27 mL  Aspirate: yellow    Patient tolerance: patient tolerated the procedure well with no immediate complications  Dressing:  Sterile dressing applied

## 2021-04-03 LAB
BACTERIA WND AEROBE CULT: ABNORMAL
GRAM STN SPEC: ABNORMAL

## 2021-04-05 ENCOUNTER — APPOINTMENT (OUTPATIENT)
Dept: PHYSICAL THERAPY | Facility: CLINIC | Age: 46
End: 2021-04-05
Payer: COMMERCIAL

## 2021-04-09 ENCOUNTER — TELEPHONE (OUTPATIENT)
Dept: GENETICS | Facility: CLINIC | Age: 46
End: 2021-04-09

## 2021-04-09 NOTE — TELEPHONE ENCOUNTER
Post-Test Genetic Counseling Consult Note    Today I spoke with Tory Castle over the phone to review the results of her genetic test for hereditary cancer  We met previously on 5/8/2020 for pre-test counseling  SUMMARY:     Test(s): [facility] Common Hereditary Cancers Panel (47 genes): APC, HUMAIRA, AXIN2, BARD1, BMPR1A, BRCA1, BRCA2, BRIP1, CDH1, CDK4, CDKN2A, CHEK2, CTNNA1, DICER1, EPCAM, GREM1, HOXB13, KIT, MEN1, MLH1, MSH2, MSH3, MSH6, MUTYH, NBN, NF1, NTHL1, PALB2, PDGFRA, PMS2, POLD1, POLE, PTEN, RAD50, RAD51C, RAD51D, SDHA, SDHB, SDHC, SDHD, SMAD4, SMARCA4, STK11, TP53, TSC1, TSC2, VHL     Result: Variant of uncertain significance     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); heterozygous; uncertain significance     Assessment:  A variant of uncertain significance (VUS) means that a change was identified in a specific gene but it cannot be determined whether the variant is associated with an increased risk of cancer or is a harmless genetic change  It is possible that the variant was seen in only a handful of individuals, or there may be conflicting or incomplete information in the medical literature about the variant and its association with hereditary cancer  Variant 1: POLD1 c 125_136del (p [id number])    The POLD1 gene is associated with autosomal dominant predisposition to colonic adenomatous polyps and colon cancer  There is also preliminary evidence supporting a correlation with autosomal dominant predisposition to endometrial cancer  The significance of the specific POLD1 c 125_136del variant identified in Tory Castle is currently not known and therefore this test result cannot be used to help determine Tory Castle cancer risks  This sequence change deletes 12 nucleotides from exon 2 of the POLD1 mRNA (c 125_[id number])  This leads to the deletion of 4 amino acid residues in the POLD1 protein (p [id number]) but otherwise preserves the integrity of the reading frame    In summary, this variant is a rare in-frame deletion with uncertain impact on protein function  The laboratory will continue to accumulate information on this variant and will reclassify it as either a positive or negative genetic test result when they are confident that they have adequate information  As updated information is obtained, we will notify Jaclyn Hamman with the updated information  Genetic testing for this variant is not recommended for relatives who wish to determine their cancer risks for purposes of determining medical management  The presence or absence of this variant in a relative is not clinically meaningful unless the variant is reclassified in the future  In some cases, it may be helpful for other relatives with a cancer diagnosis to undergo genetic testing with a full panel, particularly if they were diagnosed at a young age  We encouraged Jaclyn Hamman to share her genetic test result with her mother who was diagnosed with multiple primary cancer as she may still benefit from genetic testing  Relatives who wish to pursue genetic testing can reach out to the [address] [place] [address] Road ([id number]) to schedule an appointment or visit www [facility] org to identify a local genetic counselor  Risk Based on Family History:  The significance of this variant is currently not known and therefore this test result cannot be used to help determine Jaclyn Hamman cancer risks  Rather her personal medical history and family history of cancer are the most important factors used to estimate her risk for developing certain cancers and to direct her medical management  Despite her test result, we are able to run risk models to better estimate Jami's lifetime risk of developing breast cancer   I ran three risk models based on the information Jaclyn Hamman provided during our pre-test counseling session:    Clairer-Raghavendra model: Estimated lifetime risk, to age 80, for breast cancer is 8 2% compared to approximately 10 5% general population risk    Monica Medina model:Estimated lifetime risk, to age 80, for breast cancer is 8% compared to approximately 12% general population risk     Augie tables: Estimated lifetime risk, to age 78, for breast cancer is 10 4%    Tees lifetime risk for breast cancer is not elevated above the general population risk of 11% based on the models listed above therefore she does not meet criteria for additional breast cancer screening at this time  Dilma Smith may still have an increased risk for colon and other cancers based upon her mothers history of cancer  We recommended that Dilma Smith make her healthcare providers aware of the family history and discuss her options for screening and risk-reduction  Additional Information:  A healthy lifestyle will improve overall health and reduce risk for illness  Eating a healthy diet and exercising for 4 hours per week is recommended  Both diet and exercise have been shown to help maintain a healthy weight  Postmenopausal women who are overweight are at higher risk for breast cancer  Moderate to heavy alcohol use can increase the risk for some cancers  Smoking cigarettes can also increase risk for breast, lung, prostate, pancreatic and other cancers  Plan:   Recommendations for Dilma Smith are outlined below however the surveillance and medical management should continue as clinically indicated and as determined appropriate by her healthcare providers  Breast Cancer Screening:  Population-based screening guidelines are appropriate  Colon Cancer Screening:   Screening colonoscopy beginning at age 36, or 8 years before earliest diagnosis, with follow-up colonoscopy every 5 years unless more frequent screening is clinically indicated (NCCN Version 2 2020 Colorectal Cancer Screening; =1 first-degree relative with colorectal cancer at any age)    Gynecologic Cancer Screening/Risk Reduction  Routine gynecologic screening    There are no standard screening recommendations for endometrial or ovarian cancer  VUS Result: Allison Valles was strongly encouraged to contact us regarding any changes in her personal or family history of cancer as these changes could alter our recommendation regarding genetic testing and/or cancer screening  Allison Valles was also encouraged to follow up with us on an annual basis as variant classifications are subject to change

## 2021-04-14 ENCOUNTER — EVALUATION (OUTPATIENT)
Dept: PHYSICAL THERAPY | Facility: CLINIC | Age: 46
End: 2021-04-14
Payer: COMMERCIAL

## 2021-04-14 DIAGNOSIS — M22.2X1 PATELLOFEMORAL SYNDROME OF BOTH KNEES: Primary | ICD-10-CM

## 2021-04-14 DIAGNOSIS — M22.2X2 PATELLOFEMORAL SYNDROME OF BOTH KNEES: Primary | ICD-10-CM

## 2021-04-14 DIAGNOSIS — M25.571 CHRONIC PAIN OF RIGHT ANKLE: ICD-10-CM

## 2021-04-14 DIAGNOSIS — M54.16 RADICULOPATHY, LUMBAR REGION: ICD-10-CM

## 2021-04-14 DIAGNOSIS — G89.29 CHRONIC PAIN OF RIGHT ANKLE: ICD-10-CM

## 2021-04-14 DIAGNOSIS — M79.671 RIGHT FOOT PAIN: ICD-10-CM

## 2021-04-14 DIAGNOSIS — S96.911A STRAIN OF RIGHT ANKLE, INITIAL ENCOUNTER: ICD-10-CM

## 2021-04-14 DIAGNOSIS — M53.3 SACROILIAC JOINT DYSFUNCTION OF BOTH SIDES: ICD-10-CM

## 2021-04-14 DIAGNOSIS — R29.898 WEAKNESS OF BOTH HIPS: ICD-10-CM

## 2021-04-14 PROCEDURE — 97110 THERAPEUTIC EXERCISES: CPT | Performed by: PHYSICAL THERAPIST

## 2021-04-14 PROCEDURE — 97140 MANUAL THERAPY 1/> REGIONS: CPT | Performed by: PHYSICAL THERAPIST

## 2021-04-14 NOTE — PROGRESS NOTES
Daily Note     Today's date: 2021  Patient name: Page Vasquez  : 1975  MRN: [id number]  Referring provider: Idris Sears PA-C  Dx:   Encounter Diagnosis     ICD-10-CM    1  Patellofemoral syndrome of both knees  M22 2X1     M22 2X2    2  Strain of right ankle, initial encounter  S96 911A    3  Weakness of both hips  R29 898    4  Right foot pain  M79 671    5  Sacroiliac joint dysfunction of both sides  M53 3    6  Chronic pain of right ankle  M25 571     G89 29    7   Radiculopathy, lumbar region  M54 16        Start Time: 1100  Stop Time: 1145  Total time in clinic (min): 45 minutes    Subjective: patient reports R knee and low back pain 7/10 today      Objective: see treatment diary below      Assessment: tolerated new exercises with minor knee pain; new HEP issued      Plan: Cont POC  Precautions: RA, no heat      Manual 1/21 1/28 2/4 2/11 3/3 3/15 3/18 3/24 3/31 4/14   MFR  15' 10 10 30 15 10' STM R foot 10' STM L/S 30' 15   Jt mobs   5 5                                   Neuro Re-Ed             Stance on foam             Stability disc        5'     Foam balance beam             Glides with ball    x30ea  x30 30x 30x 30x 30x   LTR     x30 x30 30x 30x 30x 30x   Bridges with ball      x30 30x 30x 30x 30x                Ther Ex             Pro stretch  30" 3x           Prom/stretch  10' 10' 5  5' 5' R foot      Bike  12' 15' 10  10 10' 10'     CS/HS  10x 10x 10x    10x     // bars 5-way             Pt ed - HEP 20    10     Abd with band  x30   Circuit LEs    x20      Add with ball  x30 5"   SLR          2x10   Ther Activity                                       Gait Training             gym             TM             Modalities             E-Stim

## 2021-04-19 ENCOUNTER — APPOINTMENT (OUTPATIENT)
Dept: PHYSICAL THERAPY | Facility: CLINIC | Age: 46
End: 2021-04-19
Payer: COMMERCIAL

## 2021-04-21 ENCOUNTER — OFFICE VISIT (OUTPATIENT)
Dept: PHYSICAL THERAPY | Facility: CLINIC | Age: 46
End: 2021-04-21
Payer: COMMERCIAL

## 2021-04-21 DIAGNOSIS — R29.898 WEAKNESS OF BOTH HIPS: ICD-10-CM

## 2021-04-21 DIAGNOSIS — M25.571 CHRONIC PAIN OF RIGHT ANKLE: ICD-10-CM

## 2021-04-21 DIAGNOSIS — M22.2X2 PATELLOFEMORAL SYNDROME OF BOTH KNEES: Primary | ICD-10-CM

## 2021-04-21 DIAGNOSIS — M54.16 RADICULOPATHY, LUMBAR REGION: ICD-10-CM

## 2021-04-21 DIAGNOSIS — M22.2X1 PATELLOFEMORAL SYNDROME OF BOTH KNEES: Primary | ICD-10-CM

## 2021-04-21 DIAGNOSIS — M79.671 RIGHT FOOT PAIN: ICD-10-CM

## 2021-04-21 DIAGNOSIS — S96.911A STRAIN OF RIGHT ANKLE, INITIAL ENCOUNTER: ICD-10-CM

## 2021-04-21 DIAGNOSIS — G89.29 CHRONIC PAIN OF RIGHT ANKLE: ICD-10-CM

## 2021-04-21 DIAGNOSIS — M53.3 SACROILIAC JOINT DYSFUNCTION OF BOTH SIDES: ICD-10-CM

## 2021-04-21 PROCEDURE — 97112 NEUROMUSCULAR REEDUCATION: CPT | Performed by: PHYSICAL THERAPIST

## 2021-04-21 PROCEDURE — 97110 THERAPEUTIC EXERCISES: CPT | Performed by: PHYSICAL THERAPIST

## 2021-04-21 NOTE — PROGRESS NOTES
Daily Note     Today's date: 2021  Patient name: Smooth Damico  : 1975  MRN: [id number]  Referring provider: Jaky Nesbitt PA-C  Dx:   Encounter Diagnosis     ICD-10-CM    1  Patellofemoral syndrome of both knees  M22 2X1     M22 2X2    2  Sacroiliac joint dysfunction of both sides  M53 3    3  Strain of right ankle, initial encounter  S96 911A    4  Chronic pain of right ankle  M25 571     G89 29    5  Weakness of both hips  R29 898    6  Radiculopathy, lumbar region  M54 16    7   Right foot pain  M79 671        Start Time: 1415  Stop Time: 1500  Total time in clinic (min): 45 minutes    Subjective: patient reports R knee and low back feels pretty good today      Objective: see treatment diary below      Assessment: tolerated stability exercises with good posture and balance; able to self-correct minor LOB; tolerated TM with good gait mechanics; verbal reminders to engage abdominals during glides with ball      Plan: Cont POC  Precautions: RA, no heat      Manual 4/21 1/28 2/4 2/11 3/3 3/15 3/18 3/24 3/31 4/14   MFR  15' 10 10 30 15 10' STM R foot 10' STM L/S 30' 15   Jt mobs   5 5                                   Neuro Re-Ed             Stance on foam 5 5           Stability disc 5 5      5'     Foam balance beam 5 5           Glides with ball x30 x30  x30ea  x30 30x 30x 30x 30x   LTR x30 x30   x30 x30 30x 30x 30x 30x   Bridges with ball x30 x30    x30 30x 30x 30x 30x                Ther Ex             Pro stretch             Prom/stretch   10' 5  5' 5' R foot      Bike 10' 10' 15' 10  10 10' 10'     CS/HS  10x 10x 10x    10x     // bars 5-way             Pt ed - HEP     10     Abd with band  x30   Circuit LEs    x20      Add with ball  x30 5"   SLR          2x10   Ther Activity                                       Gait Training             gym             TM 5'            Modalities             E-Stim

## 2021-04-26 ENCOUNTER — OFFICE VISIT (OUTPATIENT)
Dept: PHYSICAL THERAPY | Facility: CLINIC | Age: 46
End: 2021-04-26
Payer: COMMERCIAL

## 2021-04-26 DIAGNOSIS — R29.898 WEAKNESS OF BOTH HIPS: ICD-10-CM

## 2021-04-26 DIAGNOSIS — M22.2X1 PATELLOFEMORAL SYNDROME OF BOTH KNEES: Primary | ICD-10-CM

## 2021-04-26 DIAGNOSIS — M22.2X2 PATELLOFEMORAL SYNDROME OF BOTH KNEES: Primary | ICD-10-CM

## 2021-04-26 DIAGNOSIS — M54.16 RADICULOPATHY, LUMBAR REGION: ICD-10-CM

## 2021-04-26 DIAGNOSIS — S96.911A STRAIN OF RIGHT ANKLE, INITIAL ENCOUNTER: ICD-10-CM

## 2021-04-26 DIAGNOSIS — M53.3 SACROILIAC JOINT DYSFUNCTION OF BOTH SIDES: ICD-10-CM

## 2021-04-26 PROCEDURE — 97110 THERAPEUTIC EXERCISES: CPT | Performed by: PHYSICAL THERAPIST

## 2021-04-26 PROCEDURE — 97112 NEUROMUSCULAR REEDUCATION: CPT | Performed by: PHYSICAL THERAPIST

## 2021-04-26 NOTE — LETTER
2021    Jameson Andino, [address] [facility]  [address]    Patient: Louise Irwin   YOB: 1975   Date of Visit: 2021     Encounter Diagnosis     ICD-10-CM    1  Patellofemoral syndrome of both knees  M22 2X1     M22 2X2    2  Weakness of both hips  R29 898    3  Radiculopathy, lumbar region  M54 16    4  Sacroiliac joint dysfunction of both sides  M53 3    5  Strain of right ankle, initial encounter  P11 702K        Dear Dr Misael Hernandez: Thank you for your recent referral of Louise Irwin  Please review the attached evaluation summary from Jami's recent visit  Please verify that you agree with the plan of care by signing the attached order  If you have any questions or concerns, please do not hesitate to call  I sincerely appreciate the opportunity to share in the care of one of your patients and hope to have another opportunity to work with you in the near future  Sincerely,    Van Lopez PT      Referring Provider:      I certify that I have read the below Plan of Care and certify the need for these services furnished under this plan of treatment while under my care  Jameson Andino PA-C  [address] [address]  [address] [place]          RE-CERTIFICATION     Today's date: 2021  Patient name: Louise Irwin  : 1975  MRN: [id number]  Referring provider: Mary Dumont PA-C  Dx:   Encounter Diagnosis     ICD-10-CM    1  Patellofemoral syndrome of both knees  M22 2X1     M22 2X2    2  Weakness of both hips  R29 898    3  Radiculopathy, lumbar region  M54 16    4  Sacroiliac joint dysfunction of both sides  M53 3    5   Strain of right ankle, initial encounter  S96 911A        Start Time: 1230  Stop Time: 1315  Total time in clinic (min): 45 minutes    Subjective: patient reports minimal pain back and knee      Objective: R LE Strength: knee flexion 4+/5; extension 4+/5; BL LE Strength hip flexion 4/5; add 5/5      Assessment: increased strength, able to walk with mild difficulty; drive without difficulty; remaining difficulty ascending/descending stairs      Plan: Cont POC 2x/week - 4 weeks; STGs Met; LTGs 50% Met  Precautions: RA, no heat      Manual 4/21 4/26 2/4 2/11 3/3 3/15 3/18 3/24 3/31 4/14   MFR   10 10 30 15 10' STM R foot 10' STM L/S 30' 15   Jt mobs   5 5                                   Neuro Re-Ed             Stance on foam 5 5           Stability disc 5 5      5'     Foam balance beam 5 5           Glides with ball x30 x30  x30ea  x30 30x 30x 30x 30x   LTR x30 x30   x30 x30 30x 30x 30x 30x   Bridges with ball x30 x30    x30 30x 30x 30x 30x                Ther Ex             Pro stretch             Prom/stretch   10' 5  5' 5' R foot      Bike 10' 5' 15' 10  10 10' 10'     CS/HS   10x 10x    10x     // bars 5-way             Pt ed - HEP     10     Abd with band  x30   Circuit LEs    x20      Add with ball  x30 5"   SLR          2x10   Ther Activity                                       Gait Training             gym             TM 5' 10'           Modalities             E-Stim

## 2021-04-26 NOTE — PROGRESS NOTES
RE-CERTIFICATION     Today's date: 2021  Patient name: Louise Irwin  : 1975  MRN: [id number]  Referring provider: Mary Dumont PA-C  Dx:   Encounter Diagnosis     ICD-10-CM    1  Patellofemoral syndrome of both knees  M22 2X1     M22 2X2    2  Weakness of both hips  R29 898    3  Radiculopathy, lumbar region  M54 16    4  Sacroiliac joint dysfunction of both sides  M53 3    5   Strain of right ankle, initial encounter  S96 911A        Start Time: 1230  Stop Time: 1315  Total time in clinic (min): 45 minutes    Subjective: patient reports minimal pain back and knee      Objective: R LE Strength: knee flexion 4+/5; extension 4+/5; BL LE Strength hip flexion 4/5; add 5/5      Assessment: increased strength, able to walk with mild difficulty; drive without difficulty; remaining difficulty ascending/descending stairs      Plan: Cont POC 2x/week - 4 weeks; STGs Met; LTGs 50% Met  Precautions: RA, no heat      Manual 4/21 4/26 2/4 2/11 3/3 3/15 3/18 3/24 3/31 4/14   MFR   10 10 30 15 10' STM R foot 10' STM L/S 30' 15   Jt mobs   5 5                                   Neuro Re-Ed             Stance on foam 5 5           Stability disc 5 5      5'     Foam balance beam 5 5           Glides with ball x30 x30  x30ea  x30 30x 30x 30x 30x   LTR x30 x30   x30 x30 30x 30x 30x 30x   Bridges with ball x30 x30    x30 30x 30x 30x 30x                Ther Ex             Pro stretch             Prom/stretch   10' 5  5' 5' R foot      Bike 10' 5' 15' 10  10 10' 10'     CS/HS   10x 10x    10x     // bars 5-way             Pt ed - HEP     10     Abd with band  x30   Circuit LEs    x20      Add with ball  x30 5"   SLR          2x10   Ther Activity                                       Gait Training             gym             TM 5' 10'           Modalities             E-Stim

## 2021-04-28 ENCOUNTER — APPOINTMENT (OUTPATIENT)
Dept: PHYSICAL THERAPY | Facility: CLINIC | Age: 46
End: 2021-04-28
Payer: COMMERCIAL

## 2021-05-13 ENCOUNTER — OFFICE VISIT (OUTPATIENT)
Dept: OBGYN CLINIC | Facility: CLINIC | Age: 46
End: 2021-05-13
Payer: COMMERCIAL

## 2021-05-13 VITALS
SYSTOLIC BLOOD PRESSURE: 112 MMHG | DIASTOLIC BLOOD PRESSURE: 79 MMHG | HEIGHT: 66 IN | HEART RATE: 66 BPM | WEIGHT: 190 LBS | BODY MASS INDEX: 30.53 KG/M2

## 2021-05-13 DIAGNOSIS — M22.2X1 PATELLOFEMORAL SYNDROME OF RIGHT KNEE: ICD-10-CM

## 2021-05-13 DIAGNOSIS — M54.16 RADICULOPATHY, LUMBAR REGION: ICD-10-CM

## 2021-05-13 DIAGNOSIS — M17.11 PRIMARY OSTEOARTHRITIS OF RIGHT KNEE: Primary | ICD-10-CM

## 2021-05-13 DIAGNOSIS — S96.911D RIGHT ANKLE STRAIN, SUBSEQUENT ENCOUNTER: ICD-10-CM

## 2021-05-13 DIAGNOSIS — M53.3 SACROILIAC JOINT DYSFUNCTION OF BOTH SIDES: ICD-10-CM

## 2021-05-13 DIAGNOSIS — R29.898 WEAKNESS OF BOTH HIPS: ICD-10-CM

## 2021-05-13 PROCEDURE — 99213 OFFICE O/P EST LOW 20 MIN: CPT | Performed by: FAMILY MEDICINE

## 2021-05-13 NOTE — PROGRESS NOTES
Assessment/Plan:  Assessment/Plan   Diagnoses and all orders for this visit:    Primary osteoarthritis of right knee  -     Ambulatory referral to Physical Therapy; Future    Patellofemoral syndrome of right knee  -     Ambulatory referral to Physical Therapy; Future    Radiculopathy, lumbar region  -     Ambulatory referral to Physical Therapy; Future    Sacroiliac joint dysfunction of both sides  -     Ambulatory referral to Physical Therapy; Future    Weakness of both hips  -     Ambulatory referral to Physical Therapy; Future    Right ankle strain, subsequent encounter  -     Ambulatory referral to Physical Therapy; Future         53-year-old female with bilateral lower extremity pain of many years duration  Discussed with patient physical exam, impression and plan  Right knee is noted for mild swelling  She has tenderness at the medial and lateral joint line  She has full extension and flexion to 130  Clinical impression is that she is improving regard to the inflammatory component and patellofemoral mechanics  She is to continue with physical therapy and home exercises as directed  She will follow up in 8 weeks at which point she will be re-evaluated  Subjective:   Patient ID: Jessica Salamanca is a 42 y o  female  Chief Complaint   Patient presents with    Right Knee - Follow-up        53-year-old female following up for bilateral lower extremity pain of many years duration  She was last seen by me 6 she underwent right knee aspiration and corticosteroid injection  She was provided with patellar stabilizing knee brace and  Advised to continue with physical therapy  She reports improvement since her last visit  She has no longer experiencing severe pain in the knee    He has been experiencing pain described as generalized to the knee but worse at the lateral aspect, achy and sore, nonradiating, associated swelling, worse with physical activity, and improved with rest   She has been weaning out of patellar stabilizing brace  Knee Pain  This is a chronic problem  The current episode started more than 1 year ago  The problem has been gradually improving  Associated symptoms include arthralgias and joint swelling  Pertinent negatives include no numbness or weakness  The symptoms are aggravated by bending and twisting  She has tried rest (Corticosteroid injection, physical therapy, home exercise, knee brace) for the symptoms  The treatment provided mild relief  Review of Systems   Musculoskeletal: Positive for arthralgias and joint swelling  Neurological: Negative for weakness and numbness  Objective:  Vitals:    05/13/21 0801   BP: 112/79   Pulse: 66   Weight: 86 2 kg (190 lb)   Height: 5' 6" (1 676 m)     Right Ankle Exam     Tenderness   The patient is experiencing no tenderness  Swelling: none    Muscle Strength   Dorsiflexion:  5/5  Plantar flexion:  5/5      Right Knee Exam     Muscle Strength   The patient has normal right knee strength  Tenderness   The patient is experiencing tenderness in the lateral joint line and medial joint line  Range of Motion   Extension: normal   Flexion: 130     Tests   Varus: negative Valgus: negative    Other   Swelling: mild          Strength/Myotome Testing     Right Ankle/Foot   Dorsiflexion: 5  Plantar flexion: 5      Physical Exam  Vitals signs and nursing note reviewed  Constitutional:       General: She is not in acute distress  Appearance: She is well-developed  HENT:      Head: Normocephalic  Right Ear: External ear normal       Left Ear: External ear normal    Eyes:      Conjunctiva/sclera: Conjunctivae normal    Neck:      Trachea: No tracheal deviation  Cardiovascular:      Rate and Rhythm: Normal rate  Pulmonary:      Effort: Pulmonary effort is normal  No respiratory distress  Abdominal:      General: There is no distension  Skin:     General: Skin is warm and dry     Neurological:      Mental Status: She is alert and oriented to person, place, and time     Psychiatric:         Behavior: Behavior normal

## 2021-05-17 ENCOUNTER — OFFICE VISIT (OUTPATIENT)
Dept: PHYSICAL THERAPY | Facility: CLINIC | Age: 46
End: 2021-05-17
Payer: COMMERCIAL

## 2021-05-17 DIAGNOSIS — M22.2X2 PATELLOFEMORAL SYNDROME OF BOTH KNEES: ICD-10-CM

## 2021-05-17 DIAGNOSIS — M54.16 RADICULOPATHY, LUMBAR REGION: Primary | ICD-10-CM

## 2021-05-17 DIAGNOSIS — M22.2X1 PATELLOFEMORAL SYNDROME OF BOTH KNEES: ICD-10-CM

## 2021-05-17 DIAGNOSIS — R29.898 WEAKNESS OF BOTH HIPS: ICD-10-CM

## 2021-05-17 PROCEDURE — 97112 NEUROMUSCULAR REEDUCATION: CPT

## 2021-05-17 PROCEDURE — 97110 THERAPEUTIC EXERCISES: CPT | Performed by: PHYSICAL THERAPIST

## 2021-05-17 NOTE — PROGRESS NOTES
Daily Note     Today's date: 2021  Patient name: Elham Powers  : 1975  MRN: [id number]  Referring provider: Samir Delacruz PA-C  Dx:   Encounter Diagnosis     ICD-10-CM    1  Radiculopathy, lumbar region  M54 16    2  Patellofemoral syndrome of both knees  M22 2X1     M22 2X2    3  Weakness of both hips  R29 898        Start Time: 1140  Stop Time: 1220  Total time in clinic (min): 40 minutes    Subjective: Patient reports her back is feeling okay today, no new complaints  Objective: See treatment diary below      Assessment: Patient tolerated session well  This session we added standing hip 3 way to promote hip strengthening  Good balance noted w/ balance activities  Patient would benefit from continued skilled PT  Plan: Continue per plan of care        Precautions: RA, no heat      Manual    MFR  15'        Pt  declined   Jt mobs                                       Neuro Re-Ed             Stance on foam          15s 10x SLS   Stability disc          5'   Foam balance beam          10x   Glides with ball          30x   LTR          30x   Bridges with ball          30x   Bosu lunges          10x   Ther Ex             Pro stretch  30" 3x           Prom/stretch  10'           Bike  12'        10'   CS/HS  10x        10x   // bars 5-way          20x   Pt ed - HEP 20            Circuit LEs                          Ther Activity                                       Gait Training             gym             TM          7'   Modalities             E-Stim

## 2021-05-20 ENCOUNTER — HOSPITAL ENCOUNTER (OUTPATIENT)
Dept: MAMMOGRAPHY | Facility: CLINIC | Age: 46
Discharge: HOME | End: 2021-05-20
Payer: COMMERCIAL

## 2021-05-20 VITALS — HEIGHT: 66 IN | WEIGHT: 190 LBS | BODY MASS INDEX: 30.53 KG/M2

## 2021-05-20 DIAGNOSIS — Z12.31 ENCOUNTER FOR SCREENING MAMMOGRAM FOR MALIGNANT NEOPLASM OF BREAST: ICD-10-CM

## 2021-05-20 PROCEDURE — 77067 SCR MAMMO BI INCL CAD: CPT

## 2021-05-20 PROCEDURE — 77063 BREAST TOMOSYNTHESIS BI: CPT

## 2021-05-25 ENCOUNTER — EVALUATION (OUTPATIENT)
Dept: PHYSICAL THERAPY | Facility: CLINIC | Age: 46
End: 2021-05-25
Payer: COMMERCIAL

## 2021-05-25 DIAGNOSIS — M54.16 RADICULOPATHY, LUMBAR REGION: Primary | ICD-10-CM

## 2021-05-25 DIAGNOSIS — M53.3 SACROILIAC JOINT DYSFUNCTION OF BOTH SIDES: ICD-10-CM

## 2021-05-25 PROCEDURE — 97110 THERAPEUTIC EXERCISES: CPT | Performed by: PHYSICAL THERAPIST

## 2021-05-25 PROCEDURE — 97112 NEUROMUSCULAR REEDUCATION: CPT | Performed by: PHYSICAL THERAPIST

## 2021-05-25 PROCEDURE — 97116 GAIT TRAINING THERAPY: CPT | Performed by: PHYSICAL THERAPIST

## 2021-05-25 NOTE — PROGRESS NOTES
RE-CERTIFICATION    Today's date: 2021  Patient name: Valdez Ann  : 1975  MRN: [id number]  Referring provider: Yuliet Calles PA-C  Dx:   Encounter Diagnosis     ICD-10-CM    1  Radiculopathy, lumbar region  M54 16    2  Sacroiliac joint dysfunction of both sides  M53 3        Start Time: 0930  Stop Time: 1015  Total time in clinic (min): 45 minutes    Subjective: patient reports 1/10 low back pain at rest; 10/10 if over-exerted      Objective: Lumbar Arom forward flexion minimal restriction; extension minimal restriction; BL rotation moderate restriction; LE strength hip flexion 4-/5; abd/add 4/5; SLR 4+/5, abdominals 4+/5      Assessment: decreased Rom and Strength from last re-certification secondary to lapse in treatment secondary to insurance issues and patient not feeling well; remaining difficulty walking, ascending/descending stairs      Plan: Continue per plan of care   2x/week - 4 weeks; LTGs 50% Met     Precautions: RA, no heat      Manual    MFR  15'        Pt  declined   Jt mobs                                       Neuro Re-Ed             Stance on foam   5       15s 10x SLS   Stability disc   5       5'   Foam balance beam   5       10x   Glides with ball          30x   LTR          30x   Bridges with ball          30x   Bosu lunges          10x   Ther Ex             Pro stretch             Prom/stretch   5          Bike          10'   CS/HS   x10 10"       10x   // bars 5-way          20x   Pt ed - HEP 20            Circuit LEs                          Ther Activity                                       Gait Training             gym             TM   15       7'   Modalities             E-Stim

## 2021-05-25 NOTE — LETTER
May 25, 2021    Deacon Gomez, [address] [facility]  [address]    Patient: Page Vasquez   YOB: 1975   Date of Visit: 2021     Encounter Diagnosis     ICD-10-CM    1  Radiculopathy, lumbar region  M54 16    2  Sacroiliac joint dysfunction of both sides  M53 3        Dear Dr Maria G Roach: Thank you for your recent referral of Page Vasquez  Please review the attached evaluation summary from Jami's recent visit  Please verify that you agree with the plan of care by signing the attached order  If you have any questions or concerns, please do not hesitate to call  I sincerely appreciate the opportunity to share in the care of one of your patients and hope to have another opportunity to work with you in the near future  Sincerely,    Karely Christine PT      Referring Provider:      I certify that I have read the below Plan of Care and certify the need for these services furnished under this plan of treatment while under my care  Deacon Gomez PA-C  [address] [address]  [address] [place]          RE-CERTIFICATION    Today's date: 2021  Patient name: Page Vasquez  : 1975  MRN: [id number]  Referring provider: Idris Sears PA-C  Dx:   Encounter Diagnosis     ICD-10-CM    1  Radiculopathy, lumbar region  M54 16    2   Sacroiliac joint dysfunction of both sides  M53 3        Start Time: 0930  Stop Time: 1015  Total time in clinic (min): 45 minutes    Subjective: patient reports 1/10 low back pain at rest; 10/10 if over-exerted      Objective: Lumbar Arom forward flexion minimal restriction; extension minimal restriction; BL rotation moderate restriction; LE strength hip flexion 4-/5; abd/add 4/5; SLR 4+/5, abdominals 4+/5      Assessment: decreased Rom and Strength from last re-certification secondary to lapse in treatment secondary to insurance issues and patient not feeling well; remaining difficulty walking, ascending/descending stairs      Plan: Continue per plan of care   2x/week - 4 weeks; LTGs 50% Met     Precautions: RA, no heat      Manual 1/21 1/28 5/25 5/17   MFR  15'        Pt  declined   Jt mobs                                       Neuro Re-Ed             Stance on foam   5       15s 10x SLS   Stability disc   5       5'   Foam balance beam   5       10x   Glides with ball          30x   LTR          30x   Bridges with ball          30x   Bosu lunges          10x   Ther Ex             Pro stretch             Prom/stretch   5          Bike          10'   CS/HS   x10 10"       10x   // bars 5-way          20x   Pt ed - HEP 20            Circuit LEs                          Ther Activity                                       Gait Training             gym             TM   15       7'   Modalities             E-Stim

## 2021-05-26 ENCOUNTER — HOSPITAL ENCOUNTER (OUTPATIENT)
Dept: ULTRASOUND IMAGING | Facility: CLINIC | Age: 46
Discharge: HOME | End: 2021-05-26
Payer: COMMERCIAL

## 2021-05-26 ENCOUNTER — HOSPITAL ENCOUNTER (OUTPATIENT)
Dept: MAMMOGRAPHY | Facility: CLINIC | Age: 46
Discharge: HOME | End: 2021-05-26
Payer: COMMERCIAL

## 2021-05-26 DIAGNOSIS — R92.8 ABNORMAL MAMMOGRAM: ICD-10-CM

## 2021-05-26 PROCEDURE — G0279 TOMOSYNTHESIS, MAMMO: HCPCS

## 2021-05-26 PROCEDURE — 76642 ULTRASOUND BREAST LIMITED: CPT

## 2021-05-26 PROCEDURE — 77065 DX MAMMO INCL CAD UNI: CPT

## 2021-05-27 ENCOUNTER — OFFICE VISIT (OUTPATIENT)
Dept: PHYSICAL THERAPY | Facility: CLINIC | Age: 46
End: 2021-05-27
Payer: COMMERCIAL

## 2021-05-27 DIAGNOSIS — M54.16 RADICULOPATHY, LUMBAR REGION: Primary | ICD-10-CM

## 2021-05-27 DIAGNOSIS — M22.2X2 PATELLOFEMORAL SYNDROME OF BOTH KNEES: ICD-10-CM

## 2021-05-27 DIAGNOSIS — M22.2X1 PATELLOFEMORAL SYNDROME OF BOTH KNEES: ICD-10-CM

## 2021-05-27 DIAGNOSIS — M53.3 SACROILIAC JOINT DYSFUNCTION OF BOTH SIDES: ICD-10-CM

## 2021-05-27 PROCEDURE — 97110 THERAPEUTIC EXERCISES: CPT

## 2021-05-27 PROCEDURE — 97140 MANUAL THERAPY 1/> REGIONS: CPT

## 2021-05-27 NOTE — PROGRESS NOTES
Daily Note     Today's date: 2021  Patient name: Sheila Watters  : 1975  MRN: [id number]  Referring provider: aHyden Mason PA-C  Dx:   Encounter Diagnosis     ICD-10-CM    1  Radiculopathy, lumbar region  M54 16    2  Sacroiliac joint dysfunction of both sides  M53 3    3  Patellofemoral syndrome of both knees  M22 2X1     M22 2X2        Start Time: 1015  Stop Time: 1100  Total time in clinic (min): 45 minutes    Subjective: Patient reports her back is sore and tight today  Objective: See treatment diary below      Assessment: Patient tolerated session well  Reduced muscular tightness post manual therapy  Patient had reduced pain post session  Patient would benefit from continued skilled PT  Plan: Continue per plan of care        Precautions: RA, no heat      Manual    MFR  15'  10'      Pt  declined   Jt mobs                                       Neuro Re-Ed             Stance on foam   5 5      15s 10x SLS   Stability disc   5 5      5'   Foam balance beam   5 5      10x   Glides with ball          30x   LTR          30x   Bridges with ball          30x   Bosu lunges          10x   Ther Ex             Pro stretch             Prom/stretch   5          Bike          10'   CS/HS   x10 10" 10x      10x   // bars 5-way          20x   Pt ed - HEP 20            Circuit LEs                          Ther Activity                                       Gait Training             gym             TM   15 10'      7'   Modalities             E-Stim

## 2021-06-01 ENCOUNTER — OFFICE VISIT (OUTPATIENT)
Dept: PHYSICAL THERAPY | Facility: CLINIC | Age: 46
End: 2021-06-01
Payer: COMMERCIAL

## 2021-06-01 DIAGNOSIS — M53.3 SACROILIAC JOINT DYSFUNCTION OF BOTH SIDES: ICD-10-CM

## 2021-06-01 DIAGNOSIS — M54.16 RADICULOPATHY, LUMBAR REGION: Primary | ICD-10-CM

## 2021-06-01 PROCEDURE — 97116 GAIT TRAINING THERAPY: CPT | Performed by: PHYSICAL THERAPIST

## 2021-06-01 PROCEDURE — 97112 NEUROMUSCULAR REEDUCATION: CPT | Performed by: PHYSICAL THERAPIST

## 2021-06-01 NOTE — PROGRESS NOTES
Daily Note     Today's date: 2021  Patient name: Monse Weir  : 1975  MRN: [id number]  Referring provider: Elenita Browne PA-C  Dx:   Encounter Diagnosis     ICD-10-CM    1  Radiculopathy, lumbar region  M54 16    2  Sacroiliac joint dysfunction of both sides  M53 3        Start Time: 1145  Stop Time: 1230  Total time in clinic (min): 45 minutes    Subjective: Patient reports low back pain 4-5/10 today      Objective: See treatment diary below      Assessment: decreased low back pain post MFR; stability and gait pattern remains good    Plan: Continue per plan of care        Precautions: RA, no heat      Manual    MFR  15'  10' 5'     Pt  declined   Jt mobs                                       Neuro Re-Ed             Stance on foam   5 5 5     15s 10x SLS   Stability disc   5 5 5     5'   Foam balance beam   5 5 5     10x   Glides with ball     x30     30x   LTR     x30     30x   Bridges with ball     x30     30x   Bosu lunges          10x   Ther Ex             Pro stretch             Prom/stretch   5          Bike          10'   CS/HS   x10 10" 10x HS/x5 ea     10x   // bars 5-way          20x   Pt ed - HEP 20            Circuit LEs                          Ther Activity                                       Gait Training             gym             TM   15 10' 15'     7'   Modalities             E-Stim

## 2021-06-15 ENCOUNTER — TELEMEDICINE (OUTPATIENT)
Dept: FAMILY MEDICINE CLINIC | Facility: CLINIC | Age: 46
End: 2021-06-15
Payer: COMMERCIAL

## 2021-06-15 ENCOUNTER — APPOINTMENT (OUTPATIENT)
Dept: PHYSICAL THERAPY | Facility: CLINIC | Age: 46
End: 2021-06-15
Payer: COMMERCIAL

## 2021-06-15 VITALS — WEIGHT: 190 LBS | BODY MASS INDEX: 30.53 KG/M2 | HEIGHT: 66 IN

## 2021-06-15 DIAGNOSIS — Z91.09 ENVIRONMENTAL ALLERGIES: Primary | ICD-10-CM

## 2021-06-15 DIAGNOSIS — J30.2 SEASONAL ALLERGIC RHINITIS, UNSPECIFIED TRIGGER: ICD-10-CM

## 2021-06-15 PROCEDURE — 99213 OFFICE O/P EST LOW 20 MIN: CPT | Performed by: PHYSICIAN ASSISTANT

## 2021-06-15 RX ORDER — FLUTICASONE PROPIONATE 50 MCG
1 SPRAY, SUSPENSION (ML) NASAL DAILY
Qty: 9.9 ML | Refills: 1 | Status: SHIPPED | OUTPATIENT
Start: 2021-06-15 | End: 2021-08-16

## 2021-06-15 RX ORDER — FEXOFENADINE HCL 180 MG/1
180 TABLET ORAL DAILY
Qty: 60 TABLET | Refills: 0 | Status: SHIPPED | OUTPATIENT
Start: 2021-06-15

## 2021-06-15 NOTE — PROGRESS NOTES
Virtual Regular Visit      Assessment/Plan:    Problem List Items Addressed This Visit     None      Visit Diagnoses     Environmental allergies    -  Primary    Relevant Medications    fexofenadine (ALLEGRA) 180 MG tablet    fluticasone (FLONASE) 50 mcg/act nasal spray    Seasonal allergic rhinitis, unspecified trigger        Relevant Medications    fexofenadine (ALLEGRA) 180 MG tablet    fluticasone (FLONASE) 50 mcg/act nasal spray      trial flonase and allegra daily for allergies  Follow up prn  It was my intent to perform this visit via video technology but the patient was not able to do a video connection so the visit was completed via audio telephone only  Reason for visit is   Chief Complaint   Patient presents with    Virtual Regular Visit    Sinus Problem    Back Pain    Virtual Regular Visit        Encounter provider Jose J Hernandez PA-C    Provider located at [place] [address]  [address] [place] [id number]      Recent Visits  No visits were found meeting these conditions  Showing recent visits within past 7 days and meeting all other requirements  Today's Visits  Date Type Provider Dept   06/15/21 Telemedicine Nicole Galan PA-C  [facility]    Showing today's visits and meeting all other requirements  Future Appointments  No visits were found meeting these conditions  Showing future appointments within next 150 days and meeting all other requirements       The patient was identified by name and date of birth  Gonzálezjpgay Mookie was informed that this is a telemedicine visit and that the visit is being conducted through telephone  My office door was closed  No one else was in the room  She acknowledged consent and understanding of privacy and security of the video platform  The patient has agreed to participate and understands they can discontinue the visit at any time  Patient is aware this is a billable service  Liseth Alvarado is a 42 y o  female    Pt presents with complaints of significant sneezing, rhinorrhea, PND, congestion, sinus pressure and swollen/watery eyes  She tried zyrtec without benefit  Hx of environmental allergies  No cough  No fevers  Past Medical History:   Diagnosis Date    Anxiety     Arthritis     Asthma     Depression     Osteoporosis     Urinary tract infection        Past Surgical History:   Procedure Laterality Date     SECTION      TUBAL LIGATION         Current Outpatient Medications   Medication Sig Dispense Refill    albuterol (PROVENTIL HFA,VENTOLIN HFA) 90 mcg/act inhaler Inhale 2 puffs every 6 (six) hours as needed for wheezing (cough) 1 Inhaler 0    Cholecalciferol (Vitamin D3) 25 MCG (1000 UT) CAPS Take by mouth      clobetasol (TEMOVATE) 0 05 % ointment Apply 1 application topically 2 (two) times a day To affected area 30 g 0    Cyanocobalamin 1000 MCG/ML LIQD Take by mouth      fexofenadine (ALLEGRA) 180 MG tablet Take 1 tablet (180 mg total) by mouth daily 60 tablet 0    fluticasone (FLONASE) 50 mcg/act nasal spray 1 spray into each nostril daily 9 9 mL 1    Multiple Vitamin (MULTIVITAMIN) capsule Take 1 capsule by mouth daily      olopatadine (PATANOL) 0 1 % ophthalmic solution Administer 1 drop to both eyes 2 (two) times a day 5 mL 3    TURMERIC PO Take by mouth      Vit C-Cholecalciferol-Alessandra Hip (Vitamin C & D3/Alessandra Hips) 500-1000-20 MG-UNIT-MG CAPS Take by mouth       No current facility-administered medications for this visit  Allergies   Allergen Reactions    Pollen Extract        Review of Systems   Constitutional: Negative for chills, fatigue and fever  HENT: Positive for congestion, postnasal drip, rhinorrhea, sinus pressure and sneezing  Negative for ear pain, hearing loss, nosebleeds, sinus pain and sore throat  Eyes: Positive for redness and itching  Negative for pain, discharge and visual disturbance  Respiratory: Negative for cough, chest tightness, shortness of breath and wheezing  Cardiovascular: Negative for chest pain, palpitations and leg swelling  Gastrointestinal: Negative for abdominal pain, blood in stool, constipation, diarrhea, nausea and vomiting  Genitourinary: Negative for frequency and urgency  Allergic/Immunologic: Positive for environmental allergies  Neurological: Negative for dizziness, light-headedness and numbness  Video Exam    Vitals:    06/15/21 1355   Weight: 86 2 kg (190 lb)   Height: 5' 6" (1 676 m)       Physical Exam  Vitals and nursing note reviewed  Constitutional:       General: She is not in acute distress  Pulmonary:      Effort: Pulmonary effort is normal  No respiratory distress  Neurological:      Mental Status: She is alert and oriented to person, place, and time  Psychiatric:         Mood and Affect: Mood normal           I spent 10 minutes directly with the patient during this visit      VIRTUAL VISIT DISCLAIMER    Ki aIn acknowledges that she has consented to an online visit or consultation  She understands that the online visit is based solely on information provided by her, and that, in the absence of a face-to-face physical evaluation by the physician, the diagnosis she receives is both limited and provisional in terms of accuracy and completeness  This is not intended to replace a full medical face-to-face evaluation by the physician  Ki Sosa understands and accepts these terms

## 2021-06-16 NOTE — PROGRESS NOTES
I agree with care plan as per physical therapist and I have no revisions      [employer], DO 06/16/21

## 2021-06-24 ENCOUNTER — OFFICE VISIT (OUTPATIENT)
Dept: PHYSICAL THERAPY | Facility: CLINIC | Age: 46
End: 2021-06-24
Payer: COMMERCIAL

## 2021-06-24 DIAGNOSIS — M54.16 RADICULOPATHY, LUMBAR REGION: Primary | ICD-10-CM

## 2021-06-24 DIAGNOSIS — M22.2X2 PATELLOFEMORAL SYNDROME OF BOTH KNEES: ICD-10-CM

## 2021-06-24 DIAGNOSIS — M53.3 SACROILIAC JOINT DYSFUNCTION OF BOTH SIDES: ICD-10-CM

## 2021-06-24 DIAGNOSIS — M22.2X1 PATELLOFEMORAL SYNDROME OF BOTH KNEES: ICD-10-CM

## 2021-06-24 PROCEDURE — 97110 THERAPEUTIC EXERCISES: CPT | Performed by: PHYSICAL THERAPIST

## 2021-06-24 PROCEDURE — 97112 NEUROMUSCULAR REEDUCATION: CPT | Performed by: PHYSICAL THERAPIST

## 2021-06-24 NOTE — PROGRESS NOTES
Daily Note     Today's date: 2021  Patient name: Lanie Muhammad  : 1975  MRN: [id number]  Referring provider: Gabby Gillespie PA-C  Dx:   Encounter Diagnosis     ICD-10-CM    1  Radiculopathy, lumbar region  M54 16    2  Sacroiliac joint dysfunction of both sides  M53 3    3  Patellofemoral syndrome of both knees  M22 2X1     M22 2X2        Start Time: 7676  Stop Time: 1500  Total time in clinic (min): 45 minutes    Subjective: Patient reports feeling pretty good      Objective: See treatment diary below      Assessment:  good stability and posture; endurance improving  Plan: Continue per plan of care        Precautions: RA, no heat      Manual    MFR  15'  10' 5'     Pt  declined   Jt mobs                                       Neuro Re-Ed             Stance on foam   5 5 5 5    15s 10x SLS   Stability disc   5 5 5 5    5'   Foam balance beam   5 5 5 5    10x   Glides with ball     x30 x30    30x   LTR     x30 x30    30x   Bridges with ball     x30 x30    30x   Bosu lunges          10x   Ther Ex             Pro stretch             Prom/stretch   5          Bike          10'   CS/HS   x10 10" 10x HS/x5 ea     10x   // bars 5-way          20x   Pt ed - HEP 20            Circuit LEs                          Ther Activity                                       Gait Training             gym             TM   15 10' 15' 15'    7'   Modalities             E-Stim

## 2021-06-30 ENCOUNTER — OFFICE VISIT (OUTPATIENT)
Dept: PHYSICAL THERAPY | Facility: CLINIC | Age: 46
End: 2021-06-30
Payer: COMMERCIAL

## 2021-06-30 DIAGNOSIS — M22.2X2 PATELLOFEMORAL SYNDROME OF BOTH KNEES: ICD-10-CM

## 2021-06-30 DIAGNOSIS — M53.3 SACROILIAC JOINT DYSFUNCTION OF BOTH SIDES: ICD-10-CM

## 2021-06-30 DIAGNOSIS — M54.16 RADICULOPATHY, LUMBAR REGION: Primary | ICD-10-CM

## 2021-06-30 DIAGNOSIS — M22.2X1 PATELLOFEMORAL SYNDROME OF BOTH KNEES: ICD-10-CM

## 2021-06-30 PROCEDURE — 97112 NEUROMUSCULAR REEDUCATION: CPT

## 2021-06-30 PROCEDURE — 97110 THERAPEUTIC EXERCISES: CPT

## 2021-06-30 NOTE — PROGRESS NOTES
Discharge/Daily Note     Today's date: 2021  Patient name: Radha Desouza  : 1975  MRN: [id number]  Referring provider: Krishan Fisher PA-C  Dx:   Encounter Diagnosis     ICD-10-CM    1  Radiculopathy, lumbar region  M54 16    2  Sacroiliac joint dysfunction of both sides  M53 3    3  Patellofemoral syndrome of both knees  M22 2X1     M22 2X2        Start Time: 0800  Stop Time: 0850  Total time in clinic (min): 50 minutes    Subjective: Patient reports no new complaints  Kwaku Langston stated that she is feeling pretty good, no pain  Objective: See treatment diary below      Assessment: Tolerated treatment well  Patient demonstrated fatigue post treatment  Reduced muscular tightness in b/l paraspinals post MFR  Good endurance  No increased pain  Improved balance  Patient has met all goals  STG  1  Decrease pain 50% in 3 weeks--MET  2  Increase Rom 50% in 3 weeks--MET  3  Increase Strength half grade in 3 weeks--MET  4  Compliant with HEP in 3 weeks---MET  LTG  1  Decrease pain to mild to none in 6 weeks--MET  2  Increase Rom WNL in 6 weeks--MET  3  Increase Strength WNL in 6 weeks--MET  4  Able to drive with mild to no difficulty in 6 weeks--MET  5  Able to walk with mild to no difficulty in 6 weeks--MET  6  Able to ascend/descend stairs with mild to no difficulty in 6 weeks--MET      Plan: Discharge from PT       Precautions: RA, no heat      Manual    MFR  15'  10' 5'  5'   Pt  declined   Jt mobs                                       Neuro Re-Ed             Stance on foam   5 5 5 5 5   15s 10x SLS   Stability disc   5 5 5 5 5   5'   Foam balance beam   5 5 5 5 5   10x   Glides with ball     x30 x30 30x   30x   LTR     x30 x30 30x   30x   Bridges with ball     x30 x30 30x   30x   Bosu lunges          10x   Ther Ex             Pro stretch             Prom/stretch   5          Bike          10'   CS/HS   x10 10" 10x HS/x5 ea  5x   10x   // bars 5-way 20x on foam   20x   Pt ed - HEP 20            Circuit LEs                          Ther Activity                                       Gait Training             gym             TM   15 10' 15' 15' 10'   7'   Modalities             E-Stim

## 2021-07-13 LAB — MISCELLANEOUS LAB TEST RESULT: NORMAL

## 2021-07-23 ENCOUNTER — TELEPHONE (OUTPATIENT)
Dept: OBGYN CLINIC | Facility: CLINIC | Age: 46
End: 2021-07-23

## 2021-07-23 ENCOUNTER — APPOINTMENT (OUTPATIENT)
Dept: LAB | Facility: HOSPITAL | Age: 46
End: 2021-07-23
Attending: STUDENT IN AN ORGANIZED HEALTH CARE EDUCATION/TRAINING PROGRAM
Payer: COMMERCIAL

## 2021-07-23 DIAGNOSIS — R35.0 URINARY FREQUENCY: ICD-10-CM

## 2021-07-23 DIAGNOSIS — R35.0 URINARY FREQUENCY: Primary | ICD-10-CM

## 2021-07-23 DIAGNOSIS — N39.0 URINARY TRACT INFECTION WITHOUT HEMATURIA, SITE UNSPECIFIED: ICD-10-CM

## 2021-07-23 PROCEDURE — 87186 SC STD MICRODIL/AGAR DIL: CPT

## 2021-07-23 PROCEDURE — 87077 CULTURE AEROBIC IDENTIFY: CPT

## 2021-07-23 PROCEDURE — 87086 URINE CULTURE/COLONY COUNT: CPT

## 2021-07-23 RX ORDER — SULFAMETHOXAZOLE AND TRIMETHOPRIM 800; 160 MG/1; MG/1
TABLET ORAL
Qty: 10 TABLET | Refills: 0 | Status: SHIPPED | OUTPATIENT
Start: 2021-07-23 | End: 2021-07-28

## 2021-07-23 NOTE — TELEPHONE ENCOUNTER
Orders placed as directed  Pt contacted and informed as directed  Pt agreed to plan of action  Pt confirms not allergic to sulfa  Order placed for rx please sign off

## 2021-07-23 NOTE — TELEPHONE ENCOUNTER
Last annual 01/25/21- with CS  I returned pt call  Sx rlq pain when voiding, frequency urination, pt denied burning with urination  Pt states tingling sensation with urination   Please advise

## 2021-07-25 LAB — BACTERIA UR CULT: ABNORMAL

## 2021-07-29 ENCOUNTER — OFFICE VISIT (OUTPATIENT)
Dept: OBGYN CLINIC | Facility: CLINIC | Age: 46
End: 2021-07-29
Payer: COMMERCIAL

## 2021-07-29 VITALS
WEIGHT: 188 LBS | BODY MASS INDEX: 30.22 KG/M2 | HEART RATE: 69 BPM | DIASTOLIC BLOOD PRESSURE: 72 MMHG | HEIGHT: 66 IN | SYSTOLIC BLOOD PRESSURE: 118 MMHG

## 2021-07-29 DIAGNOSIS — M54.16 RADICULOPATHY, LUMBAR REGION: ICD-10-CM

## 2021-07-29 DIAGNOSIS — M17.11 PRIMARY OSTEOARTHRITIS OF RIGHT KNEE: Primary | ICD-10-CM

## 2021-07-29 DIAGNOSIS — M22.2X1 PATELLOFEMORAL SYNDROME OF RIGHT KNEE: ICD-10-CM

## 2021-07-29 PROCEDURE — 99214 OFFICE O/P EST MOD 30 MIN: CPT | Performed by: FAMILY MEDICINE

## 2021-07-29 NOTE — PROGRESS NOTES
Assessment/Plan:  Assessment/Plan   Diagnoses and all orders for this visit:    Primary osteoarthritis of right knee    Patellofemoral syndrome of right knee    Radiculopathy, lumbar region        51-year-old female with bilateral lower extremity pain of many years duration  Discussed with patient physical exam, impression and plan  Right knee has full extension and flexion to 130°  She has difficulty with bending and performing a squat  Clinical impression is that she is improved regard to the inflammatory component of her right knee pain  She is likely still symptomatic from abnormal patellofemoral mechanics and lumbar spine radiculopathy  I recommend she continue with home exercise program and with taking supplements  She may also continue with yoga exercises  She will follow up with me as needed  Subjective:   Patient ID: Natalie Cai is a 42 y o  female  Chief Complaint   Patient presents with    Right Knee - Follow-up       51-year-old female following up for bilateral lower extremity pain of many years duration  She was last seen by me more than 2 5 months ago at which point she was advised to continue with physical therapy  She has been attending physical therapy and doing home exercises since 04/14/2021  She received right knee aspiration and corticosteroid injection on 04/01/2021  She reports that pain in the right knee has significantly improved  She recently returned from a trip to multiple areas including [place], [place], and [place] during which she spends lot of time standing and ambulating and had little discomfort of the right knee  She still continue to have pain of the low back  Pain described as localized to the lumbosacral aspect of spine, radiating to the right lower leg, worse with bending and physical activity, and improved with resting  She recently started introductory yoga  Knee Pain  This is a chronic problem   The current episode started more than 1 year ago  The problem occurs rarely  The problem has been rapidly improving  Associated symptoms include numbness  Pertinent negatives include no arthralgias, joint swelling or weakness  Nothing aggravates the symptoms  She has tried rest (Physical therapy, home exercise, corticosteroid injection) for the symptoms  The treatment provided significant relief  Review of Systems   Musculoskeletal: Negative for arthralgias and joint swelling  Neurological: Positive for numbness  Negative for weakness  Objective:  Vitals:    07/29/21 1122   BP: 118/72   Pulse: 69   Weight: 85 3 kg (188 lb)   Height: 5' 6" (1 676 m)     Right Knee Exam     Muscle Strength   The patient has normal right knee strength  Range of Motion   Extension: normal   Flexion: 130     Other   Swelling: none            Physical Exam  Vitals and nursing note reviewed  Constitutional:       General: She is not in acute distress  Appearance: She is well-developed  HENT:      Head: Normocephalic  Right Ear: External ear normal       Left Ear: External ear normal    Eyes:      Conjunctiva/sclera: Conjunctivae normal    Neck:      Trachea: No tracheal deviation  Cardiovascular:      Rate and Rhythm: Normal rate  Pulmonary:      Effort: Pulmonary effort is normal  No respiratory distress  Abdominal:      General: There is no distension  Musculoskeletal:         General: No swelling, deformity or signs of injury  Right lower leg: No edema  Left lower leg: No edema  Skin:     General: Skin is warm and dry  Coloration: Skin is not jaundiced or pale  Neurological:      Mental Status: She is alert and oriented to person, place, and time  Psychiatric:         Mood and Affect: Mood normal          Behavior: Behavior normal          Thought Content:  Thought content normal          Judgment: Judgment normal

## 2021-08-15 DIAGNOSIS — Z91.09 ENVIRONMENTAL ALLERGIES: ICD-10-CM

## 2021-08-15 DIAGNOSIS — J30.2 SEASONAL ALLERGIC RHINITIS, UNSPECIFIED TRIGGER: ICD-10-CM

## 2021-08-16 RX ORDER — FLUTICASONE PROPIONATE 50 MCG
SPRAY, SUSPENSION (ML) NASAL
Qty: 16 ML | Refills: 1 | Status: SHIPPED | OUTPATIENT
Start: 2021-08-16

## 2021-11-09 ENCOUNTER — OFFICE VISIT (OUTPATIENT)
Dept: FAMILY MEDICINE CLINIC | Facility: CLINIC | Age: 46
End: 2021-11-09
Payer: COMMERCIAL

## 2021-11-09 ENCOUNTER — APPOINTMENT (OUTPATIENT)
Dept: LAB | Facility: CLINIC | Age: 46
End: 2021-11-09
Payer: COMMERCIAL

## 2021-11-09 VITALS
OXYGEN SATURATION: 99 % | HEART RATE: 77 BPM | SYSTOLIC BLOOD PRESSURE: 122 MMHG | DIASTOLIC BLOOD PRESSURE: 78 MMHG | HEIGHT: 66 IN | WEIGHT: 182 LBS | BODY MASS INDEX: 29.25 KG/M2 | RESPIRATION RATE: 18 BRPM

## 2021-11-09 DIAGNOSIS — T78.40XD ALLERGY, SUBSEQUENT ENCOUNTER: ICD-10-CM

## 2021-11-09 DIAGNOSIS — R10.32 LEFT LOWER QUADRANT ABDOMINAL PAIN: Primary | ICD-10-CM

## 2021-11-09 DIAGNOSIS — R19.5 LOOSE STOOLS: ICD-10-CM

## 2021-11-09 DIAGNOSIS — R35.0 URINARY FREQUENCY: ICD-10-CM

## 2021-11-09 DIAGNOSIS — Z13.220 SCREENING FOR LIPID DISORDERS: ICD-10-CM

## 2021-11-09 LAB
SL AMB  POCT GLUCOSE, UA: ABNORMAL
SL AMB LEUKOCYTE ESTERASE,UA: ABNORMAL
SL AMB POCT BILIRUBIN,UA: ABNORMAL
SL AMB POCT BLOOD,UA: ABNORMAL
SL AMB POCT CLARITY,UA: CLEAR
SL AMB POCT COLOR,UA: YELLOW
SL AMB POCT KETONES,UA: ABNORMAL
SL AMB POCT NITRITE,UA: ABNORMAL
SL AMB POCT PH,UA: 7
SL AMB POCT SPECIFIC GRAVITY,UA: 1.01
SL AMB POCT URINE PROTEIN: ABNORMAL
SL AMB POCT UROBILINOGEN: 0.2

## 2021-11-09 PROCEDURE — 87086 URINE CULTURE/COLONY COUNT: CPT | Performed by: PHYSICIAN ASSISTANT

## 2021-11-09 PROCEDURE — 81003 URINALYSIS AUTO W/O SCOPE: CPT | Performed by: PHYSICIAN ASSISTANT

## 2021-11-09 PROCEDURE — 99214 OFFICE O/P EST MOD 30 MIN: CPT | Performed by: PHYSICIAN ASSISTANT

## 2021-11-09 RX ORDER — OLOPATADINE HYDROCHLORIDE 1 MG/ML
1 SOLUTION/ DROPS OPHTHALMIC 2 TIMES DAILY
Qty: 5 ML | Refills: 3 | Status: SHIPPED | OUTPATIENT
Start: 2021-11-09

## 2021-11-10 LAB — BACTERIA UR CULT: NORMAL

## 2021-11-11 ENCOUNTER — OFFICE VISIT (OUTPATIENT)
Dept: OBGYN CLINIC | Facility: CLINIC | Age: 46
End: 2021-11-11
Payer: COMMERCIAL

## 2021-11-11 ENCOUNTER — TELEPHONE (OUTPATIENT)
Dept: OBGYN CLINIC | Facility: CLINIC | Age: 46
End: 2021-11-11

## 2021-11-11 VITALS
DIASTOLIC BLOOD PRESSURE: 80 MMHG | HEIGHT: 67 IN | BODY MASS INDEX: 28.25 KG/M2 | WEIGHT: 180 LBS | SYSTOLIC BLOOD PRESSURE: 117 MMHG | HEART RATE: 71 BPM

## 2021-11-11 DIAGNOSIS — R20.0 RIGHT UPPER EXTREMITY NUMBNESS: ICD-10-CM

## 2021-11-11 DIAGNOSIS — G56.21 CUBITAL TUNNEL SYNDROME ON RIGHT: ICD-10-CM

## 2021-11-11 DIAGNOSIS — G56.01 CARPAL TUNNEL SYNDROME ON RIGHT: Primary | ICD-10-CM

## 2021-11-11 PROCEDURE — 76942 ECHO GUIDE FOR BIOPSY: CPT | Performed by: FAMILY MEDICINE

## 2021-11-11 PROCEDURE — 20526 THER INJECTION CARP TUNNEL: CPT | Performed by: FAMILY MEDICINE

## 2021-11-11 PROCEDURE — 99214 OFFICE O/P EST MOD 30 MIN: CPT | Performed by: FAMILY MEDICINE

## 2021-11-11 RX ORDER — LIDOCAINE HYDROCHLORIDE 10 MG/ML
1 INJECTION, SOLUTION INFILTRATION; PERINEURAL
Status: COMPLETED | OUTPATIENT
Start: 2021-11-11 | End: 2021-11-11

## 2021-11-11 RX ORDER — LIDOCAINE HYDROCHLORIDE 10 MG/ML
0.5 INJECTION, SOLUTION INFILTRATION; PERINEURAL
Status: COMPLETED | OUTPATIENT
Start: 2021-11-11 | End: 2021-11-11

## 2021-11-11 RX ORDER — TRIAMCINOLONE ACETONIDE 40 MG/ML
20 INJECTION, SUSPENSION INTRA-ARTICULAR; INTRAMUSCULAR
Status: COMPLETED | OUTPATIENT
Start: 2021-11-11 | End: 2021-11-11

## 2021-11-11 RX ADMIN — LIDOCAINE HYDROCHLORIDE 1 ML: 10 INJECTION, SOLUTION INFILTRATION; PERINEURAL at 09:17

## 2021-11-11 RX ADMIN — TRIAMCINOLONE ACETONIDE 20 MG: 40 INJECTION, SUSPENSION INTRA-ARTICULAR; INTRAMUSCULAR at 09:17

## 2021-11-11 RX ADMIN — LIDOCAINE HYDROCHLORIDE 0.5 ML: 10 INJECTION, SOLUTION INFILTRATION; PERINEURAL at 09:17

## 2021-11-12 ENCOUNTER — TELEPHONE (OUTPATIENT)
Dept: GASTROENTEROLOGY | Facility: CLINIC | Age: 46
End: 2021-11-12

## 2021-11-12 ENCOUNTER — OFFICE VISIT (OUTPATIENT)
Dept: GASTROENTEROLOGY | Facility: CLINIC | Age: 46
End: 2021-11-12
Payer: COMMERCIAL

## 2021-11-12 VITALS — BODY MASS INDEX: 28.25 KG/M2 | HEIGHT: 67 IN | WEIGHT: 180 LBS

## 2021-11-12 DIAGNOSIS — R14.0 ABDOMINAL DISTENSION (GASEOUS): ICD-10-CM

## 2021-11-12 DIAGNOSIS — R19.4 CHANGE IN BOWEL HABITS: Primary | ICD-10-CM

## 2021-11-12 DIAGNOSIS — R10.32 LEFT LOWER QUADRANT ABDOMINAL PAIN: ICD-10-CM

## 2021-11-12 DIAGNOSIS — R19.8 ALTERNATING CONSTIPATION AND DIARRHEA: ICD-10-CM

## 2021-11-12 DIAGNOSIS — Z80.0 FAMILY HISTORY OF COLON CANCER IN FATHER: ICD-10-CM

## 2021-11-12 DIAGNOSIS — R19.5 LOOSE STOOLS: ICD-10-CM

## 2021-11-12 PROCEDURE — 99244 OFF/OP CNSLTJ NEW/EST MOD 40: CPT | Performed by: INTERNAL MEDICINE

## 2021-12-10 DIAGNOSIS — Z01.812 PRE-PROCEDURAL LABORATORY EXAMINATION: Primary | ICD-10-CM

## 2021-12-10 PROCEDURE — U0003 INFECTIOUS AGENT DETECTION BY NUCLEIC ACID (DNA OR RNA); SEVERE ACUTE RESPIRATORY SYNDROME CORONAVIRUS 2 (SARS-COV-2) (CORONAVIRUS DISEASE [COVID-19]), AMPLIFIED PROBE TECHNIQUE, MAKING USE OF HIGH THROUGHPUT TECHNOLOGIES AS DESCRIBED BY CMS-2020-01-R: HCPCS | Performed by: INTERNAL MEDICINE

## 2021-12-10 PROCEDURE — U0005 INFEC AGEN DETEC AMPLI PROBE: HCPCS | Performed by: INTERNAL MEDICINE

## 2021-12-11 LAB — SARS-COV-2 RNA RESP QL NAA+PROBE: NEGATIVE

## 2021-12-13 PROCEDURE — 88305 TISSUE EXAM BY PATHOLOGIST: CPT | Performed by: PATHOLOGY

## 2021-12-13 PROCEDURE — 88342 IMHCHEM/IMCYTCHM 1ST ANTB: CPT | Performed by: PATHOLOGY

## 2021-12-13 PROCEDURE — 88341 IMHCHEM/IMCYTCHM EA ADD ANTB: CPT | Performed by: PATHOLOGY

## 2021-12-14 ENCOUNTER — LAB REQUISITION (OUTPATIENT)
Dept: LAB | Facility: HOSPITAL | Age: 46
End: 2021-12-14
Payer: COMMERCIAL

## 2021-12-14 DIAGNOSIS — K63.5 POLYP OF COLON: ICD-10-CM

## 2021-12-20 ENCOUNTER — TELEPHONE (OUTPATIENT)
Dept: GASTROENTEROLOGY | Facility: CLINIC | Age: 46
End: 2021-12-20

## 2022-01-07 NOTE — TELEPHONE ENCOUNTER
Patient sees Dr Mcnally.  Patient is calling about attending physical therapy and scheduling her EMG Test, she spoke with central scheduling and they stated she has to wait for the office.  It has been several months and she is looking for assisting in scheduling for these services.    Asking for a call back.     # [phone]

## 2022-01-25 ENCOUNTER — EVALUATION (OUTPATIENT)
Dept: OCCUPATIONAL THERAPY | Facility: CLINIC | Age: 47
End: 2022-01-25
Payer: COMMERCIAL

## 2022-01-25 DIAGNOSIS — G56.01 CARPAL TUNNEL SYNDROME ON RIGHT: ICD-10-CM

## 2022-01-25 DIAGNOSIS — G56.21 CUBITAL TUNNEL SYNDROME ON RIGHT: ICD-10-CM

## 2022-01-25 PROCEDURE — 97165 OT EVAL LOW COMPLEX 30 MIN: CPT

## 2022-01-25 PROCEDURE — 97112 NEUROMUSCULAR REEDUCATION: CPT

## 2022-01-25 NOTE — PROGRESS NOTES
OT Evaluation     Today's date: 2022  Patient name: Adilia Stein  : 1975  MRN: [id number]  Referring provider: Leonel Boyd DO  Dx:   Encounter Diagnosis     ICD-10-CM    1  Carpal tunnel syndrome on right  G56  Ambulatory referral to PT/OT hand therapy   2  Cubital tunnel syndrome on right  G56  Ambulatory referral to PT/OT hand therapy                  Assessment  Assessment details: Adilia Stein is a 41 y o , Right HD female referred to hand therapy for a right arm median and ulnar nerve compression  Onset of injury 2 5 years ago due to unknown etiology  Patient presents 24 with impaired right strength, and sensation of the median nerve distribution  Deficits also noted in pain and functional use of the right UE  Patient has diminished sensation with sensory testing, but provocative testing was negative  Patient is a good candidate for OT services to abolish pain and edema and restore ROM, strength, coordination, and sensation for a return to independence in daily tasks  Impairments: activity intolerance, impaired physical strength, lacks appropriate home exercise program, pain with function and weight-bearing intolerance  Other impairment: Impaired sensation right median nerve distribution  Functional limitations: Pain in [place] during IADL tasks    Difficulty performing resistive grasping, pinching and performing lifting  Symptom irritability: moderateBarriers to therapy: Fibromyalgia; asthma; chronic pain; neuropathy  Understanding of Dx/Px/POC: excellent   Prognosis: good    Goals  STGs (4 weeks)  Patient will be independent in HEP of nerve glides, edema management  Patient will report an average pain level of {gen number 5/10  Patient will demonstrate 50% reduction in median and ulnar nerve symtoms  Patient will demonstrate independence in use of splints to prevent nerve compression while sleeping  LTGs (12 weeks)  Patient will demonstrate independence in a HEP to maintain ROM, strength, and function at discharge  Patient will report an average pain level of 1-2/10 to be independent in daily tasks  Patient will demonstrate 5/5 muscle strength in the wrist and forearm to be MI for meal prep  Patient will demonstrate right hand strength within 20% of the left hand to be MI for IADL tasks  Patient will demonstrate resolution of median and ulnar nerve symptoms in the RUE at night and during activities        Plan  Patient would benefit from: skilled occupational therapy and custom splinting  Planned modality interventions: ultrasound, thermotherapy: hydrocollator packs and cryotherapy  Planned therapy interventions: activity modification, compression, fine motor coordination training, graded activity, graded exercise, home exercise program, therapeutic exercise, therapeutic activities, stretching, strengthening, patient education, orthotic fitting/training, neuromuscular re-education and manual therapy  Other planned therapy interventions: IASTM; kinesio tape; long arm splint for sleep; cupping  Frequency: 2x week  Duration in weeks: 12  Plan of Care beginning date: 2022  Plan of Care expiration date: 2022  Treatment plan discussed with: patient        Subjective Evaluation    History of Present Illness  Mechanism of injury: Patient reports that 2 5 years ago, she began to notice numbness and tingling in the right hand, mostly at night  Unknown etiology  She has been wearing a wrist brace at night with good results  She does not have an elbow splint  She has had multiple cortisone shots in the CT with temporary improvement in symptoms  Patient now presents for OT evaluation and treatment          Recurrent probem    Quality of life: excellent    Pain  Current pain ratin  At best pain ratin  At worst pain rating: 10  Location: right wrist, ulnar forearm, cubital tunnel    Occasionally in the shoulder and chest  Quality: burning, sharp and knife-like  Relieving factors: heat, change in position and medications (tylenol; pain patches)  Aggravating factors: lifting (squeezing; sleeping at night)  Progression: worsening    Social Support  Lives with: adult children and spouse    Employment status: not working  Hand dominance: right      Diagnostic Tests    FCE comments: 1/25/22: EMG has been orderedTreatments  Previous treatment: injection treatment (wrist splint)  Patient Goals  Patient goals for therapy: decreased pain, increased strength, independence with ADLs/IADLs and return to sport/leisure activities  Patient goal: sleep at night without pain        Objective     Observations     Right Wrist/Hand   Negative for Nhi's nodes, deformity, edema, Heberden's nodes, trophic changes and Wartenberg's sign  Neurological Testing     Sensation     Wrist/Hand     Right   Diminished: light touch and static two point discrimination    Comments   Right light touch: Hollister Jammie Monofilaments thumb = 4 31 diminished protective sensation  Index = 3 61 diminished light touch  Middle, ring, small = WNL  Right static two point discrimination: Diminshed to 9 mm thumb and index; middle, ring, small = WNL    Active Range of Motion     Right Wrist   Normal active range of motion    Right Thumb   Opposition: 1/25/22: WNL    Additional Active Range of Motion Details  1/25/22:  WNL    Strength/Myotome Testing     Left Wrist/Hand      (2nd hand position)     Trial 1: 70    Thumb Strength  Key/Lateral Pinch     Trial 1: 17  Tip/Two-Point Pinch     Trial 1: 9  Palmar/Three-Point Pinch     Trial 1: 13    Right Wrist/Hand   Normal wrist strength     (2nd hand position)     Trial 1: 45    Thumb Strength   Key/Lateral Pinch     Trial 1: 11  Tip/Two-Point Pinch     Trial 1: 6  Palmar/Three-Point Pinch     Trial 1: 8    Tests     Right Elbow   Negative elbow flexion and Tinel's sign (cubital tunnel)  Right Wrist/Hand   Negative Phalen's sign and Tinel's sign (medial nerve)  Precautions:  Asthma; neuropathy; fibromyalgia; chronic pain       Date 1/25       Visit 1       Manuals        IASTM CT, volar forearm        Cupping        STM        Kinesio tape CT correction       Ortho fit/train        Neuro Re-Ed         Distal MNG 1-3 x 10       Proximal MNG Step 1 x 10       JENNY        UBE        Sensory patsy                        Ther Ex        TGE        Isolated digit ext        Digit abd/add        AROM thumb         strength        Pinch strength                        Ther Activity        Translation                HEP MNG distal and proximal                       Modalities        MHP 5'

## 2022-01-26 ENCOUNTER — ANNUAL EXAM (OUTPATIENT)
Dept: OBGYN CLINIC | Facility: CLINIC | Age: 47
End: 2022-01-26
Payer: COMMERCIAL

## 2022-01-26 VITALS
DIASTOLIC BLOOD PRESSURE: 78 MMHG | SYSTOLIC BLOOD PRESSURE: 120 MMHG | WEIGHT: 176 LBS | HEIGHT: 66 IN | BODY MASS INDEX: 28.28 KG/M2

## 2022-01-26 DIAGNOSIS — Z01.411 ENCOUNTER FOR GYNECOLOGICAL EXAMINATION WITH ABNORMAL FINDING: Primary | ICD-10-CM

## 2022-01-26 DIAGNOSIS — B37.49 CANDIDA INFECTION OF GENITAL REGION: ICD-10-CM

## 2022-01-26 DIAGNOSIS — Z12.31 SCREENING MAMMOGRAM FOR BREAST CANCER: ICD-10-CM

## 2022-01-26 PROBLEM — H10.33 ACUTE BACTERIAL CONJUNCTIVITIS OF BOTH EYES: Status: RESOLVED | Noted: 2020-07-08 | Resolved: 2022-01-26

## 2022-01-26 PROBLEM — F17.200 NICOTINE USE DISORDER: Status: RESOLVED | Noted: 2020-01-15 | Resolved: 2022-01-26

## 2022-01-26 PROBLEM — G89.29 CHRONIC PAIN OF RIGHT ANKLE: Status: RESOLVED | Noted: 2021-01-05 | Resolved: 2022-01-26

## 2022-01-26 PROBLEM — M25.571 CHRONIC PAIN OF RIGHT ANKLE: Status: RESOLVED | Noted: 2021-01-05 | Resolved: 2022-01-26

## 2022-01-26 PROBLEM — Z13.220 LIPID SCREENING: Status: RESOLVED | Noted: 2020-01-15 | Resolved: 2022-01-26

## 2022-01-26 PROBLEM — L30.9 DERMATITIS: Status: RESOLVED | Noted: 2020-11-19 | Resolved: 2022-01-26

## 2022-01-26 PROBLEM — Z23 NEED FOR PNEUMOCOCCAL VACCINATION: Status: RESOLVED | Noted: 2020-01-15 | Resolved: 2022-01-26

## 2022-01-26 PROCEDURE — 99396 PREV VISIT EST AGE 40-64: CPT | Performed by: NURSE PRACTITIONER

## 2022-01-26 RX ORDER — NYSTATIN AND TRIAMCINOLONE ACETONIDE 100000; 1 [USP'U]/G; MG/G
OINTMENT TOPICAL 2 TIMES DAILY
Qty: 45 G | Refills: 1 | Status: SHIPPED | OUTPATIENT
Start: 2022-01-26

## 2022-01-26 RX ORDER — FLUCONAZOLE 150 MG/1
150 TABLET ORAL ONCE
Qty: 2 TABLET | Refills: 0 | Status: SHIPPED | OUTPATIENT
Start: 2022-01-26 | End: 2022-01-26

## 2022-01-26 NOTE — PATIENT INSTRUCTIONS
Breast Self Exam for Women   AMBULATORY CARE:   A breast self-exam (BSE)  is a way to check your breasts for lumps and other changes  Regular BSEs can help you know how your breasts normally look and feel  Most breast lumps or changes are not cancer, but you should always have them checked by a healthcare provider  Why you should do a BSE:  Breast cancer is the most common type of cancer in women  Even if you have mammograms, you may still want to do a BSE regularly  If you know how your breasts normally feel and look, it may help you know when to contact your healthcare provider  Mammograms can miss some cancers  You may find a lump during a BSE that did not show up on a mammogram   When you should do a BSE:  If you have periods, you may want to do your BSE 1 week after your period ends  This is the time when your breasts may be the least swollen, lumpy, or tender  You can do regular BSEs even if you are breastfeeding or have breast implants  Call your doctor if:   · You find any lumps or changes in your breasts  · You have breast pain or fluid coming from your nipples  · You have questions or concerns about your condition or care  How to do a BSE:       · Look at your breasts in a mirror  Look at the size and shape of each breast and nipple  Check for swelling, lumps, dimpling, scaly skin, or other skin changes  Look for nipple changes, such as a nipple that is painful or beginning to pull inward  Gently squeeze both nipples and check to see if fluid (that is not breast milk) comes out of them  If you find any of these or other breast changes, contact your healthcare provider  Check your breasts while you sit or  the following 3 positions:    ? Hang your arms down at your sides  ? Raise your hands and join them behind your head  ? Put firm pressure with your hands on your hips  Bend slightly forward while you look at your breasts in the mirror  · Lie down and feel your breasts    When you lie down, your breast tissue spreads out evenly over your chest  This makes it easier for you to feel for lumps and anything that may not be normal for your breasts  Do a BSE on one breast at a time  ? Place a small pillow or towel under your left shoulder  Put your left arm behind your head  ? Use the 3 middle fingers of your right hand  Use your fingertip pads, on the top of your fingers  Your fingertip pad is the most sensitive part of your finger  ? Use small circles to feel your breast tissue  Use your fingertip pads to make dime-sized, overlapping circles on your breast and armpits  Use light, medium, and firm pressure  First, press lightly  Second, press with medium pressure to feel a little deeper into the breast  Last, use firm pressure to feel deep within your breast     ? Examine your entire breast area  Examine the breast area from above the breast to below the breast where you feel only ribs  Make small circles with your fingertips, starting in the middle of your armpit  Make circles going up and down the breast area  Continue toward your breast and all the way across it  Examine the area from your armpit all the way over to the middle of your chest (breastbone)  Stop at the middle of your chest     ? Move the pillow or towel to your right shoulder, and put your right arm behind your head  Use the 3 fingertip pads of your left hand, and repeat the above steps to do a BSE on your right breast     What else you can do to check for breast problems or cancer:  Talk to your healthcare provider about mammograms  A mammogram is an x-ray of your breasts to screen for breast cancer or other problems  Your provider can tell you the benefits and risks of mammograms  The first mammogram is usually at age 39 or 48  Your provider may recommend you start at 36 or younger if your risk for breast cancer is high  Mammograms usually continue every 1 to 2 years until age 76         Follow up with your doctor as directed:  Write down your questions so you remember to ask them during your visits  © Copyright [employer] 2021 Information is for End User's use only and may not be sold, redistributed or otherwise used for commercial purposes  All illustrations and images included in CareNotes® are the copyrighted property of [employer]  or [employer]ell[employer]  The above information is an  only  It is not intended as medical advice for individual conditions or treatments  Talk to your doctor, nurse or pharmacist before following any medical regimen to see if it is safe and effective for you
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
## 2022-01-27 ENCOUNTER — OFFICE VISIT (OUTPATIENT)
Dept: OCCUPATIONAL THERAPY | Facility: CLINIC | Age: 47
End: 2022-01-27
Payer: COMMERCIAL

## 2022-01-27 DIAGNOSIS — G56.01 CARPAL TUNNEL SYNDROME ON RIGHT: Primary | ICD-10-CM

## 2022-01-27 DIAGNOSIS — G56.21 CUBITAL TUNNEL SYNDROME ON RIGHT: ICD-10-CM

## 2022-01-27 PROCEDURE — 97110 THERAPEUTIC EXERCISES: CPT

## 2022-01-27 PROCEDURE — 97140 MANUAL THERAPY 1/> REGIONS: CPT

## 2022-01-27 PROCEDURE — 97112 NEUROMUSCULAR REEDUCATION: CPT

## 2022-01-27 NOTE — TELEPHONE ENCOUNTER
Patient calling back to speak with Debbie, she will not speak to anyone else.  Please give her a call back, thanks.     # [phone]

## 2022-01-27 NOTE — PROGRESS NOTES
Daily Note     Today's date: 2022  Patient name: Nataliia Bonner  : 1975  MRN: [id number]  Referring provider: Nicolas Ortega DO  Dx:   Encounter Diagnosis     ICD-10-CM    1  Carpal tunnel syndrome on right  G56 01    2  Cubital tunnel syndrome on right  G56 21                   Subjective: When I write, it hurts so I lean on the table with my elbow bent      Objective: See treatment diary below      Assessment: Tolerated treatment well  Patient would benefit from continued OT  Patient educated in proper desk positioning and to use a large pen for writing  Avoid pressure on medial elbow and elbow flexion beyond 30 degrees      Plan: Continue per plan of care  Precautions:  Asthma; neuropathy; fibromyalgia; chronic pain       Date       Visit 1       Manuals        IASTM CT, volar forearm  10'      Cupping        STM        Kinesio tape CT correction       Ortho fit/train        Neuro Re-Ed         Distal MNG 1-3 x 10 Full glide x 10      Proximal MNG Step 1 x 10 Step 2 x 10      JENNY  Step 1-2 x 10      UBE        Sensory patsy                        Ther Ex        TGE  x10      Isolated digit ext  x10      Digit abd/add  Marbles 1 set      AROM thumb         strength        Pinch strength        Hook to full fist glide  x20 lg pen              Ther Activity        Translation                HEP MNG distal and proximal Hook to full fist glides   Desk positioning                      Modalities        MHP 5' 5'

## 2022-02-01 ENCOUNTER — OFFICE VISIT (OUTPATIENT)
Dept: OCCUPATIONAL THERAPY | Facility: CLINIC | Age: 47
End: 2022-02-01
Payer: COMMERCIAL

## 2022-02-01 ENCOUNTER — APPOINTMENT (OUTPATIENT)
Dept: OCCUPATIONAL THERAPY | Facility: CLINIC | Age: 47
End: 2022-02-01
Payer: COMMERCIAL

## 2022-02-01 DIAGNOSIS — G56.21 CUBITAL TUNNEL SYNDROME ON RIGHT: ICD-10-CM

## 2022-02-01 DIAGNOSIS — G56.01 CARPAL TUNNEL SYNDROME ON RIGHT: Primary | ICD-10-CM

## 2022-02-01 PROCEDURE — 97140 MANUAL THERAPY 1/> REGIONS: CPT

## 2022-02-01 PROCEDURE — 97112 NEUROMUSCULAR REEDUCATION: CPT

## 2022-02-01 PROCEDURE — 97110 THERAPEUTIC EXERCISES: CPT

## 2022-02-01 NOTE — PROGRESS NOTES
Daily Note     Today's date: 2022  Patient name: Lanie Muhammad  : 1975  MRN: [id number]  Referring provider: Skyler Baptiste DO  Dx:   Encounter Diagnosis     ICD-10-CM    1  Carpal tunnel syndrome on right  G56 01    2  Cubital tunnel syndrome on right  G56 21                   Subjective: I'm feeling better  I think that tape helped me      Objective: See treatment diary below      Assessment: Tolerated treatment well  Patient exhibited good technique with therapeutic exercises  Decreased numbness reported      Plan: Continue per plan of care  Precautions:  Asthma; neuropathy; fibromyalgia; chronic pain       Date  2     Visit 1 2 3     Manuals        IASTM CT, volar forearm  10' 10'     Cupping        STM        Kinesio tape CT correction  CT, ulnar nerve correction     Ortho fit/train        Neuro Re-Ed         Distal MNG 1-3 x 10 Full glide x 10 Full glide x 10     Proximal MNG Step 1 x 10 Step 2 x 10 Step 3 x  10     JENNY  Step 1-2 x 10 Full glide x 10     UBE        Sensory patsy                        Ther Ex        TGE  x10 x10     Isolated digit ext  x10 x10     Digit abd/add  Marbles 1 set Marbles 1 set     AROM thumb         strength        Pinch strength        Hook to full fist glide  x20 lg pen x20 lg pen             Ther Activity        Translation                HEP MNG distal and proximal Hook to full fist glides   Desk positioning                      Modalities        MHP 5' 5' 5'

## 2022-02-03 ENCOUNTER — APPOINTMENT (OUTPATIENT)
Dept: OCCUPATIONAL THERAPY | Facility: CLINIC | Age: 47
End: 2022-02-03
Payer: COMMERCIAL

## 2022-02-07 ENCOUNTER — OFFICE VISIT (OUTPATIENT)
Dept: OCCUPATIONAL THERAPY | Facility: CLINIC | Age: 47
End: 2022-02-07
Payer: COMMERCIAL

## 2022-02-07 DIAGNOSIS — G56.01 CARPAL TUNNEL SYNDROME ON RIGHT: Primary | ICD-10-CM

## 2022-02-07 DIAGNOSIS — G56.21 CUBITAL TUNNEL SYNDROME ON RIGHT: ICD-10-CM

## 2022-02-07 PROCEDURE — 97110 THERAPEUTIC EXERCISES: CPT

## 2022-02-07 PROCEDURE — 97140 MANUAL THERAPY 1/> REGIONS: CPT

## 2022-02-07 NOTE — PROGRESS NOTES
Daily Note     Today's date: 2022  Patient name: Elham Powers  : 1975  MRN: [id number]  Referring provider: Jama Segal DO  Dx:   Encounter Diagnosis     ICD-10-CM    1  Carpal tunnel syndrome on right  G56 01    2  Cubital tunnel syndrome on right  G56 21                   Subjective: I don't want to do the metal tool near my elbow  That increased my pain  I like the tape      Objective: See treatment diary below      Assessment: Tolerated treatment well  Patient exhibited good technique with therapeutic exercises      Plan: Continue per plan of care  Precautions:  Asthma; neuropathy; fibromyalgia; chronic pain       Date     Visit 1 2 3 4    Manuals        IASTM CT, volar forearm  10' 10' 8' CT only    Cupping        STM        Kinesio tape CT correction  CT, ulnar nerve correction CT, Ulnar nerve correction    Ortho fit/train        Neuro Re-Ed         Distal MNG 1-3 x 10 Full glide x 10 Full glide x 10 Full glide x 10    Proximal MNG Step 1 x 10 Step 2 x 10 Step 3 x  10 Step 3 x 10    JENNY  Step 1-2 x 10 Full glide x 10 Full glide x 10    UBE        Sensory patsy                        Ther Ex        TGE  x10 x10 x10    Isolated digit ext  x10 x10 x10    Digit abd/add  Marbles 1 set Marbles 1 set Marbles 1 set    AROM thumb         strength        Pinch strength        Hook to full fist glide  x20 lg pen x20 lg pen x20            Ther Activity        Translation                HEP MNG distal and proximal Hook to full fist glides   Desk positioning                      Modalities        MHP 5' 5' 5' 5'

## 2022-02-08 ENCOUNTER — APPOINTMENT (OUTPATIENT)
Dept: OCCUPATIONAL THERAPY | Facility: CLINIC | Age: 47
End: 2022-02-08
Payer: COMMERCIAL

## 2022-02-10 ENCOUNTER — OFFICE VISIT (OUTPATIENT)
Dept: FAMILY MEDICINE CLINIC | Facility: CLINIC | Age: 47
End: 2022-02-10
Payer: COMMERCIAL

## 2022-02-10 ENCOUNTER — APPOINTMENT (OUTPATIENT)
Dept: OCCUPATIONAL THERAPY | Facility: CLINIC | Age: 47
End: 2022-02-10
Payer: COMMERCIAL

## 2022-02-10 ENCOUNTER — TELEPHONE (OUTPATIENT)
Dept: OTHER | Facility: OTHER | Age: 47
End: 2022-02-10

## 2022-02-10 VITALS
HEIGHT: 66 IN | DIASTOLIC BLOOD PRESSURE: 54 MMHG | WEIGHT: 178 LBS | BODY MASS INDEX: 28.61 KG/M2 | SYSTOLIC BLOOD PRESSURE: 112 MMHG | OXYGEN SATURATION: 96 % | TEMPERATURE: 98.6 F | HEART RATE: 61 BPM

## 2022-02-10 DIAGNOSIS — J02.0 STREP THROAT: Primary | ICD-10-CM

## 2022-02-10 DIAGNOSIS — J02.9 SORE THROAT: ICD-10-CM

## 2022-02-10 DIAGNOSIS — H92.02 LEFT EAR PAIN: ICD-10-CM

## 2022-02-10 PROBLEM — Z86.16 HISTORY OF COVID-19: Status: ACTIVE | Noted: 2021-01-05

## 2022-02-10 LAB — S PYO AG THROAT QL: POSITIVE

## 2022-02-10 PROCEDURE — 87880 STREP A ASSAY W/OPTIC: CPT | Performed by: FAMILY MEDICINE

## 2022-02-10 PROCEDURE — 99213 OFFICE O/P EST LOW 20 MIN: CPT | Performed by: FAMILY MEDICINE

## 2022-02-10 RX ORDER — AMOXICILLIN AND CLAVULANATE POTASSIUM 875; 125 MG/1; MG/1
1 TABLET, FILM COATED ORAL EVERY 12 HOURS SCHEDULED
Qty: 14 TABLET | Refills: 0 | Status: SHIPPED | OUTPATIENT
Start: 2022-02-10 | End: 2022-02-17

## 2022-02-10 NOTE — TELEPHONE ENCOUNTER
Patient called to ask for an appointment for today  She has a sore throat and her left ear hurts  She said the pain in her ear on a scale from 1-10  Is an 8  Patient is requesting a call back

## 2022-02-10 NOTE — PROGRESS NOTES
Nicole Jenkins 1975 female MRN: [id number]      ASSESSMENT/PLAN  Problem List Items Addressed This Visit     None      Visit Diagnoses     Sore throat    -  Primary    Relevant Orders    POCT rapid strepA    Left ear pain            Rapid strep (+) -- cover with Augmentin  Continue supportive care  To call if symptoms worsen/do not improve  Future Appointments   Date Time Provider Kain Donahue   2/10/2022  4:15 PM Gisella Fus, OT [place] [facility]   2022  5:00 PM Gisella Fus, OT [facility]   2022  4:15 PM Gisella Fus, OT [facility]   2022  3:45 PM Gisella Fus, OT [facility]   2022  3:45 PM Gisella Fus, OT [facility]   2023  9:40 AM CORI Poole [facility]          SUBJECTIVE  CC: Sore Throat (onset last night ) and Earache      HPI:  Nicole Jenkins is a 41 y o  female who presents due to ear/throat pain  Last night, started having a sore throat  Initially thought it was caramel she drank -- too a benadryl   This AM had L ear pain as well and had a hoarse voice   Noted white stuff on the L tonsil   Took Tylenol and Ricola today   No known sick contacts   Not vaccinated against COVID     Review of Systems   Constitutional: Negative for fever  HENT: Positive for ear pain, rhinorrhea (green mucus) and sore throat  Negative for congestion  Respiratory: Positive for cough (since she stopped smoking )  Gastrointestinal: Negative for diarrhea and vomiting         Historical Information   The patient history was reviewed and updated as follows:    Past Medical History:   Diagnosis Date    Anxiety     Arthritis     Asthma     Depression     Osteoporosis     Urinary tract infection      Past Surgical History:   Procedure Laterality Date     SECTION      TUBAL LIGATION       Family History   Problem Relation Age of Onset    Cancer Mother     Breast cancer Mother         age unknown   Marcelo Georges Diabetes Mother     Coronary artery disease Mother     Ovarian cancer Mother    Marcelo Georges Cancer Father     Prostate cancer Father     Colon cancer Father     No Known Problems Daughter     No Known Problems Maternal Grandmother     No Known Problems Paternal Grandmother     No Known Problems Half-Sister     Breast cancer Half-Sister         age unknown    No Known Problems Half-Sister     No Known Problems Sister     No Known Problems Sister     No Known Problems Sister     No Known Problems Sister     No Known Problems Sister     No Known Problems Daughter     No Known Problems Maternal Aunt     No Known Problems Maternal Aunt     No Known Problems Maternal Aunt     No Known Problems Maternal Aunt     No Known Problems Maternal Aunt     No Known Problems Maternal Aunt     No Known Problems Maternal Aunt     No Known Problems Maternal Aunt     No Known Problems Paternal Aunt     No Known Problems Paternal Aunt     No Known Problems Paternal Aunt     No Known Problems Paternal Aunt     No Known Problems Paternal Aunt       Social History   Social History     Substance and Sexual Activity   Alcohol Use Yes    Comment: Social      Social History     Substance and Sexual Activity   Drug Use Not Currently     Social History     Tobacco Use   Smoking Status Former Smoker    Packs/day: 0 50    Types: Cigarettes   Smokeless Tobacco Never Used       Medications:     Current Outpatient Medications:     albuterol (PROVENTIL HFA,VENTOLIN HFA) 90 mcg/act inhaler, Inhale 2 puffs every 6 (six) hours as needed for wheezing (cough), Disp: 1 Inhaler, Rfl: 0    Cholecalciferol (Vitamin D3) 25 MCG (1000 UT) CAPS, Take by mouth, Disp: , Rfl:     clobetasol (TEMOVATE) 0 05 % ointment, Apply 1 application topically 2 (two) times a day To affected area, Disp: 30 g, Rfl: 0    Cyanocobalamin 1000 MCG/ML LIQD, Take by mouth, Disp: , Rfl:     fexofenadine (ALLEGRA) 180 MG tablet, Take 1 tablet (180 mg total) by mouth daily, Disp: 60 tablet, Rfl: 0    fluticasone (FLONASE) 50 mcg/act nasal spray, SPRAY 1 SPRAY INTO EACH NOSTRIL EVERY DAY, Disp: 16 mL, Rfl: 1    Multiple Vitamin (MULTIVITAMIN) capsule, Take 1 capsule by mouth daily, Disp: , Rfl:     nystatin-triamcinolone (MYCOLOG-II) ointment, Apply topically 2 (two) times a day As needed, Disp: 45 g, Rfl: 1    olopatadine (PATANOL) 0 1 % ophthalmic solution, Administer 1 drop to both eyes 2 (two) times a day, Disp: 5 mL, Rfl: 3    TURMERIC PO, Take by mouth, Disp: , Rfl:     Vit C-Cholecalciferol-Alessandra Hip (Vitamin C & D3/Alessandra Hips) 500-1000-20 MG-UNIT-MG CAPS, Take by mouth, Disp: , Rfl:   Allergies   Allergen Reactions    Pollen Extract        OBJECTIVE    Vitals:   Vitals:    02/10/22 1259   BP: 112/54   Pulse: 61   Temp: 98 6 °F (37 °C)   SpO2: 96%   Weight: 80 7 kg (178 lb)   Height: 5' 6" (1 676 m)           Physical Exam  Vitals and nursing note reviewed  Constitutional:       General: She is not in acute distress  Appearance: Normal appearance  HENT:      Head: Normocephalic and atraumatic  Right Ear: Tympanic membrane, ear canal and external ear normal  Tympanic membrane is not erythematous, retracted or bulging  Left Ear: Tympanic membrane, ear canal and external ear normal  Tympanic membrane is not erythematous, retracted or bulging  Ears:      Comments: L ear tender with exam     Nose: Nose normal  No mucosal edema or rhinorrhea  Mouth/Throat:      Mouth: Mucous membranes are moist       Pharynx: Posterior oropharyngeal erythema present  No oropharyngeal exudate  Eyes:      Conjunctiva/sclera: Conjunctivae normal    Cardiovascular:      Rate and Rhythm: Normal rate and regular rhythm  Pulmonary:      Effort: Pulmonary effort is normal  No respiratory distress  Breath sounds: Normal breath sounds  No decreased breath sounds, wheezing or rales     Lymphadenopathy:      Cervical: Cervical adenopathy (tender on L) present  Skin:     General: Skin is warm and dry  Neurological:      General: No focal deficit present  Mental Status: She is alert     Psychiatric:         Mood and Affect: Mood normal                     Nalini Otoole DO  [facility]   2/10/2022  1:08 PM

## 2022-02-14 ENCOUNTER — APPOINTMENT (OUTPATIENT)
Dept: OCCUPATIONAL THERAPY | Facility: CLINIC | Age: 47
End: 2022-02-14
Payer: COMMERCIAL

## 2022-02-15 ENCOUNTER — APPOINTMENT (OUTPATIENT)
Dept: OCCUPATIONAL THERAPY | Facility: CLINIC | Age: 47
End: 2022-02-15
Payer: COMMERCIAL

## 2022-02-17 ENCOUNTER — OFFICE VISIT (OUTPATIENT)
Dept: OCCUPATIONAL THERAPY | Facility: CLINIC | Age: 47
End: 2022-02-17
Payer: COMMERCIAL

## 2022-02-17 ENCOUNTER — APPOINTMENT (OUTPATIENT)
Dept: OCCUPATIONAL THERAPY | Facility: CLINIC | Age: 47
End: 2022-02-17
Payer: COMMERCIAL

## 2022-02-17 DIAGNOSIS — G56.01 CARPAL TUNNEL SYNDROME ON RIGHT: Primary | ICD-10-CM

## 2022-02-17 DIAGNOSIS — G56.21 CUBITAL TUNNEL SYNDROME ON RIGHT: ICD-10-CM

## 2022-02-17 PROCEDURE — 97110 THERAPEUTIC EXERCISES: CPT

## 2022-02-17 PROCEDURE — 97140 MANUAL THERAPY 1/> REGIONS: CPT

## 2022-02-17 NOTE — PROGRESS NOTES
Daily Note     Today's date: 2022  Patient name: Monse Weir  : 1975  MRN: [id number]  Referring provider: Destiny Corona DO  Dx:   Encounter Diagnosis     ICD-10-CM    1  Carpal tunnel syndrome on right  G56 01    2  Cubital tunnel syndrome on right  G56 21                   Subjective: the numbness is better, especially in my elbow  Today I'm sore in my forearm      Objective: See treatment diary below      Assessment: Tolerated treatment well  Patient exhibited good technique with therapeutic exercises  Patient reports pain in volar forearm mm  Diffuse pain  Reported pain relief after US      Plan: Continue per plan of care  Precautions:  Asthma; neuropathy; fibromyalgia; chronic pain       Date    Visit 1 2 3 4 5   Manuals        IASTM CT, volar forearm  10' 10' 8' CT only 10' CT, volar forearm   Cupping        STM        Kinesio tape CT correction  CT, ulnar nerve correction CT, Ulnar nerve correction CT, ulnar nerve correction   Ortho fit/train        Neuro Re-Ed         Distal MNG 1-3 x 10 Full glide x 10 Full glide x 10 Full glide x 10 Full glide x 10   Proximal MNG Step 1 x 10 Step 2 x 10 Step 3 x  10 Step 3 x 10 X 10   JENNY  Step 1-2 x 10 Full glide x 10 Full glide x 10 Full glide x 10   UBE        Sensory patsy                        Ther Ex        TGE  x10 x10 x10 x10   Isolated digit ext  x10 x10 x10 x10   Digit abd/add  Marbles 1 set Marbles 1 set Marbles 1 set Marbles 1 set   AROM thumb         strength        Pinch strength        Hook to full fist glide  x20 lg pen x20 lg pen x20 x20           Ther Activity        Translation                HEP MNG distal and proximal Hook to full fist glides   Desk positioning                      Modalities        MHP 5' 5' 5' 5'    US cont, 1MHz, 1 0w/cm2     8' CT, volar forearm

## 2022-02-22 ENCOUNTER — APPOINTMENT (OUTPATIENT)
Dept: OCCUPATIONAL THERAPY | Facility: CLINIC | Age: 47
End: 2022-02-22
Payer: COMMERCIAL

## 2022-02-23 ENCOUNTER — OFFICE VISIT (OUTPATIENT)
Dept: OCCUPATIONAL THERAPY | Facility: CLINIC | Age: 47
End: 2022-02-23
Payer: COMMERCIAL

## 2022-02-23 DIAGNOSIS — G56.21 CUBITAL TUNNEL SYNDROME ON RIGHT: Primary | ICD-10-CM

## 2022-02-23 DIAGNOSIS — G56.01 CARPAL TUNNEL SYNDROME ON RIGHT: ICD-10-CM

## 2022-02-23 PROCEDURE — 97760 ORTHOTIC MGMT&TRAING 1ST ENC: CPT

## 2022-02-23 PROCEDURE — 97112 NEUROMUSCULAR REEDUCATION: CPT

## 2022-02-23 NOTE — PROGRESS NOTES
OT Re-Evaluation     Today's date: 2022  Patient name: Fredric Schaumann  : 1975  MRN: [id number]  Referring provider: Suzy Urbina DO  Dx:   Encounter Diagnosis     ICD-10-CM    1  Cubital tunnel syndrome on right  G56 21    2  Carpal tunnel syndrome on right  G56                    Assessment  Assessment details: Fredric Schaumann is a 41 y o , Right HD female referred to hand therapy for a right arm median and ulnar nerve compression  Onset of injury 2 5 years ago due to unknown etiology  Patient presents 22 with impaired right strength, and sensation of the median nerve distribution  Deficits also noted in pain and functional use of the right UE  Patient has diminished sensation with sensory testing, but provocative testing was negative  Patient is a good candidate for OT services to abolish pain and edema and restore ROM, strength, coordination, and sensation for a return to independence in daily tasks  22:  Patient attended / OT appts this date  Although she rates pain at the same level, she states her pain has decreased in frequency at the wrist and hand  She now is reporting pain in the medial epicondyle that she feels is related to mopping her floors throughout the day  Patent has positive provocative testing for medial epicondylitis  Sensation in the ulnar and median nerve distributions has improved, but tingling has not fully resolved  Right hand strength improved, but mild deficits still noted  A custom wrist splint was fabricated to wear at night and prn during the day to reduce elbow pain and symptoms of neve compressions  Continue OT 2x/wk x 4 weeks    Impairments: activity intolerance, impaired physical strength, lacks appropriate home exercise program, pain with function and weight-bearing intolerance  Other impairment: Impaired sensation right median nerve distribution  Functional limitations: Pain in RUE during IADL tasks    Difficulty performing resistive grasping, pinching and performing lifting  Symptom irritability: moderateBarriers to therapy: Fibromyalgia; asthma; chronic pain; neuropathy  Understanding of Dx/Px/POC: excellent   Prognosis: good    Goals  STGs (4 weeks)  Patient will be independent in HEP of nerve glides, edema management  MET  Patient will report an average pain level of 5/10  NOT MET  Patient will demonstrate 50% reduction in median and ulnar nerve symtoms  MET  Patient will demonstrate independence in use of splints to prevent nerve compression while sleeping  MET  LTGs (12 weeks)  Patient will demonstrate independence in a HEP to maintain ROM, strength, and function at discharge  ON GOING  Patient will report an average pain level of 1-2/10 to be independent in daily tasks  NOT MET  Patient will demonstrate 5/5 muscle strength in the wrist and forearm to be MI for meal prep  NOT MET  Patient will demonstrate right hand strength within 20% of the left hand to be MI for IADL tasks  MET for lateral and 3 jaw pinch only  Patient will demonstrate resolution of median and ulnar nerve symptoms in the RUE at night and during activities    NOT MET        Plan  Plan details: 2/23/22:  Continue OT 2x/wk x 4-8 weeks  Patient would benefit from: skilled occupational therapy and custom splinting  Planned modality interventions: ultrasound, thermotherapy: hydrocollator packs and cryotherapy  Planned therapy interventions: activity modification, compression, fine motor coordination training, graded activity, graded exercise, home exercise program, therapeutic exercise, therapeutic activities, stretching, strengthening, patient education, orthotic fitting/training, neuromuscular re-education and manual therapy  Other planned therapy interventions: IASTM; kinesio tape; long arm splint for sleep; cupping  Frequency: 2x week  Duration in weeks: 12  Plan of Care beginning date: 1/25/2022  Plan of Care expiration date: 4/29/2022  Treatment plan discussed with: patient        Subjective Evaluation    History of Present Illness  Mechanism of injury: Patient reports that 2 5 years ago, she began to notice numbness and tingling in the right hand, mostly at night  Unknown etiology  She has been wearing a wrist brace at night with good results  She does not have an elbow splint  She has had multiple cortisone shots in the CT with temporary improvement in symptoms  Patient now presents for OT evaluation and treatment    22: "It's doing a little better " Patient is now reporting pain on medial epicondyle that she feels is from mopping  She report she mops frequently during the day due to OCD          Recurrent probem    Quality of life: excellent    Pain  Current pain ratin  At best pain ratin  At worst pain ratin  Location: right wrist and medial epicondyle  Quality: burning, sharp and knife-like  Relieving factors: heat, change in position, medications and ice (tylenol; lidocaine patches; wrist brace)  Aggravating factors: lifting (squeezing; mopping)  Progression: improved (Patient reports improved pain at wrist and pain is less frequent  Now has pain at medial epicondyle)    Social Support  Lives with: adult children and spouse    Employment status: not working  Hand dominance: right      Diagnostic Tests    FCE comments: 22: EMG has been orderedTreatments  Previous treatment: injection treatment (wrist splint)  Patient Goals  Patient goals for therapy: decreased pain, increased strength, independence with ADLs/IADLs and return to sport/leisure activities  Patient goal: sleep at night without pain        Objective     Observations     Right Wrist/Hand   Negative for Nhi's nodes, deformity, edema, Heberden's nodes, trophic changes and Wartenberg's sign  Tenderness     Right Elbow   Tenderness in the medial epicondyle  Right Wrist/Hand   Tenderness in the medial epicondyle       Neurological Testing     Sensation     Wrist/Hand     Right Diminished: light touch and static two point discrimination    Comments   Right light touch: [place] [employer] thumb, index, ring,small = 2 83 (WNL)  Middle = 3 61 diminished light touch  Right static two point discrimination: Diminshed to 8 mm index and middle  Thumb, ring = 7mm  Small 6mm = WNL    Active Range of Motion     Right Wrist   Normal active range of motion    Right Thumb   Opposition: 1/25/22: WNL    Additional Active Range of Motion Details  1/25/22:  WNL    Strength/Myotome Testing     Left Wrist/Hand      (2nd hand position)     Trial 1: 70    Thumb Strength  Key/Lateral Pinch     Trial 1: 17  Tip/Two-Point Pinch     Trial 1: 11  Palmar/Three-Point Pinch     Trial 1: 13    Right Wrist/Hand   Normal wrist strength     (2nd hand position)     Trial 1: 50    Thumb Strength   Key/Lateral Pinch     Trial 1: 17  Tip/Two-Point Pinch     Trial 1: 7  Palmar/Three-Point Pinch     Trial 1: 13    Additional Strength Details  2/23/22:   and pinch strength improved, but with mild discomfort at wrist    Tests     Right Elbow   Positive active medial epicondylitis  Negative elbow flexion and Tinel's sign (cubital tunnel)  Right Wrist/Hand   Negative Phalen's sign and Tinel's sign (medial nerve)  Swelling   Left Elbow Girth Measurements   Girth at joint line (cm): 25 5 cm  Right Elbow Girth Measurements   Girth at joint line (cm): 25 5 cm               Precautions:  Asthma; neuropathy; fibromyalgia; chronic pain       Date 2/23 1/27 2/1 2/7 2/17   Visit 6 2 3 4 5   Manuals        IASTM CT, volar forearm  10' 10' 8' CT only 10' CT, volar forearm   Cupping        STM        Kinesio tape CT correction; wrist flexor inhibit  CT, ulnar nerve correction CT, Ulnar nerve correction CT, ulnar nerve correction   Ortho fit/train Volar wrist splint       Neuro Re-Ed         Distal MNG Full glide x 10 Full glide x 10 Full glide x 10 Full glide x 10 Full glide x 10   Proximal MNG x 10 Step 2 x 10 Step 3 x  10 Step 3 x 10 X 10   JENNY Full glide x 10 Step 1-2 x 10 Full glide x 10 Full glide x 10 Full glide x 10   UBE        Sensory patsy                        Ther Ex        TGE  x10 x10 x10 x10   Isolated digit ext  x10 x10 x10 x10   Digit abd/add  Marbles 1 set Marbles 1 set Marbles 1 set Marbles 1 set   AROM thumb         strength        Pinch strength        Hook to full fist glide  x20 lg pen x20 lg pen x20 x20           Ther Activity        Translation                HEP Avoid resistive grasping combined with pronation  No mopping  Splint wear, care, precautions Hook to full fist glides   Desk positioning                      Modalities        MHP 5' 5' 5' 5'    US cont, 1MHz, 1 0w/cm2     8' CT, volar forearm

## 2022-02-24 ENCOUNTER — APPOINTMENT (OUTPATIENT)
Dept: OCCUPATIONAL THERAPY | Facility: CLINIC | Age: 47
End: 2022-02-24
Payer: COMMERCIAL

## 2022-02-28 ENCOUNTER — APPOINTMENT (OUTPATIENT)
Dept: OCCUPATIONAL THERAPY | Facility: CLINIC | Age: 47
End: 2022-02-28
Payer: COMMERCIAL

## 2022-03-10 ENCOUNTER — OFFICE VISIT (OUTPATIENT)
Dept: OBGYN CLINIC | Facility: CLINIC | Age: 47
End: 2022-03-10
Payer: COMMERCIAL

## 2022-03-10 VITALS
SYSTOLIC BLOOD PRESSURE: 106 MMHG | WEIGHT: 178 LBS | HEIGHT: 66 IN | HEART RATE: 60 BPM | DIASTOLIC BLOOD PRESSURE: 73 MMHG | BODY MASS INDEX: 28.61 KG/M2

## 2022-03-10 DIAGNOSIS — G56.21 CUBITAL TUNNEL SYNDROME ON RIGHT: ICD-10-CM

## 2022-03-10 DIAGNOSIS — R20.0 RIGHT UPPER EXTREMITY NUMBNESS: ICD-10-CM

## 2022-03-10 DIAGNOSIS — G56.01 CARPAL TUNNEL SYNDROME ON RIGHT: Primary | ICD-10-CM

## 2022-03-10 PROCEDURE — 99213 OFFICE O/P EST LOW 20 MIN: CPT | Performed by: FAMILY MEDICINE

## 2022-03-10 RX ORDER — PREDNISONE 20 MG/1
20 TABLET ORAL 2 TIMES DAILY WITH MEALS
Qty: 10 TABLET | Refills: 0 | Status: SHIPPED | OUTPATIENT
Start: 2022-03-10 | End: 2022-03-15

## 2022-03-10 NOTE — PROGRESS NOTES
Assessment/Plan:  Assessment/Plan   Diagnoses and all orders for this visit:    Carpal tunnel syndrome on right    Cubital tunnel syndrome on right    Right upper extremity numbness  -     predniSONE 20 mg tablet; Take 1 tablet (20 mg total) by mouth 2 (two) times a day with meals for 5 days          74-year-old right-hand-dominant female with right upper extremity pain and numbness more than several years duration  Discussed with patient physical exam, impression and plan  Physical exam noted for right elbow medial soft tissue swelling  She has tenderness at the epicondyle and at the cubital tunnel  There is positive cubital tunnel Tinel's  She has decreased sensation light touch right upper extremity dermatomes C6-C8  She is to take prednisone 20 mg twice daily food for 5 days  She is to proceed with getting EMG study done and she will follow up afterward  Subjective:   Patient ID: Adilia Stein is a 41 y o  female  Chief Complaint   Patient presents with    Right Arm - Pain, Follow-up       74-year-old right-hand-dominant female following up for right upper extremity pain and numbness more than several years duration  She was last seen by me 4 months ago which point clinical impression was that she was symptomatic from combination of carpal tunnel and cubital tunnel syndrome  She was given right carpal tunnel corticosteroid injection, referred to hand therapy, and referred for EMG study  She has been attending hand therapy and doing home exercises as directed  She has not had EMG study done  She has been experiencing pain described as generalized to the medial aspect of the elbow, radiating to the forearm and wrist, throbbing and burning, worse with activity, associated with numbness, and improved with resting  She tried applying icy Hot to the medial aspect the elbow but her skin became irritated  Arm Pain  This is a chronic problem   The current episode started more than 1 year ago  The problem occurs daily  The problem has been waxing and waning  Associated symptoms include arthralgias, joint swelling and numbness  Pertinent negatives include no weakness  Exacerbated by: Physical activity  She has tried rest, position changes and ice (Physical therapy, home exercise) for the symptoms  The treatment provided mild relief  Review of Systems   Musculoskeletal: Positive for arthralgias and joint swelling  Neurological: Positive for numbness  Negative for weakness  Objective:  Vitals:    03/10/22 1117   BP: 106/73   Pulse: 60   Weight: 80 7 kg (178 lb)   Height: 5' 6" (1 676 m)     Right Hand Exam     Other   Sensation: decreased      Left Hand Exam     Other   Sensation: normal      Right Elbow Exam     Tests   Tinel's sign (cubital tunnel): positive          Observations     Right Wrist/Hand   Negative for deformity  Tenderness     Right Wrist/Hand   Tenderness in the medial epicondyle  No tenderness in the distal biceps tendon, distal triceps tendon and lateral epicondyle  Physical Exam  Vitals and nursing note reviewed  Constitutional:       General: She is not in acute distress  Appearance: She is well-developed  She is not ill-appearing or diaphoretic  HENT:      Head: Normocephalic  Right Ear: External ear normal       Left Ear: External ear normal    Eyes:      Conjunctiva/sclera: Conjunctivae normal    Neck:      Trachea: No tracheal deviation  Cardiovascular:      Rate and Rhythm: Normal rate  Pulmonary:      Effort: Pulmonary effort is normal  No respiratory distress  Abdominal:      General: There is no distension  Musculoskeletal:         General: Swelling and tenderness present  No deformity or signs of injury  Right elbow:  in medial epicondyle  No lateral epicondyle tenderness  Right hand: No deformity  Skin:     General: Skin is warm and dry  Coloration: Skin is not jaundiced or pale     Neurological: Mental Status: She is alert and oriented to person, place, and time  Psychiatric:         Mood and Affect: Mood normal          Behavior: Behavior normal          Thought Content:  Thought content normal          Judgment: Judgment normal

## 2022-04-29 NOTE — PROGRESS NOTES
4/29/22: Patient did not return to therapy following treatment on 2/23/22  MD is sending her for EMG test for UE numbness and tingling  DC OT services

## 2022-05-02 ENCOUNTER — PROCEDURE VISIT (OUTPATIENT)
Dept: NEUROLOGY | Facility: CLINIC | Age: 47
End: 2022-05-02
Payer: COMMERCIAL

## 2022-05-02 ENCOUNTER — TELEPHONE (OUTPATIENT)
Dept: OBGYN CLINIC | Facility: CLINIC | Age: 47
End: 2022-05-02

## 2022-05-02 DIAGNOSIS — R20.0 RIGHT UPPER EXTREMITY NUMBNESS: ICD-10-CM

## 2022-05-02 PROCEDURE — 95909 NRV CNDJ TST 5-6 STUDIES: CPT | Performed by: PHYSICAL MEDICINE & REHABILITATION

## 2022-05-02 PROCEDURE — 95886 MUSC TEST DONE W/N TEST COMP: CPT | Performed by: PHYSICAL MEDICINE & REHABILITATION

## 2022-05-02 NOTE — PROGRESS NOTES
EMG 1 Limb     Date/Time 5/2/2022 1:08 PM     Performed by  Mary Yip MD     Authorized by [employer],                 [facility] AT [address]  [phone]    Electromyography & Nerve Conduction Studies Report          Full Name: Myra Tomas Gender: Female  MRN: [id number] YOB: 1975      Visit Date: 5/2/2022 12:29 PM  Age: 41 Years  Examining Physician: Mary Yip MD   Referring Physician: DR Jessee Borrego History:  29-year-old right-handed female presents with complaints of tingling and numbness in the right hand associated with tenderness in the right elbow, progressively worsening for the last 5 months  She denies any radicular symptoms  Patient is being evaluated for focal neuropathy  On exam motor strength is 5/5 throughout  Sensations are intact to light touch and pinprick in the median, radial and ulnar nerve distribution  TEMP 32      Sensory Nerve Conduction Study       Nerve / Sites Rec  Site Onset Lat Peak Lat  Amp Segments Distance Velocity     ms ms µV  cm m/s   R Median - Dig II (Antidromic)      Wrist Index 3 0 3 9 44 3 Wrist - Index 13 43      Ref  ?3 5 ? 20 0 Ref  ?50   R Ulnar - Dig V (Antidromic)  Wrist Dig V 2 3 3 2 18 2 Wrist - Dig V 12 51      Ref  ?2 9  ? 17 0 Ref  ?50   R Radial - Superficial (Antidromic)      Forearm Wrist 1 6 2 1 19 2 Forearm - Wrist 10 62      Ref  ?2 9 ? 15 0 Ref  ?50       Motor Nerve Conduction Study       Nerve / Sites Muscle Latency Ref  Amplitude Ref  Segments Distance Lat Diff Velocity Ref  ms ms mV mV  cm ms m/s m/s   R Median - APB      Wrist APB 4 0 ?4 4 7 3 ?4 0 Wrist - APB 7         Elbow APB 7 4  6 1  Elbow - Wrist 21 3 35 63 ?49   R Ulnar - ADM      Wrist ADM 2 5 ?3 3 12 8 ?6 0 Wrist - ADM 7         B  Elbow ADM 5 9  15 4  B  Elbow - Wrist 22 3 40 65 ?49      A  Elbow ADM 7 3  14 4  A  Elbow - B  Elbow 10 1 44 70 ?49       F Waves Nerve F Latency Ref  ms ms   R Median - APB 27 3 ?31 0   R Ulnar - ADM 27 0 ?32 0       EMG Summary Table     Spontaneous MUAP Recruitment   Muscle Nerve Roots IA Fib PSW Fasc H F  Dur  Amp PPP Config Pattern   R  First dorsal interosseous Ulnar C8-T1 NL None None None None NL NL None NL NL   R  Deltoid Axillary C5-C6 NL None None None None NL NL None NL NL   R  Biceps brachii Musculocut  C5-C6 NL None None None None NL NL None NL NL   R  Triceps brachii Radial C6-C8 NL None None None None NL NL None NL NL   R  Pronator teres Median C6-C7 NL None None None None NL NL None NL NL   R  Abductor pollicis brevis Median Z7-D2 NL None None None None NL NL None NL NL   R  Cervical paraspinals (low)  - NL None None None None NL NL None NL NL                     Summary      Motor and sensory conduction studies were performed on the right median and ulnar nerves  The distal motor latencies were normal  The motor action potential amplitudes were normal  Motor conduction velocities were normal including conduction velocity of the ulnar nerve across the elbow  Median and ulnar F waves were normal     Median sensory peak latency was prolonged with normal sensory action potential amplitude and a slow conduction velocity across the wrist   The superficial radial and ulnar sensory action potentials were normal      Concentric needle EMG was performed on various distal and proximal muscles of the right upper extremity including APB, FDI, pronator teres, deltoid, biceps, triceps and low cervical paraspinal region  There was no evidence of active denervation in any of the muscles tested  The compound motor unit action potentials were of normal configuration with interference patterns being full or full for effort  Impression:      Abnormal study  There is electrophysiologic evidence of a:    1  Mild median nerve compression neuropathy at the wrist with demyelinative changes, consistent with diagnosis of carpal tunnel syndrome  2  There is no evidence of a cervical radiculopathy or ulnar neuropathy

## 2022-05-05 ENCOUNTER — OFFICE VISIT (OUTPATIENT)
Dept: OBGYN CLINIC | Facility: CLINIC | Age: 47
End: 2022-05-05
Payer: COMMERCIAL

## 2022-05-05 VITALS
DIASTOLIC BLOOD PRESSURE: 84 MMHG | SYSTOLIC BLOOD PRESSURE: 128 MMHG | HEIGHT: 66 IN | WEIGHT: 178 LBS | HEART RATE: 64 BPM | BODY MASS INDEX: 28.61 KG/M2

## 2022-05-05 DIAGNOSIS — G56.01 CARPAL TUNNEL SYNDROME ON RIGHT: Primary | ICD-10-CM

## 2022-05-05 DIAGNOSIS — G56.21 CUBITAL TUNNEL SYNDROME, RIGHT: ICD-10-CM

## 2022-05-05 DIAGNOSIS — R20.2 PARESTHESIAS: ICD-10-CM

## 2022-05-05 PROCEDURE — 99213 OFFICE O/P EST LOW 20 MIN: CPT | Performed by: FAMILY MEDICINE

## 2022-05-05 NOTE — PROGRESS NOTES
Assessment/Plan:  Assessment/Plan   Diagnoses and all orders for this visit:    Carpal tunnel syndrome on right  -     Ambulatory Referral to Hand Surgery; Future    Cubital tunnel syndrome, right  -     Ambulatory Referral to Hand Surgery; Future    Paresthesias  -     Ambulatory Referral to Neurology; Future          44-year-old right-hand-dominant female with right upper extremity pain and numbness more than several years duration  Discussed with patient EMG findings, impression and plan  EMG right upper extremity noted for mild median nerve compression neuropathy at the wrist   There is no appreciation of cervical radiculopathy or ulnar neuropathy  Clinical impression is that she is symptomatic from carpal tunnel, however she also has clinical findings suggestive of cubital tunnel syndrome  She has not improved with bracing, formal therapy and home exercises, and carpal tunnel steroid injection  At this time I will refer to hand surgeon for further evaluation and recommendation treatment  Recommend she obtain thick neoprene sleeve to be worn at the elbow especially at nighttime  I will also refer to neurology due to symptoms of numbness in multiple extremities  She will follow up as needed  Subjective:   Patient ID: Vannesa Meneses is a 41 y o  female  Chief Complaint   Patient presents with    Right Arm - Pain, Numbness       44-year-old right-hand-dominant female following up for right upper extremity pain and numbness more than several years duration  She was last seen by me nearly 2 months ago which point she was given course of prednisone 20 mg twice daily for 5 days and she was advised to proceed with getting EMG study done as ordered  She reports having pain described as generalized to the medial aspect the elbow, radiating to the forearm wrist, throbbing and burning, worse with activity, associated with numbness, and improved with resting  Arm Pain  This is a chronic problem   The current episode started more than 1 year ago  The problem occurs constantly  The problem has been unchanged  Associated symptoms include arthralgias, joint swelling and numbness  Pertinent negatives include no weakness  Exacerbated by: Activity  She has tried rest and position changes (Formal therapy, and cortisone injection, home exercise) for the symptoms  The treatment provided mild relief  Review of Systems   Musculoskeletal: Positive for arthralgias and joint swelling  Neurological: Positive for numbness  Negative for weakness  Objective:  Vitals:    05/05/22 1044   BP: 128/84   Pulse: 64   Weight: 80 7 kg (178 lb)   Height: 5' 6" (1 676 m)     Ortho Exam    Physical Exam  Vitals and nursing note reviewed  Constitutional:       General: She is not in acute distress  Appearance: She is well-developed  She is not ill-appearing or diaphoretic  HENT:      Head: Normocephalic  Right Ear: External ear normal       Left Ear: External ear normal    Eyes:      Conjunctiva/sclera: Conjunctivae normal    Neck:      Trachea: No tracheal deviation  Cardiovascular:      Rate and Rhythm: Normal rate  Pulmonary:      Effort: Pulmonary effort is normal  No respiratory distress  Abdominal:      General: There is no distension  Skin:     General: Skin is warm and dry  Coloration: Skin is not jaundiced or pale  Neurological:      Mental Status: She is alert and oriented to person, place, and time  Psychiatric:         Mood and Affect: Mood normal          Behavior: Behavior normal          Thought Content:  Thought content normal          Judgment: Judgment normal

## 2022-05-10 ENCOUNTER — TELEPHONE (OUTPATIENT)
Dept: OBGYN CLINIC | Facility: HOSPITAL | Age: 47
End: 2022-05-10

## 2022-05-10 NOTE — TELEPHONE ENCOUNTER
EMAIL SENT TO SME:    Hello,  Please advise if the following patient can be forced onto the schedule:  Patient:  James Ellington  :  [birth year]  MRN:  [id number]  INSURANCE:  [facility], [place]  Call back #:  [phone]  Reason for appointment:  Patient being referred for carpal/cubital on rt side  Requested doctor/location:  Dr Idania Linton     Thank you in advance!

## 2022-05-26 ENCOUNTER — OFFICE VISIT (OUTPATIENT)
Dept: FAMILY MEDICINE CLINIC | Facility: CLINIC | Age: 47
End: 2022-05-26
Payer: COMMERCIAL

## 2022-05-26 VITALS
BODY MASS INDEX: 27.8 KG/M2 | TEMPERATURE: 99 F | OXYGEN SATURATION: 98 % | WEIGHT: 173 LBS | SYSTOLIC BLOOD PRESSURE: 127 MMHG | HEART RATE: 74 BPM | HEIGHT: 66 IN | DIASTOLIC BLOOD PRESSURE: 77 MMHG

## 2022-05-26 DIAGNOSIS — M79.7 FIBROMYALGIA: ICD-10-CM

## 2022-05-26 DIAGNOSIS — R11.0 NAUSEA: ICD-10-CM

## 2022-05-26 DIAGNOSIS — Z13.220 SCREENING FOR LIPID DISORDERS: ICD-10-CM

## 2022-05-26 DIAGNOSIS — R23.2 HOT FLASHES: Primary | ICD-10-CM

## 2022-05-26 PROCEDURE — 99214 OFFICE O/P EST MOD 30 MIN: CPT | Performed by: PHYSICIAN ASSISTANT

## 2022-05-26 NOTE — PROGRESS NOTES
Assessment/Plan:       Problem List Items Addressed This Visit        Other    Fibromyalgia      Other Visit Diagnoses     Hot flashes    -  Primary    Relevant Orders    CBC and differential    Comprehensive metabolic panel    TSH, 3rd generation with Free T4 reflex    Screening for lipid disorders        Relevant Orders    Lipid Panel with Direct LDL reflex    Nausea            exam today is normal, VSS  Unclear etiology, possible perimenopausal sx  Recommend labs as above  There are no cardiac sx, sx occur at rest  Consider SSRI/SNRI for sx management if workup is benign  No evidence of acute infection on exam  Complete mammography, continue with GYN    Subjective:      Patient ID: Karen Navarro is a 41 y o  female  Pt presents with concerns of "not feeling herself" for the past month  Notes intermittent hot flashes  She occasionally feels dizzy/nauseas  Her fibromyalgia is acting up  She also notes sore throat  She shares she discussed sx with her GYN at last visit and discussed perimenopause  Sweats worse at night  She continues to have regular menses  She denies CP, SOB, palpitations, edema, syncope  She denies  sx  No GI sx  She has a hx of anxiety but shares these do not feel like anxiety attacks  The following portions of the patient's history were reviewed and updated as appropriate:   She  has a past medical history of Anxiety, Arthritis, Asthma, Depression, Osteoporosis, and Urinary tract infection  She   Patient Active Problem List    Diagnosis Date Noted    History of COVID-19 2021    Fibromyalgia 2021    Chronic pain of multiple joints 2020    Neuropathy 2020    Chronic neck pain 2020    Allergic conjunctivitis of both eyes 2020    Contact dermatitis 2020    Mild intermittent asthma without complication      She  has a past surgical history that includes  section and Tubal ligation    Her family history includes Breast cancer in her half-sister and mother; Cancer in her father and mother; Colon cancer in her father; Coronary artery disease in her mother; Diabetes in her mother; No Known Problems in her daughter, daughter, half-sister, half-sister, maternal aunt, maternal aunt, maternal aunt, maternal aunt, maternal aunt, maternal aunt, maternal aunt, maternal aunt, maternal grandmother, paternal aunt, paternal aunt, paternal aunt, paternal aunt, paternal aunt, paternal grandmother, sister, sister, sister, sister, and sister; Ovarian cancer in her mother; Prostate cancer in her father  She  reports that she has quit smoking  Her smoking use included cigarettes  She smoked 0 50 packs per day  She has never used smokeless tobacco  She reports current alcohol use  She reports previous drug use  Current Outpatient Medications   Medication Sig Dispense Refill    albuterol (PROVENTIL HFA,VENTOLIN HFA) 90 mcg/act inhaler Inhale 2 puffs every 6 (six) hours as needed for wheezing (cough) 1 Inhaler 0    Cholecalciferol (Vitamin D3) 25 MCG (1000 UT) CAPS Take by mouth      clobetasol (TEMOVATE) 0 05 % ointment Apply 1 application topically 2 (two) times a day To affected area 30 g 0    Cyanocobalamin 1000 MCG/ML LIQD Take by mouth      fexofenadine (ALLEGRA) 180 MG tablet Take 1 tablet (180 mg total) by mouth daily 60 tablet 0    fluticasone (FLONASE) 50 mcg/act nasal spray SPRAY 1 SPRAY INTO EACH NOSTRIL EVERY DAY 16 mL 1    Multiple Vitamin (MULTIVITAMIN) capsule Take 1 capsule by mouth daily      nystatin-triamcinolone (MYCOLOG-II) ointment Apply topically 2 (two) times a day As needed 45 g 1    olopatadine (PATANOL) 0 1 % ophthalmic solution Administer 1 drop to both eyes 2 (two) times a day 5 mL 3    TURMERIC PO Take by mouth      Vit C-Cholecalciferol-Alessandra Hip (Vitamin C & D3/Alessandra Hips) 500-1000-20 MG-UNIT-MG CAPS Take by mouth       No current facility-administered medications for this visit       Current Outpatient Medications on File Prior to Visit   Medication Sig    albuterol (PROVENTIL HFA,VENTOLIN HFA) 90 mcg/act inhaler Inhale 2 puffs every 6 (six) hours as needed for wheezing (cough)    Cholecalciferol (Vitamin D3) 25 MCG (1000 UT) CAPS Take by mouth    clobetasol (TEMOVATE) 0 05 % ointment Apply 1 application topically 2 (two) times a day To affected area    Cyanocobalamin 1000 MCG/ML LIQD Take by mouth    fexofenadine (ALLEGRA) 180 MG tablet Take 1 tablet (180 mg total) by mouth daily    fluticasone (FLONASE) 50 mcg/act nasal spray SPRAY 1 SPRAY INTO EACH NOSTRIL EVERY DAY    Multiple Vitamin (MULTIVITAMIN) capsule Take 1 capsule by mouth daily    nystatin-triamcinolone (MYCOLOG-II) ointment Apply topically 2 (two) times a day As needed    olopatadine (PATANOL) 0 1 % ophthalmic solution Administer 1 drop to both eyes 2 (two) times a day    TURMERIC PO Take by mouth    Vit C-Cholecalciferol-Alessandra Hip (Vitamin C & D3/Alessandra Hips) 500-1000-20 MG-UNIT-MG CAPS Take by mouth     No current facility-administered medications on file prior to visit  She is allergic to pollen extract       Review of Systems   Constitutional: Positive for diaphoresis and fatigue  Negative for chills and fever  HENT: Negative for congestion, ear pain, hearing loss, nosebleeds, postnasal drip, rhinorrhea, sinus pressure, sinus pain, sneezing and sore throat  Eyes: Negative for pain, discharge, itching and visual disturbance  Respiratory: Negative for cough, chest tightness, shortness of breath and wheezing  Cardiovascular: Negative for chest pain, palpitations and leg swelling  Gastrointestinal: Positive for nausea  Negative for abdominal pain, blood in stool, constipation, diarrhea and vomiting  Genitourinary: Negative for frequency and urgency  Musculoskeletal: Positive for arthralgias and myalgias  Neurological: Positive for dizziness  Negative for light-headedness and numbness           Objective:      /77 Pulse 74   Temp 99 °F (37 2 °C)   Ht 5' 6" (1 676 m)   Wt 78 5 kg (173 lb)   SpO2 98%   BMI 27 92 kg/m²          Physical Exam  Vitals and nursing note reviewed  Constitutional:       General: She is not in acute distress  Appearance: Normal appearance  HENT:      Head: Normocephalic and atraumatic  Right Ear: Tympanic membrane, ear canal and external ear normal       Left Ear: Tympanic membrane, ear canal and external ear normal       Nose: Nose normal  No congestion or rhinorrhea  Mouth/Throat:      Mouth: Mucous membranes are moist       Pharynx: Oropharynx is clear  No oropharyngeal exudate or posterior oropharyngeal erythema  Comments: PND noted  Eyes:      Pupils: Pupils are equal, round, and reactive to light  Cardiovascular:      Rate and Rhythm: Normal rate and regular rhythm  Heart sounds: Normal heart sounds  No murmur heard  Pulmonary:      Effort: Pulmonary effort is normal  No respiratory distress  Breath sounds: Normal breath sounds  No wheezing, rhonchi or rales  Abdominal:      General: Bowel sounds are normal  There is no distension  Palpations: Abdomen is soft  Tenderness: There is no abdominal tenderness  There is no guarding  Hernia: No hernia is present  Musculoskeletal:         General: Normal range of motion  Cervical back: Normal range of motion and neck supple  No tenderness  Right lower leg: No edema  Left lower leg: No edema  Lymphadenopathy:      Cervical: No cervical adenopathy  Skin:     General: Skin is warm and dry  Neurological:      Mental Status: She is alert and oriented to person, place, and time     Psychiatric:         Mood and Affect: Mood and affect normal

## 2022-05-27 ENCOUNTER — APPOINTMENT (OUTPATIENT)
Dept: LAB | Facility: CLINIC | Age: 47
End: 2022-05-27
Payer: COMMERCIAL

## 2022-05-27 DIAGNOSIS — R23.2 HOT FLASHES: ICD-10-CM

## 2022-05-27 DIAGNOSIS — Z13.220 SCREENING FOR LIPID DISORDERS: ICD-10-CM

## 2022-05-27 LAB
ALBUMIN SERPL BCP-MCNC: 3.7 G/DL (ref 3.5–5)
ALP SERPL-CCNC: 81 U/L (ref 46–116)
ALT SERPL W P-5'-P-CCNC: 24 U/L (ref 12–78)
ANION GAP SERPL CALCULATED.3IONS-SCNC: 1 MMOL/L (ref 4–13)
AST SERPL W P-5'-P-CCNC: 10 U/L (ref 5–45)
BASOPHILS # BLD AUTO: 0.1 THOUSANDS/ΜL (ref 0–0.1)
BASOPHILS NFR BLD AUTO: 1 % (ref 0–1)
BILIRUB SERPL-MCNC: 0.3 MG/DL (ref 0.2–1)
BUN SERPL-MCNC: 15 MG/DL (ref 5–25)
CALCIUM SERPL-MCNC: 9.5 MG/DL (ref 8.3–10.1)
CHLORIDE SERPL-SCNC: 108 MMOL/L (ref 100–108)
CHOLEST SERPL-MCNC: 175 MG/DL
CO2 SERPL-SCNC: 29 MMOL/L (ref 21–32)
CREAT SERPL-MCNC: 0.65 MG/DL (ref 0.6–1.3)
EOSINOPHIL # BLD AUTO: 0.4 THOUSAND/ΜL (ref 0–0.61)
EOSINOPHIL NFR BLD AUTO: 5 % (ref 0–6)
ERYTHROCYTE [DISTWIDTH] IN BLOOD BY AUTOMATED COUNT: 13.3 % (ref 11.6–15.1)
GFR SERPL CREATININE-BSD FRML MDRD: 107 ML/MIN/1.73SQ M
GLUCOSE P FAST SERPL-MCNC: 86 MG/DL (ref 65–99)
HCT VFR BLD AUTO: 40 % (ref 34.8–46.1)
HDLC SERPL-MCNC: 43 MG/DL
HGB BLD-MCNC: 12.6 G/DL (ref 11.5–15.4)
IMM GRANULOCYTES # BLD AUTO: 0.02 THOUSAND/UL (ref 0–0.2)
IMM GRANULOCYTES NFR BLD AUTO: 0 % (ref 0–2)
LDLC SERPL CALC-MCNC: 121 MG/DL (ref 0–100)
LYMPHOCYTES # BLD AUTO: 2.97 THOUSANDS/ΜL (ref 0.6–4.47)
LYMPHOCYTES NFR BLD AUTO: 34 % (ref 14–44)
MCH RBC QN AUTO: 27.1 PG (ref 26.8–34.3)
MCHC RBC AUTO-ENTMCNC: 31.5 G/DL (ref 31.4–37.4)
MCV RBC AUTO: 86 FL (ref 82–98)
MONOCYTES # BLD AUTO: 0.95 THOUSAND/ΜL (ref 0.17–1.22)
MONOCYTES NFR BLD AUTO: 11 % (ref 4–12)
NEUTROPHILS # BLD AUTO: 4.39 THOUSANDS/ΜL (ref 1.85–7.62)
NEUTS SEG NFR BLD AUTO: 49 % (ref 43–75)
NRBC BLD AUTO-RTO: 0 /100 WBCS
PLATELET # BLD AUTO: 277 THOUSANDS/UL (ref 149–390)
PMV BLD AUTO: 10.9 FL (ref 8.9–12.7)
POTASSIUM SERPL-SCNC: 4.2 MMOL/L (ref 3.5–5.3)
PROT SERPL-MCNC: 7.5 G/DL (ref 6.4–8.2)
RBC # BLD AUTO: 4.65 MILLION/UL (ref 3.81–5.12)
SODIUM SERPL-SCNC: 138 MMOL/L (ref 136–145)
TRIGL SERPL-MCNC: 55 MG/DL
TSH SERPL DL<=0.05 MIU/L-ACNC: 1.34 UIU/ML (ref 0.45–4.5)
WBC # BLD AUTO: 8.83 THOUSAND/UL (ref 4.31–10.16)

## 2022-05-27 PROCEDURE — 80053 COMPREHEN METABOLIC PANEL: CPT

## 2022-05-27 PROCEDURE — 36415 COLL VENOUS BLD VENIPUNCTURE: CPT

## 2022-05-27 PROCEDURE — 85025 COMPLETE CBC W/AUTO DIFF WBC: CPT

## 2022-05-27 PROCEDURE — 80061 LIPID PANEL: CPT

## 2022-05-27 PROCEDURE — 84443 ASSAY THYROID STIM HORMONE: CPT

## 2022-06-02 ENCOUNTER — OFFICE VISIT (OUTPATIENT)
Dept: FAMILY MEDICINE CLINIC | Facility: CLINIC | Age: 47
End: 2022-06-02
Payer: COMMERCIAL

## 2022-06-02 VITALS
HEIGHT: 66 IN | DIASTOLIC BLOOD PRESSURE: 78 MMHG | WEIGHT: 175 LBS | OXYGEN SATURATION: 98 % | BODY MASS INDEX: 28.12 KG/M2 | SYSTOLIC BLOOD PRESSURE: 119 MMHG | HEART RATE: 71 BPM | TEMPERATURE: 98.2 F

## 2022-06-02 DIAGNOSIS — R23.2 HOT FLASHES: Primary | ICD-10-CM

## 2022-06-02 PROCEDURE — 99214 OFFICE O/P EST MOD 30 MIN: CPT | Performed by: PHYSICIAN ASSISTANT

## 2022-06-02 RX ORDER — CITALOPRAM 10 MG/1
10 TABLET ORAL DAILY
Qty: 30 TABLET | Refills: 0 | Status: SHIPPED | OUTPATIENT
Start: 2022-06-02

## 2022-06-02 NOTE — PROGRESS NOTES
Assessment/Plan:       Problem List Items Addressed This Visit    None     Visit Diagnoses     Hot flashes    -  Primary    Relevant Medications    citalopram (CeleXA) 10 mg tablet    BMI 28 0-28 9,adult        Relevant Orders    Ambulatory Referral to Weight Management        labs reviewed and unremarkable  Possible hormonal cause? Perimenopausal? Anxiety? Anxiety is more so about the sweats rather than causing them  Will trial SSRI for tx of hot flashes  4 week follow up  Pt would also like referral to medical weight management  Referral placed  Discussed risks/benefits of SSRIs  Subjective:      Patient ID: Griffin Brooke is a 41 y o  female  Pt presents for lab review and follow up of hot flashes  Labs reviewed as below  Continues to have spontaneous sweats  No cardiac complaints  Occurs during day and at night  Not exertional  No fevers       Recent Results (from the past 672 hour(s))  -CBC and differential:   Collection Time: 05/27/22  7:52 AM       Result                      Value             Ref Range           WBC                         8 83              4 31 - 10 16*       RBC                         4 65              3 81 - 5 12 *       Hemoglobin                  12 6              11 5 - 15 4 *       Hematocrit                  40 0              34 8 - 46 1 %       MCV                         86                82 - 98 fL          MCH                         27 1              26 8 - 34 3 *       MCHC                        31 5              31 4 - 37 4 *       RDW                         13 3              11 6 - 15 1 %       MPV                         10 9              8 9 - 12 7 fL       Platelets                   277               149 - 390 Th*       nRBC                        0                 /100 WBCs           Neutrophils Relative        49                43 - 75 %           Immat GRANS %               0                 0 - 2 %             Lymphocytes Relative        34 14 - 44 %           Monocytes Relative          11                4 - 12 %            Eosinophils Relative        5                 0 - 6 %             Basophils Relative          1                 0 - 1 %             Neutrophils Absolute        4 39              1 85 - 7 62 *       Immature Grans Absolute     0 02              0 00 - 0 20 *       Lymphocytes Absolute        2 97              0 60 - 4 47 *       Monocytes Absolute          0 95              0 17 - 1 22 *       Eosinophils Absolute        0 40              0 00 - 0 61 *       Basophils Absolute          0 10              0 00 - 0 10 *  -Comprehensive metabolic panel:   Collection Time: 05/27/22  7:52 AM       Result                      Value             Ref Range           Sodium                      138               136 - 145 mm*       Potassium                   4 2               3 5 - 5 3 mm*       Chloride                    108               100 - 108 mm*       CO2                         29                21 - 32 mmol*       ANION GAP                   1 (L)             4 - 13 mmol/L       BUN                         15                5 - 25 mg/dL        Creatinine                  0 65              0 60 - 1 30 *       Glucose, Fasting            86                65 - 99 mg/dL       Calcium                     9 5               8 3 - 10 1 m*       AST                         10                5 - 45 U/L          ALT                         24                12 - 78 U/L         Alkaline Phosphatase        81                46 - 116 U/L        Total Protein               7 5               6 4 - 8 2 g/*       Albumin                     3 7               3 5 - 5 0 g/*       Total Bilirubin             0 30              0 20 - 1 00 *       eGFR                        107               ml/min/1 73s*  -Lipid Panel with Direct LDL reflex:   Collection Time: 05/27/22  7:52 AM       Result                      Value             Ref Range Cholesterol                 175               See Comment *       Triglycerides               55                See Comment *       HDL, Direct                 43 (L)            >=50 mg/dL          LDL Calculated              121 (H)           0 - 100 mg/dL  -TSH, 3rd generation with Free T4 reflex:   Collection Time: 22  7:52 AM       Result                      Value             Ref Range           TSH 3RD GENERATON           1 340             0 450 - 4 50*        The following portions of the patient's history were reviewed and updated as appropriate:   She  has a past medical history of Anxiety, Arthritis, Asthma, Depression, Osteoporosis, and Urinary tract infection  She   Patient Active Problem List    Diagnosis Date Noted    History of COVID-19 2021    Fibromyalgia 2021    Chronic pain of multiple joints 2020    Neuropathy 2020    Chronic neck pain 2020    Allergic conjunctivitis of both eyes 2020    Contact dermatitis 2020    Mild intermittent asthma without complication      She  has a past surgical history that includes  section and Tubal ligation  Her family history includes Breast cancer in her half-sister and mother; Cancer in her father and mother; Colon cancer in her father; Coronary artery disease in her mother; Diabetes in her mother; No Known Problems in her daughter, daughter, half-sister, half-sister, maternal aunt, maternal aunt, maternal aunt, maternal aunt, maternal aunt, maternal aunt, maternal aunt, maternal aunt, maternal grandmother, paternal aunt, paternal aunt, paternal aunt, paternal aunt, paternal aunt, paternal grandmother, sister, sister, sister, sister, and sister; Ovarian cancer in her mother; Prostate cancer in her father  She  reports that she has quit smoking  Her smoking use included cigarettes  She smoked 0 50 packs per day  She has never used smokeless tobacco  She reports current alcohol use   She reports previous drug use  Current Outpatient Medications   Medication Sig Dispense Refill    citalopram (CeleXA) 10 mg tablet Take 1 tablet (10 mg total) by mouth daily 30 tablet 0    albuterol (PROVENTIL HFA,VENTOLIN HFA) 90 mcg/act inhaler Inhale 2 puffs every 6 (six) hours as needed for wheezing (cough) 1 Inhaler 0    Cholecalciferol (Vitamin D3) 25 MCG (1000 UT) CAPS Take by mouth      clobetasol (TEMOVATE) 0 05 % ointment Apply 1 application topically 2 (two) times a day To affected area 30 g 0    Cyanocobalamin 1000 MCG/ML LIQD Take by mouth      fexofenadine (ALLEGRA) 180 MG tablet Take 1 tablet (180 mg total) by mouth daily 60 tablet 0    fluticasone (FLONASE) 50 mcg/act nasal spray SPRAY 1 SPRAY INTO EACH NOSTRIL EVERY DAY 16 mL 1    Multiple Vitamin (MULTIVITAMIN) capsule Take 1 capsule by mouth daily      nystatin-triamcinolone (MYCOLOG-II) ointment Apply topically 2 (two) times a day As needed 45 g 1    olopatadine (PATANOL) 0 1 % ophthalmic solution Administer 1 drop to both eyes 2 (two) times a day 5 mL 3    TURMERIC PO Take by mouth      Vit C-Cholecalciferol-Alessandra Hip (Vitamin C & D3/Alessandra Hips) 500-1000-20 MG-UNIT-MG CAPS Take by mouth       No current facility-administered medications for this visit       Current Outpatient Medications on File Prior to Visit   Medication Sig    albuterol (PROVENTIL HFA,VENTOLIN HFA) 90 mcg/act inhaler Inhale 2 puffs every 6 (six) hours as needed for wheezing (cough)    Cholecalciferol (Vitamin D3) 25 MCG (1000 UT) CAPS Take by mouth    clobetasol (TEMOVATE) 0 05 % ointment Apply 1 application topically 2 (two) times a day To affected area    Cyanocobalamin 1000 MCG/ML LIQD Take by mouth    fexofenadine (ALLEGRA) 180 MG tablet Take 1 tablet (180 mg total) by mouth daily    fluticasone (FLONASE) 50 mcg/act nasal spray SPRAY 1 SPRAY INTO EACH NOSTRIL EVERY DAY    Multiple Vitamin (MULTIVITAMIN) capsule Take 1 capsule by mouth daily    nystatin-triamcinolone (MYCOLOG-II) ointment Apply topically 2 (two) times a day As needed    olopatadine (PATANOL) 0 1 % ophthalmic solution Administer 1 drop to both eyes 2 (two) times a day    TURMERIC PO Take by mouth    Vit C-Cholecalciferol-Alessandra Hip (Vitamin C & D3/Alessandra Hips) 500-1000-20 MG-UNIT-MG CAPS Take by mouth     No current facility-administered medications on file prior to visit  She is allergic to pollen extract       Review of Systems   Constitutional: Negative for chills, fatigue and fever  HENT: Negative for congestion, ear pain, hearing loss, nosebleeds, postnasal drip, rhinorrhea, sinus pressure, sinus pain, sneezing and sore throat  Eyes: Negative for pain, discharge, itching and visual disturbance  Respiratory: Negative for cough, chest tightness, shortness of breath and wheezing  Cardiovascular: Negative for chest pain, palpitations and leg swelling  Gastrointestinal: Negative for abdominal pain, blood in stool, constipation, diarrhea, nausea and vomiting  Endocrine: Positive for heat intolerance  Genitourinary: Negative for frequency and urgency  Neurological: Negative for dizziness, light-headedness and numbness  Objective:      /78   Pulse 71   Temp 98 2 °F (36 8 °C)   Ht 5' 6" (1 676 m)   Wt 79 4 kg (175 lb)   SpO2 98%   BMI 28 25 kg/m²          Physical Exam  Vitals and nursing note reviewed  Constitutional:       General: She is not in acute distress  Appearance: Normal appearance  She is not ill-appearing  HENT:      Head: Normocephalic and atraumatic  Pulmonary:      Effort: Pulmonary effort is normal  No respiratory distress  Musculoskeletal:         General: Normal range of motion  Cervical back: Normal range of motion  Skin:     General: Skin is warm and dry  Neurological:      Mental Status: She is alert and oriented to person, place, and time

## 2022-06-06 ENCOUNTER — TELEPHONE (OUTPATIENT)
Dept: PHYSICAL THERAPY | Facility: OTHER | Age: 47
End: 2022-06-06

## 2022-06-06 NOTE — TELEPHONE ENCOUNTER
Patient filled out On-Line form for [facility]  Nurse reached out and reviewed the program with her  Nurse also confirmed current HI  Nurse encouraged her to call number on HI to find PT provider for evaluation  Patient understood and agreed  Very appreciative for call and discussion  Nurse wished her well and encouraged to CB if needed

## 2022-07-06 ENCOUNTER — TELEPHONE (OUTPATIENT)
Dept: OTHER | Facility: OTHER | Age: 47
End: 2022-07-06

## 2022-07-07 ENCOUNTER — OFFICE VISIT (OUTPATIENT)
Dept: FAMILY MEDICINE CLINIC | Facility: CLINIC | Age: 47
End: 2022-07-07
Payer: COMMERCIAL

## 2022-07-07 VITALS
HEART RATE: 72 BPM | BODY MASS INDEX: 28.22 KG/M2 | OXYGEN SATURATION: 100 % | TEMPERATURE: 97.7 F | DIASTOLIC BLOOD PRESSURE: 78 MMHG | WEIGHT: 175.6 LBS | HEIGHT: 66 IN | SYSTOLIC BLOOD PRESSURE: 120 MMHG

## 2022-07-07 DIAGNOSIS — Z23 NEED FOR PNEUMOCOCCAL VACCINE: ICD-10-CM

## 2022-07-07 DIAGNOSIS — R23.2 HOT FLASHES: Primary | ICD-10-CM

## 2022-07-07 PROCEDURE — 90471 IMMUNIZATION ADMIN: CPT | Performed by: FAMILY MEDICINE

## 2022-07-07 PROCEDURE — 90677 PCV20 VACCINE IM: CPT | Performed by: FAMILY MEDICINE

## 2022-07-07 PROCEDURE — 99213 OFFICE O/P EST LOW 20 MIN: CPT | Performed by: PHYSICIAN ASSISTANT

## 2022-07-07 NOTE — PROGRESS NOTES
Assessment/Plan:       Problem List Items Addressed This Visit    None     Visit Diagnoses     Hot flashes    -  Primary    Need for pneumococcal vaccine        Relevant Orders    Pneumococcal Conjugate Vaccine 20-valent (Pcv20) (Completed)        her sweats improved, she did not start the celexa  I believe her hot flashes are likely perimenopausal/somatic linked to anxiety  Continue healthy lifestyle choices and monitor sx off of pharmacotherapy  Follow up prn     Subjective:      Patient ID: Louise Irwin is a 41 y o  female  Pt presents for one month follow up  Last month I prescribed celexa for possible perimenopausal sweats/hot flashes and possible anxiety  She did not start the medication  She notes her sweats improved  She correlates this with her children being out of the house  She met with weight management and will be pursing medical weight management strategies for weight loss      The following portions of the patient's history were reviewed and updated as appropriate:   She  has a past medical history of Anxiety, Arthritis, Asthma, Depression, Osteoporosis, and Urinary tract infection  She   Patient Active Problem List    Diagnosis Date Noted    History of COVID-19 2021    Fibromyalgia 2021    Chronic pain of multiple joints 2020    Neuropathy 2020    Chronic neck pain 2020    Allergic conjunctivitis of both eyes 2020    Contact dermatitis 2020    Mild intermittent asthma without complication      She  has a past surgical history that includes  section and Tubal ligation    Her family history includes Breast cancer in her half-sister and mother; Cancer in her father and mother; Colon cancer in her father; Coronary artery disease in her mother; Diabetes in her mother; No Known Problems in her daughter, daughter, half-sister, half-sister, maternal aunt, maternal aunt, maternal aunt, maternal aunt, maternal aunt, maternal aunt, maternal aunt, maternal aunt, maternal grandmother, paternal aunt, paternal aunt, paternal aunt, paternal aunt, paternal aunt, paternal grandmother, sister, sister, sister, sister, and sister; Ovarian cancer in her mother; Prostate cancer in her father  She  reports that she has quit smoking  Her smoking use included cigarettes  She smoked 0 50 packs per day  She has never used smokeless tobacco  She reports current alcohol use  She reports previous drug use  Current Outpatient Medications   Medication Sig Dispense Refill    albuterol (PROVENTIL HFA,VENTOLIN HFA) 90 mcg/act inhaler Inhale 2 puffs every 6 (six) hours as needed for wheezing (cough) 1 Inhaler 0    Cholecalciferol (Vitamin D3) 25 MCG (1000 UT) CAPS Take by mouth      citalopram (CeleXA) 10 mg tablet Take 1 tablet (10 mg total) by mouth daily 30 tablet 0    clobetasol (TEMOVATE) 0 05 % ointment Apply 1 application topically 2 (two) times a day To affected area 30 g 0    Cyanocobalamin 1000 MCG/ML LIQD Take by mouth      fexofenadine (ALLEGRA) 180 MG tablet Take 1 tablet (180 mg total) by mouth daily 60 tablet 0    fluticasone (FLONASE) 50 mcg/act nasal spray SPRAY 1 SPRAY INTO EACH NOSTRIL EVERY DAY 16 mL 1    Multiple Vitamin (MULTIVITAMIN) capsule Take 1 capsule by mouth daily      nystatin-triamcinolone (MYCOLOG-II) ointment Apply topically 2 (two) times a day As needed 45 g 1    olopatadine (PATANOL) 0 1 % ophthalmic solution Administer 1 drop to both eyes 2 (two) times a day 5 mL 3    TURMERIC PO Take by mouth      Vit C-Cholecalciferol-Alessandra Hip (Vitamin C & D3/Alessandra Hips) 500-1000-20 MG-UNIT-MG CAPS Take by mouth       No current facility-administered medications for this visit       Current Outpatient Medications on File Prior to Visit   Medication Sig    albuterol (PROVENTIL HFA,VENTOLIN HFA) 90 mcg/act inhaler Inhale 2 puffs every 6 (six) hours as needed for wheezing (cough)    Cholecalciferol (Vitamin D3) 25 MCG (1000 UT) CAPS Take by mouth    citalopram (CeleXA) 10 mg tablet Take 1 tablet (10 mg total) by mouth daily    clobetasol (TEMOVATE) 0 05 % ointment Apply 1 application topically 2 (two) times a day To affected area    Cyanocobalamin 1000 MCG/ML LIQD Take by mouth    fexofenadine (ALLEGRA) 180 MG tablet Take 1 tablet (180 mg total) by mouth daily    fluticasone (FLONASE) 50 mcg/act nasal spray SPRAY 1 SPRAY INTO EACH NOSTRIL EVERY DAY    Multiple Vitamin (MULTIVITAMIN) capsule Take 1 capsule by mouth daily    nystatin-triamcinolone (MYCOLOG-II) ointment Apply topically 2 (two) times a day As needed    olopatadine (PATANOL) 0 1 % ophthalmic solution Administer 1 drop to both eyes 2 (two) times a day    TURMERIC PO Take by mouth    Vit C-Cholecalciferol-Alessandra Hip (Vitamin C & D3/Alessandra Hips) 500-1000-20 MG-UNIT-MG CAPS Take by mouth     No current facility-administered medications on file prior to visit  She is allergic to pollen extract       Review of Systems   Constitutional: Positive for diaphoresis (improved)  Negative for chills, fatigue and fever  HENT: Negative for congestion, ear pain, hearing loss, nosebleeds, postnasal drip, rhinorrhea, sinus pressure, sinus pain, sneezing and sore throat  Eyes: Negative for pain, discharge, itching and visual disturbance  Respiratory: Negative for cough, chest tightness, shortness of breath and wheezing  Cardiovascular: Negative for chest pain, palpitations and leg swelling  Gastrointestinal: Negative for abdominal pain, blood in stool, constipation, diarrhea, nausea and vomiting  Genitourinary: Negative for frequency and urgency  Neurological: Negative for dizziness, light-headedness and numbness  Objective:      /78 (BP Location: Left arm, Patient Position: Sitting)   Pulse 72   Temp 97 7 °F (36 5 °C)   Ht 5' 6" (1 676 m)   Wt 79 7 kg (175 lb 9 6 oz)   SpO2 100%   BMI 28 34 kg/m²          Physical Exam  Vitals and nursing note reviewed  Constitutional:       General: She is not in acute distress  Appearance: Normal appearance  She is not ill-appearing  HENT:      Head: Normocephalic  Pulmonary:      Effort: Pulmonary effort is normal  No respiratory distress  Musculoskeletal:         General: Normal range of motion  Cervical back: Normal range of motion  Skin:     General: Skin is warm and dry  Neurological:      Mental Status: She is alert and oriented to person, place, and time

## 2022-07-27 ENCOUNTER — TELEPHONE (OUTPATIENT)
Dept: BARIATRICS | Facility: CLINIC | Age: 47
End: 2022-07-27

## 2022-07-27 NOTE — TELEPHONE ENCOUNTER
Called patient from wait list for new provider  Left patient voicemail to call back and schedule consult visit

## 2022-08-24 ENCOUNTER — CONSULT (OUTPATIENT)
Dept: BARIATRICS | Facility: CLINIC | Age: 47
End: 2022-08-24
Payer: COMMERCIAL

## 2022-08-24 VITALS
DIASTOLIC BLOOD PRESSURE: 80 MMHG | WEIGHT: 176.4 LBS | BODY MASS INDEX: 28.35 KG/M2 | RESPIRATION RATE: 16 BRPM | SYSTOLIC BLOOD PRESSURE: 126 MMHG | HEIGHT: 66 IN | HEART RATE: 72 BPM

## 2022-08-24 DIAGNOSIS — F41.9 ANXIETY: ICD-10-CM

## 2022-08-24 DIAGNOSIS — M79.7 FIBROMYALGIA: ICD-10-CM

## 2022-08-24 DIAGNOSIS — E66.3 OVERWEIGHT: ICD-10-CM

## 2022-08-24 DIAGNOSIS — E78.2 MIXED HYPERLIPIDEMIA: Primary | ICD-10-CM

## 2022-08-24 PROCEDURE — 99203 OFFICE O/P NEW LOW 30 MIN: CPT | Performed by: FAMILY MEDICINE

## 2022-08-24 NOTE — PROGRESS NOTES
Assessment/Plan:  Brijesh Lara was seen today for consult  Diagnoses and all orders for this visit:    Mixed hyperlipidemia  Advised weight loss will improve HDL and TG panel  Advised decrease saturated fats and increase omega3 fat content of meals  Fibromyalgia  continue weight lifting and swimming   Overweight  Calorie goal 1200kcal  Start Healthy core  See dietician   Return in 3 mo with me  Anxiety  Cont current Celexa, may consider other choices if conservative plan does not work   Of note Hx of bulimia as a child  Motivational interview performed and patient noted changes to work on until next visit  -Labs reviewed as bellow no diabetes   - Discussed options of HealthyCORE-Intensive Lifestyle Intervention Program, Very Low Calorie Diet-VLCD and Conservative Program and the role of weight loss medications  - Explained the importance of making lifestyle changes first before starting any anti-obesity medications  Patient should demonstrate lifestyle changes first before anti-obesity medication can be initiated  - Patient is interested in pursuing Conservative Program  - Initial weight loss goal of 5-10% weight loss for improved health  - Weight loss can improve patient's co-morbid conditions and/or prevent weight-related complications  Goals:  Do not skip any meals! Food log (ie ) www myfitnesspal com,sparkpeople  com,loseit com,calorieking  com,etc  baritastic (use skinnytaste  com, dietdoctor  com or smartphone mariel [employer] for recipes)  No sugary beverages  At least 64oz of water daily  Increase physical activity by 10 minutes daily  Gradually increase physical activity to a goal of 5 days per week for 30 minutes of MODERATE intensity PLUS 2 days per week of FULL BODY resistance training (use smartphone apps benchee, Home Workout, etc )      Total time spent: 30min, with >50% face-to-face time spent counseling patient on nonsurgical interventions for the treatment of excess weight   Discussed in detail nonsurgical options including intensive lifestyle intervention program, very low-calorie diet program and conservative program   Discussed the role of weight loss medications  Counseled patient on diet behavior and exercise modification for weight loss  Subjective:   Chief Complaint   Patient presents with    Consult     SB1/8       Patient ID: Billy Foss  is a 41 y o  female with excess weight/obesity here to pursue weight management  Previous notes and records have been reviewed  HPI  Wt Readings from Last 20 Encounters:   08/24/22 80 kg (176 lb 6 4 oz)   07/07/22 79 7 kg (175 lb 9 6 oz)   06/02/22 79 4 kg (175 lb)   05/26/22 78 5 kg (173 lb)   05/05/22 80 7 kg (178 lb)   03/10/22 80 7 kg (178 lb)   02/10/22 80 7 kg (178 lb)   01/26/22 79 8 kg (176 lb)   11/12/21 81 6 kg (180 lb)   11/11/21 81 6 kg (180 lb)   11/09/21 82 6 kg (182 lb)   07/29/21 85 3 kg (188 lb)   06/15/21 86 2 kg (190 lb)   05/20/21 86 2 kg (190 lb)   05/13/21 86 2 kg (190 lb)   04/01/21 86 2 kg (190 lb)   03/31/21 86 4 kg (190 lb 6 4 oz)   02/08/21 86 2 kg (190 lb)   01/29/21 86 2 kg (190 lb)   01/25/21 85 7 kg (189 lb)     Obesity/Excess Weight:  Severity: Moderate  Onset:   Childhood 16yo, had bulimia in childhood highest 220lbs  Modifiers: Diet and Exercise and Commercial Weight Loss Programs-ie  Weight Watchers, [employer], Nutrisystem, etc   Contributing factors: Poor Food Choices, Insufficient Physical Activity and Stress/Emotional Eating  Associated symptoms: comorbid conditions, fatigue, increased joint pain and body image issues    B-water and honey and coffee with 3 spoons of sugar and almond milk  S-  L-BLT  S-  D-spaghetti with fiber noodles ground meat and sauce  S-   Hydration:1 gallon  Alcohol: every other day when in [place] but usually no when here   Smoking: vapes   hooka or cigarette occasional  Exercise: PT exercises   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   Sleep: 4hrs sometimes good days and bad days  STOP ban/8    Past Medical History:   Diagnosis Date    Anxiety     Arthritis     Asthma     Depression     Osteoporosis     Urinary tract infection      Past Surgical History:   Procedure Laterality Date     SECTION      TUBAL LIGATION       The following portions of the patient's history were reviewed and updated as appropriate: allergies, current medications, past family history, past medical history, past social history, past surgical history, and problem list     ROS:  Review of Systems   Constitutional: Negative for activity change  Fatigue  HENT: Negative for trouble swallowing  Respiratory: Negative for shortness of breath  Cardiovascular: Negative for chest pain, edema  Gastrointestinal: Negative for abdominal pain, nausea and vomiting, +acid reflux with some foods, constipation/diarrhea  Endocrine: negative for heat /cold intolerance, premenopausal with hot sweats, menses are regular  Genitourinary: Negative for difficulty urinating  Musculoskeletal: Negative for joint pain and +myalgias  Psychiatric/Behavioral: Anxiety present , no depression  Objective:  /80 (Cuff Size: Large)   Pulse 72   Resp 16   Ht 5' 5 75" (1 67 m)   Wt 80 kg (176 lb 6 4 oz)   BMI 28 69 kg/m²   Constitutional: Well-developed, well-nourished and Obese Body mass index is 28 69 kg/m²  Kiki Parry Alert, cooperative  HEENT: No conjunctival injection  No thyroid masses  Pulmonary: No increased work of breathing or signs of respiratory distress  Clear respiratory sounds  CV: Well-perfused, Regular rate and rhythm, no murmurs  Vascular: no peripheral edema  GI: Abdomen obese, Non-distended  Not tender  MSK: no sarcopenia noted   Neuro: Oriented to person, place and time  Normal Speech  Normal gait  Psych: Normal affect and mood  Labs and Imaging  Recent labs and imaging have been personally reviewed    Lab Results   Component Value Date    WBC 8 83 2022    HGB 12 6 05/27/2022    HCT 40 0 05/27/2022    MCV 86 05/27/2022     05/27/2022     Lab Results   Component Value Date    SODIUM 138 05/27/2022    K 4 2 05/27/2022     05/27/2022    CO2 29 05/27/2022    AGAP 1 (L) 05/27/2022    BUN 15 05/27/2022    CREATININE 0 65 05/27/2022    GLUF 86 05/27/2022    CALCIUM 9 5 05/27/2022    AST 10 05/27/2022    ALT 24 05/27/2022    ALKPHOS 81 05/27/2022    TP 7 5 05/27/2022    TBILI 0 30 05/27/2022    EGFR 107 05/27/2022     Lab Results   Component Value Date    HGBA1C 5 2 01/16/2020     Lab Results   Component Value Date    ZOP3GFDELFAT 1 340 05/27/2022     Lab Results   Component Value Date    CHOLESTEROL 175 05/27/2022     Lab Results   Component Value Date    HDL 43 (L) 05/27/2022     Lab Results   Component Value Date    TRIG 55 05/27/2022     Lab Results   Component Value Date    LDLCALC 121 (H) 05/27/2022

## 2022-08-30 ENCOUNTER — OFFICE VISIT (OUTPATIENT)
Dept: BARIATRICS | Facility: CLINIC | Age: 47
End: 2022-08-30

## 2022-08-30 DIAGNOSIS — R63.5 ABNORMAL WEIGHT GAIN: ICD-10-CM

## 2022-08-30 PROCEDURE — RECHECK

## 2022-08-30 PROCEDURE — WMPRO12

## 2022-08-30 NOTE — PROGRESS NOTES
Weight Management Medical Nutrition Assessment  Yanna Hurst was here today as a new start in the [facility]   Today she weighs 177 and has a goal "to just feel healthier " She has a dairy allergy, and also is "partially gluten free" secondary to having fibromyalgia  Discussed portion sizes, food logging, and the importance of exercise  She does not typically eat her first meal until 11 am, and asked about intermittent fasting  Reviewed the principles with her, fasting and eating times with her  Questions asked and answered  Patient seen by Medical Provider in past 6 months:  yes  Requested to schedule appointment with Medical Provider: No      Anthropometric Measurements  Start Weight (#): 177 0   Current Weight (#):177 0    TBW % Change from start weight:0%  Ideal Body Weight (#):  Goal Weight (#): "just to feel healthy"  Highest:210  Lowest: 155    Weight Loss History  Previous weight loss attempts: Commercial Programs (Weight Watchers, [employer], etc )    Food and Nutrition Related History  Wake up:6 am   Bed Time:11 pm    Food Recall  Breakfast: metamucil fiber drink, coffee    Snack:  Lunch: wheat with tuna or spam with spinach or egg salad sandwich (gluten free)  Snack:apple or fruit with nuts  Dinner: pasta or rice with beans, chicken  Snack: popcorn, candy, cake      Beverages: water,coffee   Volume of beverage intake: 1 galloon    Weekends: same  Cravings: sweets  Trouble area of day: night    Frequency of Eating out: irregularly  Food restrictions: dairy, gluten  Cooking: self   Food Shopping: self    Physical Activity Intake  Activity: walking and swimming  Frequency:frequently  Physical limitations/barriers to exercise: fibromialgia    Estimated Needs  Energy    [employer] Needs:  BMR : 0466   1-2# loss weekly sedentary:   368-4507            1-2# loss weekly lightly active: 1003 - 1503  Maintenance calories for sedentary activity level: 1748  Protein:  70 - 88    (1 2-1 5g/kg IBW)  Fluid:   68   (35mL/kg IBW)    Nutrition Diagnosis  Yes; Overweight/obesity  related to Excess energy intake as evidenced by  BMI more than normative standard for age and sex (overweight 25-29  9)       Nutrition Intervention    Nutrition Prescription  Calories: 1000-17652  Protein:70 - 80  Fluid:68      Nutrition Education:    [facility] Manual  Calorie controlled menu  Lean protein food choices  Healthy snack options  Food journaling tips      Nutrition Counseling:  Strategies: meal planning, portion sizes, healthy snack choices, hydration, fiber intake, protein intake, exercise, food journal      Monitoring and Evaluation:  Evaluation criteria:  Energy Intake  Meet protein needs  Maintain adequate hydration  Monitor weekly weight  Meal planning/preparation  Food journal   Decreased portions at mealtimes and snacks  Physical activity     Barriers to learning:none  Readiness to change: Action:  (Changing behavior)  Comprehension: good  Expected Compliance: good

## 2022-08-31 ENCOUNTER — TELEPHONE (OUTPATIENT)
Dept: OTHER | Facility: OTHER | Age: 47
End: 2022-08-31

## 2022-08-31 ENCOUNTER — TELEPHONE (OUTPATIENT)
Dept: OBGYN CLINIC | Facility: MEDICAL CENTER | Age: 47
End: 2022-08-31

## 2022-08-31 NOTE — TELEPHONE ENCOUNTER
I called and left a voice mail to call us back to reschedule her canceled appointment with Dr Rashawn Minor

## 2022-08-31 NOTE — TELEPHONE ENCOUNTER
Patient calling to reschedule her appointment from 8/31/2022 with Dr Joselito Alonso, new patient, she had to cancel and needs new appointment      Patient  Sherman Akinssergtyrone    MRN  [id number]    Phone   [phone]

## 2022-08-31 NOTE — TELEPHONE ENCOUNTER
Patient is calling regarding cancelling an appointment      Date/Time:8/31 @ 10:00    Patient was rescheduled: YES [] NO [x]    Patient requesting call back to reschedule: YES [x] NO []

## 2022-09-02 ENCOUNTER — TELEPHONE (OUTPATIENT)
Dept: NEUROLOGY | Facility: CLINIC | Age: 47
End: 2022-09-02

## 2022-09-07 ENCOUNTER — CLINICAL SUPPORT (OUTPATIENT)
Dept: BARIATRICS | Facility: CLINIC | Age: 47
End: 2022-09-07

## 2022-09-07 VITALS — BODY MASS INDEX: 28.9 KG/M2 | HEIGHT: 66 IN | WEIGHT: 179.8 LBS

## 2022-09-07 DIAGNOSIS — R63.5 ABNORMAL WEIGHT GAIN: Primary | ICD-10-CM

## 2022-09-14 ENCOUNTER — CLINICAL SUPPORT (OUTPATIENT)
Dept: BARIATRICS | Facility: CLINIC | Age: 47
End: 2022-09-14

## 2022-09-14 VITALS — HEIGHT: 66 IN | WEIGHT: 180.2 LBS | BODY MASS INDEX: 28.96 KG/M2

## 2022-09-14 DIAGNOSIS — R63.5 ABNORMAL WEIGHT GAIN: Primary | ICD-10-CM

## 2022-09-21 ENCOUNTER — CLINICAL SUPPORT (OUTPATIENT)
Dept: BARIATRICS | Facility: CLINIC | Age: 47
End: 2022-09-21

## 2022-09-21 VITALS — HEIGHT: 66 IN | BODY MASS INDEX: 29.12 KG/M2 | WEIGHT: 181.2 LBS

## 2022-09-21 DIAGNOSIS — R63.5 ABNORMAL WEIGHT GAIN: Primary | ICD-10-CM

## 2022-09-21 PROCEDURE — RECHECK

## 2022-09-27 ENCOUNTER — OFFICE VISIT (OUTPATIENT)
Dept: BARIATRICS | Facility: CLINIC | Age: 47
End: 2022-09-27

## 2022-09-27 VITALS — BODY MASS INDEX: 29.15 KG/M2 | WEIGHT: 181.4 LBS | HEIGHT: 66 IN

## 2022-09-27 DIAGNOSIS — R63.5 ABNORMAL WEIGHT GAIN: Primary | ICD-10-CM

## 2022-09-27 PROCEDURE — RECHECK

## 2022-09-27 NOTE — PROGRESS NOTES
Weight Management Medical Nutrition Assessment  Nandini Solis was here today for her 2 week follow-up in the [facility]   Today she weighs 181 4 lbs giving her a gain of 4 4 lbs  She is very frustrated that she has gained, and reports that she is "logging everything and not going over 1200 calories  Reiterated the importance of accurate logging/measuring  She indicated indirectly that she may have some depression and is not being treated for it  Reminded her of her [address] visit which she has scheduled on 10/7  Recommend that she follow-up with her PCP and have discussion about depression medication since she stated that he had suggested it before       Patient seen by Medical Provider in past 6 months:  yes  Requested to schedule appointment with Medical Provider: No        Anthropometric Measurements  Start Weight (#): 177 0   Current Weight (#):181 4  TBW % Change from start weight:-2%  Ideal Body Weight (#):128 75  Goal Weight (#): "just to feel healthy"  Highest:210  Lowest: 155     Weight Loss History  Previous weight loss attempts: Commercial Programs (Weight Watchers, Adeola Obey, etc )     Food and Nutrition Related History  Wake up:6 am   Bed Time:11 pm     Food Recall  Breakfast: metamucil fiber drink, coffee           Snack:  Lunch: wheat with tuna or spam with spinach or egg salad sandwich (gluten free)  Snack:apple or fruit with nuts  Dinner: pasta or rice with beans, chicken  Snack: popcorn, candy, cake        Beverages: water,coffee   Volume of beverage intake: 1 galloon     Weekends: same  Cravings: sweets  Trouble area of day: night     Frequency of Eating out: irregularly  Food restrictions: dairy, gluten  Cooking: self   Food Shopping: self     Physical Activity Intake  Activity: walking and swimming  Frequency:frequently  Physical limitations/barriers to exercise: fibromialgia     Estimated Needs  Energy     [employer] Needs:  BMR : 0210   1-2# loss weekly sedentary:   823-1964 1-2# loss weekly lightly active: 1003 - 1503  Maintenance calories for sedentary activity level: 1748  Protein:  70 - 88    (1 2-1 5g/kg IBW)  Fluid:   68   (35mL/kg IBW)     Nutrition Diagnosis  Yes; Overweight/obesity  related to Excess energy intake as evidenced by  BMI more than normative standard for age and sex (overweight 25-29  9)     Nutrition Intervention     Nutrition Prescription  Calories: 1000-29141  Protein:70 - 80  Fluid:68        Nutrition Education:    [facility] Manual  Calorie controlled menu  Lean protein food choices  Healthy snack options  Food journaling tips        Nutrition Counseling:  Strategies: meal planning, portion sizes, healthy snack choices, hydration, fiber intake, protein intake, exercise, food journal        Monitoring and Evaluation:  Evaluation criteria:  Energy Intake  Meet protein needs  Maintain adequate hydration  Monitor weekly weight  Meal planning/preparation  Food journal   Decreased portions at mealtimes and snacks  Physical activity      Barriers to learning:none  Readiness to change: Action:  (Changing behavior)  Comprehension: good  Expected Compliance: good

## 2022-10-05 ENCOUNTER — OFFICE VISIT (OUTPATIENT)
Dept: URGENT CARE | Facility: CLINIC | Age: 47
End: 2022-10-05
Payer: COMMERCIAL

## 2022-10-05 VITALS — RESPIRATION RATE: 18 BRPM | TEMPERATURE: 98.1 F | HEART RATE: 78 BPM | OXYGEN SATURATION: 100 %

## 2022-10-05 DIAGNOSIS — R05.1 ACUTE COUGH: ICD-10-CM

## 2022-10-05 DIAGNOSIS — U07.1 COVID: Primary | ICD-10-CM

## 2022-10-05 LAB
SARS-COV-2 AG UPPER RESP QL IA: POSITIVE
VALID CONTROL: ABNORMAL

## 2022-10-05 PROCEDURE — G0382 LEV 3 HOSP TYPE B ED VISIT: HCPCS | Performed by: PHYSICIAN ASSISTANT

## 2022-10-05 PROCEDURE — 99283 EMERGENCY DEPT VISIT LOW MDM: CPT | Performed by: PHYSICIAN ASSISTANT

## 2022-10-05 PROCEDURE — 87811 SARS-COV-2 COVID19 W/OPTIC: CPT | Performed by: PHYSICIAN ASSISTANT

## 2022-10-05 NOTE — PROGRESS NOTES
[facility]        NAME: Melanie Camacho is a 41 y o  female  : 1975    MRN: [id number]  DATE: 2022  TIME: 9:30 AM    Assessment and Plan   COVID [U07 1]  1  COVID     2  Acute cough  Poct Covid 19 Rapid Antigen Test     - POCT Covid positive  - Supportive care with rest, fluids, and OTC decongestants, nasal sprays, cough suppressants, Tylenol/Ibuprofen PRN   - ER for trouble breathing/SOB, persistent fever > 102, inability to tolerate PO intake         Patient Instructions       Follow up with PCP in 3-5 days  Proceed to  ER if symptoms worsen  Chief Complaint     Chief Complaint   Patient presents with    Cold Like Symptoms     Patient here with sore throat, loss of taste, body aches, and cough  Symptoms started yesterday evening  Patient was here yesterday with  who was sick, but  was covid negative  History of Present Illness       Patient is a 40 yo female who presents for evaluation of nasal congestion, loss of taste, sore throat, ear pain, body aches x 1 day  Reports a positive Covid exposure  Denies fevers, SOB, CP  Review of Systems   Review of Systems   Constitutional: Negative for chills, fatigue and fever  HENT: Positive for congestion, ear pain and sore throat  Negative for trouble swallowing and voice change  Respiratory: Negative for cough and shortness of breath  Musculoskeletal: Positive for arthralgias, back pain and myalgias  Skin: Negative for color change           Current Medications       Current Outpatient Medications:     albuterol (PROVENTIL HFA,VENTOLIN HFA) 90 mcg/act inhaler, Inhale 2 puffs every 6 (six) hours as needed for wheezing (cough), Disp: 1 Inhaler, Rfl: 0    Cholecalciferol (Vitamin D3) 25 MCG (1000 UT) CAPS, Take by mouth, Disp: , Rfl:     Cyanocobalamin 1000 MCG/ML LIQD, Take by mouth, Disp: , Rfl:     fexofenadine (ALLEGRA) 180 MG tablet, Take 1 tablet (180 mg total) by mouth daily, Disp: 60 tablet, Rfl: 0    fluticasone (FLONASE) 50 mcg/act nasal spray, SPRAY 1 SPRAY INTO EACH NOSTRIL EVERY DAY, Disp: 16 mL, Rfl: 1    Multiple Vitamin (MULTIVITAMIN) capsule, Take 1 capsule by mouth daily, Disp: , Rfl:     TURMERIC PO, Take by mouth, Disp: , Rfl:     Vit C-Cholecalciferol-Alessandra Hip (Vitamin C & D3/Alessandra Hips) 500-1000-20 MG-UNIT-MG CAPS, Take by mouth, Disp: , Rfl:     citalopram (CeleXA) 10 mg tablet, Take 1 tablet (10 mg total) by mouth daily (Patient not taking: No sig reported), Disp: 30 tablet, Rfl: 0    clobetasol (TEMOVATE) 0 05 % ointment, Apply 1 application topically 2 (two) times a day To affected area (Patient not taking: Reported on 10/5/2022), Disp: 30 g, Rfl: 0    nystatin-triamcinolone (MYCOLOG-II) ointment, Apply topically 2 (two) times a day As needed (Patient not taking: Reported on 10/5/2022), Disp: 45 g, Rfl: 1    olopatadine (PATANOL) 0 1 % ophthalmic solution, Administer 1 drop to both eyes 2 (two) times a day (Patient not taking: No sig reported), Disp: 5 mL, Rfl: 3    Current Allergies     Allergies as of 10/05/2022 - Reviewed 10/05/2022   Allergen Reaction Noted    Pollen extract  12/10/2020            The following portions of the patient's history were reviewed and updated as appropriate: allergies, current medications, past family history, past medical history, past social history, past surgical history and problem list      Past Medical History:   Diagnosis Date    Anxiety     Arthritis     Asthma     Depression     Osteoporosis     Urinary tract infection        Past Surgical History:   Procedure Laterality Date     SECTION      TUBAL LIGATION         Family History   Problem Relation Age of Onset    Cancer Mother     Breast cancer Mother         age unknown    Diabetes Mother     Coronary artery disease Mother     Ovarian cancer Mother     Cancer Father     Prostate cancer Father     Colon cancer Father     No Known Problems Daughter    Maira Guajardo No Known Problems Maternal Grandmother     No Known Problems Paternal Grandmother     No Known Problems Half-Sister     Breast cancer Half-Sister         age unknown    No Known Problems Half-Sister     No Known Problems Sister     No Known Problems Sister     No Known Problems Sister     No Known Problems Sister     No Known Problems Sister     No Known Problems Daughter     No Known Problems Maternal Aunt     No Known Problems Maternal Aunt     No Known Problems Maternal Aunt     No Known Problems Maternal Aunt     No Known Problems Maternal Aunt     No Known Problems Maternal Aunt     No Known Problems Maternal Aunt     No Known Problems Maternal Aunt     No Known Problems Paternal Aunt     No Known Problems Paternal Aunt     No Known Problems Paternal Aunt     No Known Problems Paternal Aunt     No Known Problems Paternal Aunt          Medications have been verified  Objective   Pulse 78   Temp 98 1 °F (36 7 °C)   Resp 18   SpO2 100%        Physical Exam     Physical Exam  Constitutional:       General: She is not in acute distress  Appearance: Normal appearance  She is not ill-appearing or diaphoretic  HENT:      Right Ear: Tympanic membrane, ear canal and external ear normal       Left Ear: Tympanic membrane, ear canal and external ear normal       Nose: Congestion and rhinorrhea present  Mouth/Throat:      Mouth: Mucous membranes are moist       Pharynx: Oropharynx is clear  Posterior oropharyngeal erythema present  Eyes:      Conjunctiva/sclera: Conjunctivae normal    Cardiovascular:      Rate and Rhythm: Normal rate and regular rhythm  Heart sounds: Normal heart sounds  Pulmonary:      Effort: Pulmonary effort is normal       Breath sounds: Normal breath sounds  Skin:     General: Skin is warm and dry  Neurological:      Mental Status: She is alert     Psychiatric:         Mood and Affect: Mood normal          Behavior: Behavior normal

## 2022-10-05 NOTE — LETTER
October 5, 2022     Patient: Page Vasquez   YOB: 1975   Date of Visit: 10/5/2022       To Whom it May Concern:    Page Vasquez was seen in my clinic on 10/5/2022  She tested positive for Covid 19  She is excused from work until 10/09/2022 and may return on this day    If you have any questions or concerns, please don't hesitate to call           Sincerely,          Magda Wilder PA-C        CC: No Recipients

## 2022-10-07 ENCOUNTER — OFFICE VISIT (OUTPATIENT)
Dept: BARIATRICS | Facility: CLINIC | Age: 47
End: 2022-10-07

## 2022-10-07 DIAGNOSIS — R63.5 ABNORMAL WEIGHT GAIN: Primary | ICD-10-CM

## 2022-10-07 NOTE — PROGRESS NOTES
Patient's name and  were verified during visit  Provider explained that [employer] is a HIPPA compliant platform and to further protect their confidentiality the provider was alone and the office door was closed  Patient consented to the virtual video visit  This visit is free  Patient presents for [facility] Evaluation as a part of [facility]     Eating behaviors/food choices: Patient is trying to learn new eating routines and food choices, understanding nutrition composition, pairings of nutritious foods and portioning  Patient is used to a certain eating schedule which includes skipping meals and snacking, she has noticed losing weight during the winter months and gaining in the summer  Since joining the program she has been discouraged with some weight gain and is feeling the push to go back to the eating patterns she knows  Reviewed the impact that certain eating patterns can have on the metabolism and she's trying to retrain her body to live a healthier lifestyle  Recommended to continue learning about nutrition from RD to understand what to have, not to drink her fruit and to be conscientious of head versus stomach hunger when feeling she needs to snack  Activity/Exercise:  Patient is not currently active due to a skin condition that causes burning when sweating  She does have a dermatologist appointment scheduled to follow up on this and is hopeful that a treatment will manage this so she can add in exercise  Mental Health/Wellness:  Patient reports a historical diagnosis Bipolar Disorder, Depression and Schizoaffective Disorder, she reports being in treatment for 25 years and that she feels she's managed her symptoms well  She understands her triggers and feels she doesn't have a problem with eating but upon further discussion, patient has past trauma from food insecurity when she was a child    She's used food to soothe, even more so now that she can access food and because she says she's on a "diet"  Patient's anxiety increases when she feels she has to deprive herself of any food but she wasn't recognizing that as such in the moment  She was interpreting it as overwhelming hunger and grazing on snacks to fulfill this anxious emptiness she was feeling  Patient had a realization about this pattern and is going to work on being mindful of this anxiety, finding alternative behaviors from eating, practicing self kindness and accepting slip-ups and setbacks as opportunities of learning and growth  Goals:    - be mindful of eating habits, tune into body cues of hunger and satiation and avoid skipping meals  - continue expanding knowledge of nutrition, combinations of foods and portions to keep self satisfied and well nourished  - practice mindfulness when experiencing anxiety regarding past trauma and food insecurity, check in on eating for physical hunger versus soothing the anxiety that may be triggered by being denied access to food  - consider reconnecting to therapy and/or medication management for anxiety symptoms and process past trauma that is impacting current weight management struggles      Next Appointment:  Scheduled for classes weekly, will be seeing Dr Hai Knox on 11/7

## 2022-10-07 NOTE — PATIENT INSTRUCTIONS
- be mindful of eating habits, tune into body cues of hunger and satiation and avoid skipping meals  - continue expanding knowledge of nutrition, combinations of foods and portions to keep self satisfied and well nourished  - practice mindfulness when experiencing anxiety regarding past trauma and food insecurity, check in on eating for physical hunger versus soothing the anxiety that may be triggered by being denied access to food  - consider reconnecting to therapy and/or medication management for anxiety symptoms and process past trauma that is impacting current weight management struggles

## 2022-10-24 ENCOUNTER — OFFICE VISIT (OUTPATIENT)
Dept: OBGYN CLINIC | Facility: HOSPITAL | Age: 47
End: 2022-10-24
Attending: FAMILY MEDICINE
Payer: COMMERCIAL

## 2022-10-24 VITALS
DIASTOLIC BLOOD PRESSURE: 70 MMHG | BODY MASS INDEX: 29.09 KG/M2 | RESPIRATION RATE: 16 BRPM | HEIGHT: 66 IN | HEART RATE: 72 BPM | WEIGHT: 181 LBS | SYSTOLIC BLOOD PRESSURE: 112 MMHG

## 2022-10-24 DIAGNOSIS — G56.01 CARPAL TUNNEL SYNDROME ON RIGHT: ICD-10-CM

## 2022-10-24 DIAGNOSIS — G56.21 CUBITAL TUNNEL SYNDROME, RIGHT: Primary | ICD-10-CM

## 2022-10-24 PROCEDURE — 99245 OFF/OP CONSLTJ NEW/EST HI 55: CPT | Performed by: SURGERY

## 2022-10-24 RX ORDER — CEFAZOLIN SODIUM 2 G/50ML
2000 SOLUTION INTRAVENOUS ONCE
OUTPATIENT
Start: 2022-10-24 | End: 2022-10-24

## 2022-10-24 RX ORDER — CHLORHEXIDINE GLUCONATE 0.12 MG/ML
15 RINSE ORAL ONCE
OUTPATIENT
Start: 2022-10-24 | End: 2022-10-24

## 2022-10-24 NOTE — PROGRESS NOTES
Marissa ROWE  Attending, Orthopaedic Surgery  Hand, Wrist, and Elbow Surgery  [facility]      ORTHOPAEDIC HAND, WRIST, AND ELBOW OFFICE  VISIT       ASSESSMENT/PLAN:      40 yo female with right carpal tunnel syndrome with suspected cubital tunnel  EMG was reviewed which demonstrates evidence of carpal tunnel without cubital or cervical pathology  On exam there is concern for cubital tunnel as well  We discussed that sometimes cubital tunnel can be missed on EMG about 20-30% of the time  I would like to order an ultrasound of the elbow to check for cubital tunnel  We discussed treatment options including continued bracing, injections, and surgical release  Patient would like the problem definitively fixed and was agreeable to surgery  Risks, benefits, and alternatives were discussed and informed consent was signed for right carpal tunnel release and possible right cubital tunnel release pending positive ultrasound  Informed consent was signed  We will see her 10-14 days after surgery for postop evaluation and suture removal      The patient verbalized understanding of exam findings and treatment plan  We engaged in the shared decision-making process and treatment options were discussed at length with the patient  Surgical and conservative management discussed today along with risks and benefits  Diagnoses and all orders for this visit:    Cubital tunnel syndrome, right  -     Ambulatory Referral to Hand Surgery  -     [facility] limited;  Future  -     Case request operating room: RELEASE CUBITAL TUNNEL, RELEASE CARPAL TUNNEL; Standing  -     Case request operating room: RELEASE CUBITAL TUNNEL, RELEASE CARPAL TUNNEL    Carpal tunnel syndrome on right  -     Ambulatory Referral to Hand Surgery  -     Case request operating room: [address]; Standing  -     Case request operating room: RELEASE CUBITAL TUNNEL, RELEASE CARPAL TUNNEL    Other orders  - Nursing Communication Warmimg Interventions Implemented; Standing  -     Nursing Communication CHG bath, have staff wash entire body (neck down) per pre-op bathing protocol  Routine, evening prior to, and day of surgery ; Standing  -     Nursing Communication Swab both nares with Povidone-Iodine solution, EXCLUDE if patient has shellfish/Iodine allergy  Routine, day of surgery, on call to OR; Standing  -     chlorhexidine (PERIDEX) 0 12 % oral rinse 15 mL  -     Void on call to OR; Standing  -     Insert peripheral IV; Standing  -     Diet NPO; Sips with meds; Standing  -     ceFAZolin (ANCEF) IVPB (premix in dextrose) 2,000 mg 50 mL        Follow Up:  Return for After Surgery  To Do Next Visit:  Re-evaluation of current issue      General Discussions:  Carpal Tunnel Syndrome: The anatomy and physiology of carpal tunnel syndrome was discussed with the patient today  Increase pressure localized under the transverse carpal ligament can cause pain, numbness, tingling, or dysesthesias within the median nerve distribution as well as feelings of fatigue, clumsiness, or awkwardness  These symptoms typically occur at night and worse in the morning upon waking  Eventually, untreated carpal tunnel syndrome can result in weakness and permanent loss of muscle within the thenar compartment of the hand  Treatment options were discussed with the patient  Conservative treatment includes nocturnal resting splints to keep the nerve in a neutral position, ergonomic changes within the work or home environment, activity modification, and tendon gliding exercises  Vitamin B6 one tablet daily over the counter may helpful to reduce symptoms  Steroid injections within the carpal canal can help a majority of patients, however this is often self-limited in a majority of patients    Surgical intervention to divide the transverse carpal ligament typically results in a long-lasting relief of the patient's complaints, with the recurrence rate of less than 1%  Cubital Tunnel Syndrome: The anatomy and physiology of cubital tunnel syndrome were discussed with the patient today in the office  Typically, increased elbow flexion activities decrease blood flow within the intraneural spaces, resulting in a feeling of numbness, tingling, weakness, or clumsiness within the hand and fingers  Occasionally, anatomic structures such as medial elbow osteophytes, the medial head of the triceps, were subluxing ulnar nerve may result in increased pressure or aggravation at the cubital tunnel  Typical signs and symptoms usually include numbness and tingling within the ring and small finger, weakness with , and weakness with pinch  Conservative treatment and includes nocturnal bracing to keep the elbow in a semi-extended position, activity modification, therapy, and avoiding excessive elbow flexion activities  Vitamin B6 one tablet daily over the counter may helpful to reduce symptoms  A majority of patients typically respond to conservative treatment over a period of approximately 3-6 months  EMG/NCV testing of the ulnar nerve at the elbow is not as reliable as carpal tunnel syndrome  Surgical intervention in the form of in situ release of the ulnar nerve at the elbow or ulnar nerve transposition may be required in up to 20% of patients  Operative Discussions:  Cubital Tunnel Release: The anatomy and physiology of cubital tunnel syndrome were discussed with the patient today in the office  Typically, increased elbow flexion activities decrease blood flow within the intraneural spaces, resulting in a feeling of numbness, tingling, weakness, or clumsiness within the hand and fingers    Occasionally, anatomic structures such as medial elbow osteophytes, the medial head of the triceps, were subluxing ulnar nerve may result in increased pressure or aggravation at the cubital tunnel  Typical signs and symptoms usually include numbness and tingling within the ring and small finger, weakness with , and weakness with pinch  Conservative treatment and includes nocturnal bracing to keep the elbow in a semi-extended position, activity modification, therapy, and avoiding excessive elbow flexion activities  A majority of patients typically respond to conservative treatment over a period of approximately 3-6 months  Vitamin B6 one tablet daily over the counter may helpful to reduce symptoms  EMG/NCV testing of the ulnar nerve at the elbow is not as reliable as carpal tunnel syndrome  Surgical intervention in the form of in situ release of the ulnar nerve at the elbow or ulnar nerve transposition may be required in up to 20% of patients  The patient has elected to undergo cubital tunnel release  The possibility of converting to a subcutaneous or submuscular ulnar nerve transposition depending on the nerve stability was discussed with the patient  Typically, in the postoperative period, light activities are allowed immediately, driving is allowed when narcotic medications have stopped, and the incision may get wet after 5 days  Heavy activities will be allowed after follow up appointment in 1-2 weeks  While the pain within the ring and small finger of the hands generally improves rapidly, the numbness and tingling, as well as the strength, will slowly improve over a period of weeks to months  Total recovery can take up to 18 months from the time of surgery  Numbness and tingling near the incision, or near the medial aspect of the forearm was discussed with the patient  The patient has an understanding of the above mentioned discussion   The risks and benefits of the procedure were explained to the patient, which include, but are not limited to: Bleeding, infection, recurrence, pain, scar, damage to tendons, damage to nerves, and damage to blood vessels, failure to give desired results and complications related to anesthesia  These risks, along with alternative conservative treatment options, and postoperative protocols were voiced back and understood by the patient  All questions were answered to the patient's satisfaction  The patient agrees to comply with a standard postoperative protocol, and is willing to proceed  Education was provided via written and auditory forms  There were no barriers to learning  Written handouts regarding wound care, incision and scar care, and general preoperative information was provided to the patient  Prior to surgery, the patient may be requested to stop all anti-inflammatory medications  Prophylactic aspirin, Plavix, and Coumadin may be allowed to be continued  Medications including vitamin E , ginkgo, and fish oil are requested to be stopped approximately one week prior to surgery  Hypertensive medications and beta blockers, if taken, should be continued  Vitamin B6 one tablet daily over the counter may helpful to reduce symptoms  and Open Carpal Tunnel Release: The anatomy and physiology of carpal tunnel syndrome was discussed with the patient today  Increase pressure localized under the transverse carpal ligament can cause pain, numbness, tingling, or dysesthesias within the median nerve distribution as well as feelings of fatigue, clumsiness, or awkwardness  These symptoms typically occur at night and worse in the morning upon waking  Eventually, untreated carpal tunnel syndrome can result in weakness and permanent loss of muscle within the thenar compartment of the hand  Treatment options were discussed with the patient  Conservative treatment includes nocturnal resting splints to keep the nerve in a neutral position, ergonomic changes within the work or home environment, activity modification, and tendon gliding exercises  Vitamin B6 one tablet daily over the counter may helpful to reduce symptoms  Steroid injections within the carpal canal can help a majority of patients, however this is often self-limited in a majority of patients  Surgical intervention to divide the transverse carpal ligament typically results in a long-lasting relief of the patient's complaints, with the recurrence rate of less than 1%  The patient has elected to undergo an open carpal tunnel release  The palmar incision technique was discussed in the office with the patient today  In the postoperative period, light activities are allowed immediately, driving is allowed when narcotic medication has stopped, and the bandages may be removed and incision may get wet after 2 days  Heavy activities (lifting more than approximately 10 pounds) will be allowed after follow up appointment in 1-2 weeks  While night symptoms (waking from sleep, pain, and discomfort in the hands) generally improves rapidly, the numbness and tingling as well as the strength will slowly improve over weeks to months depending on the chronicity and severity of the carpal tunnel syndrome  Pillar pain and scar discomfort were discussed with the patient which are self-limiting conditions  The risks of bleeding and infection from the surgery are less than 1%  Risk of recurrence is approximately 0 5%  The risks of nerve injury or nerve damage or damage to the blood vessels is approximately 1 in 1200  The patient has an understanding of the above mentioned discussion  The risks and benefits of the procedure were explained to the patient, which include, but are not limited to: Bleeding, infection, recurrence, pain, scar, damage to tendons, damage to nerves, and damage to blood vessels, failure to give desired results and complications related to anesthesia    These risks, along with alternative conservative treatment options, and postoperative protocols were voiced back and understood by the patient  All questions were answered to the patient's satisfaction  The patient agrees to comply with a standard postoperative protocol, and is willing to proceed  Education was provided via written and auditory forms  There were no barriers to learning  Written handouts regarding wound care, incision and scar care, and general preoperative information was provided to the patient  Prior to surgery, the patient may be requested to stop all anti-inflammatory medications  Prophylactic aspirin, Plavix, and Coumadin may be allowed to be continued  Medications including vitamin E , ginkgo, and fish oil are requested to be stopped approximately one week prior to surgery  Hypertensive medications and beta blockers, if taken, should be continued      ____________________________________________________________________________________________________________________________________________      CHIEF COMPLAINT:  Chief Complaint   Patient presents with   • Right Hand - Numbness, Tingling       SUBJECTIVE:  Wesley Jeffries is a 41y o  year old RHD female seen in consultation requested by Dr Judith Mascorro who presents for evaluation of right upper extremity paresthesias  Patient notes worsening paresthesias into the entire hand over the last 5 months to a year  Symptoms worsen at rest and at night as well as with driving and certain activity  She also notes associated medial elbow pain and swelling  She was previously treated with carpal tunnel injections and is currently bracing at the wrist and elbow however her symptoms continue to worsen  No injuries or prior surgeries         Prior treatment   · NSAIDsYes   · Injections Yes   · Bracing/Orthotics Yes    Physical Therapy No     I have personally reviewed all the relevant PMH, PSH, SH, FH, Medications and allergies      PAST MEDICAL HISTORY:  Past Medical History:   Diagnosis Date   • Anxiety    • Arthritis    • Asthma    • Depression    • Osteoporosis    • Urinary tract infection        PAST SURGICAL HISTORY:  Past Surgical History:   Procedure Laterality Date   •  SECTION     • TUBAL LIGATION         FAMILY HISTORY:  Family History   Problem Relation Age of Onset   • Cancer Mother    • Breast cancer Mother         age unknown   • Diabetes Mother    • Coronary artery disease Mother    • Ovarian cancer Mother    • Cancer Father    • Prostate cancer Father    • Colon cancer Father    • No Known Problems Daughter    • No Known Problems Maternal Grandmother    • No Known Problems Paternal Grandmother    • No Known Problems Half-Sister    • Breast cancer Half-Sister         age unknown   • No Known Problems Half-Sister    • No Known Problems Sister    • No Known Problems Sister    • No Known Problems Sister    • No Known Problems Sister    • No Known Problems Sister    • No Known Problems Daughter    • No Known Problems Maternal Aunt    • No Known Problems Maternal Aunt    • No Known Problems Maternal Aunt    • No Known Problems Maternal Aunt    • No Known Problems Maternal Aunt    • No Known Problems Maternal Aunt    • No Known Problems Maternal Aunt    • No Known Problems Maternal Aunt    • No Known Problems Paternal Aunt    • No Known Problems Paternal Aunt    • No Known Problems Paternal Aunt    • No Known Problems Paternal Aunt    • No Known Problems Paternal Aunt        SOCIAL HISTORY:  Social History     Tobacco Use   • Smoking status: Former Smoker     Packs/day: 0 50     Types: Cigarettes     Quit date: 2021     Years since quittin 8   • Smokeless tobacco: Never Used   Vaping Use   • Vaping Use: Never used   Substance Use Topics   • Alcohol use: Yes     Comment: Social    • Drug use: Not Currently       MEDICATIONS:    Current Outpatient Medications:   •  albuterol (PROVENTIL HFA,VENTOLIN HFA) 90 mcg/act inhaler, Inhale 2 puffs every 6 (six) hours as needed for wheezing (cough), Disp: 1 Inhaler, Rfl: 0  •  Cholecalciferol (Vitamin D3) 25 MCG (1000 UT) CAPS, Take 1 capsule by mouth daily, Disp: , Rfl:   •  cyanocobalamin (VITAMIN B-12) 100 MCG tablet, Take 100 mcg by mouth in the morning, Disp: , Rfl:   •  fexofenadine (ALLEGRA) 180 MG tablet, Take 1 tablet (180 mg total) by mouth daily, Disp: 60 tablet, Rfl: 0  •  fluticasone (FLONASE) 50 mcg/act nasal spray, SPRAY 1 SPRAY INTO EACH NOSTRIL EVERY DAY (Patient taking differently: 2 sprays into each nostril daily), Disp: 16 mL, Rfl: 1  •  Multiple Vitamin (MULTIVITAMIN) capsule, Take 1 capsule by mouth daily, Disp: , Rfl:   •  Turmeric 400 MG CAPS, Take 1 capsule by mouth in the morning, Disp: , Rfl:   •  Vit C-Cholecalciferol-Alessandra Hip (Vitamin C & D3/Alessandra Hips) 500-1000-20 MG-UNIT-MG CAPS, Take 1 capsule by mouth in the morning, Disp: , Rfl:   •  citalopram (CeleXA) 10 mg tablet, Take 1 tablet (10 mg total) by mouth daily (Patient not taking: No sig reported), Disp: 30 tablet, Rfl: 0  •  clobetasol (TEMOVATE) 0 05 % ointment, Apply 1 application topically 2 (two) times a day To affected area (Patient not taking: No sig reported), Disp: 30 g, Rfl: 0  •  nystatin-triamcinolone (MYCOLOG-II) ointment, Apply topically 2 (two) times a day As needed (Patient not taking: No sig reported), Disp: 45 g, Rfl: 1  •  olopatadine (PATANOL) 0 1 % ophthalmic solution, Administer 1 drop to both eyes 2 (two) times a day (Patient not taking: No sig reported), Disp: 5 mL, Rfl: 3    ALLERGIES:  Allergies   Allergen Reactions   • Dairy Aid [Lactase] GI Intolerance and Vomiting   • Pollen Extract Other (See Comments)     Seasonal allergies  REVIEW OF SYSTEMS:  Review of Systems   Constitutional: Negative for chills, fatigue and fever  HENT: Negative for hearing loss, nosebleeds and sore throat  Eyes: Negative for redness and visual disturbance  Respiratory: Negative for cough, shortness of breath and wheezing  Cardiovascular: Negative for chest pain, palpitations and leg swelling     Gastrointestinal: Negative for abdominal pain, nausea and vomiting  Endocrine: Negative for polydipsia and polyuria  Genitourinary: Negative for difficulty urinating, dysuria and hematuria  Musculoskeletal: Positive for arthralgias  Negative for joint swelling and myalgias  Skin: Negative for rash and wound  Neurological: Positive for numbness  Negative for speech difficulty, weakness and headaches  Psychiatric/Behavioral: Negative for decreased concentration and suicidal ideas  The patient is not nervous/anxious  VITALS:  Vitals:    10/24/22 0744   BP: 112/70   Pulse: 72   Resp: 16       LABS:  HgA1c:   Lab Results   Component Value Date    HGBA1C 5 2 01/16/2020     BMP:   Lab Results   Component Value Date    CALCIUM 9 5 05/27/2022    K 4 2 05/27/2022    CO2 29 05/27/2022     05/27/2022    BUN 15 05/27/2022    CREATININE 0 65 05/27/2022       _____________________________________________________  PHYSICAL EXAMINATION:  General: well developed and well nourished, alert, oriented times 3 and appears comfortable  Psychiatric: Normal  HEENT: Normocephalic, Atraumatic Trachea Midline, No torticollis  Pulmonary: No audible wheezing or respiratory distress   Abdomen/GI: Non tender, non distended   Cardiovascular: No pitting edema, 2+ radial pulse   Skin: No masses, erythema, lacerations, fluctation, ulcerations  Neurovascular: Sensation Intact to the Median, Ulnar, Radial Nerve, Motor Intact to the Median, Ulnar, Radial Nerve and Pulses Intact  Musculoskeletal: Normal, except as noted in detailed exam and in HPI  MUSCULOSKELETAL EXAMINATION:  Right Carpal Tunnel Exam:    Negative thenar atrophy  Positive phalen's test  Positive carpal tunnel compression  Positive tinels over median nerve at the wrist   Opposition strength 5/5  Abduction strength 5/5  Right Ulnar Nerve Exam:    Negative intrinsic atrophy  Tender at 2 heads of FCU  Negative  deformity at the elbow  Full range of motion with flexion and extension of the elbow  Positive ulnar nerve compression test at the elbow  Positive tinels over the ulnar nerve at the elbow  Negative cross finger test in the index and long fingers  FDP strength is 5/5 to the ring finger  FDP strength is 5/5 to the small finger    Intrinsic strength 5/5      ___________________________________________________  STUDIES REVIEWED:  I have personally reviewed EMG of the right upper extremity which demonstrates evidence of mild carpal tunnel syndrome without evidence of cubital tunnel or cervical radiculopathy           PROCEDURES PERFORMED:  Procedures  No Procedures performed today    _____________________________________________________      Scribe Attestation    I,:  Sally Peraza PA-C am acting as a scribe while in the presence of the attending physician :       I,:  Kesha Martines MD personally performed the services described in this documentation    as scribed in my presence :

## 2022-10-24 NOTE — H&P
Patricio ROWE  Attending, Orthopaedic Surgery  Hand, Wrist, and Elbow Surgery  [facility]      ORTHOPAEDIC HAND, WRIST, AND ELBOW OFFICE  VISIT       ASSESSMENT/PLAN:      42 yo female with right carpal tunnel syndrome with suspected cubital tunnel  EMG was reviewed which demonstrates evidence of carpal tunnel without cubital or cervical pathology  On exam there is concern for cubital tunnel as well  We discussed that sometimes cubital tunnel can be missed on EMG about 20-30% of the time  I would like to order an ultrasound of the elbow to check for cubital tunnel  We discussed treatment options including continued bracing, injections, and surgical release  Patient would like the problem definitively fixed and was agreeable to surgery  Risks, benefits, and alternatives were discussed and informed consent was signed for right carpal tunnel release and possible right cubital tunnel release pending positive ultrasound  Informed consent was signed  We will see her 10-14 days after surgery for postop evaluation and suture removal      The patient verbalized understanding of exam findings and treatment plan  We engaged in the shared decision-making process and treatment options were discussed at length with the patient  Surgical and conservative management discussed today along with risks and benefits  Diagnoses and all orders for this visit:    Cubital tunnel syndrome, right  -     Ambulatory Referral to Hand Surgery  -     [facility] limited;  Future  -     Case request operating room: RELEASE CUBITAL TUNNEL, RELEASE CARPAL TUNNEL; Standing  -     Case request operating room: RELEASE CUBITAL TUNNEL, RELEASE CARPAL TUNNEL    Carpal tunnel syndrome on right  -     Ambulatory Referral to Hand Surgery  -     Case request operating room: [address] [facility]; Standing  -     Case request operating room: RELEASE CUBITAL TUNNEL, RELEASE CARPAL TUNNEL    Other orders  - Nursing Communication Warmimg Interventions Implemented; Standing  -     Nursing Communication CHG bath, have staff wash entire body (neck down) per pre-op bathing protocol  Routine, evening prior to, and day of surgery ; Standing  -     Nursing Communication Swab both nares with Povidone-Iodine solution, EXCLUDE if patient has shellfish/Iodine allergy  Routine, day of surgery, on call to OR; Standing  -     chlorhexidine (PERIDEX) 0 12 % oral rinse 15 mL  -     Void on call to OR; Standing  -     Insert peripheral IV; Standing  -     Diet NPO; Sips with meds; Standing  -     ceFAZolin (ANCEF) IVPB (premix in dextrose) 2,000 mg 50 mL        Follow Up:  Return for After Surgery  To Do Next Visit:  Re-evaluation of current issue      General Discussions:  Carpal Tunnel Syndrome: The anatomy and physiology of carpal tunnel syndrome was discussed with the patient today  Increase pressure localized under the transverse carpal ligament can cause pain, numbness, tingling, or dysesthesias within the median nerve distribution as well as feelings of fatigue, clumsiness, or awkwardness  These symptoms typically occur at night and worse in the morning upon waking  Eventually, untreated carpal tunnel syndrome can result in weakness and permanent loss of muscle within the thenar compartment of the hand  Treatment options were discussed with the patient  Conservative treatment includes nocturnal resting splints to keep the nerve in a neutral position, ergonomic changes within the work or home environment, activity modification, and tendon gliding exercises  Vitamin B6 one tablet daily over the counter may helpful to reduce symptoms  Steroid injections within the carpal canal can help a majority of patients, however this is often self-limited in a majority of patients    Surgical intervention to divide the transverse carpal ligament typically results in a long-lasting relief of the patient's complaints, with the recurrence rate of less than 1%  Cubital Tunnel Syndrome: The anatomy and physiology of cubital tunnel syndrome were discussed with the patient today in the office  Typically, increased elbow flexion activities decrease blood flow within the intraneural spaces, resulting in a feeling of numbness, tingling, weakness, or clumsiness within the hand and fingers  Occasionally, anatomic structures such as medial elbow osteophytes, the medial head of the triceps, were subluxing ulnar nerve may result in increased pressure or aggravation at the cubital tunnel  Typical signs and symptoms usually include numbness and tingling within the ring and small finger, weakness with , and weakness with pinch  Conservative treatment and includes nocturnal bracing to keep the elbow in a semi-extended position, activity modification, therapy, and avoiding excessive elbow flexion activities  Vitamin B6 one tablet daily over the counter may helpful to reduce symptoms  A majority of patients typically respond to conservative treatment over a period of approximately 3-6 months  EMG/NCV testing of the ulnar nerve at the elbow is not as reliable as carpal tunnel syndrome  Surgical intervention in the form of in situ release of the ulnar nerve at the elbow or ulnar nerve transposition may be required in up to 20% of patients  Operative Discussions:  Cubital Tunnel Release: The anatomy and physiology of cubital tunnel syndrome were discussed with the patient today in the office  Typically, increased elbow flexion activities decrease blood flow within the intraneural spaces, resulting in a feeling of numbness, tingling, weakness, or clumsiness within the hand and fingers    Occasionally, anatomic structures such as medial elbow osteophytes, the medial head of the triceps, were subluxing ulnar nerve may result in increased pressure or aggravation at the cubital tunnel  Typical signs and symptoms usually include numbness and tingling within the ring and small finger, weakness with , and weakness with pinch  Conservative treatment and includes nocturnal bracing to keep the elbow in a semi-extended position, activity modification, therapy, and avoiding excessive elbow flexion activities  A majority of patients typically respond to conservative treatment over a period of approximately 3-6 months  Vitamin B6 one tablet daily over the counter may helpful to reduce symptoms  EMG/NCV testing of the ulnar nerve at the elbow is not as reliable as carpal tunnel syndrome  Surgical intervention in the form of in situ release of the ulnar nerve at the elbow or ulnar nerve transposition may be required in up to 20% of patients  The patient has elected to undergo cubital tunnel release  The possibility of converting to a subcutaneous or submuscular ulnar nerve transposition depending on the nerve stability was discussed with the patient  Typically, in the postoperative period, light activities are allowed immediately, driving is allowed when narcotic medications have stopped, and the incision may get wet after 5 days  Heavy activities will be allowed after follow up appointment in 1-2 weeks  While the pain within the ring and small finger of the hands generally improves rapidly, the numbness and tingling, as well as the strength, will slowly improve over a period of weeks to months  Total recovery can take up to 18 months from the time of surgery  Numbness and tingling near the incision, or near the medial aspect of the forearm was discussed with the patient  The patient has an understanding of the above mentioned discussion   The risks and benefits of the procedure were explained to the patient, which include, but are not limited to: Bleeding, infection, recurrence, pain, scar, damage to tendons, damage to nerves, and damage to blood vessels, failure to give desired results and complications related to anesthesia  These risks, along with alternative conservative treatment options, and postoperative protocols were voiced back and understood by the patient  All questions were answered to the patient's satisfaction  The patient agrees to comply with a standard postoperative protocol, and is willing to proceed  Education was provided via written and auditory forms  There were no barriers to learning  Written handouts regarding wound care, incision and scar care, and general preoperative information was provided to the patient  Prior to surgery, the patient may be requested to stop all anti-inflammatory medications  Prophylactic aspirin, Plavix, and Coumadin may be allowed to be continued  Medications including vitamin E , ginkgo, and fish oil are requested to be stopped approximately one week prior to surgery  Hypertensive medications and beta blockers, if taken, should be continued  Vitamin B6 one tablet daily over the counter may helpful to reduce symptoms  and Open Carpal Tunnel Release: The anatomy and physiology of carpal tunnel syndrome was discussed with the patient today  Increase pressure localized under the transverse carpal ligament can cause pain, numbness, tingling, or dysesthesias within the median nerve distribution as well as feelings of fatigue, clumsiness, or awkwardness  These symptoms typically occur at night and worse in the morning upon waking  Eventually, untreated carpal tunnel syndrome can result in weakness and permanent loss of muscle within the thenar compartment of the hand  Treatment options were discussed with the patient  Conservative treatment includes nocturnal resting splints to keep the nerve in a neutral position, ergonomic changes within the work or home environment, activity modification, and tendon gliding exercises  Vitamin B6 one tablet daily over the counter may helpful to reduce symptoms  Steroid injections within the carpal canal can help a majority of patients, however this is often self-limited in a majority of patients  Surgical intervention to divide the transverse carpal ligament typically results in a long-lasting relief of the patient's complaints, with the recurrence rate of less than 1%  The patient has elected to undergo an open carpal tunnel release  The palmar incision technique was discussed in the office with the patient today  In the postoperative period, light activities are allowed immediately, driving is allowed when narcotic medication has stopped, and the bandages may be removed and incision may get wet after 2 days  Heavy activities (lifting more than approximately 10 pounds) will be allowed after follow up appointment in 1-2 weeks  While night symptoms (waking from sleep, pain, and discomfort in the hands) generally improves rapidly, the numbness and tingling as well as the strength will slowly improve over weeks to months depending on the chronicity and severity of the carpal tunnel syndrome  Pillar pain and scar discomfort were discussed with the patient which are self-limiting conditions  The risks of bleeding and infection from the surgery are less than 1%  Risk of recurrence is approximately 0 5%  The risks of nerve injury or nerve damage or damage to the blood vessels is approximately 1 in 1200  The patient has an understanding of the above mentioned discussion  The risks and benefits of the procedure were explained to the patient, which include, but are not limited to: Bleeding, infection, recurrence, pain, scar, damage to tendons, damage to nerves, and damage to blood vessels, failure to give desired results and complications related to anesthesia    These risks, along with alternative conservative treatment options, and postoperative protocols were voiced back and understood by the patient  All questions were answered to the patient's satisfaction  The patient agrees to comply with a standard postoperative protocol, and is willing to proceed  Education was provided via written and auditory forms  There were no barriers to learning  Written handouts regarding wound care, incision and scar care, and general preoperative information was provided to the patient  Prior to surgery, the patient may be requested to stop all anti-inflammatory medications  Prophylactic aspirin, Plavix, and Coumadin may be allowed to be continued  Medications including vitamin E , ginkgo, and fish oil are requested to be stopped approximately one week prior to surgery  Hypertensive medications and beta blockers, if taken, should be continued      ____________________________________________________________________________________________________________________________________________      CHIEF COMPLAINT:  Chief Complaint   Patient presents with   • Right Hand - Numbness, Tingling       SUBJECTIVE:  Adamaris Storm is a 41y o  year old RHD female seen in consultation requested by Dr Jonatan Zavala who presents for evaluation of right upper extremity paresthesias  Patient notes worsening paresthesias into the entire hand over the last 5 months to a year  Symptoms worsen at rest and at night as well as with driving and certain activity  She also notes associated medial elbow pain and swelling  She was previously treated with carpal tunnel injections and is currently bracing at the wrist and elbow however her symptoms continue to worsen  No injuries or prior surgeries         Prior treatment   · NSAIDsYes   · Injections Yes   · Bracing/Orthotics Yes    Physical Therapy No     I have personally reviewed all the relevant PMH, PSH, SH, FH, Medications and allergies      PAST MEDICAL HISTORY:  Past Medical History:   Diagnosis Date   • Anxiety    • Arthritis    • Asthma    • Depression    • Osteoporosis    • Urinary tract infection        PAST SURGICAL HISTORY:  Past Surgical History:   Procedure Laterality Date   •  SECTION     • TUBAL LIGATION         FAMILY HISTORY:  Family History   Problem Relation Age of Onset   • Cancer Mother    • Breast cancer Mother         age unknown   • Diabetes Mother    • Coronary artery disease Mother    • Ovarian cancer Mother    • Cancer Father    • Prostate cancer Father    • Colon cancer Father    • No Known Problems Daughter    • No Known Problems Maternal Grandmother    • No Known Problems Paternal Grandmother    • No Known Problems Half-Sister    • Breast cancer Half-Sister         age unknown   • No Known Problems Half-Sister    • No Known Problems Sister    • No Known Problems Sister    • No Known Problems Sister    • No Known Problems Sister    • No Known Problems Sister    • No Known Problems Daughter    • No Known Problems Maternal Aunt    • No Known Problems Maternal Aunt    • No Known Problems Maternal Aunt    • No Known Problems Maternal Aunt    • No Known Problems Maternal Aunt    • No Known Problems Maternal Aunt    • No Known Problems Maternal Aunt    • No Known Problems Maternal Aunt    • No Known Problems Paternal Aunt    • No Known Problems Paternal Aunt    • No Known Problems Paternal Aunt    • No Known Problems Paternal Aunt    • No Known Problems Paternal Aunt        SOCIAL HISTORY:  Social History     Tobacco Use   • Smoking status: Former Smoker     Packs/day: 0 50     Types: Cigarettes     Quit date: 2021     Years since quittin 8   • Smokeless tobacco: Never Used   Vaping Use   • Vaping Use: Never used   Substance Use Topics   • Alcohol use: Yes     Comment: Social    • Drug use: Not Currently       MEDICATIONS:    Current Outpatient Medications:   •  albuterol (PROVENTIL HFA,VENTOLIN HFA) 90 mcg/act inhaler, Inhale 2 puffs every 6 (six) hours as needed for wheezing (cough), Disp: 1 Inhaler, Rfl: 0  •  Cholecalciferol (Vitamin D3) 25 MCG (1000 UT) CAPS, Take 1 capsule by mouth daily, Disp: , Rfl:   •  cyanocobalamin (VITAMIN B-12) 100 MCG tablet, Take 100 mcg by mouth in the morning, Disp: , Rfl:   •  fexofenadine (ALLEGRA) 180 MG tablet, Take 1 tablet (180 mg total) by mouth daily, Disp: 60 tablet, Rfl: 0  •  fluticasone (FLONASE) 50 mcg/act nasal spray, SPRAY 1 SPRAY INTO EACH NOSTRIL EVERY DAY (Patient taking differently: 2 sprays into each nostril daily), Disp: 16 mL, Rfl: 1  •  Multiple Vitamin (MULTIVITAMIN) capsule, Take 1 capsule by mouth daily, Disp: , Rfl:   •  Turmeric 400 MG CAPS, Take 1 capsule by mouth in the morning, Disp: , Rfl:   •  Vit C-Cholecalciferol-Alessandra Hip (Vitamin C & D3/Alessandra Hips) 500-1000-20 MG-UNIT-MG CAPS, Take 1 capsule by mouth in the morning, Disp: , Rfl:   •  citalopram (CeleXA) 10 mg tablet, Take 1 tablet (10 mg total) by mouth daily (Patient not taking: No sig reported), Disp: 30 tablet, Rfl: 0  •  clobetasol (TEMOVATE) 0 05 % ointment, Apply 1 application topically 2 (two) times a day To affected area (Patient not taking: No sig reported), Disp: 30 g, Rfl: 0  •  nystatin-triamcinolone (MYCOLOG-II) ointment, Apply topically 2 (two) times a day As needed (Patient not taking: No sig reported), Disp: 45 g, Rfl: 1  •  olopatadine (PATANOL) 0 1 % ophthalmic solution, Administer 1 drop to both eyes 2 (two) times a day (Patient not taking: No sig reported), Disp: 5 mL, Rfl: 3    ALLERGIES:  Allergies   Allergen Reactions   • Dairy Aid [Lactase] GI Intolerance and Vomiting   • Pollen Extract Other (See Comments)     Seasonal allergies  REVIEW OF SYSTEMS:  Review of Systems   Constitutional: Negative for chills, fatigue and fever  HENT: Negative for hearing loss, nosebleeds and sore throat  Eyes: Negative for redness and visual disturbance  Respiratory: Negative for cough, shortness of breath and wheezing  Cardiovascular: Negative for chest pain, palpitations and leg swelling     Gastrointestinal: Negative for abdominal pain, nausea and vomiting  Endocrine: Negative for polydipsia and polyuria  Genitourinary: Negative for difficulty urinating, dysuria and hematuria  Musculoskeletal: Positive for arthralgias  Negative for joint swelling and myalgias  Skin: Negative for rash and wound  Neurological: Positive for numbness  Negative for speech difficulty, weakness and headaches  Psychiatric/Behavioral: Negative for decreased concentration and suicidal ideas  The patient is not nervous/anxious  VITALS:  Vitals:    10/24/22 0744   BP: 112/70   Pulse: 72   Resp: 16       LABS:  HgA1c:   Lab Results   Component Value Date    HGBA1C 5 2 01/16/2020     BMP:   Lab Results   Component Value Date    CALCIUM 9 5 05/27/2022    K 4 2 05/27/2022    CO2 29 05/27/2022     05/27/2022    BUN 15 05/27/2022    CREATININE 0 65 05/27/2022       _____________________________________________________  PHYSICAL EXAMINATION:  General: well developed and well nourished, alert, oriented times 3 and appears comfortable  Psychiatric: Normal  HEENT: Normocephalic, Atraumatic Trachea Midline, No torticollis  Pulmonary: No audible wheezing or respiratory distress   Abdomen/GI: Non tender, non distended   Cardiovascular: No pitting edema, 2+ radial pulse   Skin: No masses, erythema, lacerations, fluctation, ulcerations  Neurovascular: Sensation Intact to the Median, Ulnar, Radial Nerve, Motor Intact to the Median, Ulnar, Radial Nerve and Pulses Intact  Musculoskeletal: Normal, except as noted in detailed exam and in HPI  MUSCULOSKELETAL EXAMINATION:  Right Carpal Tunnel Exam:    Negative thenar atrophy  Positive phalen's test  Positive carpal tunnel compression  Positive tinels over median nerve at the wrist   Opposition strength 5/5  Abduction strength 5/5  Right Ulnar Nerve Exam:    Negative intrinsic atrophy  Tender at 2 heads of FCU  Negative  deformity at the elbow  Full range of motion with flexion and extension of the elbow  Positive ulnar nerve compression test at the elbow  Positive tinels over the ulnar nerve at the elbow  Negative cross finger test in the index and long fingers  FDP strength is 5/5 to the ring finger  FDP strength is 5/5 to the small finger    Intrinsic strength 5/5      ___________________________________________________  STUDIES REVIEWED:  I have personally reviewed EMG of the right upper extremity which demonstrates evidence of mild carpal tunnel syndrome without evidence of cubital tunnel or cervical radiculopathy           PROCEDURES PERFORMED:  Procedures  No Procedures performed today    _____________________________________________________      Scribe Attestation    I,:  Bela Quiroz PA-C am acting as a scribe while in the presence of the attending physician :       I,:  Yoel Good MD personally performed the services described in this documentation    as scribed in my presence :

## 2022-10-26 ENCOUNTER — CLINICAL SUPPORT (OUTPATIENT)
Dept: BARIATRICS | Facility: CLINIC | Age: 47
End: 2022-10-26

## 2022-10-26 VITALS — HEIGHT: 66 IN | WEIGHT: 181.7 LBS | BODY MASS INDEX: 29.2 KG/M2

## 2022-10-26 DIAGNOSIS — R63.5 ABNORMAL WEIGHT GAIN: Primary | ICD-10-CM

## 2022-10-26 PROCEDURE — RECHECK

## 2022-10-31 ENCOUNTER — APPOINTMENT (OUTPATIENT)
Dept: RADIOLOGY | Facility: CLINIC | Age: 47
End: 2022-10-31

## 2022-10-31 ENCOUNTER — OFFICE VISIT (OUTPATIENT)
Dept: FAMILY MEDICINE CLINIC | Facility: CLINIC | Age: 47
End: 2022-10-31

## 2022-10-31 VITALS
HEIGHT: 66 IN | BODY MASS INDEX: 28.93 KG/M2 | SYSTOLIC BLOOD PRESSURE: 120 MMHG | OXYGEN SATURATION: 97 % | HEART RATE: 79 BPM | DIASTOLIC BLOOD PRESSURE: 74 MMHG | WEIGHT: 180 LBS | TEMPERATURE: 98.1 F

## 2022-10-31 DIAGNOSIS — F41.9 ANXIETY: ICD-10-CM

## 2022-10-31 DIAGNOSIS — L71.0 PERIORAL DERMATITIS: ICD-10-CM

## 2022-10-31 DIAGNOSIS — J45.21 MILD INTERMITTENT ASTHMA WITH EXACERBATION: Primary | ICD-10-CM

## 2022-10-31 DIAGNOSIS — J45.21 MILD INTERMITTENT ASTHMA WITH EXACERBATION: ICD-10-CM

## 2022-10-31 RX ORDER — AZITHROMYCIN 250 MG/1
TABLET, FILM COATED ORAL
Qty: 6 TABLET | Refills: 0 | Status: SHIPPED | OUTPATIENT
Start: 2022-10-31 | End: 2022-11-05

## 2022-10-31 RX ORDER — ESCITALOPRAM OXALATE 5 MG/1
TABLET ORAL
Qty: 46 TABLET | Refills: 0 | Status: SHIPPED | OUTPATIENT
Start: 2022-10-31 | End: 2022-11-30

## 2022-10-31 RX ORDER — PREDNISONE 10 MG/1
TABLET ORAL
Qty: 20 TABLET | Refills: 0 | Status: SHIPPED | OUTPATIENT
Start: 2022-10-31 | End: 2022-11-08

## 2022-10-31 NOTE — PROGRESS NOTES
Name: Kris Aguilar      : 1975      MRN: [id number]  Encounter Provider: Noah Yap PA-C  Encounter Date: 10/31/2022   Encounter department: [address] [place] [address]     1  Mild intermittent asthma with exacerbation  -     XR chest pa & lateral; Future; Expected date: 10/31/2022  -     predniSONE 10 mg tablet; Take 4 tablets (40 mg total) by mouth daily for 2 days, THEN 3 tablets (30 mg total) daily for 2 days, THEN 2 tablets (20 mg total) daily for 2 days, THEN 1 tablet (10 mg total) daily for 2 days  -     azithromycin (Zithromax) 250 mg tablet; Take 2 tablets (500 mg total) by mouth daily for 1 day, THEN 1 tablet (250 mg total) daily for 4 days  2  Anxiety  -     escitalopram (LEXAPRO) 5 mg tablet; Take 1 tablet (5 mg total) by mouth daily for 14 days, THEN 2 tablets (10 mg total) daily for 16 days  3  Perioral dermatitis  -     metroNIDAZOLE (METROCREAM) 0 75 % cream; Apply topically 2 (two) times a day  diffuse wheezing on exam, begin prednisone, cover with doxycycline  Seek CXR  Tylenol for pain  Saline gargles  Topical metronidazole for perioral dermatitis  After discussion of risks/benefits pt agreeable to starting lexapro  4 week follow up  Earlier prn       Subjective     Pt presents with concerns of cough, sore throat, L ear pain, chills  She had covid 3 weeks ago and recovered  She has no SOB  She does have a hx of asthma  No objective fevers  Also has a rash on face, hx of perioral dermatitis/rocasea  She also shares her anxiety is picking back up  She is starting a new job and would like to be back on medication for anxity  She notes anxiety around people/social anxiety  Review of Systems   Constitutional: Positive for chills and fatigue  Negative for fever  HENT: Positive for congestion and sore throat  Negative for ear pain, hearing loss, nosebleeds, postnasal drip, rhinorrhea, sinus pressure, sinus pain and sneezing      Eyes: Negative for pain, discharge, itching and visual disturbance  Respiratory: Positive for cough and chest tightness  Negative for shortness of breath and wheezing  Cardiovascular: Negative for chest pain, palpitations and leg swelling  Gastrointestinal: Negative for abdominal pain, blood in stool, constipation, diarrhea, nausea and vomiting  Genitourinary: Negative for frequency and urgency  Skin: Positive for rash  Neurological: Negative for dizziness, light-headedness and numbness  Psychiatric/Behavioral: The patient is nervous/anxious          Past Medical History:   Diagnosis Date   • Anxiety    • Arthritis    • Asthma    • Depression    • Osteoporosis    • Urinary tract infection      Past Surgical History:   Procedure Laterality Date   •  SECTION     • TUBAL LIGATION       Family History   Problem Relation Age of Onset   • Cancer Mother    • Breast cancer Mother         age unknown   • Diabetes Mother    • Coronary artery disease Mother    • Ovarian cancer Mother    • Cancer Father    • Prostate cancer Father    • Colon cancer Father    • No Known Problems Daughter    • No Known Problems Maternal Grandmother    • No Known Problems Paternal Grandmother    • No Known Problems Half-Sister    • Breast cancer Half-Sister         age unknown   • No Known Problems Half-Sister    • No Known Problems Sister    • No Known Problems Sister    • No Known Problems Sister    • No Known Problems Sister    • No Known Problems Sister    • No Known Problems Daughter    • No Known Problems Maternal Aunt    • No Known Problems Maternal Aunt    • No Known Problems Maternal Aunt    • No Known Problems Maternal Aunt    • No Known Problems Maternal Aunt    • No Known Problems Maternal Aunt    • No Known Problems Maternal Aunt    • No Known Problems Maternal Aunt    • No Known Problems Paternal Aunt    • No Known Problems Paternal Aunt    • No Known Problems Paternal Aunt    • No Known Problems Paternal Aunt    • No Known Problems Paternal Aunt      Social History     Socioeconomic History   • Marital status: /Civil Union     Spouse name: None   • Number of children: None   • Years of education: None   • Highest education level: None   Occupational History   • Occupation: homemaker   Tobacco Use   • Smoking status: Former Smoker     Packs/day: 0 50     Types: Cigarettes     Quit date: 2021     Years since quittin 8   • Smokeless tobacco: Never Used   Vaping Use   • Vaping Use: Never used   Substance and Sexual Activity   • Alcohol use: Yes     Comment: Social    • Drug use: Not Currently   • Sexual activity: Yes     Partners: Male     Birth control/protection: Female Sterilization   Other Topics Concern   • None   Social History Narrative   • None     Social Determinants of Health     Financial Resource Strain: Not on file   Food Insecurity: Not on file   Transportation Needs: Not on file   Physical Activity: Not on file   Stress: Not on file   Social Connections: Not on file   Intimate Partner Violence: Not on file   Housing Stability: Not on file     Current Outpatient Medications on File Prior to Visit   Medication Sig   • albuterol (PROVENTIL HFA,VENTOLIN HFA) 90 mcg/act inhaler Inhale 2 puffs every 6 (six) hours as needed for wheezing (cough)   • Cholecalciferol (Vitamin D3) 25 MCG (1000 UT) CAPS Take 1 capsule by mouth daily   • clobetasol (TEMOVATE) 0 05 % ointment Apply 1 application topically 2 (two) times a day To affected area (Patient not taking: No sig reported)   • cyanocobalamin (VITAMIN B-12) 100 MCG tablet Take 100 mcg by mouth in the morning   • fexofenadine (ALLEGRA) 180 MG tablet Take 1 tablet (180 mg total) by mouth daily   • fluticasone (FLONASE) 50 mcg/act nasal spray SPRAY 1 SPRAY INTO EACH NOSTRIL EVERY DAY (Patient taking differently: 2 sprays into each nostril daily)   • Multiple Vitamin (MULTIVITAMIN) capsule Take 1 capsule by mouth daily   • nystatin-triamcinolone (MYCOLOG-II) ointment Apply topically 2 (two) times a day As needed (Patient not taking: No sig reported)   • olopatadine (PATANOL) 0 1 % ophthalmic solution Administer 1 drop to both eyes 2 (two) times a day (Patient not taking: No sig reported)   • Turmeric 400 MG CAPS Take 1 capsule by mouth in the morning   • Vit C-Cholecalciferol-Alessandra Hip (Vitamin C & D3/Alessandra Hips) 500-1000-20 MG-UNIT-MG CAPS Take 1 capsule by mouth in the morning   • [DISCONTINUED] citalopram (CeleXA) 10 mg tablet Take 1 tablet (10 mg total) by mouth daily (Patient not taking: No sig reported)     Allergies   Allergen Reactions   • Dairy Aid [Tilactase] GI Intolerance and Vomiting   • Pollen Extract Other (See Comments)     Seasonal allergies  Immunization History   Administered Date(s) Administered   • Pneumococcal Conjugate Vaccine 20-valent (Pcv20), Polysace 07/07/2022   • Pneumococcal Polysaccharide PPV23 01/15/2020   • Tdap 10/05/2016       Objective     /74 (BP Location: Left arm, Patient Position: Sitting, Cuff Size: Large)   Pulse 79   Temp 98 1 °F (36 7 °C)   Ht 5' 6" (1 676 m)   Wt 81 6 kg (180 lb)   SpO2 97%   BMI 29 05 kg/m²     Physical Exam  Vitals and nursing note reviewed  Constitutional:       General: She is not in acute distress  Appearance: Normal appearance  HENT:      Head: Normocephalic and atraumatic  Right Ear: Tympanic membrane, ear canal and external ear normal       Left Ear: Ear canal and external ear normal  A middle ear effusion is present  Tympanic membrane is not erythematous or bulging  Nose: Congestion present  Mouth/Throat:      Mouth: Mucous membranes are moist       Pharynx: Oropharynx is clear  Posterior oropharyngeal erythema present  No oropharyngeal exudate  Eyes:      Pupils: Pupils are equal, round, and reactive to light  Cardiovascular:      Rate and Rhythm: Normal rate and regular rhythm  Heart sounds: Normal heart sounds     Pulmonary:      Effort: Pulmonary effort is normal  No respiratory distress  Breath sounds: Wheezing (inspiratory/expiratory) and rhonchi present  Abdominal:      General: Bowel sounds are normal       Palpations: Abdomen is soft  Musculoskeletal:         General: Normal range of motion  Cervical back: Normal range of motion and neck supple  Right lower leg: No edema  Left lower leg: No edema  Lymphadenopathy:      Cervical: No cervical adenopathy  Skin:     General: Skin is warm and dry  Findings: Rash (papular rash aorund mouth, slightly erythematous) present  Neurological:      Mental Status: She is alert and oriented to person, place, and time     Psychiatric:         Mood and Affect: Mood and affect normal        Jose Luis Atkins PA-C

## 2022-10-31 NOTE — LETTER
October 31, 2022     Patient: Sandra Ann  YOB: 1975  Date of Visit: 10/31/2022      To Whom it May Concern:    Sandra Ann is under my professional care  Sanjuanita Jasenjacki was seen in my office on 10/31/2022  Sanjuanitagwyn Montoya may return to work on 11/2/2022  If you have any questions or concerns, please don't hesitate to call           Sincerely,          Maris Turcios PA-C        CC: No Recipients

## 2022-11-28 DIAGNOSIS — F41.9 ANXIETY: ICD-10-CM

## 2022-11-28 RX ORDER — ESCITALOPRAM OXALATE 5 MG/1
TABLET ORAL
Qty: 46 TABLET | Refills: 0 | Status: SHIPPED | OUTPATIENT
Start: 2022-11-28 | End: 2022-11-28

## 2022-11-29 ENCOUNTER — APPOINTMENT (OUTPATIENT)
Dept: LAB | Facility: CLINIC | Age: 47
End: 2022-11-29
Payer: COMMERCIAL

## 2022-11-29 ENCOUNTER — OFFICE VISIT (OUTPATIENT)
Dept: FAMILY MEDICINE CLINIC | Facility: CLINIC | Age: 47
End: 2022-11-29

## 2022-11-29 VITALS
WEIGHT: 171 LBS | DIASTOLIC BLOOD PRESSURE: 84 MMHG | BODY MASS INDEX: 27.48 KG/M2 | HEIGHT: 66 IN | OXYGEN SATURATION: 99 % | TEMPERATURE: 97 F | SYSTOLIC BLOOD PRESSURE: 130 MMHG | HEART RATE: 86 BPM

## 2022-11-29 DIAGNOSIS — R11.0 NAUSEA: ICD-10-CM

## 2022-11-29 DIAGNOSIS — F41.9 ANXIETY: ICD-10-CM

## 2022-11-29 DIAGNOSIS — R19.7 DIARRHEA OF PRESUMED INFECTIOUS ORIGIN: ICD-10-CM

## 2022-11-29 DIAGNOSIS — R19.7 DIARRHEA OF PRESUMED INFECTIOUS ORIGIN: Primary | ICD-10-CM

## 2022-11-29 RX ORDER — ONDANSETRON 4 MG/1
4 TABLET, FILM COATED ORAL EVERY 8 HOURS PRN
Qty: 20 TABLET | Refills: 0 | Status: SHIPPED | OUTPATIENT
Start: 2022-11-29

## 2022-11-29 NOTE — PROGRESS NOTES
Name: Kassy Gallardo      : 1975      MRN: [id number]  Encounter Provider: Serene Boykin PA-C  Encounter Date: 2022   Encounter department: [address] [place] [address]     1  Diarrhea of presumed infectious origin  -     Clostridium difficile toxin by PCR; Future  -     Stool Enteric Bacterial Panel by PCR; Future  -     Ova and parasite examination; Future    2  Nausea  -     ondansetron (ZOFRAN) 4 mg tablet; Take 1 tablet (4 mg total) by mouth every 8 (eight) hours as needed for nausea or vomiting    3  Anxiety  suspect viral GE given absent fevers, abdominal cramping, non-bloody stools, vomiting  Supportive measures  Given signficant diarrhea, will seek stool studies as above  Covid negative  [place] diet, plenty of fluids  zofran and imodium prn  Anxiety improved, continue lexapro at current dose       Subjective     Pt presents for 5 days of lower abdominal cramping, diarrhea, nausea and vomiting  Noted body aches  No fevers  Negative covid test at home  Yellow, watery stools  6+ times already this morning  She did have 1 week of antibiotics at the beginning of the month  No sick contacts  No black/bloody stools  Her anxiety is improved on the lexapro, she would like to leave the dose where it is     Review of Systems   Constitutional: Negative for chills, fatigue and fever  HENT: Negative for congestion, ear pain, hearing loss, nosebleeds, postnasal drip, rhinorrhea, sinus pressure, sinus pain, sneezing and sore throat  Eyes: Negative for pain, discharge, itching and visual disturbance  Respiratory: Negative for cough, chest tightness, shortness of breath and wheezing  Cardiovascular: Negative for chest pain, palpitations and leg swelling  Gastrointestinal: Positive for abdominal pain, diarrhea, nausea and vomiting  Negative for blood in stool and constipation  Genitourinary: Negative for frequency and urgency     Neurological: Negative for dizziness, light-headedness and numbness         Past Medical History:   Diagnosis Date   • Anxiety    • Arthritis    • Asthma    • Depression    • Osteoporosis    • Urinary tract infection      Past Surgical History:   Procedure Laterality Date   •  SECTION     • TUBAL LIGATION       Family History   Problem Relation Age of Onset   • Cancer Mother    • Breast cancer Mother         age unknown   • Diabetes Mother    • Coronary artery disease Mother    • Ovarian cancer Mother    • Cancer Father    • Prostate cancer Father    • Colon cancer Father    • No Known Problems Daughter    • No Known Problems Maternal Grandmother    • No Known Problems Paternal Grandmother    • No Known Problems Half-Sister    • Breast cancer Half-Sister         age unknown   • No Known Problems Half-Sister    • No Known Problems Sister    • No Known Problems Sister    • No Known Problems Sister    • No Known Problems Sister    • No Known Problems Sister    • No Known Problems Daughter    • No Known Problems Maternal Aunt    • No Known Problems Maternal Aunt    • No Known Problems Maternal Aunt    • No Known Problems Maternal Aunt    • No Known Problems Maternal Aunt    • No Known Problems Maternal Aunt    • No Known Problems Maternal Aunt    • No Known Problems Maternal Aunt    • No Known Problems Paternal Aunt    • No Known Problems Paternal Aunt    • No Known Problems Paternal Aunt    • No Known Problems Paternal Aunt    • No Known Problems Paternal Aunt      Social History     Socioeconomic History   • Marital status: /Civil Union     Spouse name: None   • Number of children: None   • Years of education: None   • Highest education level: None   Occupational History   • Occupation: homemaker   Tobacco Use   • Smoking status: Former     Packs/day: 0 50     Types: Cigarettes     Quit date: 2021     Years since quittin 9   • Smokeless tobacco: Never   Vaping Use   • Vaping Use: Never used   Substance and Sexual Activity • Alcohol use: Yes     Comment: Social    • Drug use: Not Currently   • Sexual activity: Yes     Partners: Male     Birth control/protection: Female Sterilization   Other Topics Concern   • None   Social History Narrative   • None     Social Determinants of Health     Financial Resource Strain: Not on file   Food Insecurity: Not on file   Transportation Needs: Not on file   Physical Activity: Not on file   Stress: Not on file   Social Connections: Not on file   Intimate Partner Violence: Not on file   Housing Stability: Not on file     Current Outpatient Medications on File Prior to Visit   Medication Sig   • albuterol (PROVENTIL HFA,VENTOLIN HFA) 90 mcg/act inhaler Inhale 2 puffs every 6 (six) hours as needed for wheezing (cough)   • Cholecalciferol (Vitamin D3) 25 MCG (1000 UT) CAPS Take 1 capsule by mouth daily   • clobetasol (TEMOVATE) 0 05 % ointment Apply 1 application topically 2 (two) times a day To affected area (Patient not taking: No sig reported)   • cyanocobalamin (VITAMIN B-12) 100 MCG tablet Take 100 mcg by mouth in the morning   • escitalopram (LEXAPRO) 10 mg tablet Take 1 tablet (10 mg total) by mouth daily   • fexofenadine (ALLEGRA) 180 MG tablet Take 1 tablet (180 mg total) by mouth daily   • fluticasone (FLONASE) 50 mcg/act nasal spray SPRAY 1 SPRAY INTO EACH NOSTRIL EVERY DAY (Patient taking differently: 2 sprays into each nostril daily)   • metroNIDAZOLE (METROCREAM) 0 75 % cream Apply topically 2 (two) times a day   • Multiple Vitamin (MULTIVITAMIN) capsule Take 1 capsule by mouth daily   • nystatin-triamcinolone (MYCOLOG-II) ointment Apply topically 2 (two) times a day As needed (Patient not taking: No sig reported)   • olopatadine (PATANOL) 0 1 % ophthalmic solution Administer 1 drop to both eyes 2 (two) times a day (Patient not taking: No sig reported)   • Turmeric 400 MG CAPS Take 1 capsule by mouth in the morning   • Vit C-Cholecalciferol-Alessandra Hip (Vitamin C & D3/Alessandra Hips) 500-1000-20 MG-UNIT-MG CAPS Take 1 capsule by mouth in the morning     Allergies   Allergen Reactions   • Dairy Aid [Tilactase] GI Intolerance and Vomiting   • Pollen Extract Other (See Comments)     Seasonal allergies  Immunization History   Administered Date(s) Administered   • Pneumococcal Conjugate Vaccine 20-valent (Pcv20), Polysace 07/07/2022   • Pneumococcal Polysaccharide PPV23 01/15/2020   • Tdap 10/05/2016       Objective     /84   Pulse 86   Temp (!) 97 °F (36 1 °C)   Ht 5' 6" (1 676 m)   Wt 77 6 kg (171 lb)   SpO2 99%   BMI 27 60 kg/m²     Physical Exam  Vitals and nursing note reviewed  Constitutional:       General: She is not in acute distress  Appearance: Normal appearance  HENT:      Head: Normocephalic and atraumatic  Nose: Nose normal       Mouth/Throat:      Mouth: Mucous membranes are moist       Pharynx: Oropharynx is clear  No oropharyngeal exudate or posterior oropharyngeal erythema  Eyes:      Pupils: Pupils are equal, round, and reactive to light  Cardiovascular:      Rate and Rhythm: Normal rate and regular rhythm  Heart sounds: Normal heart sounds  Pulmonary:      Effort: Pulmonary effort is normal  No respiratory distress  Breath sounds: Normal breath sounds  Abdominal:      General: Bowel sounds are normal       Palpations: Abdomen is soft  Tenderness: There is abdominal tenderness (LLQ, no hx of diverticular disease on recent colonoscopy)  Musculoskeletal:         General: Normal range of motion  Cervical back: Normal range of motion and neck supple  Skin:     General: Skin is warm and dry  Neurological:      Mental Status: She is alert and oriented to person, place, and time  Psychiatric:         Mood and Affect: Affect normal  Mood is anxious         Erendira Meier PA-C

## 2022-11-30 ENCOUNTER — APPOINTMENT (OUTPATIENT)
Dept: LAB | Facility: CLINIC | Age: 47
End: 2022-11-30

## 2022-12-01 LAB
C DIFF TOX GENS STL QL NAA+PROBE: NEGATIVE
CAMPYLOBACTER DNA SPEC NAA+PROBE: NORMAL
SALMONELLA DNA SPEC QL NAA+PROBE: NORMAL
SHIGA TOXIN STX GENE SPEC NAA+PROBE: NORMAL
SHIGELLA DNA SPEC QL NAA+PROBE: NORMAL

## 2022-12-06 ENCOUNTER — TELEPHONE (OUTPATIENT)
Dept: OBGYN CLINIC | Facility: CLINIC | Age: 47
End: 2022-12-06

## 2022-12-06 DIAGNOSIS — N76.0 BV (BACTERIAL VAGINOSIS): Primary | ICD-10-CM

## 2022-12-06 DIAGNOSIS — B96.89 BV (BACTERIAL VAGINOSIS): Primary | ICD-10-CM

## 2022-12-06 DIAGNOSIS — B37.9 CANDIDIASIS: ICD-10-CM

## 2022-12-06 RX ORDER — FLUCONAZOLE 150 MG/1
TABLET ORAL
Qty: 1 TABLET | Refills: 0 | Status: SHIPPED | OUTPATIENT
Start: 2022-12-06 | End: 2022-12-09

## 2022-12-06 RX ORDER — METRONIDAZOLE 7.5 MG/G
GEL VAGINAL
Qty: 70 G | Refills: 0 | Status: SHIPPED | OUTPATIENT
Start: 2022-12-06

## 2022-12-06 NOTE — TELEPHONE ENCOUNTER
Pt was on 2 abx's plus prednisone for a chest infection    Now has itching with thick white disch and fishy odor

## 2022-12-08 ENCOUNTER — OFFICE VISIT (OUTPATIENT)
Dept: BARIATRICS | Facility: CLINIC | Age: 47
End: 2022-12-08

## 2022-12-08 VITALS
SYSTOLIC BLOOD PRESSURE: 110 MMHG | DIASTOLIC BLOOD PRESSURE: 70 MMHG | TEMPERATURE: 98.7 F | BODY MASS INDEX: 27.32 KG/M2 | WEIGHT: 170 LBS | HEIGHT: 66 IN | RESPIRATION RATE: 16 BRPM | HEART RATE: 73 BPM

## 2022-12-08 DIAGNOSIS — E66.3 OVERWEIGHT: Primary | ICD-10-CM

## 2022-12-08 DIAGNOSIS — F41.9 ANXIETY: ICD-10-CM

## 2022-12-08 NOTE — PROGRESS NOTES
Assessment/Plan:  Denise Martin was seen today for consult  Diagnoses and all orders for this visit:  Overweight  Calorie goal 1200kcal  Finished Healthy core    Return as needed  Anxiety  Cont Lexapro,  Of note Hx of bulimia as a child  Motivational interview performed and patient noted changes to work on until next visit  -    Total time spent:20min, with >50% face-to-face time spent counseling patient on nonsurgical interventions for the treatment of excess weight  Discussed in detail nonsurgical options including intensive lifestyle intervention program, very low-calorie diet program and conservative program   Discussed the role of weight loss medications  Counseled patient on diet behavior and exercise modification for weight loss  Subjective:   Chief Complaint   Patient presents with   • Follow-up     Patient presents for f/u  Patient ID: Johny Dukes  is a 41 y o  female with excess weight/obesity here to pursue weight management  Previous notes and records have been reviewed          HPI  Wt Readings from Last 20 Encounters:   12/08/22 77 1 kg (170 lb)   11/29/22 77 6 kg (171 lb)   10/31/22 81 6 kg (180 lb)   10/26/22 82 4 kg (181 lb 11 2 oz)   10/24/22 82 1 kg (181 lb)   09/27/22 82 3 kg (181 lb 6 4 oz)   09/21/22 82 2 kg (181 lb 3 2 oz)   09/14/22 81 7 kg (180 lb 3 2 oz)   09/07/22 81 6 kg (179 lb 12 8 oz)   08/24/22 80 kg (176 lb 6 4 oz)   07/07/22 79 7 kg (175 lb 9 6 oz)   06/02/22 79 4 kg (175 lb)   05/26/22 78 5 kg (173 lb)   05/05/22 80 7 kg (178 lb)   03/10/22 80 7 kg (178 lb)   02/10/22 80 7 kg (178 lb)   01/26/22 79 8 kg (176 lb)   11/12/21 81 6 kg (180 lb)   11/11/21 81 6 kg (180 lb)   11/09/21 82 6 kg (182 lb)   start 176lbs 8/2022  Current: 170lbs   Finished Healthy Core program  She had anxiety to the thought that she would loose weight made her gain weight   Anxiety meds were started and helped   Started to works at [employer] good changes : not eating when watching TV  Nutrition notes reviewed  Hydration:1 gallon  Cut out coffee intake during daytime   Alcohol: every other day when in [place] but usually no when here   Smoking: vapes  hooka or cigarette occasional  Exercise: PT exercises   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   Sleep: 4hrs sometimes good days and bad days  STOP ban/8    Past Medical History:   Diagnosis Date   • Anxiety    • Arthritis    • Asthma    • Depression    • Osteoporosis    • Urinary tract infection      Past Surgical History:   Procedure Laterality Date   •  SECTION     • TUBAL LIGATION       The following portions of the patient's history were reviewed and updated as appropriate: allergies, current medications, past family history, past medical history, past social history, past surgical history, and problem list     ROS:  Review of Systems   Constitutional: Negative for activity change  Fatigue  HENT: Negative for trouble swallowing  Respiratory: Negative for shortness of breath  Cardiovascular: Negative for chest pain, edema  Gastrointestinal: Negative for abdominal pain, nausea and vomiting, +acid reflux with some foods, constipation/diarrhea  Endocrine: negative for heat /cold intolerance, premenopausal with hot sweats, menses are regular  Genitourinary: Negative for difficulty urinating  Musculoskeletal: Negative for joint pain and +myalgias  Psychiatric/Behavioral: Anxiety present , no depression  Objective:  /70 (BP Location: Left arm, Patient Position: Sitting, Cuff Size: Large)   Pulse 73   Temp 98 7 °F (37 1 °C)   Resp 16   Ht 5' 6" (1 676 m)   Wt 77 1 kg (170 lb)   BMI 27 44 kg/m²   Constitutional: Well-developed, well-nourished and Obese Body mass index is 27 44 kg/m²  Kamlesh Merle Alert, cooperative  HEENT: No conjunctival injection  No thyroid masses  Pulmonary: No increased work of breathing or signs of respiratory distress  Clear respiratory sounds    CV: Well-perfused, Regular rate and rhythm, no murmurs  Vascular: no peripheral edema  GI: Abdomen obese, Non-distended  Not tender  MSK: no sarcopenia noted   Neuro: Oriented to person, place and time  Normal Speech  Normal gait  Psych: Normal affect and mood  Labs and Imaging  Recent labs and imaging have been personally reviewed    Lab Results   Component Value Date    WBC 8 83 05/27/2022    HGB 12 6 05/27/2022    HCT 40 0 05/27/2022    MCV 86 05/27/2022     05/27/2022     Lab Results   Component Value Date    SODIUM 138 05/27/2022    K 4 2 05/27/2022     05/27/2022    CO2 29 05/27/2022    AGAP 1 (L) 05/27/2022    BUN 15 05/27/2022    CREATININE 0 65 05/27/2022    GLUF 86 05/27/2022    CALCIUM 9 5 05/27/2022    AST 10 05/27/2022    ALT 24 05/27/2022    ALKPHOS 81 05/27/2022    TP 7 5 05/27/2022    TBILI 0 30 05/27/2022    EGFR 107 05/27/2022     Lab Results   Component Value Date    HGBA1C 5 2 01/16/2020     Lab Results   Component Value Date    VNN4AXTVGOJY 1 340 05/27/2022     Lab Results   Component Value Date    CHOLESTEROL 175 05/27/2022     Lab Results   Component Value Date    HDL 43 (L) 05/27/2022     Lab Results   Component Value Date    TRIG 55 05/27/2022     Lab Results   Component Value Date    LDLCALC 121 (H) 05/27/2022

## 2022-12-29 ENCOUNTER — HOSPITAL ENCOUNTER (OUTPATIENT)
Dept: RADIOLOGY | Facility: HOSPITAL | Age: 47
Discharge: HOME | End: 2022-12-29

## 2022-12-29 DIAGNOSIS — G56.21 CUBITAL TUNNEL SYNDROME, RIGHT: ICD-10-CM

## 2023-01-11 DIAGNOSIS — M79.671 RIGHT FOOT PAIN: Primary | ICD-10-CM

## 2023-01-23 ENCOUNTER — OFFICE VISIT (OUTPATIENT)
Dept: URGENT CARE | Facility: CLINIC | Age: 48
End: 2023-01-23

## 2023-01-23 VITALS
HEART RATE: 65 BPM | TEMPERATURE: 98.5 F | DIASTOLIC BLOOD PRESSURE: 88 MMHG | SYSTOLIC BLOOD PRESSURE: 142 MMHG | OXYGEN SATURATION: 100 % | RESPIRATION RATE: 17 BRPM

## 2023-01-23 DIAGNOSIS — R10.33 PERIUMBILICAL ABDOMINAL PAIN: Primary | ICD-10-CM

## 2023-01-23 DIAGNOSIS — J45.20 MILD INTERMITTENT ASTHMA WITHOUT COMPLICATION: ICD-10-CM

## 2023-01-23 NOTE — PROGRESS NOTES
[facility]        NAME: Joselo Cordon is a 57 y o  female  : 1975    MRN: [id number]  DATE: 2023  TIME: 8:47 AM      Assessment and Plan     Periumbilical abdominal pain [R10 33]  1  Periumbilical abdominal pain        2  Mild intermittent asthma without complication          Medical decision making: I suspect possible hernia, but with mild pain on palpation, but no palpable lump, will have patient follow up with PCP tomorrow  Told patient to go to the ER right away if the pain becomes worse or constant or if she develops any other symptoms  Patient Instructions   There are no Patient Instructions on file for this visit  Follow up with PCP for your appointment tomorrow  Go to ER if symptoms/pain worsen  Chief Complaint     Chief Complaint   Patient presents with   • Abdominal Pain     Pt c/o a sharp pain to the left of her belly button when she coughs, sneezes, and blowing nose since  morning  History of Present Illness     Patient presents with abdominal pain on the left side of her belly button when she sneezes or coughs x 2 days  She states that the pain is around a 3, but only when she sneezes/coughs  She tried having a bowel movement and passing gas, but that provided no relief of the pain  She states the pain is sharp  She states she has an appointment with her PCP tomorrow, but was worried that it might be something that shouldn't wait  Patient also just finished a course of prednisone for an asthma exacerbation  Abdominal Pain  Pertinent negatives include no anorexia, arthralgias, belching, constipation, diarrhea, dysuria, fever, flatus, headaches, hematochezia, hematuria, melena, myalgias, nausea, vomiting or weight loss  She has tried nothing for the symptoms  Review of Systems     Review of Systems   Constitutional: Negative for appetite change, chills, fever, unexpected weight change and weight loss     HENT: Negative for ear pain and sore throat  Eyes: Negative for pain and visual disturbance  Respiratory: Positive for cough and wheezing  Negative for shortness of breath  Asthma-related   Cardiovascular: Negative for chest pain and palpitations  Gastrointestinal: Positive for abdominal pain  Negative for abdominal distention, anal bleeding, anorexia, blood in stool, constipation, diarrhea, flatus, hematochezia, melena, nausea, rectal pain and vomiting  Genitourinary: Negative for dysuria, hematuria, menstrual problem and pelvic pain  Musculoskeletal: Negative for arthralgias, back pain and myalgias  Skin: Negative for color change and rash  Neurological: Negative for dizziness, seizures, syncope and headaches  All other systems reviewed and are negative          Current Medications       Current Outpatient Medications:   •  albuterol (PROVENTIL HFA,VENTOLIN HFA) 90 mcg/act inhaler, Inhale 2 puffs every 6 (six) hours as needed for wheezing (cough), Disp: 1 Inhaler, Rfl: 0  •  Cholecalciferol (Vitamin D3) 25 MCG (1000 UT) CAPS, Take 1 capsule by mouth daily, Disp: , Rfl:   •  clobetasol (TEMOVATE) 0 05 % ointment, Apply 1 application topically 2 (two) times a day To affected area, Disp: 30 g, Rfl: 0  •  cyanocobalamin (VITAMIN B-12) 100 MCG tablet, Take 100 mcg by mouth in the morning, Disp: , Rfl:   •  escitalopram (LEXAPRO) 10 mg tablet, Take 1 tablet (10 mg total) by mouth daily, Disp: 30 tablet, Rfl: 3  •  fexofenadine (ALLEGRA) 180 MG tablet, Take 1 tablet (180 mg total) by mouth daily, Disp: 60 tablet, Rfl: 0  •  fluticasone (FLONASE) 50 mcg/act nasal spray, SPRAY 1 SPRAY INTO EACH NOSTRIL EVERY DAY (Patient taking differently: 2 sprays into each nostril daily), Disp: 16 mL, Rfl: 1  •  Multiple Vitamin (MULTIVITAMIN) capsule, Take 1 capsule by mouth daily, Disp: , Rfl:   •  olopatadine (PATANOL) 0 1 % ophthalmic solution, Administer 1 drop to both eyes 2 (two) times a day, Disp: 5 mL, Rfl: 3  •  Turmeric 400 MG CAPS, Take 1 capsule by mouth in the morning, Disp: , Rfl:   •  Vit C-Cholecalciferol-Alessandra Hip (Vitamin C & D3/Alessandra Hips) 500-1000-20 MG-UNIT-MG CAPS, Take 1 capsule by mouth in the morning, Disp: , Rfl:   •  metroNIDAZOLE (METROCREAM) 0 75 % cream, Apply topically 2 (two) times a day, Disp: 45 g, Rfl: 0  •  metroNIDAZOLE (METROGEL) 0 75 % vaginal gel, Insert intravaginally times 5 nights, Disp: 70 g, Rfl: 0  •  nystatin-triamcinolone (MYCOLOG-II) ointment, Apply topically 2 (two) times a day As needed, Disp: 45 g, Rfl: 1  •  ondansetron (ZOFRAN) 4 mg tablet, Take 1 tablet (4 mg total) by mouth every 8 (eight) hours as needed for nausea or vomiting (Patient not taking: Reported on 2023), Disp: 20 tablet, Rfl: 0    Current Allergies     Allergies as of 2023 - Reviewed 2023   Allergen Reaction Noted   • Dairy aid [tilactase] GI Intolerance and Vomiting 10/24/2022   • Pollen extract Other (See Comments) 12/10/2020              The following portions of the patient's history were reviewed and updated as appropriate: allergies, current medications, past family history, past medical history, past social history, past surgical history, and problem list      Past Medical History:   Diagnosis Date   • Anxiety    • Arthritis    • Asthma    • Depression    • Osteoporosis    • Urinary tract infection        Past Surgical History:   Procedure Laterality Date   •  SECTION     • TUBAL LIGATION         Family History   Problem Relation Age of Onset   • Cancer Mother    • Breast cancer Mother         age unknown   • Diabetes Mother    • Coronary artery disease Mother    • Ovarian cancer Mother    • Cancer Father    • Prostate cancer Father    • Colon cancer Father    • No Known Problems Daughter    • No Known Problems Maternal Grandmother    • No Known Problems Paternal Grandmother    • No Known Problems Half-Sister    • Breast cancer Half-Sister         age unknown   • No Known Problems Half-Sister    • No Known Problems Sister    • No Known Problems Sister    • No Known Problems Sister    • No Known Problems Sister    • No Known Problems Sister    • No Known Problems Daughter    • No Known Problems Maternal Aunt    • No Known Problems Maternal Aunt    • No Known Problems Maternal Aunt    • No Known Problems Maternal Aunt    • No Known Problems Maternal Aunt    • No Known Problems Maternal Aunt    • No Known Problems Maternal Aunt    • No Known Problems Maternal Aunt    • No Known Problems Paternal Aunt    • No Known Problems Paternal Aunt    • No Known Problems Paternal Aunt    • No Known Problems Paternal Aunt    • No Known Problems Paternal Aunt          Medications have been verified  Objective     /88   Pulse 65   Temp 98 5 °F (36 9 °C)   Resp 17   SpO2 100%   No LMP recorded  Physical Exam     Physical Exam  Vitals reviewed  Constitutional:       Appearance: She is well-developed and normal weight  She is not ill-appearing or toxic-appearing  HENT:      Head: Normocephalic and atraumatic  Cardiovascular:      Rate and Rhythm: Normal rate and regular rhythm  Heart sounds: Normal heart sounds  Pulmonary:      Effort: Pulmonary effort is normal  No respiratory distress  Breath sounds: Wheezing present  Comments: Patient just finished prednisone for asthma exacerbation  Abdominal:      General: Abdomen is flat  Bowel sounds are normal  There is no distension or abdominal bruit  There are no signs of injury  Palpations: Abdomen is soft  There is no hepatomegaly, splenomegaly, mass or pulsatile mass  Tenderness: There is abdominal tenderness in the periumbilical area  There is no guarding or rebound  Negative signs include Childers's sign  Hernia: No hernia is present  Comments: Mild tenderness to palpation of left periumbilical region  No palpable lump/mass with and without coughing/bearing down  Skin:     General: Skin is warm and dry  Neurological:      General: No focal deficit present  Mental Status: She is alert and oriented to person, place, and time     Psychiatric:         Mood and Affect: Mood normal          Behavior: Behavior normal

## 2023-01-26 ENCOUNTER — OFFICE VISIT (OUTPATIENT)
Dept: FAMILY MEDICINE CLINIC | Facility: CLINIC | Age: 48
End: 2023-01-26

## 2023-01-26 VITALS
DIASTOLIC BLOOD PRESSURE: 80 MMHG | WEIGHT: 164 LBS | HEIGHT: 66 IN | HEART RATE: 91 BPM | OXYGEN SATURATION: 98 % | TEMPERATURE: 97.5 F | SYSTOLIC BLOOD PRESSURE: 124 MMHG | BODY MASS INDEX: 26.36 KG/M2

## 2023-01-26 DIAGNOSIS — K59.00 CONSTIPATION, UNSPECIFIED CONSTIPATION TYPE: ICD-10-CM

## 2023-01-26 DIAGNOSIS — Z00.00 HEALTH MAINTENANCE EXAMINATION: ICD-10-CM

## 2023-01-26 DIAGNOSIS — Z12.31 ENCOUNTER FOR SCREENING MAMMOGRAM FOR MALIGNANT NEOPLASM OF BREAST: ICD-10-CM

## 2023-01-26 DIAGNOSIS — R10.33 PERIUMBILICAL PAIN: Primary | ICD-10-CM

## 2023-01-26 RX ORDER — DOCUSATE SODIUM 100 MG/1
100 CAPSULE, LIQUID FILLED ORAL 2 TIMES DAILY PRN
Qty: 30 CAPSULE | Refills: 0 | Status: SHIPPED | OUTPATIENT
Start: 2023-01-26

## 2023-01-26 NOTE — PROGRESS NOTES
Name: Eulalia Pearson      : 1975      MRN: [id number]  Encounter Provider: Lenda Goldmann, PA-C  Encounter Date: 2023   Encounter department: [address] [place] [address]     1  Periumbilical pain  -     CT abdomen pelvis w contrast; Future; Expected date: 2023    2  Encounter for screening mammogram for malignant neoplasm of breast  -     Mammo screening bilateral w cad; Future; Expected date: 2023    3  Constipation, unspecified constipation type  -     docusate sodium (COLACE) 100 mg capsule; Take 1 capsule (100 mg total) by mouth 2 (two) times a day as needed for constipation    4  Health maintenance examination  periumbilical TTP without mass/hernia  Last BM 5 days ago  Pt takes miralax daily and will add colace BID prn  If no BM in next 2 days, alert office  No evidence of obstruction, bowel sounds are normal  Pt apparently passing flatus since last BM  CT to r/o small ventral/umbilical hernia  Update HM  Follow up prn       Subjective     Pt presents with concerns of periumbilical pain, mostly L sided and also constipation  Last BM 5 days ago  Pain with increased intraabdominal pressure such as sitting up or trying to have a BM  Notes a "burning", sharp pain in the area  She feels bloated and the periumbilical area feels a bit TTP  No N/V fevers  She also notes it has been 31 days since her last period and she is normally more regular  She is not sexually active and she has a hx of tubal ligation  No other concerns  Due for mammography  No other interval changes  Her mood has been stable   She is utd with colon cancer screenig  She has upcoming carpal tunnel surgery  Medications reviewed  Review of Systems   Constitutional: Negative for chills, fatigue and fever  HENT: Negative for congestion, ear pain, hearing loss, nosebleeds, postnasal drip, rhinorrhea, sinus pressure, sinus pain, sneezing and sore throat      Eyes: Negative for pain, discharge, itching and visual disturbance  Respiratory: Negative for cough, chest tightness, shortness of breath and wheezing  Cardiovascular: Negative for chest pain, palpitations and leg swelling  Gastrointestinal: Positive for abdominal pain and constipation  Negative for blood in stool, diarrhea, nausea and vomiting  Genitourinary: Negative for frequency and urgency  Neurological: Negative for dizziness, light-headedness and numbness         Past Medical History:   Diagnosis Date   • Anxiety    • Arthritis    • Asthma    • Depression    • Osteoporosis    • Urinary tract infection      Past Surgical History:   Procedure Laterality Date   •  SECTION     • TUBAL LIGATION       Family History   Problem Relation Age of Onset   • Cancer Mother    • Breast cancer Mother         age unknown   • Diabetes Mother    • Coronary artery disease Mother    • Ovarian cancer Mother    • Cancer Father    • Prostate cancer Father    • Colon cancer Father    • No Known Problems Daughter    • No Known Problems Maternal Grandmother    • No Known Problems Paternal Grandmother    • No Known Problems Half-Sister    • Breast cancer Half-Sister         age unknown   • No Known Problems Half-Sister    • No Known Problems Sister    • No Known Problems Sister    • No Known Problems Sister    • No Known Problems Sister    • No Known Problems Sister    • No Known Problems Daughter    • No Known Problems Maternal Aunt    • No Known Problems Maternal Aunt    • No Known Problems Maternal Aunt    • No Known Problems Maternal Aunt    • No Known Problems Maternal Aunt    • No Known Problems Maternal Aunt    • No Known Problems Maternal Aunt    • No Known Problems Maternal Aunt    • No Known Problems Paternal Aunt    • No Known Problems Paternal Aunt    • No Known Problems Paternal Aunt    • No Known Problems Paternal Aunt    • No Known Problems Paternal Aunt      Social History     Socioeconomic History   • Marital status: /Civil [employer]     Spouse name: None   • Number of children: None   • Years of education: None   • Highest education level: None   Occupational History   • Occupation: homemaker   Tobacco Use   • Smoking status: Former     Packs/day: 0 50     Types: Cigarettes     Quit date: 2021     Years since quittin 0   • Smokeless tobacco: Never   Vaping Use   • Vaping Use: Never used   Substance and Sexual Activity   • Alcohol use: Yes     Comment: Social    • Drug use: Not Currently   • Sexual activity: Yes     Partners: Male     Birth control/protection: Female Sterilization   Other Topics Concern   • None   Social History Narrative   • None     Social Determinants of Health     Financial Resource Strain: Not on file   Food Insecurity: Not on file   Transportation Needs: Not on file   Physical Activity: Not on file   Stress: Not on file   Social Connections: Not on file   Intimate Partner Violence: Not on file   Housing Stability: Not on file     Current Outpatient Medications on File Prior to Visit   Medication Sig   • albuterol (PROVENTIL HFA,VENTOLIN HFA) 90 mcg/act inhaler Inhale 2 puffs every 6 (six) hours as needed for wheezing (cough)   • Cholecalciferol (Vitamin D3) 25 MCG (1000 UT) CAPS Take 1 capsule by mouth daily   • clobetasol (TEMOVATE) 0 05 % ointment Apply 1 application topically 2 (two) times a day To affected area   • cyanocobalamin (VITAMIN B-12) 100 MCG tablet Take 100 mcg by mouth in the morning   • escitalopram (LEXAPRO) 10 mg tablet Take 1 tablet (10 mg total) by mouth daily   • fexofenadine (ALLEGRA) 180 MG tablet Take 1 tablet (180 mg total) by mouth daily   • fluticasone (FLONASE) 50 mcg/act nasal spray SPRAY 1 SPRAY INTO EACH NOSTRIL EVERY DAY (Patient taking differently: 2 sprays into each nostril daily)   • metroNIDAZOLE (METROCREAM) 0 75 % cream Apply topically 2 (two) times a day   • metroNIDAZOLE (METROGEL) 0 75 % vaginal gel Insert intravaginally times 5 nights   • Multiple Vitamin (MULTIVITAMIN) capsule Take 1 capsule by mouth daily   • nystatin-triamcinolone (MYCOLOG-II) ointment Apply topically 2 (two) times a day As needed   • olopatadine (PATANOL) 0 1 % ophthalmic solution Administer 1 drop to both eyes 2 (two) times a day   • Turmeric 400 MG CAPS Take 1 capsule by mouth in the morning   • Vit C-Cholecalciferol-Alessandra Hip (Vitamin C & D3/Alessandra Hips) 500-1000-20 MG-UNIT-MG CAPS Take 1 capsule by mouth in the morning     Allergies   Allergen Reactions   • Dairy Aid [Tilactase] GI Intolerance and Vomiting   • Pollen Extract Other (See Comments)     Seasonal allergies  Immunization History   Administered Date(s) Administered   • Pneumococcal Conjugate Vaccine 20-valent (Pcv20), Polysace 07/07/2022   • Pneumococcal Polysaccharide PPV23 01/15/2020   • Tdap 10/05/2016       Objective     /80   Pulse 91   Temp 97 5 °F (36 4 °C)   Ht 5' 6" (1 676 m)   Wt 74 4 kg (164 lb)   SpO2 98%   BMI 26 47 kg/m²     Physical Exam  Vitals and nursing note reviewed  Constitutional:       Appearance: Normal appearance  HENT:      Head: Normocephalic and atraumatic  Pulmonary:      Effort: Pulmonary effort is normal  No respiratory distress  Abdominal:      General: Abdomen is flat  Bowel sounds are normal       Palpations: Abdomen is soft  There is no mass  Tenderness: There is abdominal tenderness (L sided periumbilical)  There is no guarding  Hernia: A hernia (no appreciable ventral or umbilical hernia) is present  Musculoskeletal:      Cervical back: Normal range of motion  Skin:     General: Skin is warm and dry  Neurological:      Mental Status: She is alert and oriented to person, place, and time         Sandra Ca PA-C

## 2023-02-01 ENCOUNTER — TELEPHONE (OUTPATIENT)
Dept: NEUROLOGY | Facility: CLINIC | Age: 48
End: 2023-02-01

## 2023-02-03 ENCOUNTER — TELEPHONE (OUTPATIENT)
Dept: FAMILY MEDICINE CLINIC | Facility: CLINIC | Age: 48
End: 2023-02-03

## 2023-02-03 NOTE — TELEPHONE ENCOUNTER
Patient had appointment Monday for CT in [facility] but they aren't free standing  Called and found a facility in [facility] that accepted are insurance and they could see her Monday also  Called patient to notify her and she was very upset  She said she wanted to speak to Keven Kline  Gave him the message

## 2023-02-06 ENCOUNTER — ANESTHESIA EVENT (OUTPATIENT)
Dept: PERIOP | Facility: HOSPITAL | Age: 48
End: 2023-02-06

## 2023-02-06 ENCOUNTER — TELEPHONE (OUTPATIENT)
Dept: FAMILY MEDICINE CLINIC | Facility: CLINIC | Age: 48
End: 2023-02-06

## 2023-02-06 ENCOUNTER — TELEPHONE (OUTPATIENT)
Dept: NEUROLOGY | Facility: CLINIC | Age: 48
End: 2023-02-06

## 2023-02-06 DIAGNOSIS — R20.2 PARESTHESIA: Primary | ICD-10-CM

## 2023-02-06 NOTE — TELEPHONE ENCOUNTER
Pt called office requesting new neurology referral as she had to reschedule her np appt with dr Fidel Wilson due to sx  Advised pt to reach out to insurance company so they can advise her of neurologist within her plan and we can update referral as needed  Pt gave understanding

## 2023-02-06 NOTE — PRE-PROCEDURE INSTRUCTIONS
Pre-Surgery Instructions:   Medication Instructions   • albuterol (PROVENTIL HFA,VENTOLIN HFA) 90 mcg/act inhaler Uses PRN- OK to take day of surgery   • Cholecalciferol (Vitamin D3) 25 MCG (1000 UT) CAPS Stop taking 7 days prior to surgery  • clobetasol (TEMOVATE) 0 05 % ointment Uses PRN- DO NOT take day of surgery   • cyanocobalamin (VITAMIN B-12) 100 MCG tablet Stop taking 7 days prior to surgery  • docusate sodium (COLACE) 100 mg capsule Uses PRN- OK to take day of surgery   • escitalopram (LEXAPRO) 10 mg tablet Uses PRN- OK to take day of surgery   • fluticasone (FLONASE) 50 mcg/act nasal spray Take day of surgery  • Multiple Vitamin (MULTIVITAMIN) capsule Stop taking 7 days prior to surgery  • olopatadine (PATANOL) 0 1 % ophthalmic solution Take day of surgery  • Turmeric 400 MG CAPS Stop taking 7 days prior to surgery  • Vit C-Cholecalciferol-Alessandra Hip (Vitamin C & D3/Alessandra Hips) 500-1000-20 MG-UNIT-MG CAPS Stop taking 7 days prior to surgery  Preop bathing and medication instructions reviewed with pt via telephone call  Pt verbalizes understanding  Instructed pt no alcohol, drugs or smoking 24 hrs prior to surgery  No vitamins or supplements (not ordered by a physician) for 7 days prior to surgery  No NSAIDS 5-7 days prior to surgery  Tylenol is OK to use  NPO after midnight the night prior to surgery  The hospital will call the pt with time and instructions one business day prior to surgery  Pt verbalizes understanding and all questions/concerns addressed

## 2023-02-09 ENCOUNTER — TELEPHONE (OUTPATIENT)
Dept: OBGYN CLINIC | Facility: HOSPITAL | Age: 48
End: 2023-02-09

## 2023-02-09 NOTE — TELEPHONE ENCOUNTER
Caller: Patient    Doctor/Office: [place]    Call regarding :  Calling regarding surgery time  I provided patient with ambulatory procedure unit # in [facility]  She said she really needs to know her surgery time due to her children        Call was transferred to: [facility] procedure [phone]

## 2023-02-09 NOTE — PROGRESS NOTES
[id number]   1 c- section and 2 vaginal deliveries   PMB:  SA:  HPV: NO   Birth control: Tubal ligation   Last pap: 01/22/2020  Negative HPV Negative   Last mammo: 05/20/2021:  Last colonoscopy: 12/13/2021  RTO in 5 years   Last Dexa: Not on file  Family History:      Mother- Breast cancer ,Ovarian cancer   Father - prostate cancer ,colon cancer   Half-sister - Breast cancer

## 2023-02-10 ENCOUNTER — HOSPITAL ENCOUNTER (OUTPATIENT)
Facility: HOSPITAL | Age: 48
Setting detail: OUTPATIENT SURGERY
Discharge: HOME | End: 2023-02-10
Attending: SURGERY | Admitting: SURGERY

## 2023-02-10 ENCOUNTER — ANESTHESIA (OUTPATIENT)
Dept: PERIOP | Facility: HOSPITAL | Age: 48
End: 2023-02-10

## 2023-02-10 VITALS
OXYGEN SATURATION: 99 % | HEART RATE: 92 BPM | RESPIRATION RATE: 20 BRPM | WEIGHT: 172 LBS | TEMPERATURE: 97.8 F | DIASTOLIC BLOOD PRESSURE: 74 MMHG | SYSTOLIC BLOOD PRESSURE: 117 MMHG | HEIGHT: 66 IN | BODY MASS INDEX: 27.64 KG/M2

## 2023-02-10 DIAGNOSIS — G56.01 CARPAL TUNNEL SYNDROME ON RIGHT: ICD-10-CM

## 2023-02-10 DIAGNOSIS — G56.21 CUBITAL TUNNEL SYNDROME, RIGHT: Primary | ICD-10-CM

## 2023-02-10 DIAGNOSIS — Z47.89 AFTERCARE FOLLOWING SURGERY OF THE MUSCULOSKELETAL SYSTEM: ICD-10-CM

## 2023-02-10 RX ORDER — FENTANYL CITRATE/PF 50 MCG/ML
50 SYRINGE (ML) INJECTION
Status: DISCONTINUED | OUTPATIENT
Start: 2023-02-10 | End: 2023-02-10 | Stop reason: HOSPADM

## 2023-02-10 RX ORDER — FENTANYL CITRATE 50 UG/ML
INJECTION, SOLUTION INTRAMUSCULAR; INTRAVENOUS AS NEEDED
Status: DISCONTINUED | OUTPATIENT
Start: 2023-02-10 | End: 2023-02-10

## 2023-02-10 RX ORDER — METOCLOPRAMIDE HYDROCHLORIDE 5 MG/ML
10 INJECTION INTRAMUSCULAR; INTRAVENOUS ONCE AS NEEDED
Status: DISCONTINUED | OUTPATIENT
Start: 2023-02-10 | End: 2023-02-10 | Stop reason: HOSPADM

## 2023-02-10 RX ORDER — PROPOFOL 10 MG/ML
INJECTION, EMULSION INTRAVENOUS AS NEEDED
Status: DISCONTINUED | OUTPATIENT
Start: 2023-02-10 | End: 2023-02-10

## 2023-02-10 RX ORDER — LIDOCAINE HYDROCHLORIDE 10 MG/ML
0.5 INJECTION, SOLUTION EPIDURAL; INFILTRATION; INTRACAUDAL; PERINEURAL ONCE AS NEEDED
Status: COMPLETED | OUTPATIENT
Start: 2023-02-10 | End: 2023-02-10

## 2023-02-10 RX ORDER — LORAZEPAM 2 MG/ML
1 INJECTION INTRAMUSCULAR ONCE
Status: COMPLETED | OUTPATIENT
Start: 2023-02-10 | End: 2023-02-10

## 2023-02-10 RX ORDER — OXYCODONE HYDROCHLORIDE AND ACETAMINOPHEN 5; 325 MG/1; MG/1
1 TABLET ORAL EVERY 4 HOURS PRN
Status: DISCONTINUED | OUTPATIENT
Start: 2023-02-10 | End: 2023-02-10 | Stop reason: HOSPADM

## 2023-02-10 RX ORDER — CEFAZOLIN SODIUM 2 G/50ML
2000 SOLUTION INTRAVENOUS ONCE
Status: COMPLETED | OUTPATIENT
Start: 2023-02-10 | End: 2023-02-10

## 2023-02-10 RX ORDER — EPHEDRINE SULFATE 50 MG/ML
INJECTION INTRAVENOUS AS NEEDED
Status: DISCONTINUED | OUTPATIENT
Start: 2023-02-10 | End: 2023-02-10

## 2023-02-10 RX ORDER — LIDOCAINE HYDROCHLORIDE 10 MG/ML
INJECTION, SOLUTION EPIDURAL; INFILTRATION; INTRACAUDAL; PERINEURAL AS NEEDED
Status: DISCONTINUED | OUTPATIENT
Start: 2023-02-10 | End: 2023-02-10

## 2023-02-10 RX ORDER — ROPIVACAINE HYDROCHLORIDE 5 MG/ML
INJECTION, SOLUTION EPIDURAL; INFILTRATION; PERINEURAL AS NEEDED
Status: DISCONTINUED | OUTPATIENT
Start: 2023-02-10 | End: 2023-02-10

## 2023-02-10 RX ORDER — OXYCODONE HYDROCHLORIDE AND ACETAMINOPHEN 5; 325 MG/1; MG/1
1 TABLET ORAL EVERY 4 HOURS PRN
Qty: 12 TABLET | Refills: 0 | Status: SHIPPED | OUTPATIENT
Start: 2023-02-10 | End: 2023-02-20

## 2023-02-10 RX ORDER — CHLORHEXIDINE GLUCONATE 0.12 MG/ML
15 RINSE ORAL ONCE
Status: DISCONTINUED | OUTPATIENT
Start: 2023-02-10 | End: 2023-02-10

## 2023-02-10 RX ORDER — PROPOFOL 10 MG/ML
INJECTION, EMULSION INTRAVENOUS CONTINUOUS PRN
Status: DISCONTINUED | OUTPATIENT
Start: 2023-02-10 | End: 2023-02-10

## 2023-02-10 RX ORDER — ONDANSETRON 2 MG/ML
INJECTION INTRAMUSCULAR; INTRAVENOUS AS NEEDED
Status: DISCONTINUED | OUTPATIENT
Start: 2023-02-10 | End: 2023-02-10

## 2023-02-10 RX ORDER — MIDAZOLAM HYDROCHLORIDE 2 MG/2ML
INJECTION, SOLUTION INTRAMUSCULAR; INTRAVENOUS AS NEEDED
Status: DISCONTINUED | OUTPATIENT
Start: 2023-02-10 | End: 2023-02-10

## 2023-02-10 RX ORDER — LIDOCAINE HYDROCHLORIDE 10 MG/ML
10 INJECTION, SOLUTION EPIDURAL; INFILTRATION; INTRACAUDAL; PERINEURAL ONCE
Status: DISCONTINUED | OUTPATIENT
Start: 2023-02-10 | End: 2023-02-10 | Stop reason: HOSPADM

## 2023-02-10 RX ORDER — DEXAMETHASONE SODIUM PHOSPHATE 4 MG/ML
INJECTION, SOLUTION INTRA-ARTICULAR; INTRALESIONAL; INTRAMUSCULAR; INTRAVENOUS; SOFT TISSUE AS NEEDED
Status: DISCONTINUED | OUTPATIENT
Start: 2023-02-10 | End: 2023-02-10

## 2023-02-10 RX ORDER — BUPIVACAINE HYDROCHLORIDE 2.5 MG/ML
10 INJECTION, SOLUTION EPIDURAL; INFILTRATION; INTRACAUDAL ONCE
Status: DISCONTINUED | OUTPATIENT
Start: 2023-02-10 | End: 2023-02-10 | Stop reason: HOSPADM

## 2023-02-10 RX ORDER — ONDANSETRON 2 MG/ML
4 INJECTION INTRAMUSCULAR; INTRAVENOUS ONCE AS NEEDED
Status: DISCONTINUED | OUTPATIENT
Start: 2023-02-10 | End: 2023-02-10 | Stop reason: HOSPADM

## 2023-02-10 RX ORDER — SODIUM CHLORIDE, SODIUM LACTATE, POTASSIUM CHLORIDE, CALCIUM CHLORIDE 600; 310; 30; 20 MG/100ML; MG/100ML; MG/100ML; MG/100ML
50 INJECTION, SOLUTION INTRAVENOUS CONTINUOUS
Status: DISCONTINUED | OUTPATIENT
Start: 2023-02-10 | End: 2023-02-10 | Stop reason: HOSPADM

## 2023-02-10 RX ORDER — GLYCOPYRROLATE 0.2 MG/ML
INJECTION INTRAMUSCULAR; INTRAVENOUS AS NEEDED
Status: DISCONTINUED | OUTPATIENT
Start: 2023-02-10 | End: 2023-02-10

## 2023-02-10 RX ADMIN — PROPOFOL 50 MG: 10 INJECTION, EMULSION INTRAVENOUS at 13:21

## 2023-02-10 RX ADMIN — ONDANSETRON 4 MG: 2 INJECTION INTRAMUSCULAR; INTRAVENOUS at 13:46

## 2023-02-10 RX ADMIN — SODIUM CHLORIDE, POTASSIUM CHLORIDE, SODIUM LACTATE AND CALCIUM CHLORIDE 50 ML/HR: 600; 310; 30; 20 INJECTION, SOLUTION INTRAVENOUS at 12:01

## 2023-02-10 RX ADMIN — LIDOCAINE HYDROCHLORIDE 50 MG: 10 INJECTION, SOLUTION EPIDURAL; INFILTRATION; INTRACAUDAL; PERINEURAL at 13:19

## 2023-02-10 RX ADMIN — EPHEDRINE SULFATE 10 MG: 50 INJECTION INTRAVENOUS at 13:52

## 2023-02-10 RX ADMIN — FENTANYL CITRATE 50 MCG: 50 INJECTION, SOLUTION INTRAMUSCULAR; INTRAVENOUS at 13:04

## 2023-02-10 RX ADMIN — PROPOFOL 100 MCG/KG/MIN: 10 INJECTION, EMULSION INTRAVENOUS at 13:19

## 2023-02-10 RX ADMIN — DEXAMETHASONE SODIUM PHOSPHATE 4 MG: 4 INJECTION INTRA-ARTICULAR; INTRALESIONAL; INTRAMUSCULAR; INTRAVENOUS; SOFT TISSUE at 13:05

## 2023-02-10 RX ADMIN — FENTANYL CITRATE 25 MCG: 50 INJECTION, SOLUTION INTRAMUSCULAR; INTRAVENOUS at 13:38

## 2023-02-10 RX ADMIN — LIDOCAINE HYDROCHLORIDE 0.5 ML: 10 INJECTION, SOLUTION EPIDURAL; INFILTRATION; INTRACAUDAL; PERINEURAL at 12:00

## 2023-02-10 RX ADMIN — EPHEDRINE SULFATE 10 MG: 50 INJECTION INTRAVENOUS at 13:43

## 2023-02-10 RX ADMIN — ROPIVACAINE HYDROCHLORIDE 25 MG: 5 INJECTION, SOLUTION EPIDURAL; INFILTRATION; PERINEURAL at 13:05

## 2023-02-10 RX ADMIN — PROPOFOL 100 MG: 10 INJECTION, EMULSION INTRAVENOUS at 13:19

## 2023-02-10 RX ADMIN — EPHEDRINE SULFATE 10 MG: 50 INJECTION INTRAVENOUS at 13:23

## 2023-02-10 RX ADMIN — CEFAZOLIN SODIUM 2000 MG: 2 SOLUTION INTRAVENOUS at 13:20

## 2023-02-10 RX ADMIN — DEXAMETHASONE SODIUM PHOSPHATE 10 MG: 4 INJECTION INTRA-ARTICULAR; INTRALESIONAL; INTRAMUSCULAR; INTRAVENOUS; SOFT TISSUE at 13:46

## 2023-02-10 RX ADMIN — FENTANYL CITRATE 25 MCG: 50 INJECTION, SOLUTION INTRAMUSCULAR; INTRAVENOUS at 13:52

## 2023-02-10 RX ADMIN — PROPOFOL 150 MG: 10 INJECTION, EMULSION INTRAVENOUS at 13:24

## 2023-02-10 RX ADMIN — LORAZEPAM 1 MG: 2 INJECTION INTRAMUSCULAR; INTRAVENOUS at 15:19

## 2023-02-10 RX ADMIN — GLYCOPYRROLATE 0.1 MG: 0.2 INJECTION, SOLUTION INTRAMUSCULAR; INTRAVENOUS at 13:46

## 2023-02-10 RX ADMIN — MIDAZOLAM 2 MG: 1 INJECTION INTRAMUSCULAR; INTRAVENOUS at 13:04

## 2023-02-10 NOTE — H&P
ASSESSMENT/PLAN:       42 yo female with right carpal tunnel syndrome with suspected cubital tunnel  EMG was reviewed which demonstrates evidence of carpal tunnel without cubital or cervical pathology  On exam there is concern for cubital tunnel as well  We discussed that sometimes cubital tunnel can be missed on EMG about 20-30% of the time  I would like to order an ultrasound of the elbow to check for cubital tunnel  We discussed treatment options including continued bracing, injections, and surgical release  Patient would like the problem definitively fixed and was agreeable to surgery  Risks, benefits, and alternatives were discussed and informed consent was signed for right carpal tunnel release and possible right cubital tunnel release pending positive ultrasound  Informed consent was signed  We will see her 10-14 days after surgery for postop evaluation and suture removal       The patient verbalized understanding of exam findings and treatment plan  We engaged in the shared decision-making process and treatment options were discussed at length with the patient  Surgical and conservative management discussed today along with risks and benefits      Diagnoses and all orders for this visit:     Cubital tunnel syndrome, right  -     Ambulatory Referral to Hand Surgery  -     [facility] limited; Future  -     Case request operating room: RELEASE CUBITAL TUNNEL, RELEASE CARPAL TUNNEL; Standing  -     Case request operating room: RELEASE CUBITAL TUNNEL, [address]     Carpal tunnel syndrome on right  -     Ambulatory Referral to Hand Surgery  -     Case request operating room: RELEASE CUBITAL TUNNEL, [address]; Standing  -     Case request operating room: RELEASE CUBITAL TUNNEL, [address]     Other orders  -     Nursing Communication [facility] Interventions Implemented; Standing  -     Nursing Communication CHG bath, have staff wash entire body (neck down) per pre-op bathing protocol  Routine, evening prior to, and day of surgery ; Standing  -     Nursing Communication Swab both nares with Povidone-Iodine solution, EXCLUDE if patient has shellfish/Iodine allergy  Routine, day of surgery, on call to OR; Standing  -     chlorhexidine (PERIDEX) 0 12 % oral rinse 15 mL  -     Void on call to OR; Standing  -     Insert peripheral IV; Standing  -     Diet NPO; Sips with meds; Standing  -     ceFAZolin (ANCEF) IVPB (premix in dextrose) 2,000 mg 50 mL           Follow Up:  Return for After Surgery      To Do Next Visit:  Re-evaluation of current issue        General Discussions:  Carpal Tunnel Syndrome: The anatomy and physiology of carpal tunnel syndrome was discussed with the patient today  Increase pressure localized under the transverse carpal ligament can cause pain, numbness, tingling, or dysesthesias within the median nerve distribution as well as feelings of fatigue, clumsiness, or awkwardness  These symptoms typically occur at night and worse in the morning upon waking  Eventually, untreated carpal tunnel syndrome can result in weakness and permanent loss of muscle within the thenar compartment of the hand  Treatment options were discussed with the patient  Conservative treatment includes nocturnal resting splints to keep the nerve in a neutral position, ergonomic changes within the work or home environment, activity modification, and tendon gliding exercises  Vitamin B6 one tablet daily over the counter may helpful to reduce symptoms  Steroid injections within the carpal canal can help a majority of patients, however this is often self-limited in a majority of patients  Surgical intervention to divide the transverse carpal ligament typically results in a long-lasting relief of the patient's complaints, with the recurrence rate of less than 1%  Cubital Tunnel Syndrome: The anatomy and physiology of cubital tunnel syndrome were discussed with the patient today in the office  Typically, increased elbow flexion activities decrease blood flow within the intraneural spaces, resulting in a feeling of numbness, tingling, weakness, or clumsiness within the hand and fingers  Occasionally, anatomic structures such as medial elbow osteophytes, the medial head of the triceps, were subluxing ulnar nerve may result in increased pressure or aggravation at the cubital tunnel  Typical signs and symptoms usually include numbness and tingling within the ring and small finger, weakness with , and weakness with pinch  Conservative treatment and includes nocturnal bracing to keep the elbow in a semi-extended position, activity modification, therapy, and avoiding excessive elbow flexion activities  Vitamin B6 one tablet daily over the counter may helpful to reduce symptoms  A majority of patients typically respond to conservative treatment over a period of approximately 3-6 months  EMG/NCV testing of the ulnar nerve at the elbow is not as reliable as carpal tunnel syndrome  Surgical intervention in the form of in situ release of the ulnar nerve at the elbow or ulnar nerve transposition may be required in up to 20% of patients       Operative Discussions:  Cubital Tunnel Release: The anatomy and physiology of cubital tunnel syndrome were discussed with the patient today in the office  Typically, increased elbow flexion activities decrease blood flow within the intraneural spaces, resulting in a feeling of numbness, tingling, weakness, or clumsiness within the hand and fingers  Occasionally, anatomic structures such as medial elbow osteophytes, the medial head of the triceps, were subluxing ulnar nerve may result in increased pressure or aggravation at the cubital tunnel    Typical signs and symptoms usually include numbness and tingling within the ring and small finger, weakness with , and weakness with pinch  Conservative treatment and includes nocturnal bracing to keep the elbow in a semi-extended position, activity modification, therapy, and avoiding excessive elbow flexion activities  A majority of patients typically respond to conservative treatment over a period of approximately 3-6 months  Vitamin B6 one tablet daily over the counter may helpful to reduce symptoms  EMG/NCV testing of the ulnar nerve at the elbow is not as reliable as carpal tunnel syndrome  Surgical intervention in the form of in situ release of the ulnar nerve at the elbow or ulnar nerve transposition may be required in up to 20% of patients  The patient has elected to undergo cubital tunnel release  The possibility of converting to a subcutaneous or submuscular ulnar nerve transposition depending on the nerve stability was discussed with the patient  Typically, in the postoperative period, light activities are allowed immediately, driving is allowed when narcotic medications have stopped, and the incision may get wet after 5 days  Heavy activities will be allowed after follow up appointment in 1-2 weeks  While the pain within the ring and small finger of the hands generally improves rapidly, the numbness and tingling, as well as the strength, will slowly improve over a period of weeks to months  Total recovery can take up to 18 months from the time of surgery  Numbness and tingling near the incision, or near the medial aspect of the forearm was discussed with the patient  The patient has an understanding of the above mentioned discussion  The risks and benefits of the procedure were explained to the patient, which include, but are not limited to: Bleeding, infection, recurrence, pain, scar, damage to tendons, damage to nerves, and damage to blood vessels, failure to give desired results and complications related to anesthesia    These risks, along with alternative conservative treatment options, and postoperative protocols were voiced back and understood by the patient  All questions were answered to the patient's satisfaction  The patient agrees to comply with a standard postoperative protocol, and is willing to proceed  Education was provided via written and auditory forms  There were no barriers to learning  Written handouts regarding wound care, incision and scar care, and general preoperative information was provided to the patient  Prior to surgery, the patient may be requested to stop all anti-inflammatory medications  Prophylactic aspirin, Plavix, and Coumadin may be allowed to be continued  Medications including vitamin E , ginkgo, and fish oil are requested to be stopped approximately one week prior to surgery  Hypertensive medications and beta blockers, if taken, should be continued  Vitamin B6 one tablet daily over the counter may helpful to reduce symptoms  and Open Carpal Tunnel Release: The anatomy and physiology of carpal tunnel syndrome was discussed with the patient today  Increase pressure localized under the transverse carpal ligament can cause pain, numbness, tingling, or dysesthesias within the median nerve distribution as well as feelings of fatigue, clumsiness, or awkwardness  These symptoms typically occur at night and worse in the morning upon waking  Eventually, untreated carpal tunnel syndrome can result in weakness and permanent loss of muscle within the thenar compartment of the hand  Treatment options were discussed with the patient  Conservative treatment includes nocturnal resting splints to keep the nerve in a neutral position, ergonomic changes within the work or home environment, activity modification, and tendon gliding exercises  Vitamin B6 one tablet daily over the counter may helpful to reduce symptoms    Steroid injections within the carpal canal can help a majority of patients, however this is often self-limited in a majority of patients  Surgical intervention to divide the transverse carpal ligament typically results in a long-lasting relief of the patient's complaints, with the recurrence rate of less than 1%  The patient has elected to undergo an open carpal tunnel release  The palmar incision technique was discussed in the office with the patient today  In the postoperative period, light activities are allowed immediately, driving is allowed when narcotic medication has stopped, and the bandages may be removed and incision may get wet after 2 days  Heavy activities (lifting more than approximately 10 pounds) will be allowed after follow up appointment in 1-2 weeks  While night symptoms (waking from sleep, pain, and discomfort in the hands) generally improves rapidly, the numbness and tingling as well as the strength will slowly improve over weeks to months depending on the chronicity and severity of the carpal tunnel syndrome  Pillar pain and scar discomfort were discussed with the patient which are self-limiting conditions  The risks of bleeding and infection from the surgery are less than 1%  Risk of recurrence is approximately 0 5%  The risks of nerve injury or nerve damage or damage to the blood vessels is approximately 1 in 1200  The patient has an understanding of the above mentioned discussion  The risks and benefits of the procedure were explained to the patient, which include, but are not limited to: Bleeding, infection, recurrence, pain, scar, damage to tendons, damage to nerves, and damage to blood vessels, failure to give desired results and complications related to anesthesia  These risks, along with alternative conservative treatment options, and postoperative protocols were voiced back and understood by the patient  All questions were answered to the patient's satisfaction  The patient agrees to comply with a standard postoperative protocol, and is willing to proceed    Education was provided via written and auditory forms  There were no barriers to learning  Written handouts regarding wound care, incision and scar care, and general preoperative information was provided to the patient  Prior to surgery, the patient may be requested to stop all anti-inflammatory medications  Prophylactic aspirin, Plavix, and Coumadin may be allowed to be continued  Medications including vitamin E , ginkgo, and fish oil are requested to be stopped approximately one week prior to surgery  Hypertensive medications and beta blockers, if taken, should be continued        ____________________________________________________________________________________________________________________________________________        CHIEF COMPLAINT:      Chief Complaint   Patient presents with   • Right Hand - Numbness, Tingling         SUBJECTIVE:  Fior Velasquez is a 41y o  year old RHD female seen in consultation requested by Dr Rochelle Kawasaki who presents for evaluation of right upper extremity paresthesias  Patient notes worsening paresthesias into the entire hand over the last 5 months to a year  Symptoms worsen at rest and at night as well as with driving and certain activity  She also notes associated medial elbow pain and swelling  She was previously treated with carpal tunnel injections and is currently bracing at the wrist and elbow however her symptoms continue to worsen   No injuries or prior surgeries          Prior treatment   • NSAIDsYes   • Injections Yes   • Bracing/Orthotics Yes    Physical Therapy No      I have personally reviewed all the relevant PMH, PSH, SH, FH, Medications and allergies        PAST MEDICAL HISTORY:  Medical History        Past Medical History:   Diagnosis Date   • Anxiety     • Arthritis     • Asthma     • Depression     • Osteoporosis     • Urinary tract infection              PAST SURGICAL HISTORY:  Surgical History         Past Surgical History:   Procedure Laterality Date   •  SECTION       • TUBAL LIGATION                FAMILY HISTORY:  Family History         Family History   Problem Relation Age of Onset   • Cancer Mother     • Breast cancer Mother           age unknown   • Diabetes Mother     • Coronary artery disease Mother     • Ovarian cancer Mother     • Cancer Father     • Prostate cancer Father     • Colon cancer Father     • No Known Problems Daughter     • No Known Problems Maternal Grandmother     • No Known Problems Paternal Grandmother     • No Known Problems Half-Sister     • Breast cancer Half-Sister           age unknown   • No Known Problems Half-Sister     • No Known Problems Sister     • No Known Problems Sister     • No Known Problems Sister     • No Known Problems Sister     • No Known Problems Sister     • No Known Problems Daughter     • No Known Problems Maternal Aunt     • No Known Problems Maternal Aunt     • No Known Problems Maternal Aunt     • No Known Problems Maternal Aunt     • No Known Problems Maternal Aunt     • No Known Problems Maternal Aunt     • No Known Problems Maternal Aunt     • No Known Problems Maternal Aunt     • No Known Problems Paternal Aunt     • No Known Problems Paternal Aunt     • No Known Problems Paternal Aunt     • No Known Problems Paternal Aunt     • No Known Problems Paternal Aunt              SOCIAL HISTORY:  Social History            Tobacco Use   • Smoking status: Former Smoker       Packs/day: 0 50       Types: Cigarettes       Quit date: 2021       Years since quittin 8   • Smokeless tobacco: Never Used   Vaping Use   • Vaping Use: Never used   Substance Use Topics   • Alcohol use:  Yes       Comment: Social    • Drug use: Not Currently         MEDICATIONS:     Current Outpatient Medications:   •  albuterol (PROVENTIL HFA,VENTOLIN HFA) 90 mcg/act inhaler, Inhale 2 puffs every 6 (six) hours as needed for wheezing (cough), Disp: 1 Inhaler, Rfl: 0  •  Cholecalciferol (Vitamin D3) 25 MCG (1000 UT) CAPS, Take 1 capsule by mouth daily, Disp: , Rfl:   •  cyanocobalamin (VITAMIN B-12) 100 MCG tablet, Take 100 mcg by mouth in the morning, Disp: , Rfl:   •  fexofenadine (ALLEGRA) 180 MG tablet, Take 1 tablet (180 mg total) by mouth daily, Disp: 60 tablet, Rfl: 0  •  fluticasone (FLONASE) 50 mcg/act nasal spray, SPRAY 1 SPRAY INTO EACH NOSTRIL EVERY DAY (Patient taking differently: 2 sprays into each nostril daily), Disp: 16 mL, Rfl: 1  •  Multiple Vitamin (MULTIVITAMIN) capsule, Take 1 capsule by mouth daily, Disp: , Rfl:   •  Turmeric 400 MG CAPS, Take 1 capsule by mouth in the morning, Disp: , Rfl:   •  Vit C-Cholecalciferol-Alessandra Hip (Vitamin C & D3/Alessandra Hips) 500-1000-20 MG-UNIT-MG CAPS, Take 1 capsule by mouth in the morning, Disp: , Rfl:   •  citalopram (CeleXA) 10 mg tablet, Take 1 tablet (10 mg total) by mouth daily (Patient not taking: No sig reported), Disp: 30 tablet, Rfl: 0  •  clobetasol (TEMOVATE) 0 05 % ointment, Apply 1 application topically 2 (two) times a day To affected area (Patient not taking: No sig reported), Disp: 30 g, Rfl: 0  •  nystatin-triamcinolone (MYCOLOG-II) ointment, Apply topically 2 (two) times a day As needed (Patient not taking: No sig reported), Disp: 45 g, Rfl: 1  •  olopatadine (PATANOL) 0 1 % ophthalmic solution, Administer 1 drop to both eyes 2 (two) times a day (Patient not taking: No sig reported), Disp: 5 mL, Rfl: 3     ALLERGIES:        Allergies   Allergen Reactions   • Dairy Aid [Lactase] GI Intolerance and Vomiting   • Pollen Extract Other (See Comments)       Seasonal allergies              REVIEW OF SYSTEMS:  Review of Systems   Constitutional: Negative for chills, fatigue and fever  HENT: Negative for hearing loss, nosebleeds and sore throat  Eyes: Negative for redness and visual disturbance  Respiratory: Negative for cough, shortness of breath and wheezing  Cardiovascular: Negative for chest pain, palpitations and leg swelling     Gastrointestinal: Negative for abdominal pain, nausea and vomiting  Endocrine: Negative for polydipsia and polyuria  Genitourinary: Negative for difficulty urinating, dysuria and hematuria  Musculoskeletal: Positive for arthralgias  Negative for joint swelling and myalgias  Skin: Negative for rash and wound  Neurological: Positive for numbness  Negative for speech difficulty, weakness and headaches  Psychiatric/Behavioral: Negative for decreased concentration and suicidal ideas  The patient is not nervous/anxious           VITALS:      Vitals:     10/24/22 0744   BP: 112/70   Pulse: 72   Resp: 16         LABS:  HgA1c:         Lab Results   Component Value Date     HGBA1C 5 2 01/16/2020      BMP:         Lab Results   Component Value Date     CALCIUM 9 5 05/27/2022     K 4 2 05/27/2022     CO2 29 05/27/2022      05/27/2022     BUN 15 05/27/2022     CREATININE 0 65 05/27/2022         _____________________________________________________  PHYSICAL EXAMINATION:  General: well developed and well nourished, alert, oriented times 3 and appears comfortable  Psychiatric: Normal  HEENT: Normocephalic, Atraumatic Trachea Midline, No torticollis  Pulmonary: No audible wheezing or respiratory distress   Abdomen/GI: Non tender, non distended   Cardiovascular: No pitting edema, 2+ radial pulse   Skin: No masses, erythema, lacerations, fluctation, ulcerations  Neurovascular: Sensation Intact to the Median, Ulnar, Radial Nerve, Motor Intact to the Median, Ulnar, Radial Nerve and Pulses Intact  Musculoskeletal: Normal, except as noted in detailed exam and in HPI         MUSCULOSKELETAL EXAMINATION:  Right Carpal Tunnel Exam:     Negative thenar atrophy  Positive phalen's test  Positive carpal tunnel compression  Positive tinels over median nerve at the wrist   Opposition strength 5/5  Abduction strength 5/5        Right Ulnar Nerve Exam:     Negative intrinsic atrophy  Tender at 2 heads of FCU  Negative  deformity at the elbow     Full range of motion with flexion and extension of the elbow  Positive ulnar nerve compression test at the elbow  Positive tinels over the ulnar nerve at the elbow  Negative cross finger test in the index and long fingers  FDP strength is 5/5 to the ring finger  FDP strength is 5/5 to the small finger    Intrinsic strength 5/5       ___________________________________________________  STUDIES REVIEWED:  I have personally reviewed EMG of the right upper extremity which demonstrates evidence of mild carpal tunnel syndrome without evidence of cubital tunnel or cervical radiculopathy

## 2023-02-10 NOTE — INTERVAL H&P NOTE
H&P reviewed  After examining the patient I find no changes in the patients condition since the H&P had been written  US suspicious for cubital tunnel syndrome given nerve enlargement    Would like to proceed with surgical release for carpal and cubital tunnel releases

## 2023-02-10 NOTE — DISCHARGE INSTR - AVS FIRST PAGE
Post Operative Instructions    You have had surgery on your arm today, please read and follow the information below:  Elevate your hand above your elbow during the next 24-48 hours to help with swelling  Place your hand and arm over your head with motion at your shoulder three times a day  Do not apply any cream/ointment/oil to your incisions including antibiotics  Do not soak your hands in standing water (dishwater, tubs, Jacuzzi's, pools, etc ) until given permission (typically 2-3 weeks after injury)    Call the office if you notice any:  Increased numbness or tingling of your hand or fingers that is not relieved with elevation  Increasing pain that is not controlled with medication  Difficulty chewing, breathing, swallowing  Chest pains or shortness of breath  Fever over 101 4 degrees  Bandage: Please keep bandages clean and dry  Remove bandage after 5 days  Once bandages are removed you may wash hands with soap and water  Short showers are okay as well, but please avoid soaking the hand as described above (ie no pools, baths, dirty dish water, hot tubs, ocean/lake water, etc)  Sutures will be removed in the office at your first follow up visit, please do not remove them yourself    Please do NOT put any topical agents on the surgical wound including neosporin, peroxide, tea tree oil, vitamin E, etc  as these can delay wound healing  Motion: Move fingers into a fist 5 times a day, DO NOT move any splinted fingers  Weight bearing status: Avoid heavy lifting (>5 pounds) with the extremity that was operated on until follow up appointment  Normal activities of daily living are OK  Ice: Ice for 10 minutes every hour as needed for swelling x 24 hours  Sling: Sling for comfort for 2-3 days  Discontinue sling when comfortable       Pain medication:   Naproxen 220 mg two time a day (do not take this medication if you were told by your doctor that you cannot take anti-inflammatories or NSAIDS)  Tylenol Extended Release 650 mg every 8 hours  Norco/Hydrocodone one tab every 6 hours ONLY AS NEEDED for severe pain         Follow-up Appointment: 10-14 days with Dr Bryce De La Rosa        Please call the office if you have any questions or concerns regarding your post-operative care

## 2023-02-10 NOTE — ANESTHESIA POSTPROCEDURE EVALUATION
Post-Op Assessment Note    CV Status:  Stable    Pain management: adequate     Mental Status:  Alert and awake   Hydration Status:  Euvolemic   PONV Controlled:  Controlled   Airway Patency:  Patent      Post Op Vitals Reviewed: Yes      Staff: CRNA   Comments: VSS report RN        No notable events documented      BP   140/90   Temp      Pulse  87   Resp      SpO2   99 RA

## 2023-02-10 NOTE — ANESTHESIA PREPROCEDURE EVALUATION
Procedure:  RELEASE CUBITAL TUNNEL (Right: Elbow)  RELEASE CARPAL TUNNEL (Right: Wrist)    Relevant Problems   MUSCULOSKELETAL   (+) Fibromyalgia      NEURO/PSYCH   (+) Anxiety   (+) Chronic neck pain   (+) Fibromyalgia   (+) History of COVID-19      PULMONARY   (+) Mild intermittent asthma without complication        Physical Exam    Airway    Mallampati score: II  TM Distance: >3 FB  Neck ROM: full     Dental       Cardiovascular      Pulmonary      Other Findings        Anesthesia Plan  ASA Score- 2     Anesthesia Type- IV sedation with anesthesia with ASA Monitors  Additional Monitors:   Airway Plan:           Plan Factors-    Chart reviewed  Induction- intravenous  Postoperative Plan-     Informed Consent- Anesthetic plan and risks discussed with patient  I personally reviewed this patient with the CRNA  Discussed and agreed on the Anesthesia Plan with the CRNA  Kelsey Rosas

## 2023-02-10 NOTE — OP NOTE
OPERATIVE REPORT  PATIENT NAME: Monserrat Mazariegos  :  1975  MRN: [id number]  Pt Location: [facility]    SURGERY DATE: 02/10/23    Surgeon(s) and Role:     * Ange Mathis MD - Primary     * Yamel Benjamin PA-C - Assisting    Pre-Op Diagnosis:  Cubital tunnel syndrome, right [G56 21]  Carpal tunnel syndrome on right [G56 01]    Post-Op Diagnosis Codes:     * Cubital tunnel syndrome, right [G56 21]     * Carpal tunnel syndrome on right [G56 01]    Procedure(s):  RELEASE CUBITAL TUNNEL (Right)  RELEASE CARPAL TUNNEL (Right)    Specimen(s):  * No orders in the log *    Estimated Blood Loss:   Minimal    Anesthesia Type:   Regional with Sedation    IMPLANTS:  * No implants in log *    PERIOPERATIVE ANTIBIOTICS:    cefazolin, 2 grams    Tourniquet Time: 21 minutes  at 250  mmHg          Operative Indications: The patient has a history of Carpal Tunnel Syndrome  right and Cubital Tunnel Syndrome  right that was recalcitrant to conservative management  The decision was made to bring the patient to the operating room for Open Carpal Tunnel Release  right and Cubital Tunnel Release  right  Risks of the procedure were explained which include, but are not limited to bleeding; infection; damage to nerves, arteries,veins, tendons; scar; pain; need for reoperation; failure to give desired result; and risks of anaesthesia  All questions were answered to satisfaction and they were willing to proceed  Operative Findings:  Hypertrophy TCL   Ulnar nerve compression at deep fcu fascia       Complications:   None    Procedure and Technique:  After the patient, site, and procedure were identified, the patient was brought into the operating room in a supine position  Regional and general anaesthesia were provided  The  right upper extremity was then prepped and drapped in a normal, sterile, orthopedic fashion  A large hemaclear was applied in a sterile fashion         An anterior approach to the carpal tunnel was undertaken  A 2 cm incision was made in line with the fourth digit, proximal to Linares's line  The skin and the subcutaneous tissue were sharply incised  Under loupe magnification, dissection was carried down to the palmar fascia and it was incised  The transverse carpal ligament was visualized and  Released distally with a #64 blade  A freer was was passed above and below the TCL in a retrograde fashion, freeing up any adhesions  A Knife Lite was used to release the proximal portion of the transverse carpal ligament under direct visualization  Distally the superficial arch was identified  A complete release was carried out and exploration of the carpal canal revealed no significant tenosynovitis  The median nerve and its branches were intact  A 6 cm Incision was made with a 15 blade between medial epicondyle and olecranon  A single  branch of the MABC was identified distally and protected  The ulnar nerve was identified at the level of the cubital tunnel  The nerve was not found to be subluxed  The Ulnar nerve was traced 1st traced proximally  Proximal release was performed under direct visualization  the overlying fascia was released with a scissor up to the level of the intermuscular septum which was incised  Next dissection was taken proximally both with a scissor and with a 64 blade  Care was taken preserve any crossing branches of the medial antebrachial cutaneous nerve  Decompression was taken around the olecranon, and then to the FCU fascia  The super all facial layer of the fascia was released with a knife and the 2 bellies of the FCU spread gently with a scissor  The deep fascia of the FCU was identified and with the nerve protected all times incised  Release was taken just past the level of the 1st motor branch to the FCU  Once a complete release was verified,  the Elbow was taken through a full range of motion and the nerve was found to be stable with no subluxation   After the release, it was appreciated that the nerve had been significantly constricted at the level of the deep fcu fascia  with flattening of the nerve  The tourniquet was brought down and hemostasis was obtained  The wound was copiously irrigated and closed in a layered fashion with 3-0 monocryl  for deep dermal       At the completion of the procedure, hemostasis was obtained with cautery and direct pressure  The wounds were copiously irrigated with sterile solution  The wounds were closed with Prolene, Monocryl and Steri-strips  Sterile dressings were applied, including Xeroform, gauze, tweeners, webril, ACE and Sling  Please note, all sponge, needle, and instrument counts were correct prior to closure  Loupe magnification was utilized  The patient tolerated the procedure well       I was present for the entire procedure, A qualified resident physician was not available and A physician assistant was required during the procedure for retraction tissue handling,dissection and suturing    Patient Disposition:  PACU     SIGNATURE: Ashley Darby MD  DATE: 02/10/23  TIME: 1:56 PM

## 2023-02-12 NOTE — PROGRESS NOTES
Diagnoses and all orders for this visit:    Encounter for gynecological examination without abnormal finding  -     Liquid-based pap, screening    Encounter for screening mammogram for malignant neoplasm of breast  -     Mammo screening bilateral w 3d & cad; Future        Perineal hygiene reviewed   Weight bearing exercises minium of 150 mins/weekly advised  Kegel exercises recommended  SBE encouraged, ASCCP guidelines reviewed  Condoms encouraged with all sexual activity to prevent STI's  Gardisil vaccines recommended up to age 39  Calcium/ Vit D dietary requirements discussed,   Advised to call with any issues,  all concerns & questions addressed  See provided information in your after visit summary     F/U Annually and PRN      Health Maintenance:    Last PAP: 01/22/2020 Neg/Neg   Next PAP Due: collected today     Last Mammogram: 05/20/2021 , right breast mass noted, diagnostic imaging completed, cleared for annual screening   Life time Oliva Shepherdtina % 9 57, Density B scattered areas of fibroglandular density , Bi-Rads 2 Benign  Next Mammogram: order given    Last Colonoscopy: 12/13/2021    RTO in 5years     Gardisil:  Not completed       Subjective    CC: Yearly Exam      Mercedes Joseph is a 57 y o  female here for an annual exam  [id number]  GYN hx includes:  2 vaginal delivery , 1 c section   No personal Hx of breast, cervical, ovarian or colon CA  Family hx of: Mother with BC , cervical & ovarian  Pt had genetic counseling completed     Medically stable, reports no changes in medical Hx, follows with PMD  Recent carpal tunnel surgery right arm, doing well     Patient's last menstrual period was 12/11/2022 (exact date)  Her menstrual cycles are rare   She is officaly menopausal by definition    Reports history of abnormal pap smear years ago with Colpo negative result   She denies breast concerns, abnormal vaginal discharge, vaginal itching, odor, irritation, bowel dysfunction, pelvic pain, t  today  He reports urinary leakage  We reviewed Kegel exercises at length and offered pelvic floor physical therapy if Kegel exercises are not effective patient will let me know after 3 months of Kegels  Patient reports dyspareunia with insertion  Advised coconut oil, perineal massage  If this is not effective we can discuss estrogen topical therapy at another visit  She is sexually active  Monogamous relationship  Her current method of contraception includes  none  Denies any issues with her BCM  Leni Many She does not want STD testing today    Denies intimate partner violence    Past Medical History:   Diagnosis Date   • Abnormal Pap smear of cervix    • Anxiety    • Arthritis    • Asthma    • Depression    • Osteoporosis    • Urinary tract infection    • Varicella      Past Surgical History:   Procedure Laterality Date   •  SECTION     • MO NEUROPLASTY &/TRANSPOS MEDIAN NRV CARPAL TUNNE Right 2/10/2023    Procedure: RELEASE CARPAL TUNNEL;  Surgeon: Rusty Moran MD;  Location: [facility];  Service: Orthopedics   • MO NEUROPLASTY &/TRANSPOSITION ULNAR NERVE ELBOW Right 2/10/2023    Procedure: RELEASE CUBITAL TUNNEL;  Surgeon: Rusty Moran MD;  Location: [facility];  Service: Orthopedics   • TUBAL LIGATION         Immunization History   Administered Date(s) Administered   • Pneumococcal Conjugate Vaccine 20-valent (Pcv20), Polysace 2022   • Pneumococcal Polysaccharide PPV23 01/15/2020   • Tdap 10/05/2016       Family History   Problem Relation Age of Onset   • Cancer Mother    • Breast cancer Mother         age unknown   • Diabetes Mother    • Coronary artery disease Mother    • Ovarian cancer Mother    • Cancer Father    • Prostate cancer Father    • Colon cancer Father    • No Known Problems Sister    • No Known Problems Sister    • No Known Problems Sister    • No Known Problems Sister    • No Known Problems Sister    • No Known Problems Daughter    • No Known Problems Daughter    • No Known Problems Maternal Grandmother    • No Known Problems Paternal Grandmother    • No Known Problems Maternal Aunt    • No Known Problems Maternal Aunt    • No Known Problems Maternal Aunt    • No Known Problems Maternal Aunt    • No Known Problems Maternal Aunt    • No Known Problems Maternal Aunt    • No Known Problems Maternal Aunt    • No Known Problems Maternal Aunt    • No Known Problems Paternal Aunt    • No Known Problems Paternal Aunt    • No Known Problems Paternal Aunt    • No Known Problems Paternal Aunt    • No Known Problems Paternal Aunt    • No Known Problems Half-Sister    • Breast cancer Half-Sister         age unknown   • No Known Problems Half-Sister      Social History     Tobacco Use   • Smoking status: Former     Packs/day: 0 50     Types: Cigarettes   • Smokeless tobacco: Never   • Tobacco comments:     Almost 2 two years wit no smoking  patient report ss he vapes    Vaping Use   • Vaping Use: Never used   Substance Use Topics   • Alcohol use: Yes     Comment: Social    • Drug use: Not Currently       Current Outpatient Medications:   •  albuterol (PROVENTIL HFA,VENTOLIN HFA) 90 mcg/act inhaler, Inhale 2 puffs every 6 (six) hours as needed for wheezing (cough), Disp: 1 Inhaler, Rfl: 0  •  Cholecalciferol (Vitamin D3) 25 MCG (1000 UT) CAPS, Take 1 capsule by mouth daily, Disp: , Rfl:   •  clobetasol (TEMOVATE) 0 05 % ointment, Apply 1 application topically 2 (two) times a day To affected area (Patient taking differently: Apply 1 application topically 2 (two) times a day To affected area as needed), Disp: 30 g, Rfl: 0  •  cyanocobalamin (VITAMIN B-12) 100 MCG tablet, Take 100 mcg by mouth in the morning, Disp: , Rfl:   •  docusate sodium (COLACE) 100 mg capsule, Take 1 capsule (100 mg total) by mouth 2 (two) times a day as needed for constipation, Disp: 30 capsule, Rfl: 0  •  escitalopram (LEXAPRO) 10 mg tablet, Take 1 tablet (10 mg total) by mouth daily (Patient taking differently: Take 10 mg by mouth daily Takes as needed), Disp: 30 tablet, Rfl: 3  •  fluticasone (FLONASE) 50 mcg/act nasal spray, SPRAY 1 SPRAY INTO EACH NOSTRIL EVERY DAY (Patient taking differently: 2 sprays into each nostril daily), Disp: 16 mL, Rfl: 1  •  Multiple Vitamin (MULTIVITAMIN) capsule, Take 1 capsule by mouth daily, Disp: , Rfl:   •  olopatadine (PATANOL) 0 1 % ophthalmic solution, Administer 1 drop to both eyes 2 (two) times a day, Disp: 5 mL, Rfl: 3  •  oxyCODONE-acetaminophen (Percocet) 5-325 mg per tablet, Take 1 tablet by mouth every 4 (four) hours as needed for moderate pain for up to 10 days Max Daily Amount: 6 tablets, Disp: 12 tablet, Rfl: 0  •  Turmeric 400 MG CAPS, Take 1 capsule by mouth in the morning, Disp: , Rfl:   •  Vit C-Cholecalciferol-Alessandra Hip (Vitamin C & D3/Alessandra Hips) 500-1000-20 MG-UNIT-MG CAPS, Take 1 capsule by mouth in the morning, Disp: , Rfl:   Patient Active Problem List    Diagnosis Date Noted   • Cubital tunnel syndrome, right 02/10/2023   • Carpal tunnel syndrome on right 02/10/2023   • Anxiety 2022   • Overweight 2022   • History of COVID-19 2021   • Fibromyalgia 2021   • Chronic pain of multiple joints 2020   • Neuropathy 2020   • Chronic neck pain 2020   • Allergic conjunctivitis of both eyes 2020   • Contact dermatitis 2020   • Mild intermittent asthma without complication        Allergies   Allergen Reactions   • Dairy Aid [Tilactase] GI Intolerance and Vomiting   • Pollen Extract Other (See Comments)     Seasonal allergies         OB History    Para Term  AB Living   6 3 3     3   SAB IAB Ectopic Multiple Live Births                  # Outcome Date GA Lbr Wayne/2nd Weight Sex Delivery Anes PTL Lv   6 Term      Vag-Spont      5 Term      Vag-Spont      4 Term 0     CS-Unspec      3             2             1 Minna                Vitals:    23 0915   BP: 122/80   BP Location: Left arm   Patient Position: Sitting   Cuff Size: Large   Weight: 78 9 kg (174 lb)   Height: 5' 6" (1 676 m)     Body mass index is 28 08 kg/m²  Review of Systems     Constitutional: Negative for chills, fatigue, fever, headaches, visual disturbances, and unexpected weight change  Respiratory: Negative for cough, & shortness of breath  Cardiovascular: Negative for chest pain       Gastrointestinal: Negative for Abd pain, nausea & vomiting, constipation and diarrhea  Genitourinary: Negative for difficulty urinating, dysuria, hematuria, dyspareunia, unusual vaginal bleeding or discharge  Skin: Negative skin changes    Physical Exam     Constitutional: Alert & Oriented x3, well-developed and well-nourished  No distress  HENT: Atraumatic, Normocephalic, Conjunctivae clear  Neck: Normal range of motion  Neck supple  No thyromegaly, mass, nodules or tenderness  Pulmonary: Effort normal    Abdominal: Soft  No tenderness or masses  Musculoskeletal: Normal ROM  Skin: Warm & Dry  Psychological: Normal mood, thought content, behavior & judgement     Breasts:   Right: tissue soft without masses, tenderness, skin changes or nipple discharge  No areas of erythema or pain  No subclavicular, axillary, pectoral adenopathy  Left:  tissue soft without masses, tenderness, skin changes or nipple discharge  No areas of erythema or pain  No subclavicular, axillary, pectoral adenopathy    Pelvic exam was performed with patient supine, lithotomy position  Labia: Negative rash, tenderness, lesion or injury on the right labia  Negative rash, tenderness, lesion or injury on the left labia  Urethral meatus:  Negative for  tenderness, inflammation or discharge  Uterus: not deviated, enlarged, fixed or tender  Cervix: No CMT, no discharge or friability  Nabothian cysts noted  Right adnexa: no mass, no tenderness and no fullness  Left adnexa: no mass, no tenderness and no fullness     Vagina: No erythema, tenderness, masses, or foreign body in the vagina  No signs of injury around the vagina  No unusual vaginal discharge   Perineum without lesions, signs of injury, erythema or swelling  Inguinal Canal:        Right: No inguinal adenopathy or hernia present  Left: No inguinal adenopathy or hernia present

## 2023-02-12 NOTE — PATIENT INSTRUCTIONS
Breast Self Exam for Women   AMBULATORY CARE:   A breast self-exam (BSE)  is a way to check your breasts for lumps and other changes  Regular BSEs can help you know how your breasts normally look and feel  Most breast lumps or changes are not cancer, but you should always have them checked by a healthcare provider  Why you should do a BSE:  Breast cancer is the most common type of cancer in women  Even if you have mammograms, you may still want to do a BSE regularly  If you know how your breasts normally feel and look, it may help you know when to contact your healthcare provider  Mammograms can miss some cancers  You may find a lump during a BSE that did not show up on a mammogram   When you should do a BSE:  If you have periods, you may want to do your BSE 1 week after your period ends  This is the time when your breasts may be the least swollen, lumpy, or tender  You can do regular BSEs even if you are breastfeeding or have breast implants  Call your doctor if:   You find any lumps or changes in your breasts  You have breast pain or fluid coming from your nipples  You have questions or concerns about your condition or care  How to do a BSE:       Look at your breasts in a mirror  Look at the size and shape of each breast and nipple  Check for swelling, lumps, dimpling, scaly skin, or other skin changes  Look for nipple changes, such as a nipple that is painful or beginning to pull inward  Gently squeeze both nipples and check to see if fluid (that is not breast milk) comes out of them  If you find any of these or other breast changes, contact your healthcare provider  Check your breasts while you sit or  the following 3 positions:    [place] your arms down at your sides  Raise your hands and join them behind your head  Put firm pressure with your hands on your hips  Bend slightly forward while you look at your breasts in the mirror  Lie down and feel your breasts    When you lie down, your breast tissue spreads out evenly over your chest  This makes it easier for you to feel for lumps and anything that may not be normal for your breasts  Do a BSE on one breast at a time  Place a small pillow or towel under your left shoulder  Put your left arm behind your head  Use the 3 middle fingers of your right hand  Use your fingertip pads, on the top of your fingers  Your fingertip pad is the most sensitive part of your finger  Use small circles to feel your breast tissue  Use your fingertip pads to make dime-sized, overlapping circles on your breast and armpits  Use light, medium, and firm pressure  First, press lightly  Second, press with medium pressure to feel a little deeper into the breast  Last, use firm pressure to feel deep within your breast     Examine your entire breast area  Examine the breast area from above the breast to below the breast where you feel only ribs  Make small circles with your fingertips, starting in the middle of your armpit  Make circles going up and down the breast area  Continue toward your breast and all the way across it  Examine the area from your armpit all the way over to the middle of your chest (breastbone)  Stop at the middle of your chest     Move the pillow or towel to your right shoulder, and put your right arm behind your head  Use the 3 fingertip pads of your left hand, and repeat the above steps to do a BSE on your right breast     What else you can do to check for breast problems or cancer:  Talk to your healthcare provider about mammograms  A mammogram is an x-ray of your breasts to screen for breast cancer or other problems  Your provider can tell you the benefits and risks of mammograms  The first mammogram is usually at age 39 or 48  Your provider may recommend you start at 36 or younger if your risk for breast cancer is high  Mammograms usually continue every 1 to 2 years until age 76         Follow up with your doctor as directed:  Write down your questions so you remember to ask them during your visits  © Copyright [employer] 2022 Information is for End User's use only and may not be sold, redistributed or otherwise used for commercial purposes  All illustrations and images included in CareNotes® are the copyrighted property of A D A M , Inc  or Pipe Castellano  The above information is an  only  It is not intended as medical advice for individual conditions or treatments  Talk to your doctor, nurse or pharmacist before following any medical regimen to see if it is safe and effective for you  Wellness Visit for Adults   AMBULATORY CARE:   A wellness visit  is when you see your healthcare provider to get screened for health problems  Your healthcare provider will also give you advice on how to stay healthy  Write down your questions so you remember to ask them  Ask your healthcare provider how often you should have a wellness visit  What happens at a wellness visit:  Your healthcare provider will ask about your health, and your family history of health problems  This includes high blood pressure, heart disease, and cancer  He or she will ask if you have symptoms that concern you, if you smoke, and about your mood  You may also be asked about your intake of medicines, supplements, food, and alcohol  Any of the following may be done: Your weight  will be checked  Your height may also be checked so your body mass index (BMI) can be calculated  Your BMI shows if you are at a healthy weight  Your blood pressure  and heart rate will be checked  Your temperature may also be checked  Blood and urine tests  may be done  Blood tests may be done to check your cholesterol levels  Abnormal cholesterol levels increase your risk for heart disease and stroke  You may also need a blood or urine test to check for diabetes if you are at increased risk  Urine tests may be done to look for signs of an infection or kidney disease      A physical exam  includes checking your heartbeat and lungs with a stethoscope  Your healthcare provider may also check your skin to look for sun damage  Screening tests  may be recommended  A screening test is done to check for diseases that may not cause symptoms  The screening tests you may need depend on your age, gender, family history, and lifestyle habits  For example, colorectal screening may be recommended if you are 48years old or older  Screening tests you need if you are a woman:   A Pap smear  is used to screen for cervical cancer  Pap smears are usually done every 3 to 5 years depending on your age  You may need them more often if you have had abnormal Pap smear test results in the past  Ask your healthcare provider how often you should have a Pap smear  A mammogram  is an x-ray of your breasts to screen for breast cancer  Experts recommend mammograms every 2 years starting at age 48 years  You may need a mammogram at age 52 years or younger if you have an increased risk for breast cancer  Talk to your healthcare provider about when you should start having mammograms and how often you need them  Vaccines you may need:   Get an influenza vaccine  every year  The influenza vaccine protects you from the flu  Several types of viruses cause the flu  The viruses change over time, so new vaccines are made each year  Get a tetanus-diphtheria (Td) booster vaccine  every 10 years  This vaccine protects you against tetanus and diphtheria  Tetanus is a severe infection that may cause painful muscle spasms and lockjaw  Diphtheria is a severe bacterial infection that causes a thick covering in the back of your mouth and throat  Get a human papillomavirus (HPV) vaccine  if you are female and aged 23 to 32 or male 23 to 24 and never received it  This vaccine protects you from HPV infection  HPV is the most common infection spread by sexual contact   HPV may also cause vaginal, penile, and anal cancers  Get a pneumococcal vaccine  if you are aged 72 years or older  The pneumococcal vaccine is an injection given to protect you from pneumococcal disease  Pneumococcal disease is an infection caused by pneumococcal bacteria  The infection may cause pneumonia, meningitis, or an ear infection  Get a shingles vaccine  if you are 60 or older, even if you have had shingles before  The shingles vaccine is an injection to protect you from the varicella-zoster virus  This is the same virus that causes chickenpox  Shingles is a painful rash that develops in people who had chickenpox or have been exposed to the virus  How to eat healthy:  My Plate is a model for planning healthy meals  It shows the types and amounts of foods that should go on your plate  Fruits and vegetables make up about half of your plate, and grains and protein make up the other half  A serving of dairy is included on the side of your plate  The amount of calories and serving sizes you need depends on your age, gender, weight, and height  Examples of healthy foods are listed below:  Eat a variety of vegetables  such as dark green, red, and orange vegetables  You can also include canned vegetables low in sodium (salt) and frozen vegetables without added butter or sauces  Eat a variety of fresh fruits , canned fruit in 100% juice, frozen fruit, and dried fruit  Include whole grains  At least half of the grains you eat should be whole grains  Examples include whole-wheat bread, wheat pasta, brown rice, and whole-grain cereals such as oatmeal     Eat a variety of protein foods such as seafood (fish and shellfish), lean meat, and poultry without skin (turkey and chicken)  Examples of lean meats include pork leg, shoulder, or tenderloin, and beef round, sirloin, tenderloin, and extra lean ground beef  Other protein foods include eggs and egg substitutes, beans, peas, soy products, nuts, and seeds      Choose low-fat dairy products such as skim or 1% milk or low-fat yogurt, cheese, and cottage cheese  Limit unhealthy fats  such as butter, hard margarine, and shortening  Exercise:  Exercise at least 30 minutes per day on most days of the week  Some examples of exercise include walking, biking, dancing, and swimming  You can also fit in more physical activity by taking the stairs instead of the elevator or parking farther away from stores  Include muscle strengthening activities 2 days each week  Regular exercise provides many health benefits  It helps you manage your weight, and decreases your risk for type 2 diabetes, heart disease, stroke, and high blood pressure  Exercise can also help improve your mood  Ask your healthcare provider about the best exercise plan for you  General health and safety guidelines:   Do not smoke  Nicotine and other chemicals in cigarettes and cigars can cause lung damage  Ask your healthcare provider for information if you currently smoke and need help to quit  E-cigarettes or smokeless tobacco still contain nicotine  Talk to your healthcare provider before you use these products  Limit alcohol  A drink of alcohol is 12 ounces of beer, 5 ounces of wine, or 1½ ounces of liquor  Lose weight, if needed  Being overweight increases your risk of certain health conditions  These include heart disease, high blood pressure, type 2 diabetes, and certain types of cancer  Protect your skin  Do not sunbathe or use tanning beds  Use sunscreen with a SPF 15 or higher  Apply sunscreen at least 15 minutes before you go outside  Reapply sunscreen every 2 hours  Wear protective clothing, hats, and sunglasses when you are outside  Drive safely  Always wear your seatbelt  Make sure everyone in your car wears a seatbelt  A seatbelt can save your life if you are in an accident  Do not use your cell phone when you are driving  This could distract you and cause an accident   Pull over if you need to make a call or send a text message  Practice safe sex  Use latex condoms if are sexually active and have more than one partner  Your healthcare provider may recommend screening tests for sexually transmitted infections (STIs)  Wear helmets, lifejackets, and protective gear  Always wear a helmet when you ride a bike or motorcycle, go skiing, or play sports that could cause a head injury  Wear protective equipment when you play sports  Wear a lifejacket when you are on a boat or doing water sports  © Copyright [address] 2022 Information is for End User's use only and may not be sold, redistributed or otherwise used for commercial purposes  All illustrations and images included in CareNotes® are the copyrighted property of A D A M , Inc  or [employer]  The above information is an  only  It is not intended as medical advice for individual conditions or treatments  Talk to your doctor, nurse or pharmacist before following any medical regimen to see if it is safe and effective for you  Kegel Exercises for Women   AMBULATORY CARE:   Kegel exercises  help strengthen your pelvic muscles  Pelvic muscles hold your pelvic organs, such as your bladder and uterus, in place  Kegel exercises help prevent or control problems with urine incontinence (leakage)  Incontinence may be caused by pregnancy, childbirth, or menopause  Contact your healthcare provider if:   You cannot feel your muscles tighten or relax  You continue to leak urine  You have questions or concerns about your condition or care  Use the correct muscles:  Pelvic muscles are the muscles you use to control urine flow  To target these muscles, stop and start the flow of urine several times  This will help you become familiar with how it feels to tighten and relax these muscles  How to do Kegel exercises:   Empty your bladder  You may lie down, stand up, or sit down to do these exercises   When you first try to do these exercises, it may be easier if you lie down  Tighten or squeeze your pelvic muscles slowly  It may feel like you are trying to hold back urine or gas  Hold this position for 3 seconds  Relax for 3 seconds  Repeat this cycle 10 times  Do 10 sets of Kegel exercises, at least 3 times a day  Do not hold your breath when you do Kegel exercises  Keep your stomach, back, and leg muscles relaxed  As your muscles get stronger, you will be able to hold the squeeze longer  Your healthcare provider may ask that you increase your pelvic muscle squeeze to 10 seconds  After you squeeze for 10 seconds, relax for 10 seconds  What else you should know:   Once you know how to do Kegel exercises, use different positions  You can do these exercises while you lie on the floor, sit at your desk or watch TV, and while you stand  You may notice improved bladder control within about 6 weeks  Tighten your pelvic muscles before you sneeze, cough, or lift to prevent urine leakage  Follow up with your doctor as directed:  Write down your questions so you remember to ask them during your visits  © Copyright [employer] 2022 Information is for End User's use only and may not be sold, redistributed or otherwise used for commercial purposes  All illustrations and images included in CareNotes® are the copyrighted property of A D A M , Inc  or [facility]  The above information is an  only  It is not intended as medical advice for individual conditions or treatments  Talk to your doctor, nurse or pharmacist before following any medical regimen to see if it is safe and effective for you  Perineal Hygiene      Your vaginal naturally takes care of its self, it is a self washing system, the less you mess the healthier it will be     No soaps or feminine wash to the vulva, these products can cause dermitis, bacterial infections and other vulvar problems     Use only water to cleanse, or water with Rosebud or Rosebud Sensitive Skin Bar soap if necessary  No scented lotions or products are advised in or near your vulva  Use only coconut oil for moisture if needed  No douching this may cause imbalance in your vaginal PH and further issues  If you wear panty liners, you may apply a thin coating of Vaseline, A&D ointment or coconut oil to the vulvar tissues as a skin barrier     [place] underware, loose fitting clothing  Only perfume-free, dye-free laundry detergent, use a second rinse cycle   Avoid fabric softeners/dryer sheets  Partner should avoid the same products as well  Over the counter probiotic to restore vaginal yaniv may be helpful as well, take daily  You may also look into Boric Acid vaginal suppositories to restore vaginal PH balance for up to 2 weeks as directed on the box  You may not use these if you are pregnant      For vaginal dryness: You may use:     Coconut oil (organic, pure, unscented) as needed for moisture or lubrication  ( Do not use if allergic)       Replens moisture restore external comfort gel daily ( use as directed on the box)        Replens long lasting vaginal moisturizer  ( use as directed on the box)         For Vaginal Lubrication:          You may use:     Coconut oil (organic, pure, unscented) as a lubricant or another scent-free lubricant (Astroglide, Uberlube) if needed  Do not use coconut oil or silicone if using a condom as this may break down the integrity of the condom and cause an unplanned pregnancy              Do not use coconut oil if allergic               Replens silky smooth lubricant, premium silicone based lubricant for intercourse  ( use as directed, a small amount will provide an enhanced natural feeling)     Any premium over the counter vaginal lubricant water or silicone based  Silicone based will have more staying power  Menopause   WHAT YOU NEED TO KNOW:   What is menopause?   Menopause is a normal stage in a woman's life when her monthly periods stop  You are considered to be in menopause when you have not had a period for a full year after the age of 36  Menopause usually occurs between ages 52 to 48  Perimenopause is a stage before menopause that may cause signs and symptoms similar to menopause  Perimenopause may start about 4 years before menopause  What causes menopause? Menopause starts when the ovaries stop making the female hormones estrogen and progesterone  After menopause, you are no longer able to become pregnant  Any of the following may trigger menopause or early menopause:  Surgery, including a hysterectomy or oophorectomy    Family history of early menopause    Smoking    Chemotherapy or pelvic radiation    Chromosome abnormalities, including Cooney syndrome and Fragile X syndrome    Premature ovarian insufficiency (the ovaries stop producing eggs before age 36)    What are the signs and symptoms of menopause? You may have any of the following:  Irregular menstrual cycles with heavy vaginal bleeding followed by decreased bleeding until it stops    Hot flashes (feeling warm, flushed, and sweaty)    Vaginal changes such as increased dryness    Mood changes such as anxiety, depression, or decreased desire to have sex    Trouble sleeping, joint pain, headaches    Brittle nails, hair on chin or chest where it is normally absent    Decrease in breast size and change in skin texture    Weight gain    How is menopause treated or managed? Hormone replacement therapy (HRT) is medicine that replaces your low hormone levels  HRT contains estrogen and sometimes progestin  HRT has several benefits  HRT helps prevent osteoporosis, which decreases your risk for bone fractures  HRT also protects you from colorectal cancer  HRT also has some risks  HRT increases your risk for breast cancer, blood clots, heart disease, a heart attack, or a stroke  If you are 72 years or older, HRT can also increase your risk for dementia  Your risk for uterine or endometrial cancer is higher if you take estrogen-only HRT  Manage hot flashes  Hot flashes are brief periods of feeling very warm, flushed, and sweaty  Hot flashes can last from a few seconds to several minutes  They may happen many times during the day, and are common at night  Layer your clothing so that you can easily remove some clothing and cool yourself during a hot flash  Cold drinks may also be helpful  Non-hormone medicines can help relieve or prevent hot flashes  Examples include certain antidepressants, nerve medicines, and high blood pressure medicines  Reduce vaginal dryness by using over-the-counter vaginal creams  Vaginal dryness may cause you to have pain or discomfort during sex  Only use creams that are made for vaginal use  Do  not  use petroleum jelly  You may put an estrogen cream in and around your vagina  Estrogen cream may help decrease vaginal dryness and lower your risk of vaginal infections  Continue to use birth control during perimenopause if you do not want to get pregnant  You may need to use birth control until it has been 1 year since your periods stopped  Ask your healthcare provider when you can stop using birth control to prevent pregnancy  How can I live a healthy lifestyle during and after menopause? After menopause, your risk for heart disease and bone loss increases  Ask about these and other ways to stay healthy:  Exercise regularly  Exercise helps you maintain a healthy weight  Exercise can also help to control your blood pressure and cholesterol levels  Include weight-bearing exercise for strong bones  Weight bearing exercise is recommended for at least 30 minutes, 3 times a week  Ask your healthcare provider about the best exercise plan for you  Eat a variety of healthy foods  Include fruits, vegetables, whole grains (whole-wheat bread, pasta, and cereals), low-fat dairy, and lean protein foods (beans, poultry, and fish)  Limit foods high in sodium (salt)  Ask your healthcare provider for more information about a meal plan that is right for you  Do not smoke  If you smoke, it is never too late to quit  You are more likely to have a heart attack, lung disease, blood clots, and cancer if you smoke  Ask your healthcare provider for information if you need help quitting  Take supplements as directed  You may need extra calcium and vitamin D to help prevent osteoporosis  Limit alcohol and caffeine  Alcohol and caffeine may worsen your symptoms  When should I call my doctor? You have vaginal bleeding after menopause  You have questions or concerns about your condition or care  CARE AGREEMENT:   You have the right to help plan your care  Learn about your health condition and how it may be treated  Discuss treatment options with your healthcare providers to decide what care you want to receive  You always have the right to refuse treatment  The above information is an  only  It is not intended as medical advice for individual conditions or treatments  Talk to your doctor, nurse or pharmacist before following any medical regimen to see if it is safe and effective for you  © Copyright [employer] Replaced by [facility] 2022 Information is for End User's use only and may not be sold, redistributed or otherwise used for commercial purposes   All illustrations and images included in CareNotes® are the copyrighted property of [employer] Inc  or [address] [place] [address] [employer]

## 2023-02-13 ENCOUNTER — TELEPHONE (OUTPATIENT)
Dept: OBGYN CLINIC | Facility: HOSPITAL | Age: 48
End: 2023-02-13

## 2023-02-13 NOTE — TELEPHONE ENCOUNTER
Caller: patient    Doctor: Danis Swann    Reason for call: cb for GIOVANA FORMAN [facility]    Call back#: [phone]

## 2023-02-13 NOTE — TELEPHONE ENCOUNTER
Caller: Patient    Doctor: Marietta Aquino    Reason for call: Called to reschedule  Patient placed on hold  Came back no one answered   Call d/c'd    Call back#: [phone]

## 2023-02-13 NOTE — TELEPHONE ENCOUNTER
Caller: patient  Doctor: Jb Giron    Reason for call: patient needs a 10 day PO appt  10 days from 2/13 appt  Nothing immediate available  She said she needs her daughter to be seen asap as well      Call back#: [phone]

## 2023-02-14 ENCOUNTER — ANNUAL EXAM (OUTPATIENT)
Dept: OBGYN CLINIC | Facility: CLINIC | Age: 48
End: 2023-02-14

## 2023-02-14 VITALS
HEIGHT: 66 IN | BODY MASS INDEX: 27.97 KG/M2 | WEIGHT: 174 LBS | DIASTOLIC BLOOD PRESSURE: 80 MMHG | SYSTOLIC BLOOD PRESSURE: 122 MMHG

## 2023-02-14 DIAGNOSIS — Z12.31 ENCOUNTER FOR SCREENING MAMMOGRAM FOR MALIGNANT NEOPLASM OF BREAST: ICD-10-CM

## 2023-02-14 DIAGNOSIS — Z01.419 ENCOUNTER FOR GYNECOLOGICAL EXAMINATION WITHOUT ABNORMAL FINDING: Primary | ICD-10-CM

## 2023-02-15 ENCOUNTER — APPOINTMENT (OUTPATIENT)
Dept: RADIOLOGY | Facility: CLINIC | Age: 48
End: 2023-02-15

## 2023-02-15 ENCOUNTER — OFFICE VISIT (OUTPATIENT)
Dept: PODIATRY | Facility: CLINIC | Age: 48
End: 2023-02-15

## 2023-02-15 VITALS
DIASTOLIC BLOOD PRESSURE: 60 MMHG | SYSTOLIC BLOOD PRESSURE: 127 MMHG | HEIGHT: 66 IN | HEART RATE: 63 BPM | WEIGHT: 174 LBS | BODY MASS INDEX: 27.97 KG/M2

## 2023-02-15 DIAGNOSIS — M79.671 RIGHT FOOT PAIN: ICD-10-CM

## 2023-02-15 DIAGNOSIS — M20.5X1 HALLUX LIMITUS OF RIGHT FOOT: ICD-10-CM

## 2023-02-15 DIAGNOSIS — M79.671 RIGHT FOOT PAIN: Primary | ICD-10-CM

## 2023-02-15 NOTE — PROGRESS NOTES
Assessment/Plan:    No problem-specific Assessment & Plan notes found for this encounter  Diagnoses and all orders for this visit:    Right foot pain  -     X-ray foot right 3+ views; Future  -     Ambulatory Referral to Podiatry  -     Ambulatory referral to Physical Therapy; Future    Hallux limitus of right foot  -     Ambulatory referral to Physical Therapy; Future      plan:     -  Patient was counseled and educated on the condition and the diagnosis  The diagnosis, treatment options and prognosis were discussed in detail    - I personally reviewed x-ray findings with patient which is consistent with very mild dorsomedial exostosis of 1st metatarsal head  - I suspect medial 1st MTPJ bursitis for which I recommend conservative treatments such as supportive shoe gear with a wider toe box, at home PT and formal PT for initial evaluation  I also recommended continue range-of-motion exercises  Defer cortisone injection as pain is mostly localized outside of the joint  Patient agrees with the treatment above  - PCP notes reviewed   - Return as needed   - All questions and concerns were addressed, call if any questions      Subjective:      Patient ID: Zachariah Medina is a 57 y o  female  46-year-old female with past medical history as below presents for an evaluation of right foot pain  Patient reports about 2 years ago she was evaluated by sports medicine due to her inability to extend her big toe  She was tried on physical therapy which helped her recover the range of motion back to her big toe joint  Today patient reports she is unable to hyperextend and flex her big toe joint with the same integrity as contralateral foot  Patient reports some discomfort along the medial side of her foot at the level of metatarsal head swelling to the area  It is an aching type pain intermittently  No other complaints at this time        The following portions of the patient's history were reviewed and updated as appropriate:   She  has a past medical history of Abnormal Pap smear of cervix, Anxiety, Arthritis, Asthma, Depression, Osteoporosis, Urinary tract infection, and Varicella  She   Patient Active Problem List    Diagnosis Date Noted   • Cubital tunnel syndrome, right 02/10/2023   • Carpal tunnel syndrome on right 02/10/2023   • Anxiety 2022   • Overweight 2022   • History of COVID-19 2021   • Fibromyalgia 2021   • Chronic pain of multiple joints 2020   • Neuropathy 2020   • Chronic neck pain 2020   • Allergic conjunctivitis of both eyes 2020   • Contact dermatitis 2020   • Mild intermittent asthma without complication      She  has a past surgical history that includes  section; Tubal ligation; pr neuroplasty &/transposition ulnar nerve elbow (Right, 2/10/2023); and pr neuroplasty &/transpos median nrv carpal tunne (Right, 2/10/2023)       Review of Systems   All other systems reviewed and are negative  Objective:      /60 (BP Location: Left arm, Patient Position: Sitting, Cuff Size: Large)   Pulse 63   Ht 5' 6" (1 676 m)   Wt 78 9 kg (174 lb)   LMP 2022 (Exact Date)   BMI 28 08 kg/m²          Physical Exam  Vitals reviewed  Cardiovascular:      Rate and Rhythm: Normal rate  Pulses: Normal pulses  Pulmonary:      Effort: Pulmonary effort is normal    Musculoskeletal:         General: Tenderness present  Right foot: Tenderness present  Comments: No pain with palpation first MTPJ or DIPJ range of motion  Patient able to extend and flex her big toe joint  Tendons intact  There is dorsal medial mildly prominent first metatarsal head with fluctuant area noted consistent with bursitis  Some discomfort with palpation  Foot and ankle range of motion within normal limits  Skin:     General: Skin is warm  Neurological:      General: No focal deficit present  Mental Status: She is alert  Psychiatric:         Mood and Affect: Mood normal

## 2023-02-22 ENCOUNTER — OFFICE VISIT (OUTPATIENT)
Dept: OBGYN CLINIC | Facility: CLINIC | Age: 48
End: 2023-02-22

## 2023-02-22 VITALS — WEIGHT: 174 LBS | BODY MASS INDEX: 27.97 KG/M2 | HEIGHT: 66 IN

## 2023-02-22 DIAGNOSIS — G56.21 CUBITAL TUNNEL SYNDROME, RIGHT: Primary | ICD-10-CM

## 2023-02-22 DIAGNOSIS — G56.01 CARPAL TUNNEL SYNDROME ON RIGHT: ICD-10-CM

## 2023-02-22 LAB
LAB AP GYN PRIMARY INTERPRETATION: NORMAL
Lab: NORMAL

## 2023-02-22 NOTE — PROGRESS NOTES
Assessment/Plan:  Patient ID: Maria Eugenia Deleon 57 y o  female   Surgery: Release Cubital Tunnel - Right and Release Carpal Tunnel - Right  Date of Surgery: 2/10/2023    Sutures taken out today  Massage is imperative to help with desensitizing the incisions  Start OT to help with this and ROM of the right elbow  Follow Up:  PRN    To Do Next Visit:  Re-evaluate      CHIEF COMPLAINT:  Chief Complaint   Patient presents with   • Right Hand - Post-op         SUBJECTIVE:  Maria Eugenia Deleon is a 57y o  year old female who presents for follow up after Release Cubital Tunnel - Right and Release Carpal Tunnel - Right  Today patient has Pain  Moderate  Constant  Dull and Aching         PHYSICAL EXAMINATION:  General: well developed and well nourished, alert, oriented times 3 and appears comfortable  Psychiatric: Normal    MUSCULOSKELETAL EXAMINATION:  Incision: Clean, dry, intact and suture(s) intact   Surgery Site: normal, no evidence of infection  and swelling present  Range of Motion: As expected, opposition intact and full composite fist possible  Neurovascular status: Neuro intact, good cap refill and radial pulse 2+  Activity Restrictions: No restrictions  Done today: Sutures out      STUDIES REVIEWED:  No images reviewed at today's visit      PROCEDURES PERFORMED:  Procedures  No Procedures performed today    Scribe Attestation    I,:  Lynsey Nogueira am acting as a scribe while in the presence of the attending physician :       I,:  Deven Blevins MD personally performed the services described in this documentation    as scribed in my presence :

## 2023-03-02 ENCOUNTER — EVALUATION (OUTPATIENT)
Dept: PHYSICAL THERAPY | Facility: CLINIC | Age: 48
End: 2023-03-02

## 2023-03-02 ENCOUNTER — EVALUATION (OUTPATIENT)
Dept: OCCUPATIONAL THERAPY | Facility: CLINIC | Age: 48
End: 2023-03-02

## 2023-03-02 DIAGNOSIS — M79.671 RIGHT FOOT PAIN: ICD-10-CM

## 2023-03-02 DIAGNOSIS — M20.5X1 HALLUX LIMITUS OF RIGHT FOOT: ICD-10-CM

## 2023-03-02 DIAGNOSIS — G56.21 CUBITAL TUNNEL SYNDROME, RIGHT: ICD-10-CM

## 2023-03-02 DIAGNOSIS — G56.01 CARPAL TUNNEL SYNDROME ON RIGHT: ICD-10-CM

## 2023-03-02 NOTE — PROGRESS NOTES
OT Evaluation     Today's date: 3/2/2023  Patient name: Easton Ding  : 1975  MRN: [id number]  Referring provider: Alyx Scherer MD  Dx:   Encounter Diagnosis     ICD-10-CM    1  Cubital tunnel syndrome, right  G56 21 Ambulatory Referral to PT/OT Hand Therapy      2  Carpal tunnel syndrome on right  G56 01 Ambulatory Referral to PT/OT Hand Therapy                     Assessment  Assessment details: Easton Ding is a 57 y o , Right HD female referred to hand therapy for a right carpal and cubital tunnel release on 2/10/23  Onset of symptoms over 8 years ago  Patient presents 25 with impaired elbow and wrist ROM, strength  Sensation of the median and ulnar nerves is now intact  Deficits also noted in scar hypersensitivity, edema and functional use of the right UE  Patient has scars on the right volar wrist and medial elbow that are healing well, but with mild adherence and hypersensitivity  Patient is a good candidate for OT services to abolish pain and edema and restore ROM, strength, coordination for a return to independence in daily tasks      Impairments: abnormal coordination, abnormal or restricted ROM, activity intolerance, impaired physical strength, lacks appropriate home exercise program, pain with function and weight-bearing intolerance  Other impairment: Scar hypersensitivity    Symptom irritability: moderateBarriers to therapy: Scar hypersensitivity; arthritis; chronic neck pain  Understanding of Dx/Px/POC: excellent   Prognosis: good    Goals  STGs (4 weeks)  Patient will be independent in HEP of ROM, scar management, edema management  Patient will report an average pain level of 5/10  Patient will demonstrate active wrist extension/flexion to 70/70  Patient will demonstrate active elbow extension/flexion to 0/140  Patient will report 30% reduction in scar hypersensitivity  LTGs (12 weeks)  Patient will demonstrate independence in a HEP to maintain ROM, strength, and function at discharge  Patient will report an average pain level of 1-2/10 to be independent in daily tasks  Patient will demonstrate MCKEE of the digits to WNL to be independent in self care tasks  Patient will demonstrate AROM of the wrist and forearm WFL to be independent in self care tasks  Patient will demonstrate 5/5 muscle strength in the wrist, forearm, elbow to be MI for meal prep  Patient will demonstrate right hand strength within 30% of the left hand to be MI for IADL tasks  Patient will demonstrate resolution of edema   Patient will demonstrate resolution scar hypersensitivity      Plan  Plan details: 3/2/23: Begin OT 2x/wk x 12 weeks  Patient would benefit from: skilled occupational therapy and custom splinting  Planned modality interventions: ultrasound, thermotherapy: hydrocollator packs and cryotherapy  Planned therapy interventions: activity modification, compression, fine motor coordination training, graded activity, graded exercise, home exercise program, therapeutic exercise, therapeutic activities, stretching, strengthening, patient education, orthotic fitting/training, neuromuscular re-education and manual therapy  Other planned therapy interventions: Desensitization; IASTM  Frequency: 2x week  Duration in weeks: 12  Plan of Care beginning date: 3/2/2023  Plan of Care expiration date: 2023  Treatment plan discussed with: patient        Subjective Evaluation    History of Present Illness  Date of surgery: 2/10/2023  Mechanism of injury: surgery  Mechanism of injury: Patient reports onset of carpal tunnel and cubital tunnel symptoms over 8 years  She had a course of therapy, night time splints, and CSI, but symptoms persisted  She had right carpal and cutiatl tunnel releases on 2/10/23  Patient is now reporting pain on scars  Patient presents for OT evaluation and treatment            Recurrent probem    Quality of life: excellent    Pain  Current pain ratin  At best pain rating: 3  At worst pain rating: 10  Location: Medial right elbow at cubital tunnel release scar  Some pain at CT scar  Quality: throbbing and sharp  Relieving factors: ice (topical cream and massage)  Exacerbated by: Touching the scars  Progression: no change    Social Support  Lives with: spouse, adult children and young children    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)  Hand dominance: right    Treatments  Previous treatment: occupational therapy, injection treatment and immobilization  Patient Goals  Patient goals for therapy: decreased edema, decreased pain, increased motion, increased strength, independence with ADLs/IADLs and return to work          Objective     Observations     Right Elbow   Positive for adhesive scar and edema  Right Wrist/Hand   Positive for adhesive scar and edema  Additional Observation Details  3/2/23: Hypersensitive scar and 3/2/23: Hypersensitive scar    Neurological Testing     Sensation     Wrist/Hand     Right   Intact: light touch    Comments   Right light touch: 2 83 ( WNL) on [place] [employer] Monofil[employer]    Active Range of Motion     Right Elbow   Flexion: 118 degrees   Extension: 180 degrees   Forearm supination: WFL  Forearm pronation: WFL    Right Wrist   Wrist flexion: 40 degrees   Wrist extension: 60 degrees   Radial deviation: 25 degrees WFL  Ulnar deviation: 25 degrees     Right Thumb   Opposition: 3/2/23: WNL    Additional Active Range of Motion Details  3/23/23: WNL    Swelling   Left Elbow Girth Measurements   Joint line: 25 cm    Right Elbow Girth Measurements   Joint line: 26 cm    Left Wrist/Hand   Circumference MCP: 18 5 cm  Circumference wrist: 14 8 cm    Right Wrist/Hand   Circumference MCP: 19 cm  Circumference wrist: 15 2 cm             Precautions: Right CTR and cubital tunnel release 2/10/23  Chronic neck pain   Access Code: [email or web address]  URL: [email or web address]  Date: 03/02/2023  Prepared by: Janus Olinda    Exercises  • Standing Wrist Flexion Stretch - 3 x daily - 7 x weekly - 1 sets - 10 reps - 5 hold  • Standing Wrist Extension Stretch - 3 x daily - 7 x weekly - 1 sets - 10 reps - 5 hold  • Elbow AROM Flexion & Extension Neutral Forearm - 3 x daily - 7 x weekly - 1 sets - 10 reps  • Standing Forearm Pronation and Supination AROM - 3 x daily - 7 x weekly - 1 sets - 10 reps    Patient Education  • Carpal Tunnel Syndrome      Date 3/2/23       Visit  1       Manuals        Scar massage 8' + elastogel, tg over scars       Kinesio tape                        Neuro Re-Ed                                                                 Ther Ex        A/AAROM wrist x10       A/AAROM FA x10       A/AAROM elbow x10       Cones p/s        Cones elbow e/f        OH pulley for elbow ROM                        Ther Activity        Translation                HEP Scar massage, AROM elbow, FA,wrist; elastogel and tg for wrist and elbow                       Modalities        [facility] 5'

## 2023-03-02 NOTE — PROGRESS NOTES
PT Evaluation     Today's date: 3/2/2023  Patient name: Dre Cohen  : 1975  MRN: [id number]  Referring provider: Loise Favre, DPM  Dx:   Encounter Diagnosis     ICD-10-CM    1  Right foot pain  M79 671 Ambulatory referral to Physical Therapy      2  Hallux limitus of right foot  M20 5X1 Ambulatory referral to Physical Therapy                     Assessment  Assessment details: Dre Cohen is a 57 y o  female who presents with pain, decreased strength, and decreased ROM  Due to these impairments, patient has difficulty performing a/iadls and recreational activities  Patient's clinical presentation is consistent with their referring diagnosis of right foot pain  Patient would benefit from skilled physical therapy to address their aforementioned impairments, improve their level of function and to improve their overall quality of life  Impairments: abnormal or restricted ROM, impaired physical strength, lacks appropriate home exercise program and pain with function  Understanding of Dx/Px/POC: good   Prognosis: good    Goals  Short term goals - to be achieved in 4 weeks:     Decrease pain 20-50%  Increase strength by 1/2 grade  Improve range of motion by 25%  Balance will be improved as indicated by SLS of at least 20 seconds  Long term goals - to be achieved by discharge:    Ambulation is improved to maximal level of function  Squatting is improved to maximal level of function  Stair climbing is improved to maximal level of function  IADL performance in related activities is improved to maximal level of function  Performance in related work activities is improved to maximal level of function       Plan  Planned therapy interventions: manual therapy, neuromuscular re-education, patient education, strengthening, therapeutic activities, stretching, therapeutic exercise and home exercise program  Frequency: 2x week  Duration in visits: 12  Duration in weeks: 6  Plan of Care beginning date: 3/2/2023  Plan of Care expiration date: 2023  Treatment plan discussed with: patient        Subjective Evaluation    History of Present Illness  Mechanism of injury: Patient refers to PT with c/o pain in her right foot which began approximately six months previous of insidious onset  Patient states prior pain in her right foot approximately two years previous; states previous pain improved with PT treatment  Patient received X-rays of her right foot on 2/15/23 which Podiatrist interpreted as indicating very mild dorsomedial exostosis of 1st metatarsal head  Patient states pain in her 1st digit and pain in the medial plantar arch of her right foot  Patient states constant numbness in the 1st digit and medial arch of foot  Patient states pain with ambulation, stair climbing, and extended standing  Patient received right UE cubital and carpal tunnel release on 2/10/23; is currently out of work secondary to recent surgery    Pain  Current pain ratin  At best pain ratin  At worst pain rating: 10  Quality: sharp and throbbing  Relieving factors: medications  Aggravating factors: walking, stair climbing and standing    Patient Goals  Patient goals for therapy: decreased pain          Objective     Active Range of Motion     Right Ankle/Foot   Dorsiflexion (kf): 5 degrees   Plantar flexion: 40 degrees   Inversion: 35 degrees   Eversion: 15 degrees   Great toe flexion: 30 degrees   Great toe extension: 60 degrees     Passive Range of Motion     Right Ankle/Foot    Dorsiflexion (kf): 9 degrees    Plantar flexion: 45 degrees   Inversion: 40 degrees   Eversion: 20 degrees   Great toe flexion: 35 degrees   Great toe extension: 65 degrees     Strength/Myotome Testing     Right Hip   Planes of Motion   Flexion: 4-  Extension: 4  Abduction: 4  Adduction: 4    Right Knee   Flexion: 4+  Extension: 4    Right Ankle/Foot   Dorsiflexion: 4  Plantar flexion: 4  Inversion: 4- (pain)  Eversion: 4  Great toe flexion: 4-  Great toe extension: 4    General Comments: Ankle/Foot Comments   Right SLS - 5 seconds, loss of balance             Precautions: Right carpal/cubital tunnel release - 2/10/23, Fibromyalgia, Anxiety      Manuals 3/2            IASTM medial plantar arch and 1st digit                                                    Neuro Re-Ed             Step ups             Lateral step ups             Step downs with ecc focus                                                                 Ther Ex             Bike or NuStep             Mini squats             Gastroc strap stretch HEP            Soleus strap stretch HEP            1st digit flex/ext self str   HEP            Towel curls HEP            TB ankle x 4                          Ther Activity                                       Gait Training                                       Modalities                                           HEP access code: [email or web address]

## 2023-03-07 ENCOUNTER — APPOINTMENT (OUTPATIENT)
Dept: OCCUPATIONAL THERAPY | Facility: CLINIC | Age: 48
End: 2023-03-07

## 2023-03-07 ENCOUNTER — APPOINTMENT (OUTPATIENT)
Dept: PHYSICAL THERAPY | Facility: CLINIC | Age: 48
End: 2023-03-07

## 2023-03-08 ENCOUNTER — APPOINTMENT (OUTPATIENT)
Dept: PHYSICAL THERAPY | Facility: CLINIC | Age: 48
End: 2023-03-08

## 2023-03-08 ENCOUNTER — APPOINTMENT (OUTPATIENT)
Dept: OCCUPATIONAL THERAPY | Facility: CLINIC | Age: 48
End: 2023-03-08

## 2023-03-08 DIAGNOSIS — M20.5X1 HALLUX LIMITUS OF RIGHT FOOT: ICD-10-CM

## 2023-03-08 DIAGNOSIS — M79.671 RIGHT FOOT PAIN: Primary | ICD-10-CM

## 2023-03-08 NOTE — PROGRESS NOTES
Daily Note    Today's date: 23  Patient name: Jose Bajwa  : 1975  MRN: [id number]  Referring provider: Diana Rodas DPM  Dx:   Encounter Diagnosis     ICD-10-CM    1  Right foot pain  M79 671       2  Hallux limitus of right foot  M20 5X1                        Subjective: [facility] reports ***  Objective: See treatment diary below  Assessment: [facility] tolerated treatment {Tolerated treatment :0142707758} with {YEJX:46105}  TE's were performed with {TJS TE changes:23062}  {TJS New TE's:42926}  Following treatment, the patient {ASSESSMENT:40164}  Plan: {PLAN:0158833058}       Precautions: Right carpal/cubital tunnel release - 2/10/23, Fibromyalgia, Anxiety      Manuals 3/2            IASTM medial plantar arch and 1st digit                                                    Neuro Re-Ed             Step ups             Lateral step ups             Step downs with ecc focus                                                                 Ther Ex             Bike or NuStep             Mini squats             Gastroc strap stretch HEP            Soleus strap stretch HEP            1st digit flex/ext self str   HEP            Towel curls HEP            TB ankle x 4                          Ther Activity                                       Gait Training                                       Modalities                                            Amy Martin, PT  3/8/2023,8:26 AM

## 2023-03-09 ENCOUNTER — OFFICE VISIT (OUTPATIENT)
Dept: OCCUPATIONAL THERAPY | Facility: CLINIC | Age: 48
End: 2023-03-09

## 2023-03-09 ENCOUNTER — OFFICE VISIT (OUTPATIENT)
Dept: PHYSICAL THERAPY | Facility: CLINIC | Age: 48
End: 2023-03-09

## 2023-03-09 DIAGNOSIS — G56.21 CUBITAL TUNNEL SYNDROME, RIGHT: Primary | ICD-10-CM

## 2023-03-09 DIAGNOSIS — M79.671 RIGHT FOOT PAIN: Primary | ICD-10-CM

## 2023-03-09 DIAGNOSIS — G56.01 CARPAL TUNNEL SYNDROME ON RIGHT: ICD-10-CM

## 2023-03-09 DIAGNOSIS — M20.5X1 HALLUX LIMITUS OF RIGHT FOOT: ICD-10-CM

## 2023-03-09 NOTE — PROGRESS NOTES
Daily Note     Today's date: 3/9/2023  Patient name: Leann Holland  : 1975  MRN: [id number]  Referring provider: Dane Peabody, MD  Dx:   Encounter Diagnosis     ICD-10-CM    1  Cubital tunnel syndrome, right  G56 21       2  Carpal tunnel syndrome on right  G56 01                      Subjective: My elbow scar feels better, but the wrist is really sensitive      Objective: See treatment diary below      Assessment: Tolerated treatment well  Patient exhibited good technique with therapeutic exercises  Cubital tunnel scar is no longer hypersensitive, but mild hypertrophy noted  CT scar is still hypersensitive  Initiated kinesio tape to both scars for pain and scar management      Plan: Continue per plan of care  Precautions: Right CTR and cubital tunnel release 2/10/23  Chronic neck pain   Access Code: [email or web address]  URL: [email or web address]  Date: 2023  Prepared by: Dunia Portillo    Exercises  • Standing Wrist Flexion Stretch - 3 x daily - 7 x weekly - 1 sets - 10 reps - 5 hold  • Standing Wrist Extension Stretch - 3 x daily - 7 x weekly - 1 sets - 10 reps - 5 hold  • Elbow AROM Flexion & Extension Neutral Forearm - 3 x daily - 7 x weekly - 1 sets - 10 reps  • Standing Forearm Pronation and Supination AROM - 3 x daily - 7 x weekly - 1 sets - 10 reps    Patient Education  • Carpal Tunnel Syndrome      Date 3/2/23 3/9/23      Visit  1 2      Manuals        Scar massage 8' + elastogel, tg over scars 10' ( 3' elbow, 5' wrist)      Kinesio tape  3'                      Neuro Re-Ed                                                                 Ther Ex        A/AAROM wrist x10 x20      A/AAROM FA x10 x20      A/AAROM elbow x10 x20      Cones p/s        Cones elbow e/f        OH pulley for elbow ROM  x20      Wrist maze  x10              Ther Activity        Translation  [place] 1 set              HEP Scar massage, AROM elbow, FA,wrist; elastogel and tg for wrist and elbow Kinesio tape wear, care, precautions                      Modalities        [facility] 5' 5'

## 2023-03-09 NOTE — PROGRESS NOTES
Daily Note     Today's date: 3/9/2023  Patient name: Mercedes Joseph  : 1975  MRN: [id number]  Referring provider: Forest Fry DPM  Dx:   Encounter Diagnosis     ICD-10-CM    1  Right foot pain  M79 671       2  Hallux limitus of right foot  M20 5X1                      Subjective: Patient reports that she has pain and discomfort in her foot today, however she has been doing the exercises she was given at her evaluation  Objective: See treatment diary below      Assessment: Tolerated treatment well  Patient significantly challenged with arch lifts and required significant verbal and tactile cues to complete  Significant tenderness noted to plantar surface of medial arch and 1st ray  Patient demonstrated fatigue post treatment, exhibited good technique with therapeutic exercises and would benefit from continued PT      Plan: Continue per plan of care  Precautions: Right carpal/cubital tunnel release - 2/10/23, Fibromyalgia, Anxiety      Manuals 3/2 3/9            IASTM medial plantar arch and 1st digit  BE                                                  Neuro Re-Ed             R arch lifts   3"x20           R SLS   3x20"           Tandem stance  2x20" ea on foam           R SLS with star drill cone taps                                                                 Ther Ex             Upright bike   L1 10'           Gastroc strap stretch HEP 3x30"           Soleus strap stretch HEP 3x30"           1st digit flex/ext self str   HEP            Towel curls HEP            TB ankle x 4  GTB 3x10           Great toe ext with therabar   3"x15           Ther Activity             Fwd step downs             TG squats              Gait Training                                       Modalities

## 2023-03-16 ENCOUNTER — OFFICE VISIT (OUTPATIENT)
Dept: OCCUPATIONAL THERAPY | Facility: CLINIC | Age: 48
End: 2023-03-16

## 2023-03-16 ENCOUNTER — OFFICE VISIT (OUTPATIENT)
Dept: PHYSICAL THERAPY | Facility: CLINIC | Age: 48
End: 2023-03-16

## 2023-03-16 DIAGNOSIS — M79.671 RIGHT FOOT PAIN: Primary | ICD-10-CM

## 2023-03-16 DIAGNOSIS — M20.5X1 HALLUX LIMITUS OF RIGHT FOOT: ICD-10-CM

## 2023-03-16 DIAGNOSIS — G56.01 CARPAL TUNNEL SYNDROME ON RIGHT: ICD-10-CM

## 2023-03-16 DIAGNOSIS — G56.21 CUBITAL TUNNEL SYNDROME, RIGHT: Primary | ICD-10-CM

## 2023-03-16 NOTE — PROGRESS NOTES
Daily Note     Today's date: 3/16/2023  Patient name: Felicity Martinez  : 1975  MRN: [id number]  Referring provider: Shawnee Paiz DPM  Dx:   Encounter Diagnosis     ICD-10-CM    1  Right foot pain  M79 671       2  Hallux limitus of right foot  M20 5X1                      Subjective: Pt noted no pain in her R foot but noted still having the same tingling and numbness in the area  Pt noted that she goes to the neurologist in 2023  Objective: See treatment diary below      Assessment: Continued with treatment session, Pt noted that she felt good after TG squats this visit  Tolerated treatment well  Patient exhibited good technique with therapeutic exercises and would benefit from continued PT  S/P treatment session,  Pt noted feeling good and having no pain in her R foot  Education:    -HEP - compliance noted  - DOMS - 24 to 48 hours of muscle soreness may be noted s/p progressions this treatment session  Plan: Continue per plan of care  Precautions: Right carpal/cubital tunnel release - 2/10/23, Fibromyalgia, Anxiety      Manuals 3/2 3/9  3/16          IASTM medial plantar arch and 1st digit  BE NV missed  Neuro Re-Ed             R arch lifts   3"x20 3" 20x           R SLS   3x20"           Tandem stance  2x20" ea on foam           R SLS with star drill cone taps                                                                 Ther Ex             Upright bike   L1 10' L1 10 min           Gastroc strap stretch HEP 3x30" 3x 30"           Soleus strap stretch HEP 3x30" 3x 30"           1st digit flex/ext self str   HEP            Towel curls HEP            TB ankle x 4  GTB 3x10 GTB 3x10           Great toe ext with therabar   3"x15                                                  Ther Activity             Fwd step downs   4" 2x 10 L UE support only           TG squat    L19 2x 10 Gait Training                                       Modalities

## 2023-03-16 NOTE — PROGRESS NOTES
Daily Note     Today's date: 3/16/2023  Patient name: Joselo Cordon  : 1975  MRN: [id number]  Referring provider: Theresa Anderson MD  Dx:   Encounter Diagnosis     ICD-10-CM    1  Cubital tunnel syndrome, right  G56 21       2  Carpal tunnel syndrome on right  G56 01                      Subjective: I'm hoping to go back to work the end of the month      Objective: See treatment diary below      Assessment: Tolerated treatment well  Patient exhibited good technique with therapeutic exercises  Initiated strengthening this date  Mild pain after exercises      Plan: Continue per plan of care  Precautions: Right CTR and cubital tunnel release 2/10/23  Chronic neck pain   Access Code: [email or web address]  URL: [email or web address]  Date: 2023  Prepared by: Isabela Borjas    Exercises  • Standing Wrist Flexion Stretch - 3 x daily - 7 x weekly - 1 sets - 10 reps - 5 hold  • Standing Wrist Extension Stretch - 3 x daily - 7 x weekly - 1 sets - 10 reps - 5 hold  • Elbow AROM Flexion & Extension Neutral Forearm - 3 x daily - 7 x weekly - 1 sets - 10 reps  • Standing Forearm Pronation and Supination AROM - 3 x daily - 7 x weekly - 1 sets - 10 reps    Patient Education  • Carpal Tunnel Syndrome      Date 3/2/23 3/9/23 3/16/23     Visit  1 2 3     Manuals        Scar massage 8' + elastogel, tg over scars 10' ( 3' elbow, 5' wrist) 10' ( 5' ea scar)     Kinesio tape  3'                      Neuro Re-Ed                                                                 Ther Ex        A/AAROM wrist x10 x20 x20     A/AAROM FA x10 x20 x20     A/AAROM elbow x10 x20 x20     Cones p/s        Cones elbow e/f        OH pulley for elbow ROM  x20      Wrist maze  x10      Wrist PREs e/f, dt   2# x 10 ea     FA PREs   2# x 10     HG   25# x20     Pinch strength   R,R on PW x 1 set             Ther Activity        Translation  Marbles 1 set              HEP Scar massage, AROM elbow, FA,wrist; elastogel and tg for wrist and elbow Kinesio tape wear, care, precautions                      Modalities        [facility] 5' 5' 5'

## 2023-03-21 ENCOUNTER — OFFICE VISIT (OUTPATIENT)
Dept: PHYSICAL THERAPY | Facility: CLINIC | Age: 48
End: 2023-03-21

## 2023-03-21 ENCOUNTER — OFFICE VISIT (OUTPATIENT)
Dept: OCCUPATIONAL THERAPY | Facility: CLINIC | Age: 48
End: 2023-03-21

## 2023-03-21 DIAGNOSIS — G56.21 CUBITAL TUNNEL SYNDROME, RIGHT: Primary | ICD-10-CM

## 2023-03-21 DIAGNOSIS — M20.5X1 HALLUX LIMITUS OF RIGHT FOOT: ICD-10-CM

## 2023-03-21 DIAGNOSIS — G56.01 CARPAL TUNNEL SYNDROME ON RIGHT: ICD-10-CM

## 2023-03-21 DIAGNOSIS — M79.671 RIGHT FOOT PAIN: Primary | ICD-10-CM

## 2023-03-21 NOTE — PROGRESS NOTES
Daily Note     Today's date: 3/21/2023  Patient name: Jessica Salamanca  : 1975  MRN: [id number]  Referring provider: Amanda Cintron DPM  Dx:   Encounter Diagnosis     ICD-10-CM    1  Right foot pain  M79 671       2  Hallux limitus of right foot  M20 5X1                      Subjective: Patient reports that her feet feel good today and that she was able to put on sneakers  She states that she only has some minor discomfort on the bottom of her big toe  Objective: See treatment diary below      Assessment: Tolerated treatment well  Patient challenged with heel raises on total gym due to muscular fatigue  Patient demonstrated fatigue post treatment, exhibited good technique with therapeutic exercises and would benefit from continued PT      Plan: Progress treatment as tolerated  Precautions: Right carpal/cubital tunnel release - 2/10/23, Fibromyalgia, Anxiety      Manuals 3/2 3/9  3/16 3/21         IASTM medial plantar arch and 1st digit  BE NV missed  BE                                                Neuro Re-Ed             R arch lifts   3"x20 3" 20x  3"x20          R SLS   3x20"  2x30"         Tandem stance  2x20" ea on foam  2x30" ea on foam         R SLS with star drill cone taps                                                                 Ther Ex             Upright bike   L1 10' L1 10 min  L1 10'         Gastroc strap stretch HEP 3x30" 3x 30"           Soleus strap stretch HEP 3x30" 3x 30"           1st digit flex/ext self str   HEP            Towel curls HEP            TB ankle x 4  GTB 3x10 GTB 3x10           Great toe ext with therabar   3"x15  5"x15  Green         Heel Raise on TG    L19 2x10                                   Ther Activity             Fwd step downs   4" 2x 10 L UE support only  4" 2x 10 L UE support only          TG squat    L19 2x 10  L19   2x10                                                             Gait Training                                       Modalities

## 2023-03-21 NOTE — PROGRESS NOTES
Daily Note     Today's date: 3/21/2023  Patient name: Ki Sosa  : 1975  MRN: [id number]  Referring provider: Bianca Lopez MD  Dx:   Encounter Diagnosis     ICD-10-CM    1  Cubital tunnel syndrome, right  G56 21       2  Carpal tunnel syndrome on right  G56 01                      Subjective: My scar is really sore today  Objective: See treatment diary below      Assessment: Tolerated treatment well  Patient exhibited good technique with therapeutic exercises  Issued new elastogel for scar  Instructed patient to rub different textures of cloth on scars for desensitization      Plan: Continue per plan of care  Precautions: Right CTR and cubital tunnel release 2/10/23  Chronic neck pain   Access Code: [email or web address]  URL: [email or web address]  Date: 2023  Prepared by: Nickolas Pisano    Exercises  • Standing Wrist Flexion Stretch - 3 x daily - 7 x weekly - 1 sets - 10 reps - 5 hold  • Standing Wrist Extension Stretch - 3 x daily - 7 x weekly - 1 sets - 10 reps - 5 hold  • Elbow AROM Flexion & Extension Neutral Forearm - 3 x daily - 7 x weekly - 1 sets - 10 reps  • Standing Forearm Pronation and Supination AROM - 3 x daily - 7 x weekly - 1 sets - 10 reps    Patient Education  • Carpal Tunnel Syndrome      Date 3/2/23 3/9/23 3/16/23 3/21/23    Visit  1 2 3 4    Manuals        Scar massage 8' + elastogel, tg over scars 10' ( 3' elbow, 5' wrist) 10' ( 5' ea scar) 10' (5' ea scar) + elastogel and tg    Kinesio tape  3'                      Neuro Re-Ed                                                                 Ther Ex        A/AAROM wrist x10 x20 x20 x10    A/AAROM FA x10 x20 x20 x10    A/AAROM elbow x10 x20 x20 x10    Cones p/s        Cones elbow e/f        OH pulley for elbow ROM  x20      Wrist maze  x10      Wrist PREs e/f, dt   2# x 10 ea 2# x 20 ea    FA PREs   2# x 10 2# x 20    Elbow e/f    3# x 20    HG   25# x20 35# x 20    Pinch strength   R,R on PW x 1 set G,G on PW 2 sets            Ther Activity        Translation  Marbles 1 set  Marbles 1 set            HEP Scar massage, AROM elbow, FA,wrist; elastogel and tg for wrist and elbow Kinesio tape wear, care, precautions  Elastogel wear, care, precautions, desensitization with different textures                    Modalities        [facility] 5' 5' 5' 5'

## 2023-03-23 ENCOUNTER — APPOINTMENT (OUTPATIENT)
Dept: OCCUPATIONAL THERAPY | Facility: CLINIC | Age: 48
End: 2023-03-23

## 2023-03-23 ENCOUNTER — OFFICE VISIT (OUTPATIENT)
Dept: URGENT CARE | Facility: CLINIC | Age: 48
End: 2023-03-23

## 2023-03-23 ENCOUNTER — APPOINTMENT (OUTPATIENT)
Dept: PHYSICAL THERAPY | Facility: CLINIC | Age: 48
End: 2023-03-23

## 2023-03-23 VITALS
HEART RATE: 78 BPM | DIASTOLIC BLOOD PRESSURE: 86 MMHG | RESPIRATION RATE: 18 BRPM | TEMPERATURE: 97.6 F | SYSTOLIC BLOOD PRESSURE: 118 MMHG | OXYGEN SATURATION: 98 %

## 2023-03-23 DIAGNOSIS — M54.50 ACUTE BILATERAL LOW BACK PAIN WITHOUT SCIATICA: Primary | ICD-10-CM

## 2023-03-23 RX ORDER — PREDNISONE 10 MG/1
TABLET ORAL
Qty: 40 TABLET | Refills: 0 | Status: SHIPPED | OUTPATIENT
Start: 2023-03-23

## 2023-03-23 RX ORDER — CYCLOBENZAPRINE HCL 10 MG
10 TABLET ORAL 3 TIMES DAILY PRN
Qty: 15 TABLET | Refills: 0 | Status: SHIPPED | OUTPATIENT
Start: 2023-03-23

## 2023-03-23 NOTE — PROGRESS NOTES
[facility]        NAME: Rhys Cuellar is a 57 y o  female  : 1975    MRN: [id number]  DATE: 2023  TIME: 3:11 PM    Assessment and Plan   Acute bilateral low back pain without sciatica [M54 50]  1  Acute bilateral low back pain without sciatica  predniSONE 10 mg tablet    cyclobenzaprine (FLEXERIL) 10 mg tablet            Patient Instructions       Follow up with PCP in 3-5 days  Proceed to  ER if symptoms worsen  Chief Complaint     Chief Complaint   Patient presents with   • Back Pain     Pt states she went to PT on Tuesday and when left took a nap and when she woke up her lumbar area was in 10/10 pain  Took her Rx tylenol around 0615 today  History of Present Illness       59-year-old female is presenting today with report of lower back pain x2 days  She denies having an incident or accident or trauma  There is a longstanding history of chronic back pain  Patient reports she had been receiving occupational and physical therapy for her lower extremity as well as upper extremity pulsed carpal tunnel release  Physical therapy has not touched her back however the patient is involved in home exercise program involving the back  She went to bed and awaken with pain  Pain is localized to the paralumbar muscle region  It is aching, passing, dull, and a 5 out of a 10  She is not able to take NSAIDs  Her next physical therapy is scheduled for  in which she will discuss with her therapist her lower back condition  She denies numbness, paresthesia, weakness of the lower extremities, bowel or bladder dysfunction  Review of Systems   Review of Systems   Constitutional: Negative for activity change, appetite change and fever  Respiratory: Negative for cough  Cardiovascular: Negative for chest pain and palpitations  Gastrointestinal: Negative for abdominal pain and nausea  Endocrine: Negative for polyuria     Genitourinary: Negative for dysuria, frequency and urgency  Musculoskeletal: Negative for myalgias  Skin: Negative for rash  Neurological: Negative for headaches  Current Medications       Current Outpatient Medications:   •  albuterol (PROVENTIL HFA,VENTOLIN HFA) 90 mcg/act inhaler, Inhale 2 puffs every 6 (six) hours as needed for wheezing (cough), Disp: 1 Inhaler, Rfl: 0  •  Cholecalciferol (Vitamin D3) 25 MCG (1000 UT) CAPS, Take 1 capsule by mouth daily, Disp: , Rfl:   •  clobetasol (TEMOVATE) 0 05 % ointment, Apply 1 application topically 2 (two) times a day To affected area (Patient taking differently: Apply 1 application  topically 2 (two) times a day To affected area as needed), Disp: 30 g, Rfl: 0  •  cyanocobalamin (VITAMIN B-12) 100 MCG tablet, Take 100 mcg by mouth in the morning, Disp: , Rfl:   •  cyclobenzaprine (FLEXERIL) 10 mg tablet, Take 1 tablet (10 mg total) by mouth 3 (three) times a day as needed for muscle spasms No driving or use of machinery while on this medications  , Disp: 15 tablet, Rfl: 0  •  docusate sodium (COLACE) 100 mg capsule, Take 1 capsule (100 mg total) by mouth 2 (two) times a day as needed for constipation, Disp: 30 capsule, Rfl: 0  •  escitalopram (LEXAPRO) 10 mg tablet, Take 1 tablet (10 mg total) by mouth daily (Patient taking differently: Take 10 mg by mouth daily Takes as needed), Disp: 30 tablet, Rfl: 3  •  fluticasone (FLONASE) 50 mcg/act nasal spray, SPRAY 1 SPRAY INTO EACH NOSTRIL EVERY DAY (Patient taking differently: 2 sprays into each nostril daily), Disp: 16 mL, Rfl: 1  •  Multiple Vitamin (MULTIVITAMIN) capsule, Take 1 capsule by mouth daily, Disp: , Rfl:   •  olopatadine (PATANOL) 0 1 % ophthalmic solution, Administer 1 drop to both eyes 2 (two) times a day, Disp: 5 mL, Rfl: 3  •  predniSONE 10 mg tablet, Take 4 pills PO once in the morning for first 4 days  3 Pills PO for next 4 days, 2 pills PO for next 4 days, 1 pill PO for next 4 days  , Disp: 40 tablet, Rfl: 0  •  Turmeric 400 MG CAPS, Take 1 capsule by mouth in the morning, Disp: , Rfl:   •  Vit C-Cholecalciferol-Alessandra Hip (Vitamin C & D3/Alessandra Hips) 500-1000-20 MG-UNIT-MG CAPS, Take 1 capsule by mouth in the morning, Disp: , Rfl:     Current Allergies     Allergies as of 2023 - Reviewed 2023   Allergen Reaction Noted   • Dairy aid [tilactase] GI Intolerance and Vomiting 10/24/2022   • Pollen extract Other (See Comments) 12/10/2020            The following portions of the patient's history were reviewed and updated as appropriate: allergies, current medications, past family history, past medical history, past social history, past surgical history and problem list      Past Medical History:   Diagnosis Date   • Abnormal Pap smear of cervix    • Anxiety    • Arthritis    • Asthma    • Depression    • Osteoporosis    • Urinary tract infection    • Varicella        Past Surgical History:   Procedure Laterality Date   •  SECTION     • AZ NEUROPLASTY &/TRANSPOS MEDIAN NRV CARPAL TUNNE Right 2/10/2023    Procedure: RELEASE CARPAL TUNNEL;  Surgeon: Aldo Benítez MD;  Location: [facility];  Service: Orthopedics   • AZ NEUROPLASTY &/TRANSPOSITION ULNAR NERVE ELBOW Right 2/10/2023    Procedure: RELEASE CUBITAL TUNNEL;  Surgeon: Aldo Benítez MD;  Location: [facility];  Service: Orthopedics   • TUBAL LIGATION         Family History   Problem Relation Age of Onset   • Cancer Mother    • Breast cancer Mother         age unknown   • Diabetes Mother    • Coronary artery disease Mother    • Ovarian cancer Mother    • Cancer Father    • Prostate cancer Father    • Colon cancer Father    • No Known Problems Sister    • No Known Problems Sister    • No Known Problems Sister    • No Known Problems Sister    • No Known Problems Sister    • No Known Problems Daughter    • No Known Problems Daughter    • No Known Problems Maternal Grandmother    • No Known Problems Paternal Grandmother    • No Known Problems Maternal Aunt    • No Known Problems Maternal Aunt    • No Known Problems Maternal Aunt    • No Known Problems Maternal Aunt    • No Known Problems Maternal Aunt    • No Known Problems Maternal Aunt    • No Known Problems Maternal Aunt    • No Known Problems Maternal Aunt    • No Known Problems Paternal Aunt    • No Known Problems Paternal Aunt    • No Known Problems Paternal Aunt    • No Known Problems Paternal Aunt    • No Known Problems Paternal Aunt    • No Known Problems Half-Sister    • Breast cancer Half-Sister         age unknown   • No Known Problems Half-Sister          Medications have been verified  Objective   /86   Pulse 78   Temp 97 6 °F (36 4 °C)   Resp 18   LMP 12/11/2022 (Exact Date)   SpO2 98%        Physical Exam     Physical Exam  Vitals and nursing note reviewed  Constitutional:       General: She is not in acute distress  Appearance: Normal appearance  She is not ill-appearing  Eyes:      General: No scleral icterus  Extraocular Movements: Extraocular movements intact  Conjunctiva/sclera: Conjunctivae normal       Pupils: Pupils are equal, round, and reactive to light  Cardiovascular:      Rate and Rhythm: Normal rate and regular rhythm  Pulses: Normal pulses  Pulmonary:      Effort: Pulmonary effort is normal       Breath sounds: Normal breath sounds  No wheezing, rhonchi or rales  Musculoskeletal:      Cervical back: Normal, normal range of motion and neck supple  No tenderness  Thoracic back: Normal       Lumbar back: Spasms and tenderness present  No swelling, edema, deformity, signs of trauma, lacerations or bony tenderness  Normal range of motion  Negative right straight leg raise test and negative left straight leg raise test  No scoliosis  Lymphadenopathy:      Cervical: No cervical adenopathy  Skin:     General: Skin is warm and dry  Neurological:      General: No focal deficit present        Mental Status: She is alert and oriented to person, place, and time  Coordination: Coordination normal       Gait: Gait normal    Psychiatric:         Mood and Affect: Mood normal          Behavior: Behavior normal          Thought Content:  Thought content normal          Judgment: Judgment normal

## 2023-03-23 NOTE — PATIENT INSTRUCTIONS
Muscle Spasm   WHAT YOU NEED TO KNOW:   A muscle spasm is a sudden contraction of any muscle or group of muscles  A muscle cramp is a painful muscle spasm  Muscle cramps commonly occur after intense exercise or during pregnancy  They may also be caused by certain medications, dehydration, low calcium or magnesium levels, or another medical condition  DISCHARGE INSTRUCTIONS:   Medicines: You may need the following:  NSAIDs  help decrease swelling and pain or fever  This medicine is available with or without a doctor's order  NSAIDs can cause stomach bleeding or kidney problems in certain people  If you take blood thinner medicine, always ask your healthcare provider if NSAIDs are safe for you  Always read the medicine label and follow directions  Take your medicine as directed  Contact your healthcare provider if you think your medicine is not helping or if you have side effects  Tell your provider if you are allergic to any medicine  Keep a list of the medicines, vitamins, and herbs you take  Include the amounts, and when and why you take them  Bring the list or the pill bottles to follow-up visits  Carry your medicine list with you in case of an emergency  Follow up with your healthcare provider as directed: You may need other tests or treatment  You may also be referred to a physical therapist or other specialist  Write down your questions so you remember to ask them during your visits  Self-care:   Stretch  your muscle to help relieve the cramp  It may be helpful to keep your muscle in the stretched position until the cramp is gone  Apply heat  to help decrease pain and muscle spasms  Apply heat on the area for 20 to 30 minutes every 2 hours for as many days as directed  Apply ice  to help decrease swelling and pain  Ice may also help prevent tissue damage  Use an ice pack, or put crushed ice in a plastic bag   Cover it with a towel and place it on your muscle for 15 to 20 minutes every hour or as directed  Drink more liquids  to help prevent muscle cramps caused by dehydration  Sports drinks may help replace electrolytes you lose through sweat during exercise  Ask your healthcare provider how much liquid to drink each day and which liquids are best for you  Eat healthy foods , such as fruits, vegetables, whole grains, low-fat dairy products, and lean proteins (meat, beans, and fish)  If you are pregnant, ask your healthcare provider about foods that are high in magnesium and sodium  They may help to relieve cramps during pregnancy  Massage your muscle  to help relieve the cramp  Take frequent deep breaths  until the cramp feels better  Lie down while you take the deep breaths so you do not get dizzy or lightheaded  Contact your healthcare provider if:   You have signs of dehydration, such as a headache, dark yellow urine, dry eyes or mouth, or a fast heartbeat  You have questions or concerns about your condition or care  Return to the emergency department if:   You have warmth, swelling, or redness in the cramping muscle  You have frequent or unrelieved muscle cramps in several different muscles  You have muscle cramps with numbness, tingling, and burning in your hands and feet  © Copyright [employer] [facility] 2022 Information is for End User's use only and may not be sold, redistributed or otherwise used for commercial purposes  The above information is an  only  It is not intended as medical advice for individual conditions or treatments  Talk to your doctor, nurse or pharmacist before following any medical regimen to see if it is safe and effective for you

## 2023-03-28 ENCOUNTER — APPOINTMENT (OUTPATIENT)
Dept: PHYSICAL THERAPY | Facility: CLINIC | Age: 48
End: 2023-03-28

## 2023-03-28 ENCOUNTER — OFFICE VISIT (OUTPATIENT)
Dept: OCCUPATIONAL THERAPY | Facility: CLINIC | Age: 48
End: 2023-03-28

## 2023-03-28 DIAGNOSIS — G56.01 CARPAL TUNNEL SYNDROME ON RIGHT: ICD-10-CM

## 2023-03-28 DIAGNOSIS — G56.21 CUBITAL TUNNEL SYNDROME, RIGHT: Primary | ICD-10-CM

## 2023-03-28 NOTE — PROGRESS NOTES
"Daily Note     Today's date: 3/28/2023  Patient name: Gladis Persaud  : 1975  MRN: [id number]  Referring provider: Darrion Gabriel DPM  Dx: No diagnosis found  Subjective: ***      Objective: See treatment diary below      Assessment: Tolerated treatment {Tolerated treatment :}  Patient {assessment:}      Plan: {PLAN:}     Precautions: Right carpal/cubital tunnel release - 2/10/23, Fibromyalgia, Anxiety  HEP access code: [email or web address]     Manuals 3/2 3/9  3/16 3/21 3/28        IASTM medial plantar arch and 1st digit  BE NV missed  BE                                                Neuro Re-Ed             R arch lifts   3\"x20 3\" 20x  3\"x20          R SLS   3x20\"  2x30\"         Tandem stance  2x20\" ea on foam  2x30\" ea on foam         R SLS with star drill cone taps                                                                 Ther Ex             Upright bike   L1 10' L1 10 min  L1 10'         Gastroc strap stretch HEP 3x30\" 3x 30\"           Soleus strap stretch HEP 3x30\" 3x 30\"           1st digit flex/ext self str   HEP            Towel curls HEP            TB ankle x 4  GTB 3x10 GTB 3x10           Great toe ext with therabar   3\"x15  5\"x15  Green         Heel Raise on TG    L19 2x10                                   Ther Activity             Fwd step downs   4\" 2x 10 L UE support only  4\" 2x 10 L UE support only          TG squat    L19 2x 10  L19   2x10                                                             Gait Training                                       Modalities                                                     "

## 2023-03-30 ENCOUNTER — APPOINTMENT (OUTPATIENT)
Dept: OCCUPATIONAL THERAPY | Facility: CLINIC | Age: 48
End: 2023-03-30

## 2023-03-30 ENCOUNTER — OFFICE VISIT (OUTPATIENT)
Dept: FAMILY MEDICINE CLINIC | Facility: CLINIC | Age: 48
End: 2023-03-30

## 2023-03-30 ENCOUNTER — APPOINTMENT (OUTPATIENT)
Dept: PHYSICAL THERAPY | Facility: CLINIC | Age: 48
End: 2023-03-30

## 2023-03-30 VITALS
HEART RATE: 78 BPM | DIASTOLIC BLOOD PRESSURE: 87 MMHG | OXYGEN SATURATION: 98 % | TEMPERATURE: 97.8 F | WEIGHT: 186 LBS | HEIGHT: 66 IN | SYSTOLIC BLOOD PRESSURE: 128 MMHG | BODY MASS INDEX: 29.89 KG/M2

## 2023-03-30 DIAGNOSIS — G56.21 CUBITAL TUNNEL SYNDROME, RIGHT: Primary | ICD-10-CM

## 2023-03-30 DIAGNOSIS — Z13.220 SCREENING FOR LIPID DISORDERS: ICD-10-CM

## 2023-03-30 DIAGNOSIS — F41.9 ANXIETY: ICD-10-CM

## 2023-03-30 DIAGNOSIS — Z12.31 ENCOUNTER FOR SCREENING MAMMOGRAM FOR MALIGNANT NEOPLASM OF BREAST: ICD-10-CM

## 2023-03-30 DIAGNOSIS — Z13.1 SCREENING FOR DIABETES MELLITUS: ICD-10-CM

## 2023-03-30 DIAGNOSIS — G56.01 CARPAL TUNNEL SYNDROME ON RIGHT: ICD-10-CM

## 2023-03-30 NOTE — PROGRESS NOTES
Name: Wally Gardner      : 1975      MRN: [id number]  Encounter Provider: Loraine Stuart PA-C  Encounter Date: 3/30/2023   Encounter department: [address] [place] [address]     1  Cubital tunnel syndrome, right    2  Carpal tunnel syndrome on right    3  Anxiety    4  Screening for diabetes mellitus  -     Comprehensive metabolic panel; Future    5  Screening for lipid disorders  -     Lipid Panel with Direct LDL reflex; Future    6  Encounter for screening mammogram for malignant neoplasm of breast  -     Mammo screening bilateral w 3d & cad; Future; Expected date: 2023  great outcome s/p R carpal/cubital tunnel releases  Completed OT  Cleared for return to work  Mood stable, continue lexapro   Update screening labs and HM  6 month follow up, earlier prn    Depression Screening Follow-up Plan: Patient's depression screening was positive with a PHQ-2 score of 4  Their PHQ-9 score was 11  Patient assessed for underlying major depression  They have no active suicidal ideations  Brief counseling provided and recommend additional follow-up/re-evaluation next office visit  Patient advised to follow-up with PCP for further management  BMI Counseling: Body mass index is 30 02 kg/m²  The BMI is above normal  Nutrition recommendations include reducing portion sizes, decreasing overall calorie intake, moderation in carbohydrate intake, increasing intake of lean protein, reducing intake of saturated fat and trans fat and reducing intake of cholesterol  Exercise recommendations include moderate aerobic physical activity for 150 minutes/week  Subjective     Pt presents for follow up  Pt is s/p R carpal/cubital tunnel releases in 2023 and has completed OT  She also completed PT for foot pain  She is feeling better  Denies numbness/pain  She is excited to go back to work   She believes going back to work will also help her mood as her depression was worsening a bit at home  She is on lexapro and feels overall stable from a mood standpoint  She has no other concerns today  Review of Systems   Constitutional: Negative for chills, fatigue and fever  HENT: Negative for congestion, ear pain, hearing loss, nosebleeds, postnasal drip, rhinorrhea, sinus pressure, sinus pain, sneezing and sore throat  Eyes: Negative for pain, discharge, itching and visual disturbance  Respiratory: Negative for cough, chest tightness, shortness of breath and wheezing  Cardiovascular: Negative for chest pain, palpitations and leg swelling  Gastrointestinal: Negative for abdominal pain, blood in stool, constipation, diarrhea, nausea and vomiting  Genitourinary: Negative for frequency and urgency  Neurological: Negative for dizziness, light-headedness and numbness  Psychiatric/Behavioral: Positive for dysphoric mood (stable)  The patient is nervous/anxious (stable)          Past Medical History:   Diagnosis Date   • Abnormal Pap smear of cervix    • Anxiety    • Arthritis    • Asthma    • Depression    • Osteoporosis    • Urinary tract infection    • Varicella      Past Surgical History:   Procedure Laterality Date   •  SECTION     • RI NEUROPLASTY &/TRANSPOS MEDIAN NRV CARPAL TUNNE Right 2/10/2023    Procedure: RELEASE CARPAL TUNNEL;  Surgeon: Bisi Robledo MD;  Location: [facility];  Service: Orthopedics   • RI NEUROPLASTY &/TRANSPOSITION ULNAR NERVE ELBOW Right 2/10/2023    Procedure: RELEASE CUBITAL TUNNEL;  Surgeon: Bisi Robledo MD;  Location: [facility];  Service: Orthopedics   • TUBAL LIGATION       Family History   Problem Relation Age of Onset   • Cancer Mother    • Breast cancer Mother         age unknown   • Diabetes Mother    • Coronary artery disease Mother    • Ovarian cancer Mother    • Cancer Father    • Prostate cancer Father    • Colon cancer Father    • No Known Problems Sister    • No Known Problems Sister    • No Known Problems Sister    • No Known Problems Sister    • No Known Problems Sister    • No Known Problems Daughter    • No Known Problems Daughter    • No Known Problems Maternal Grandmother    • No Known Problems Paternal Grandmother    • No Known Problems Maternal Aunt    • No Known Problems Maternal Aunt    • No Known Problems Maternal Aunt    • No Known Problems Maternal Aunt    • No Known Problems Maternal Aunt    • No Known Problems Maternal Aunt    • No Known Problems Maternal Aunt    • No Known Problems Maternal Aunt    • No Known Problems Paternal Aunt    • No Known Problems Paternal Aunt    • No Known Problems Paternal Aunt    • No Known Problems Paternal Aunt    • No Known Problems Paternal Aunt    • No Known Problems Half-Sister    • Breast cancer Half-Sister         age unknown   • No Known Problems Half-Sister      Social History     Socioeconomic History   • Marital status: /Civil Union     Spouse name: None   • Number of children: None   • Years of education: None   • Highest education level: None   Occupational History   • Occupation: homemaker   Tobacco Use   • Smoking status: Former     Packs/day: 0 50     Types: Cigarettes   • Smokeless tobacco: Never   • Tobacco comments:     Almost 2 two years wit no smoking  patient report ss he vapes    Vaping Use   • Vaping Use: Never used   Substance and Sexual Activity   • Alcohol use: Yes     Comment: Social    • Drug use: Not Currently   • Sexual activity: Yes     Partners: Male     Birth control/protection: Female Sterilization   Other Topics Concern   • None   Social History Narrative   • None     Social Determinants of Health     Financial Resource Strain: Not on file   Food Insecurity: Not on file   Transportation Needs: Not on file   Physical Activity: Not on file   Stress: Not on file   Social Connections: Not on file   Intimate Partner Violence: Not on file   Housing Stability: Not on file     Current Outpatient Medications on File Prior to Visit   Medication Sig   • albuterol (PROVENTIL HFA,VENTOLIN HFA) 90 mcg/act inhaler Inhale 2 puffs every 6 (six) hours as needed for wheezing (cough)   • Cholecalciferol (Vitamin D3) 25 MCG (1000 UT) CAPS Take 1 capsule by mouth daily   • clobetasol (TEMOVATE) 0 05 % ointment Apply 1 application topically 2 (two) times a day To affected area (Patient taking differently: Apply 1 application  topically 2 (two) times a day To affected area as needed)   • cyanocobalamin (VITAMIN B-12) 100 MCG tablet Take 100 mcg by mouth in the morning   • cyclobenzaprine (FLEXERIL) 10 mg tablet Take 1 tablet (10 mg total) by mouth 3 (three) times a day as needed for muscle spasms No driving or use of machinery while on this medications  • docusate sodium (COLACE) 100 mg capsule Take 1 capsule (100 mg total) by mouth 2 (two) times a day as needed for constipation   • escitalopram (LEXAPRO) 10 mg tablet Take 1 tablet (10 mg total) by mouth daily (Patient taking differently: Take 10 mg by mouth daily Takes as needed)   • fluticasone (FLONASE) 50 mcg/act nasal spray SPRAY 1 SPRAY INTO EACH NOSTRIL EVERY DAY (Patient taking differently: 2 sprays into each nostril daily)   • Multiple Vitamin (MULTIVITAMIN) capsule Take 1 capsule by mouth daily   • olopatadine (PATANOL) 0 1 % ophthalmic solution Administer 1 drop to both eyes 2 (two) times a day   • predniSONE 10 mg tablet Take 4 pills PO once in the morning for first 4 days  3 Pills PO for next 4 days, 2 pills PO for next 4 days, 1 pill PO for next 4 days  • Turmeric 400 MG CAPS Take 1 capsule by mouth in the morning   • Vit C-Cholecalciferol-Alessandra Hip (Vitamin C & D3/Alessandra Hips) 500-1000-20 MG-UNIT-MG CAPS Take 1 capsule by mouth in the morning     Allergies   Allergen Reactions   • Dairy Aid [Tilactase] GI Intolerance and Vomiting   • Pollen Extract Other (See Comments)     Seasonal allergies       Immunization History   Administered Date(s) Administered   • Pneumococcal Conjugate Vaccine 20-valent (Pcv20), Polysace "07/07/2022   • Pneumococcal Polysaccharide PPV23 01/15/2020   • Tdap 10/05/2016       Objective     /87   Pulse 78   Temp 97 8 °F (36 6 °C)   Ht 5' 6\" (1 676 m)   Wt 84 4 kg (186 lb)   LMP 12/11/2022 (Exact Date)   SpO2 98%   BMI 30 02 kg/m²     Physical Exam  Vitals and nursing note reviewed  Constitutional:       General: She is not in acute distress  Appearance: Normal appearance  HENT:      Head: Normocephalic and atraumatic  Nose: Nose normal    Eyes:      Pupils: Pupils are equal, round, and reactive to light  Cardiovascular:      Rate and Rhythm: Normal rate and regular rhythm  Heart sounds: Normal heart sounds  No murmur heard  Pulmonary:      Effort: Pulmonary effort is normal  No respiratory distress  Breath sounds: Wheezing (mild basilar wheeze) present  No rhonchi or rales  Musculoskeletal:         General: Normal range of motion  Cervical back: Normal range of motion and neck supple  Skin:     General: Skin is warm and dry  Neurological:      Mental Status: She is alert and oriented to person, place, and time  Sensory: No sensory deficit  Motor: No weakness  Psychiatric:         Attention and Perception: Attention normal          Mood and Affect: Affect normal  Mood is anxious (stable) and depressed (stable)           Speech: Speech normal          Behavior: Behavior normal        Gertha Sacks, PA-C  "

## 2023-03-30 NOTE — PROGRESS NOTES
PT Re-Evaluation     Today's date: 3/30/2023  Patient name: Gladis Persaud  : 1975  MRN: [id number]  Referring provider: Darrion Gabriel DPM  Dx:   No diagnosis found  Assessment  Assessment details: Gladis Persaud is a 57 y o  female who presents with pain, decreased strength, and decreased ROM  Due to these impairments, patient has difficulty performing a/iadls and recreational activities  Patient's clinical presentation is consistent with their referring diagnosis of right foot pain  Patient would benefit from skilled physical therapy to address their aforementioned impairments, improve their level of function and to improve their overall quality of life  Gladis Persaud is a 57 y o  female presenting to physical therapy for a reevaluation with a MD referral of **  Since [unfilled]  initial evaluation, [unfilled] reports ** improvement in **  The patient has demonstrated improved **  However, they continue to have ** leading to limitations in their ability to **  This patient would benefit from continued PT services to address their impairments and functional limitations to maximize functional outcome  Impairments: abnormal or restricted ROM, impaired physical strength, lacks appropriate home exercise program and pain with function  Understanding of Dx/Px/POC: good   Prognosis: good    Goals  Short term goals - to be achieved in 4 weeks:     Decrease pain 20-50%  Increase strength by 1/2 grade  Improve range of motion by 25%  Balance will be improved as indicated by SLS of at least 20 seconds  Long term goals - to be achieved by discharge:    Ambulation is improved to maximal level of function  Squatting is improved to maximal level of function  Stair climbing is improved to maximal level of function  IADL performance in related activities is improved to maximal level of function  Performance in related work activities is improved to maximal level of function  Plan  Planned therapy interventions: manual therapy, neuromuscular re-education, patient education, strengthening, therapeutic activities, stretching, therapeutic exercise and home exercise program  Frequency: 2x week  Duration in visits: 12  Duration in weeks: 6  Plan of Care beginning date: 3/2/2023  Plan of Care expiration date: 2023  Treatment plan discussed with: patient        Subjective Evaluation    History of Present Illness  Mechanism of injury: Patient refers to PT with c/o pain in her right foot which began approximately six months previous of insidious onset  Patient states prior pain in her right foot approximately two years previous; states previous pain improved with PT treatment  Patient received X-rays of her right foot on 2/15/23 which Podiatrist interpreted as indicating very mild dorsomedial exostosis of 1st metatarsal head  Patient states pain in her 1st digit and pain in the medial plantar arch of her right foot  Patient states constant numbness in the 1st digit and medial arch of foot  Patient states pain with ambulation, stair climbing, and extended standing  Patient received right UE cubital and carpal tunnel release on 2/10/23; is currently out of work secondary to recent surgery  The patient reports ** improvement since beginning PT services  ** notes improvement in ** since initial evaluation  ** reports that they have continued difficulty with **     Pain  Current pain ratin  At best pain ratin  At worst pain rating: 10  Quality: sharp and throbbing  Relieving factors: medications  Aggravating factors: walking, stair climbing and standing    Patient Goals  Patient goals for therapy: decreased pain          Objective     Active Range of Motion     Right Ankle/Foot   Dorsiflexion (kf): 5 degrees   Plantar flexion: 40 degrees   Inversion: 35 degrees   Eversion: 15 degrees   Great toe flexion: 30 degrees   Great toe extension: 60 degrees     Passive Range of Motion "    Right Ankle/Foot    Dorsiflexion (kf): 9 degrees    Plantar flexion: 45 degrees   Inversion: 40 degrees   Eversion: 20 degrees   Great toe flexion: 35 degrees   Great toe extension: 65 degrees     Strength/Myotome Testing     Right Hip   Planes of Motion   Flexion: 4-  Extension: 4  Abduction: 4  Adduction: 4    Right Knee   Flexion: 4+  Extension: 4    Right Ankle/Foot   Dorsiflexion: 4  Plantar flexion: 4  Inversion: 4- (pain)  Eversion: 4  Great toe flexion: 4-  Great toe extension: 4    General Comments: Ankle/Foot Comments   Right SLS - 5 seconds, loss of balance             Precautions: Right carpal/cubital tunnel release - 2/10/23, Fibromyalgia, Anxiety  HEP access code: [email or web address]     Manuals 3/2 3/9  3/16 3/21 3/30        IASTM medial plantar arch and 1st digit  BE NV missed  BE                                                Neuro Re-Ed             R arch lifts   3\"x20 3\" 20x  3\"x20          R SLS   3x20\"  2x30\"         Tandem stance  2x20\" ea on foam  2x30\" ea on foam         R SLS with star drill cone taps                                                                 Ther Ex             Upright bike   L1 10' L1 10 min  L1 10'         Gastroc strap stretch HEP 3x30\" 3x 30\"           Soleus strap stretch HEP 3x30\" 3x 30\"           1st digit flex/ext self str   HEP            Towel curls HEP            TB ankle x 4  GTB 3x10 GTB 3x10           Great toe ext with therabar   3\"x15  5\"x15  Green         Heel Raise on TG    L19 2x10                                   Ther Activity             Fwd step downs   4\" 2x 10 L UE support only  4\" 2x 10 L UE support only          TG squat    L19 2x 10  L19   2x10                                                             Gait Training                                       Modalities                                                 "

## 2023-03-30 NOTE — PROGRESS NOTES
"Daily Note     Today's date: 3/30/2023  Patient name: Azael Mccracken  : 1975  MRN: [id number]  Referring provider: Kemal Hinton DPM  Dx: No diagnosis found  Subjective: ***      Objective: See treatment diary below      Assessment: Tolerated treatment {Tolerated treatment :}  Patient {assessment:}      Plan: {PLAN:}     Precautions: Right carpal/cubital tunnel release - 2/10/23, Fibromyalgia, Anxiety  HEP access code: [email or web address]     Manuals 3/2 3/9  3/16 3/21 3/30        IASTM medial plantar arch and 1st digit  BE NV missed  BE                                                Neuro Re-Ed             R arch lifts   3\"x20 3\" 20x  3\"x20          R SLS   3x20\"  2x30\"         Tandem stance  2x20\" ea on foam  2x30\" ea on foam         R SLS with star drill cone taps                                                                 Ther Ex             Upright bike   L1 10' L1 10 min  L1 10'         Gastroc strap stretch HEP 3x30\" 3x 30\"           Soleus strap stretch HEP 3x30\" 3x 30\"           1st digit flex/ext self str   HEP            Towel curls HEP            TB ankle x 4  GTB 3x10 GTB 3x10           Great toe ext with therabar   3\"x15  5\"x15  Green         Heel Raise on TG    L19 2x10                                   Ther Activity             Fwd step downs   4\" 2x 10 L UE support only  4\" 2x 10 L UE support only          TG squat    L19 2x 10  L19   2x10                                                             Gait Training                                       Modalities                                                       "

## 2023-03-30 NOTE — LETTER
March 30, 2023     Patient: Yo Yi  YOB: 1975  Date of Visit: 3/30/2023      To Whom it May Concern:    Yo Yi is under my professional care  Tabatha Strickland was seen in my office on 3/30/2023  Tabatha Strickland may return to work on 4/1/2023 with the same limitations upon hire       If you have any questions or concerns, please don't hesitate to call           Sincerely,          John Lara PA-C        CC: No Recipients

## 2023-04-07 DIAGNOSIS — M54.50 ACUTE BILATERAL LOW BACK PAIN, UNSPECIFIED WHETHER SCIATICA PRESENT: Primary | ICD-10-CM

## 2023-04-07 RX ORDER — CYCLOBENZAPRINE HCL 10 MG
10 TABLET ORAL 3 TIMES DAILY PRN
Qty: 30 TABLET | Refills: 0 | Status: SHIPPED | OUTPATIENT
Start: 2023-04-07 | End: 2023-04-07

## 2023-04-07 RX ORDER — METHOCARBAMOL 750 MG/1
750 TABLET, FILM COATED ORAL EVERY 6 HOURS PRN
Qty: 30 TABLET | Refills: 0 | Status: SHIPPED | OUTPATIENT
Start: 2023-04-07

## 2023-04-10 PROBLEM — M54.50 RIGHT LOW BACK PAIN: Status: ACTIVE | Noted: 2023-04-10

## 2023-04-24 ENCOUNTER — OFFICE VISIT (OUTPATIENT)
Dept: PHYSICAL THERAPY | Facility: CLINIC | Age: 48
End: 2023-04-24

## 2023-04-24 DIAGNOSIS — M54.50 RIGHT LOW BACK PAIN, UNSPECIFIED CHRONICITY, UNSPECIFIED WHETHER SCIATICA PRESENT: Primary | ICD-10-CM

## 2023-04-24 NOTE — PROGRESS NOTES
"Daily Note     Today's date: 2023  Patient name: Yuridia Lindsey  : 1975  MRN: [id number]  Referring provider: CORI Mckeon  Dx:   Encounter Diagnosis     ICD-10-CM    1  Right low back pain, unspecified chronicity, unspecified whether sciatica present  M54 52           Start Time: 1603  Stop Time: 1641  Total time in clinic (min): 38 minutes    Subjective: Pt noted that she has been doing pretty good and having some increase stiffness but less LBP  Objective: See treatment diary below      Assessment: Continued with treatment session, Tolerated treatment well  Pt noted feeling a good stretch with bilateral piriformis stretch  Patient exhibited good technique with therapeutic exercises and would benefit from continued PT  S/p treatment session, Pt noted having no changes in pain status  Plan: Continue per plan of care  Precautions: Depression, Anxiety      Manuals           Gentle PA mos L3-5 JF grade I-II            IASTM right lumbar paraspinals NV                                      Neuro Re-Ed             Pt education L-spine anatomy and pathology    DJD            Webslide rows M,L  GTB 3\"x20 GTB 3x 10           T-band Paloff press  GTB 10\"x10 GTB 10\" x 10           Supine PPT  5\"x20 MAX VC's 5\" 2 x 10           Bridges  3\"x20 3\" 2x 10           SL clamshells herber                          Ther Ex             Nustepseat 9 with UE  L6x10' L6 10'           Seated right sciatic nerve glides             Supine piriformis stretch right   30\" x 3                                                                            Ther Activity                                       Gait Training                                       Modalities                                            "

## 2023-04-26 ENCOUNTER — APPOINTMENT (OUTPATIENT)
Dept: PHYSICAL THERAPY | Facility: CLINIC | Age: 48
End: 2023-04-26

## 2023-04-26 DIAGNOSIS — M54.50 RIGHT LOW BACK PAIN, UNSPECIFIED CHRONICITY, UNSPECIFIED WHETHER SCIATICA PRESENT: Primary | ICD-10-CM

## 2023-04-26 NOTE — PROGRESS NOTES
"Daily Note     Today's date: 2023  Patient name: Racheal Sunshine  : 1975  MRN: [id number]  Referring provider: CORI Nunes  Dx:   Encounter Diagnosis     ICD-10-CM    1  Right low back pain, unspecified chronicity, unspecified whether sciatica present  M54 52                      Subjective: ***      Objective: See treatment diary below      Assessment: Tolerated treatment well  Patient demonstrated fatigue post treatment and would benefit from continued PT  Plan: Continue per plan of care  Progress treatment as tolerated  Precautions: Depression, Anxiety      Manuals          Gentle PA mos L3-5 JF grade I-II            IASTM right lumbar paraspinals NV                                      Neuro Re-Ed             Pt education L-spine anatomy and pathology    DJD            Webslide rows M,L  GTB 3\"x20 GTB 3x 10           T-band Paloff press  GTB 10\"x10 GTB 10\" x 10           Supine PPT  5\"x20 MAX VC's 5\" 2 x 10           Bridges  3\"x20 3\" 2x 10           SL clamshells herber                          Ther Ex             Nustepseat 9 with UE  L6x10' L6 10'           Seated right sciatic nerve glides             Supine piriformis stretch right   30\" x 3                                                                            Ther Activity                                       Gait Training                                       Modalities                                            "

## 2023-04-28 ENCOUNTER — OFFICE VISIT (OUTPATIENT)
Dept: PHYSICAL THERAPY | Facility: CLINIC | Age: 48
End: 2023-04-28

## 2023-04-28 DIAGNOSIS — M54.50 RIGHT LOW BACK PAIN, UNSPECIFIED CHRONICITY, UNSPECIFIED WHETHER SCIATICA PRESENT: Primary | ICD-10-CM

## 2023-04-28 NOTE — PROGRESS NOTES
"Daily Note     Today's date: 2023  Patient name: Eris Sun  : 1975  MRN: [id number]  Referring provider: CORI Brothers  Dx:   Encounter Diagnosis     ICD-10-CM    1  Right low back pain, unspecified chronicity, unspecified whether sciatica present  M54 52           Start Time: 0900  Stop Time: 09  Total time in clinic (min): 45 minutes    Subjective: pt noted having no pain upon arrival for her appointment today  Pt noted that she has not been on her muscle relaxer for about 3 days  Objective: See treatment diary below      Assessment: Continued with treatment session with minimal progressions added this visit, Pt noted that she did have 1x of sharp pain in her buttocks on the R side only on NuStep but then it went away  Noted that her R arm was bothering her with the T-band Paloff press and was able to complete the rest of the reps with L arm only  VC's required t/o treatment for proper technique of exercises  Tolerated treatment well with progressing to s/l clamshells with moderate challenge as well as muscle \"burning\"  Patient demonstrated fatigue post treatment, exhibited good technique with therapeutic exercises and would benefit from continued PT      Plan: Continue per plan of care  Precautions: Depression, Anxiety      Manuals          Gentle PA mos L3-5 JF grade I-II            IASTM right lumbar paraspinals NV                                      Neuro Re-Ed             Pt education L-spine anatomy and pathology  DJD            Webslide rows M,L  GTB 3\"x20 GTB 3x 10  GTB 3x 10          T-band Paloff press  GTB 10\"x10 GTB 10\" x 10  GTB 10\" x 10 L only          Supine PPT  5\"x20 MAX VC's 5\" 2 x 10  5\" 2x10          Bridges  3\"x20 3\" 2x 10  3\" 2x 10          SL clamshells herber    2x 10 ea                         Ther Ex             Nustepseat 9 with UE with cardio/endurance   L6x10' L6 10'  L6 10 min          Seated right sciatic nerve glides    " "10x R only         Supine piriformis stretch right   30\" x 3                                                                            Ther Activity                                       Gait Training                                       Modalities                                              1 on 1 time for 38 minutes on 4/28/23     "

## 2023-05-01 ENCOUNTER — OFFICE VISIT (OUTPATIENT)
Dept: PHYSICAL THERAPY | Facility: CLINIC | Age: 48
End: 2023-05-01

## 2023-05-01 DIAGNOSIS — M54.50 RIGHT LOW BACK PAIN, UNSPECIFIED CHRONICITY, UNSPECIFIED WHETHER SCIATICA PRESENT: Primary | ICD-10-CM

## 2023-05-01 NOTE — PROGRESS NOTES
"Daily Note     Today's date: 2023  Patient name: Shaggy Vergara  : 1975  MRN: [id number]  Referring provider: CORI Elliott  Dx:   Encounter Diagnosis     ICD-10-CM    1  Right low back pain, unspecified chronicity, unspecified whether sciatica present  M54 50                      Subjective: Patient states her back is feeling much better  Had some discomfort at her lumbar spine yesterday, but her  rubbed it and it felt better  She is able to stand without increased pain  Patient states t-band rows are bothering her right UE and requests to hold on doing them today  Objective: See treatment diary below      Assessment: Tolerated treatment well  Patient would benefit from continued PT   Held T-band rows today per patient request     Plan: Continue per plan of care  Precautions: Depression, Anxiety      Manuals         Gentle PA mos L3-5 JF grade I-II            IASTM right lumbar paraspinals NV                                      Neuro Re-Ed             Pt education L-spine anatomy and pathology  DJD            Webslide rows M,L  GTB 3\"x20 GTB 3x 10  GTB 3x 10  Hold today        T-band Paloff press  GTB 10\"x10 GTB 10\" x 10  GTB 10\" x 10 L only  Hold today        Supine PPT  5\"x20 MAX VC's 5\" 2 x 10  5\" 2x10  5\"x30        Bridges  3\"x20 3\" 2x 10  3\" 2x 10  3\" 2x 10         SL clamshells herber    2x 10 ea  2x 10 ea          T-band HL clamshells     GTB 3\"x20        Ther Ex             Nustep seat 9 with UE with cardio/endurance   L6x10' L6 10'  L6 10 min  L6 10 min        Seated right sciatic nerve glides    10x R only 10x R only        Supine piriformis stretch right   30\" x 3  30\" x 3  30\" x 3                                                                          Ther Activity                                       Gait Training                                       Modalities                                            "

## 2023-05-06 ENCOUNTER — OFFICE VISIT (OUTPATIENT)
Dept: URGENT CARE | Facility: CLINIC | Age: 48
End: 2023-05-06

## 2023-05-06 VITALS
DIASTOLIC BLOOD PRESSURE: 78 MMHG | OXYGEN SATURATION: 90 % | HEART RATE: 59 BPM | SYSTOLIC BLOOD PRESSURE: 111 MMHG | RESPIRATION RATE: 16 BRPM

## 2023-05-06 DIAGNOSIS — J02.9 SORE THROAT: ICD-10-CM

## 2023-05-06 DIAGNOSIS — B95.0 GROUP A STREPTOCOCCAL INFECTION: Primary | ICD-10-CM

## 2023-05-06 LAB — S PYO AG THROAT QL: POSITIVE

## 2023-05-06 RX ORDER — PENICILLIN V POTASSIUM 500 MG/1
500 TABLET ORAL 2 TIMES DAILY
Qty: 20 TABLET | Refills: 0 | Status: SHIPPED | OUTPATIENT
Start: 2023-05-06 | End: 2023-05-16

## 2023-05-06 NOTE — LETTER
May 6, 2023     Patient: Lawyer Harrison   YOB: 1975   Date of Visit: 5/6/2023       To Whom it May Concern:    Lawyer Harrison was seen in my clinic on 5/6/2023  She may return to work on 5/8/2023  If you have any questions or concerns, please don't hesitate to call           Sincerely,          Linda Jeff DO        CC: No Recipients

## 2023-05-06 NOTE — PROGRESS NOTES
[facility]        NAME: Brandon Akins is a 57 y o  female  : 1975    MRN: [id number]  DATE: May 6, 2023  TIME: 3:17 PM    Assessment and Orders   Group A streptococcal infection [B95 0]  1  Group A streptococcal infection  penicillin V potassium (VEETID) 500 mg tablet      2  Sore throat  POCT rapid strepA            Plan and Discussion      Rapid strep was positive  Will treat with penicillin course  Discussed ED precautions including (but not limited to)   Difficultly breathing or shortness of breath   Chest pain   Acutely worsening symptoms  Risks and benefits discussed  Patient understands and agrees with the plan  Follow up with PCP  Chief Complaint     Chief Complaint   Patient presents with    Sore Throat     Cant swallow, right ear pain         History of Present Illness       Sore Throat   This is a new problem  The current episode started yesterday  The problem has been gradually worsening  There has been no fever  Associated symptoms include ear pain, headaches, swollen glands and trouble swallowing  Pertinent negatives include no abdominal pain or shortness of breath  Review of Systems   Review of Systems   HENT: Positive for ear pain, sore throat and trouble swallowing  Respiratory: Negative for shortness of breath  Gastrointestinal: Negative for abdominal pain  Neurological: Positive for headaches  Current Medications       Current Outpatient Medications:     albuterol (PROVENTIL HFA,VENTOLIN HFA) 90 mcg/act inhaler, Inhale 2 puffs every 6 (six) hours as needed for wheezing (cough), Disp: 1 Inhaler, Rfl: 0    Cholecalciferol (Vitamin D3) 25 MCG (1000 UT) CAPS, Take 1 capsule by mouth daily, Disp: , Rfl:     clobetasol (TEMOVATE) 0 05 % ointment, Apply 1 application topically 2 (two) times a day To affected area (Patient taking differently: Apply 1 application   topically 2 (two) times a day To affected area as needed), Disp: 30 g, Rfl: 0    cyanocobalamin (VITAMIN B-12) 100 MCG tablet, Take 100 mcg by mouth in the morning, Disp: , Rfl:     escitalopram (LEXAPRO) 10 mg tablet, Take 1 tablet (10 mg total) by mouth daily (Patient taking differently: Take 10 mg by mouth daily Takes as needed), Disp: 30 tablet, Rfl: 3    fluticasone (FLONASE) 50 mcg/act nasal spray, SPRAY 1 SPRAY INTO EACH NOSTRIL EVERY DAY (Patient taking differently: 2 sprays into each nostril daily), Disp: 16 mL, Rfl: 1    Multiple Vitamin (MULTIVITAMIN) capsule, Take 1 capsule by mouth daily, Disp: , Rfl:     penicillin V potassium (VEETID) 500 mg tablet, Take 1 tablet (500 mg total) by mouth 2 (two) times a day for 10 days, Disp: 20 tablet, Rfl: 0    Turmeric 400 MG CAPS, Take 1 capsule by mouth in the morning, Disp: , Rfl:     Vit C-Cholecalciferol-Alessandra Hip (Vitamin C & D3/Alessandra Hips) 500-1000-20 MG-UNIT-MG CAPS, Take 1 capsule by mouth in the morning, Disp: , Rfl:     docusate sodium (COLACE) 100 mg capsule, Take 1 capsule (100 mg total) by mouth 2 (two) times a day as needed for constipation (Patient not taking: Reported on 5/6/2023), Disp: 30 capsule, Rfl: 0    methocarbamol (ROBAXIN) 500 mg tablet, Take 1 tablet (500 mg total) by mouth 4 (four) times a day as needed for muscle spasms for up to 10 days, Disp: 40 tablet, Rfl: 0    olopatadine (PATANOL) 0 1 % ophthalmic solution, Administer 1 drop to both eyes 2 (two) times a day (Patient not taking: Reported on 5/6/2023), Disp: 5 mL, Rfl: 3    Current Allergies     Allergies as of 05/06/2023 - Reviewed 05/06/2023   Allergen Reaction Noted    Pollen extract Other (See Comments) 12/10/2020    Tilactase GI Intolerance and Vomiting 10/24/2022            The following portions of the patient's history were reviewed and updated as appropriate: allergies, current medications, past family history, past medical history, past social history, past surgical history and problem list      Past Medical History:   Diagnosis Date    Abnormal Pap smear of cervix     Anxiety     Arthritis     Asthma     Depression     Osteoporosis     Urinary tract infection     Varicella        Past Surgical History:   Procedure Laterality Date     SECTION      [place] NEUROPLASTY &/TRANSPOS MEDIAN NRV CARPAL TUNNE Right 2/10/2023    Procedure: RELEASE CARPAL TUNNEL;  Surgeon: Jigna Cerna MD;  [facility];  Service: Orthopedics    [place] NEUROPLASTY &/TRANSPOSITION ULNAR NERVE ELBOW Right 2/10/2023    Procedure: RELEASE CUBITAL TUNNEL;  Surgeon: Jigna Cerna MD;  [facility];  Service: Orthopedics    TUBAL LIGATION         Family History   Problem Relation Age of Onset    Cancer Mother     Breast cancer Mother         age unknown    Diabetes Mother     Coronary artery disease Mother     Ovarian cancer Mother    Nair Luisa Cancer Father     Prostate cancer Father     Colon cancer Father     No Known Problems Sister     No Known Problems Sister     No Known Problems Sister     No Known Problems Sister     No Known Problems Sister     No Known Problems Daughter     No Known Problems Daughter     No Known Problems Maternal Grandmother     No Known Problems Paternal Grandmother     No Known Problems Maternal Aunt     No Known Problems Maternal Aunt     No Known Problems Maternal Aunt     No Known Problems Maternal Aunt     No Known Problems Maternal Aunt     No Known Problems Maternal Aunt     No Known Problems Maternal Aunt     No Known Problems Maternal Aunt     No Known Problems Paternal Aunt     No Known Problems Paternal Aunt     No Known Problems Paternal Aunt     No Known Problems Paternal Aunt     No Known Problems Paternal Aunt     No Known Problems Half-Sister     Breast cancer Half-Sister         age unknown    No Known Problems Half-Sister          Medications have been verified          Objective   /78   Pulse 59   Resp 16   LMP 2022 (Exact Date)   SpO2 90% Patient's last menstrual period was 12/11/2022 (exact date)  Physical Exam     Physical Exam  Constitutional:       General: She is not in acute distress  Appearance: She is not ill-appearing  HENT:      Right Ear: There is impacted cerumen  Left Ear: Tympanic membrane normal       Mouth/Throat:      Pharynx: Pharyngeal swelling, oropharyngeal exudate and posterior oropharyngeal erythema present  Tonsils: Tonsillar exudate present  1+ on the right  1+ on the left  Pulmonary:      Effort: No respiratory distress  Breath sounds: No stridor  Lymphadenopathy:      Cervical: Cervical adenopathy present  Neurological:      Mental Status: She is alert                 Chun Morning DO

## 2023-05-08 ENCOUNTER — APPOINTMENT (OUTPATIENT)
Dept: PHYSICAL THERAPY | Facility: CLINIC | Age: 48
End: 2023-05-08
Payer: COMMERCIAL

## 2023-05-08 ENCOUNTER — OFFICE VISIT (OUTPATIENT)
Dept: FAMILY MEDICINE CLINIC | Facility: CLINIC | Age: 48
End: 2023-05-08

## 2023-05-08 VITALS
WEIGHT: 181 LBS | BODY MASS INDEX: 29.09 KG/M2 | TEMPERATURE: 97.8 F | SYSTOLIC BLOOD PRESSURE: 114 MMHG | HEIGHT: 66 IN | DIASTOLIC BLOOD PRESSURE: 78 MMHG

## 2023-05-08 DIAGNOSIS — J02.0 STREP PHARYNGITIS: ICD-10-CM

## 2023-05-08 DIAGNOSIS — F41.9 ANXIETY: Primary | ICD-10-CM

## 2023-05-08 DIAGNOSIS — H61.21 IMPACTED CERUMEN OF RIGHT EAR: ICD-10-CM

## 2023-05-08 DIAGNOSIS — M54.50 RIGHT LOW BACK PAIN, UNSPECIFIED CHRONICITY, UNSPECIFIED WHETHER SCIATICA PRESENT: Primary | ICD-10-CM

## 2023-05-08 RX ORDER — ESCITALOPRAM OXALATE 10 MG/1
10 TABLET ORAL DAILY
Qty: 30 TABLET | Refills: 3 | Status: SHIPPED | OUTPATIENT
Start: 2023-05-08

## 2023-05-08 NOTE — PROGRESS NOTES
"Daily Note     Today's date: 2023  Patient name: Nomi Mcneil  : 1975  MRN: [id number]  Referring provider: CORI Lee  Dx:   Encounter Diagnosis     ICD-10-CM    1  Right low back pain, unspecified chronicity, unspecified whether sciatica present  M54 50                      Subjective: ***      Objective: See treatment diary below      Assessment: Tolerated treatment {Tolerated treatment :[id number]}  Patient {assessment:7099407605}      Plan: {PLAN:6694728419}     Precautions: Depression, Anxiety      Manuals         Gentle PA mos L3-5 JF grade I-II            IASTM right lumbar paraspinals NV                                      Neuro Re-Ed             Pt education L-spine anatomy and pathology  GIGI Antonio rows M,L  GTB 3\"x20 GTB 3x 10  GTB 3x 10  Hold today        T-band Paloff press  GTB 10\"x10 GTB 10\" x 10  GTB 10\" x 10 L only  Hold today        Supine PPT  5\"x20 MAX VC's 5\" 2 x 10  5\" 2x10  5\"x30        Bridges  3\"x20 3\" 2x 10  3\" 2x 10  3\" 2x 10         SL clamshells herber    2x 10 ea  2x 10 ea          T-band HL clamshells     GTB 3\"x20        Ther Ex             Nustep seat 9 with UE with cardio/endurance   L6x10' L6 10'  L6 10 min  L6 10 min        Seated right sciatic nerve glides    10x R only 10x R only        Supine piriformis stretch right   30\" x 3  30\" x 3  30\" x 3                                                                          Ther Activity                                       Gait Training                                       Modalities                                            "

## 2023-05-08 NOTE — PROGRESS NOTES
Name: Camilo Amezquita      : 1975      MRN: [id number]  Encounter Provider: Raeann Taveras PA-C  Encounter Date: 2023   Encounter department: [address] [place] [address] [id number]     1  Anxiety  -     escitalopram (LEXAPRO) 10 mg tablet; Take 1 tablet (10 mg total) by mouth daily    2  Impacted cerumen of right ear  -     Ear cerumen removal    3  Strep pharyngitis  right ear cerumen impaction resolved with simple irrigation  After discussion will restart lexapro 10mg  Complete course of antibiotics prescribed by UC    1 month follow up, earlier prn       Subjective     Pt presents for follow up  Was recently seen in UC with confirmed GAS pharyngitis on   No recurrent fevers since starting antibiotics  Still having some sore throat  Notes more irritability, mood swings, anxiety, sadness  Also notes recurrence of vasomotor sx  She stopped her lexapro     Also notes ear fullness/pressure, no pain  Hearing muffled  Review of Systems   Constitutional: Negative for chills, fatigue and fever  HENT: Positive for hearing loss and sore throat  Negative for congestion, ear pain, nosebleeds, postnasal drip, rhinorrhea, sinus pressure, sinus pain and sneezing  Eyes: Negative for pain, discharge, itching and visual disturbance  Respiratory: Negative for cough, chest tightness, shortness of breath and wheezing  Cardiovascular: Negative for chest pain, palpitations and leg swelling  Gastrointestinal: Negative for abdominal pain, blood in stool, constipation, diarrhea, nausea and vomiting  Genitourinary: Negative for frequency and urgency  Neurological: Negative for dizziness, light-headedness and numbness  Psychiatric/Behavioral: Positive for agitation  The patient is nervous/anxious          Past Medical History:   Diagnosis Date   • Abnormal Pap smear of cervix    • Anxiety    • Arthritis    • Asthma    • Depression    • Osteoporosis    • Urinary tract infection    • Varicella      Past Surgical History:   Procedure Laterality Date   •  SECTION     • [place] NEUROPLASTY &/TRANSPOS MEDIAN NRV CARPAL TUNNE Right 2/10/2023    Procedure: RELEASE CARPAL TUNNEL;  Surgeon: Vikas Noble MD;  [facility];  Service: Orthopedics   • [place] NEUROPLASTY &/TRANSPOSITION ULNAR NERVE ELBOW Right 2/10/2023    Procedure: RELEASE CUBITAL TUNNEL;  Surgeon: Vikas Noble MD;  [facility];  Service: Orthopedics   • TUBAL LIGATION       Family History   Problem Relation Age of Onset   • Cancer Mother    • Breast cancer Mother         age unknown   • Diabetes Mother    • Coronary artery disease Mother    • Ovarian cancer Mother    • Cancer Father    • Prostate cancer Father    • Colon cancer Father    • No Known Problems Sister    • No Known Problems Sister    • No Known Problems Sister    • No Known Problems Sister    • No Known Problems Sister    • No Known Problems Daughter    • No Known Problems Daughter    • No Known Problems Maternal Grandmother    • No Known Problems Paternal Grandmother    • No Known Problems Maternal Aunt    • No Known Problems Maternal Aunt    • No Known Problems Maternal Aunt    • No Known Problems Maternal Aunt    • No Known Problems Maternal Aunt    • No Known Problems Maternal Aunt    • No Known Problems Maternal Aunt    • No Known Problems Maternal Aunt    • No Known Problems Paternal Aunt    • No Known Problems Paternal Aunt    • No Known Problems Paternal Aunt    • No Known Problems Paternal Aunt    • No Known Problems Paternal Aunt    • No Known Problems Half-Sister    • Breast cancer Half-Sister         age unknown   • No Known Problems Half-Sister      Social History     Socioeconomic History   • Marital status: /Civil Union     Spouse name: None   • Number of children: None   • Years of education: None   • Highest education level: None   Occupational History   • Occupation: homemaker   Tobacco Use   • Smoking status: Former Packs/day: 0 50     Types: Cigarettes   • Smokeless tobacco: Never   • Tobacco comments:     Almost 2 two years wit no smoking  patient report ss he vapes    Vaping Use   • Vaping Use: Never used   Substance and Sexual Activity   • Alcohol use: Yes     Comment: Social    • Drug use: Not Currently   • Sexual activity: Yes     Partners: Male     Birth control/protection: Female Sterilization   Other Topics Concern   • None   Social History Narrative   • None     Social Determinants of Health     Financial Resource Strain: Not on file   Food Insecurity: Not on file   Transportation Needs: Not on file   Physical Activity: Not on file   Stress: Not on file   Social Connections: Not on file   Intimate Partner Violence: Not on file   Housing Stability: Not on file     Current Outpatient Medications on File Prior to Visit   Medication Sig   • albuterol (PROVENTIL HFA,VENTOLIN HFA) 90 mcg/act inhaler Inhale 2 puffs every 6 (six) hours as needed for wheezing (cough)   • Cholecalciferol (Vitamin D3) 25 MCG (1000 UT) CAPS Take 1 capsule by mouth daily   • clobetasol (TEMOVATE) 0 05 % ointment Apply 1 application topically 2 (two) times a day To affected area (Patient taking differently: Apply 1 application   topically 2 (two) times a day To affected area as needed)   • cyanocobalamin (VITAMIN B-12) 100 MCG tablet Take 100 mcg by mouth in the morning   • docusate sodium (COLACE) 100 mg capsule Take 1 capsule (100 mg total) by mouth 2 (two) times a day as needed for constipation (Patient not taking: Reported on 5/6/2023)   • fluticasone (FLONASE) 50 mcg/act nasal spray SPRAY 1 SPRAY INTO EACH NOSTRIL EVERY DAY (Patient taking differently: 2 sprays into each nostril daily)   • methocarbamol (ROBAXIN) 500 mg tablet Take 1 tablet (500 mg total) by mouth 4 (four) times a day as needed for muscle spasms for up to 10 days   • Multiple Vitamin (MULTIVITAMIN) capsule Take 1 capsule by mouth daily   • olopatadine (PATANOL) 0 1 % ophthalmic "solution Administer 1 drop to both eyes 2 (two) times a day (Patient not taking: Reported on 5/6/2023)   • penicillin V potassium (VEETID) 500 mg tablet Take 1 tablet (500 mg total) by mouth 2 (two) times a day for 10 days   • Turmeric 400 MG CAPS Take 1 capsule by mouth in the morning   • Vit C-Cholecalciferol-Alessandra Hip (Vitamin C & D3/Alessandra Hips) 500-1000-20 MG-UNIT-MG CAPS Take 1 capsule by mouth in the morning   • [DISCONTINUED] escitalopram (LEXAPRO) 10 mg tablet Take 1 tablet (10 mg total) by mouth daily (Patient taking differently: Take 10 mg by mouth daily Takes as needed)     Allergies   Allergen Reactions   • Pollen Extract Other (See Comments)     Seasonal allergies  • Tilactase GI Intolerance and Vomiting     Immunization History   Administered Date(s) Administered   • Pneumococcal Conjugate Vaccine 20-valent (Pcv20), Polysace 07/07/2022   • Pneumococcal Polysaccharide PPV23 01/15/2020   • Tdap 10/05/2016       Objective     /78 (BP Location: Left arm, Patient Position: Sitting, Cuff Size: Large)   Temp 97 8 °F (36 6 °C)   Ht 5' 6\" (1 676 m)   Wt 82 1 kg (181 lb)   LMP 12/11/2022 (Exact Date)   BMI 29 21 kg/m²     Physical Exam  Vitals and nursing note reviewed  Constitutional:       General: She is not in acute distress  Appearance: Normal appearance  HENT:      Head: Normocephalic and atraumatic  Right Ear: There is impacted cerumen  Nose: Nose normal       Mouth/Throat:      Mouth: Mucous membranes are moist       Pharynx: Oropharyngeal exudate and posterior oropharyngeal erythema present  Eyes:      Pupils: Pupils are equal, round, and reactive to light  Cardiovascular:      Rate and Rhythm: Normal rate and regular rhythm  Heart sounds: Normal heart sounds  No murmur heard  Pulmonary:      Effort: Pulmonary effort is normal  No respiratory distress  Breath sounds: Normal breath sounds  No wheezing, rhonchi or rales     Musculoskeletal:         General: " Normal range of motion  Cervical back: Normal range of motion and neck supple  Skin:     General: Skin is warm and dry  Neurological:      Mental Status: She is alert and oriented to person, place, and time  Psychiatric:         Attention and Perception: Attention normal          Mood and Affect: Affect normal  Mood is anxious  Speech: Speech normal          Behavior: Behavior normal      Ear cerumen removal    Date/Time: 5/8/2023 10:40 AM  Performed by: Anupama Mclaughlin PA-C  Authorized by: Anupama Mclaughlin PA-C   Universal Protocol:  Consent: Verbal consent obtained  Risks and benefits: risks, benefits and alternatives were discussed  Patient understanding: patient states understanding of the procedure being performed  Patient consent: the patient's understanding of the procedure matches consent given      Patient location:  Bedside  Procedure details:     Location:  R ear    Procedure type: irrigation only      Approach:  External  Post-procedure details:     Complication:  None    Hearing quality:  Normal    Patient tolerance of procedure:   Tolerated well, no immediate complications        Hayden Osborne PA-C

## 2023-05-10 ENCOUNTER — APPOINTMENT (OUTPATIENT)
Dept: PHYSICAL THERAPY | Facility: CLINIC | Age: 48
End: 2023-05-10
Payer: COMMERCIAL

## 2023-05-12 ENCOUNTER — OFFICE VISIT (OUTPATIENT)
Dept: PHYSICAL THERAPY | Facility: CLINIC | Age: 48
End: 2023-05-12

## 2023-05-12 DIAGNOSIS — M54.50 RIGHT LOW BACK PAIN, UNSPECIFIED CHRONICITY, UNSPECIFIED WHETHER SCIATICA PRESENT: Primary | ICD-10-CM

## 2023-05-12 NOTE — PROGRESS NOTES
"Daily Note     Today's date: 2023  Patient name: Elyssa Henley  : 1975  MRN: [id number]  Referring provider: CORI Llanos  Dx:   Encounter Diagnosis     ICD-10-CM    1  Right low back pain, unspecified chronicity, unspecified whether sciatica present  M54 50           Start Time: 1030  Stop Time: 1115  Total time in clinic (min): 45 minutes    Subjective: Pt noted that she still having the R side of the back no tension/ \"pressure\" in her lower buttocks anymore  Pt noted since doing the exercises she does feels like the stiffness is a lot better  Objective: See treatment diary below      Assessment: Continued with treatment session with focus on core stabilization  , Tolerated treatment well progressed with P ball press as well as TA with march  At this time D/C all band exercises due to noted it affects her arm too much  Patient exhibited good technique with therapeutic exercises and would benefit from continued PT  S/p treatment session, Pt noted no changes and feeling good  Plan: Continue per plan of care  Precautions: Depression, Anxiety      Manuals        Gentle PA mos L3-5 JF grade I-II            IASTM right lumbar paraspinals NV                                      Neuro Re-Ed             Pt education L-spine anatomy and pathology  DJD            Webslide rows M,L  GTB 3\"x20 GTB 3x 10  GTB 3x 10  Hold today D/C       T-band Paloff press  GTB 10\"x10 GTB 10\" x 10  GTB 10\" x 10 L only  Hold today D/C       Supine PPT  5\"x20 MAX VC's 5\" 2 x 10  5\" 2x10  5\"x30 5\" 20x       March with TA       2x 10 ea  Bridges  3\"x20 3\" 2x 10  3\" 2x 10  3\" 2x 10  3\" 2x 10        SL clamshells herber    2x 10 ea  2x 10 ea  3x 10 ea          P ball press      5\" 2x 10        T-band HL clamshells     GTB 3\"x20        Ther Ex             Nustep seat 9 with UE with cardio/endurance   L6x10' L6 10'  L6 10 min  L6 10 min L6 10 min        Seated right sciatic " "nerve glides    10x R only 10x R only        Supine piriformis stretch right   30\" x 3  30\" x 3  30\" x 3  30\"X  3                                                                         Ther Activity                                       Gait Training                                       Modalities                                            "

## 2023-05-15 ENCOUNTER — OFFICE VISIT (OUTPATIENT)
Dept: PHYSICAL THERAPY | Facility: CLINIC | Age: 48
End: 2023-05-15

## 2023-05-15 DIAGNOSIS — M54.50 RIGHT LOW BACK PAIN, UNSPECIFIED CHRONICITY, UNSPECIFIED WHETHER SCIATICA PRESENT: Primary | ICD-10-CM

## 2023-05-15 NOTE — PROGRESS NOTES
"Daily Note     Today's date: 5/15/2023  Patient name: Sergio Anderson  : 1975  MRN: [id number]  Referring provider: CORI Kaplan  Dx:   Encounter Diagnosis     ICD-10-CM    1  Right low back pain, unspecified chronicity, unspecified whether sciatica present  M54 50                      Subjective: Patient states she is not currently having any pain  Will get some pain into right buttock and lower back if she gets up quickly or walks up a steep hill  Has some stiffness and soreness in lower back upon getting OOB in the AM, but feels better when she does her HEP and starts moving around  Objective: See treatment diary below      Assessment: Tolerated treatment well  Patient would benefit from continued PT  Increased repetitions with TE today to improve strength and endurance of core stabilizing muscles  Provided green t-band for home use  Patient reports feeling much better after session today  Plan: Progress note during next visit  Precautions: Depression, Anxiety      Manuals 4/17 4/20 4/24 4/28 5/1 5/12 5/15      Gentle PA mos L3-5 JF grade I-II            IASTM right lumbar paraspinals NV                                      Neuro Re-Ed             Pt education L-spine anatomy and pathology  DJD            Webslide rows M,L  GTB 3\"x20 GTB 3x 10  GTB 3x 10  Hold today D/C       T-band Paloff press  GTB 10\"x10 GTB 10\" x 10  GTB 10\" x 10 L only  Hold today D/C       Supine PPT  5\"x20 MAX VC's 5\" 2 x 10  5\" 2x10  5\"x30 5\" 20x 5\"x30 DC     Dead Bug       NV      March with TA       2x 10 ea  3x10 ea      Bridges  3\"x20 3\" 2x 10  3\" 2x 10  3\" 2x 10  3\" 2x 10  \"x30      SL clamshells herber    2x 10 ea  2x 10 ea  3x 10 ea  3x 10 ea         P ball press      5\" 2x 10  5\" 2x 10       T-band HL clamshells     GTB 3\"x20  GTB 3\"x20      Prone UE alt raise       NV      Prone LE alt raise       NV      Ther Ex             Nustep seat 9 with UE with cardio/endurance   L6x10' L6 10'  L6 10 " "min  L6 10 min L6 10 min  L6 10 min       Seated right sciatic nerve glides    10x R only 10x R only        Supine piriformis stretch right   30\" x 3  30\" x 3  30\" x 3  30\"X  3  30\"X  3                                                                        Ther Activity                                       Gait Training                                       Modalities                                            "

## 2023-05-18 NOTE — PROGRESS NOTES
PT Discharge    Today's date: 2023  Patient name: Liz Gregory  : 1975  MRN: [id number]  Referring provider: CORI Mascorro  Dx:   Encounter Diagnosis     ICD-10-CM    1  Right low back pain, unspecified chronicity, unspecified whether sciatica present  M54 [id number]                      Assessment  Assessment details: Patient states her symptoms have improved since starting PT services, however, she still gets some pain, especially with quick movement  She feels better when she moves slowly and volitionally and avoids abrupt transitions and movements  Overall, she reports feeling 60% improved  She demonstrates improved lumbar ROM and improved quality of movement with transfers  She states she has resumed her prior level of activity with only mild discomfort in her lower back  She gets pain relief when performing her HEP  Patient can now stand and walk for 4-5 hours before experiencing increased pain, but still has limited tolerance to sitting, although that has also improved  Patient is independent and compliant with a HEP and requests DC to  HEP at this time  Impairments: pain with function  Understanding of Dx/Px/POC: good   Prognosis: good    Goals  STG (4 weeks)  1  Patient will report pain as a 4-5/10 at worst with normal movement - met  2  Patient will report the ability to go up  down stairs reciprocally - met  3  Patient will report the ability to resume doing housework with minimal discomfort - met  LTG (8 weeks)  1  Patient will be independent and compliant with a HEP in order to maintain gains made with skilled PT services - met  2  Patient will report pain as a 0-1/10 at worst with resumption of normal ADL's and work-related tasks  - not met  3  Patient will demonstrate lumbar ROM WFL - partially met  4  Patient will ambulate community distances without limitations related to pain - met  5  Patient will report the ability to sit without increased pain   - partially met    Plan  Planned therapy interventions: patient education and home exercise program  Plan of Care beginning date: 2023  Plan of Care expiration date: 2023  Treatment plan discussed with: patient        Subjective Evaluation    History of Present Illness  Mechanism of injury: Patient states her FM is flared up today due to the cool weather and her entire body is hurting  Overall, she feels 60% improved since starting PT  She no longer has the intense pain in her tailbone  She can turn side to side without pain  Pain  Current pain ratin  At best pain ratin  At worst pain ratin  Location: Across lower back, right buttock and and to mid right posterior thigh  Relieving factors: medications  Progression: improved    Social Support  Lives in: multiple-level home  Lives with: spouse and adult children    Employment status: working (Manager at [employer] )    Diagnostic Tests  X-ray: abnormal    FCE comments: (-) sleep disturbances  Reports paresthesias into right first toe  Patient states her right LE has always been weak  Reports improved strength  Goes up stairs reciprocally  Has resumed doing her dishes  She has resumed her normal level of function  Does do cooking and dishes  Treatments  Previous treatment: medication  Current treatment: medication  Patient Goals  Patient goals for therapy: decreased pain, increased motion, increased strength and independence with ADLs/IADLs  Patient goal: To walk normally without pain          Objective     Active Range of Motion     Lumbar   Flexion:  Restriction level: minimal  Extension:  Restriction level: minimal  Left rotation:  WFL Restriction level: minimal  Right rotation:  [facility]  Mechanical Assessment    Cervical      Thoracic      Lumbar    Standing extension: repeated movements  Pain location: centralized  Pain intensity: better  Pain level: decreased  Lying extension: sustained positions  Pain intensity: better  Pain level: "decreased    Strength/Myotome Testing     Left Hip   Planes of Motion   Flexion: 4+  Extension: 4  Abduction: 4    Right Hip   Planes of Motion   Flexion: 4+  Extension: 4  Abduction: 4    Left Knee   Flexion: 4+  Extension: 4+    Right Knee   Flexion: 4  Extension: 4    Left Ankle/Foot   Dorsiflexion: 4+    Right Ankle/Foot   Dorsiflexion: 4+    Tests     Lumbar     Left   Negative crossed SLR and passive SLR  Right   Negative passive SLR, quadrant and slump test      Left Hip   Negative FLORENTIN, FADIR and long sit  Right Hip   Negative FLORENTIN, FADIR and long sit  Precautions: Depression, Anxiety      Manuals 4/17 4/20 4/24 4/28 5/1 5/12 5/15 5/19     Gentle PA mos L3-5 JF grade I-II       JF grade I-II     IASTM right lumbar paraspinals NV            Left SL rotation mobs l-spine        Grade II JF                  RE performed        JF     Neuro Re-Ed             Pt education L-spine anatomy and pathology  DJD            Webslide rows M,L  GTB 3\"x20 GTB 3x 10  GTB 3x 10  Hold today D/C       T-band Paloff press  GTB 10\"x10 GTB 10\" x 10  GTB 10\" x 10 L only  Hold today D/C       Supine PPT  5\"x20 MAX VC's 5\" 2 x 10  5\" 2x10  5\"x30 5\" 20x 5\"x30 DC     Dead Bug       NV      March with TA       2x 10 ea  3x10 ea      Bridges  3\"x20 3\" 2x 10  3\" 2x 10  3\" 2x 10  3\" 2x 10  \"x30      SL clamshells herber    2x 10 ea  2x 10 ea  3x 10 ea  3x 10 ea         P ball press      5\" 2x 10  5\" 2x 10       T-band HL clamshells     GTB 3\"x20  GTB 3\"x20      Prone UE alt raise       NV      Prone LE alt raise       NV      Ther Ex             Nustep seat 9 with UE with cardio/endurance   L6x10' L6 10'  L6 10 min  L6 10 min L6 10 min  L6 10 min       Seated right sciatic nerve glides    10x R only 10x R only        Supine piriformis stretch right   30\" x 3  30\" x 3  30\" x 3  30\"X  3  30\"X  3                                                                        Ther Activity                                     " Gait Training                                       Modalities

## 2023-05-19 ENCOUNTER — EVALUATION (OUTPATIENT)
Dept: PHYSICAL THERAPY | Facility: CLINIC | Age: 48
End: 2023-05-19

## 2023-05-19 DIAGNOSIS — M54.50 RIGHT LOW BACK PAIN, UNSPECIFIED CHRONICITY, UNSPECIFIED WHETHER SCIATICA PRESENT: Primary | ICD-10-CM

## 2023-06-05 ENCOUNTER — OFFICE VISIT (OUTPATIENT)
Dept: FAMILY MEDICINE CLINIC | Facility: CLINIC | Age: 48
End: 2023-06-05
Payer: COMMERCIAL

## 2023-06-05 VITALS
HEIGHT: 66 IN | DIASTOLIC BLOOD PRESSURE: 72 MMHG | WEIGHT: 182 LBS | BODY MASS INDEX: 29.25 KG/M2 | SYSTOLIC BLOOD PRESSURE: 110 MMHG

## 2023-06-05 DIAGNOSIS — F41.9 ANXIETY: Primary | ICD-10-CM

## 2023-06-05 PROCEDURE — 99214 OFFICE O/P EST MOD 30 MIN: CPT | Performed by: PHYSICIAN ASSISTANT

## 2023-06-05 RX ORDER — ESCITALOPRAM OXALATE 5 MG/1
2.5 TABLET ORAL DAILY
Qty: 30 TABLET | Refills: 0 | Status: SHIPPED | OUTPATIENT
Start: 2023-06-05

## 2023-06-05 NOTE — PROGRESS NOTES
"Name: Virginie Proctor      : 1975      MRN: [id number]  Encounter Provider: Roberta Driscoll PA-C  Encounter Date: 2023   Encounter department: [address] [place] [address]     1  Anxiety  -     escitalopram (LEXAPRO) 5 mg tablet; Take 0 5 tablets (2 5 mg total) by mouth daily       after discussion of risks/benefits pt agreeable to try to decrease dose of lexapro to 2 5mg daily from 5mg  Take daily for 4 full weeks unless side effects are experienced  Discussed if having brain dog sensation do not drive  1 month follow up, earlier prn  Subjective     Pt presents for 1 month follow up     She was doing well on lexapro but has concerns it made her feel \"dumb\" and \"too calm\"  She describes a daze, daydream sensation but it did help her anxiety  She shares she was daydreaming and bumped into another care so she stopped the medicine  She shares her biggest trigger was her work environmental so she did ultimately leave her job  She is feeling better now but feels she will cycle downward  Review of Systems   Psychiatric/Behavioral: Negative for dysphoric mood, self-injury, sleep disturbance and suicidal ideas  The patient is nervous/anxious  All other systems reviewed and are negative        Past Medical History:   Diagnosis Date   • Abnormal Pap smear of cervix    • Anxiety    • Arthritis    • Asthma    • Depression    • Osteoporosis    • Urinary tract infection    • Varicella      Past Surgical History:   Procedure Laterality Date   •  SECTION     • ME NEUROPLASTY &/TRANSPOS MEDIAN NRV CARPAL TUNNE Right 2/10/2023    Procedure: RELEASE CARPAL TUNNEL;  Surgeon: Tanesha Francisco MD;  [facility];  Service: Orthopedics   • ME NEUROPLASTY &/TRANSPOSITION ULNAR NERVE ELBOW Right 2/10/2023    Procedure: RELEASE CUBITAL TUNNEL;  Surgeon: Tanesha Francisco MD;  [facility];  Service: Orthopedics   • TUBAL LIGATION       Family History   Problem " Relation Age of Onset   • Cancer Mother    • Breast cancer Mother         age unknown   • Diabetes Mother    • Coronary artery disease Mother    • Ovarian cancer Mother    • Cancer Father    • Prostate cancer Father    • Colon cancer Father    • No Known Problems Sister    • No Known Problems Sister    • No Known Problems Sister    • No Known Problems Sister    • No Known Problems Sister    • No Known Problems Daughter    • No Known Problems Daughter    • No Known Problems Maternal Grandmother    • No Known Problems Paternal Grandmother    • No Known Problems Maternal Aunt    • No Known Problems Maternal Aunt    • No Known Problems Maternal Aunt    • No Known Problems Maternal Aunt    • No Known Problems Maternal Aunt    • No Known Problems Maternal Aunt    • No Known Problems Maternal Aunt    • No Known Problems Maternal Aunt    • No Known Problems Paternal Aunt    • No Known Problems Paternal Aunt    • No Known Problems Paternal Aunt    • No Known Problems Paternal Aunt    • No Known Problems Paternal Aunt    • No Known Problems Half-Sister    • Breast cancer Half-Sister         age unknown   • No Known Problems Half-Sister      Social History     Socioeconomic History   • Marital status: /Civil Union     Spouse name: None   • Number of children: None   • Years of education: None   • Highest education level: None   Occupational History   • Occupation: homemaker   Tobacco Use   • Smoking status: Former     Packs/day: 0 50     Types: Cigarettes   • Smokeless tobacco: Never   • Tobacco comments:     Almost 2 two years wit no smoking  patient report ss he vapes    Vaping Use   • Vaping Use: Never used   Substance and Sexual Activity   • Alcohol use: Yes     Comment: Social    • Drug use: Not Currently   • Sexual activity: Yes     Partners: Male     Birth control/protection: Female Sterilization   Other Topics Concern   • None   Social History Narrative   • None     Social Determinants of Health     Financial Resource Strain: Not on file   Food Insecurity: Not on file   Transportation Needs: Not on file   Physical Activity: Not on file   Stress: Not on file   Social Connections: Not on file   Intimate Partner Violence: Not on file   Housing Stability: Not on file     Current Outpatient Medications on File Prior to Visit   Medication Sig   • albuterol (PROVENTIL HFA,VENTOLIN HFA) 90 mcg/act inhaler Inhale 2 puffs every 6 (six) hours as needed for wheezing (cough)   • Cholecalciferol (Vitamin D3) 25 MCG (1000 UT) CAPS Take 1 capsule by mouth daily   • clobetasol (TEMOVATE) 0 05 % ointment Apply 1 application topically 2 (two) times a day To affected area (Patient taking differently: Apply 1 application  topically 2 (two) times a day To affected area as needed)   • cyanocobalamin (VITAMIN B-12) 100 MCG tablet Take 100 mcg by mouth in the morning   • docusate sodium (COLACE) 100 mg capsule Take 1 capsule (100 mg total) by mouth 2 (two) times a day as needed for constipation (Patient not taking: Reported on 5/6/2023)   • fluticasone (FLONASE) 50 mcg/act nasal spray SPRAY 1 SPRAY INTO EACH NOSTRIL EVERY DAY (Patient taking differently: 2 sprays into each nostril daily)   • methocarbamol (ROBAXIN) 500 mg tablet Take 1 tablet (500 mg total) by mouth 4 (four) times a day as needed for muscle spasms for up to 10 days   • Multiple Vitamin (MULTIVITAMIN) capsule Take 1 capsule by mouth daily   • olopatadine (PATANOL) 0 1 % ophthalmic solution Administer 1 drop to both eyes 2 (two) times a day (Patient not taking: Reported on 5/6/2023)   • Turmeric 400 MG CAPS Take 1 capsule by mouth in the morning   • Vit C-Cholecalciferol-Alessandra Hip (Vitamin C & D3/Alessandra Hips) 500-1000-20 MG-UNIT-MG CAPS Take 1 capsule by mouth in the morning   • [DISCONTINUED] escitalopram (LEXAPRO) 10 mg tablet Take 1 tablet (10 mg total) by mouth daily     Allergies   Allergen Reactions   • Pollen Extract Other (See Comments)     Seasonal allergies     • Tilactase "GI Intolerance and Vomiting     Immunization History   Administered Date(s) Administered   • Pneumococcal Conjugate Vaccine 20-valent (Pcv20), Polysace 07/07/2022   • Pneumococcal Polysaccharide PPV23 01/15/2020   • Tdap 10/05/2016       Objective     /72 (BP Location: Left arm, Patient Position: Sitting, Cuff Size: Large)   Ht 5' 6\" (1 676 m)   Wt 82 6 kg (182 lb)   LMP 12/11/2022 (Exact Date)   BMI 29 38 kg/m²     Physical Exam  Vitals and nursing note reviewed  Constitutional:       General: She is not in acute distress  Appearance: Normal appearance  She is not ill-appearing  HENT:      Head: Normocephalic and atraumatic  Nose: Nose normal    Pulmonary:      Effort: Pulmonary effort is normal  No respiratory distress  Musculoskeletal:      Cervical back: Normal range of motion  Skin:     General: Skin is warm and dry  Neurological:      Mental Status: She is alert and oriented to person, place, and time  Psychiatric:         Attention and Perception: Attention normal          Mood and Affect: Affect normal  Mood is anxious  Mood is not depressed  Speech: Speech normal          Behavior: Behavior normal          Thought Content:  Thought content normal        Vergia WILLIAM Peck  "

## 2023-06-21 DIAGNOSIS — F41.9 ANXIETY: ICD-10-CM

## 2023-06-21 DIAGNOSIS — K59.00 CONSTIPATION, UNSPECIFIED CONSTIPATION TYPE: ICD-10-CM

## 2023-06-21 DIAGNOSIS — R06.2 WHEEZING: ICD-10-CM

## 2023-06-21 DIAGNOSIS — R05.9 COUGH: ICD-10-CM

## 2023-06-21 RX ORDER — ALBUTEROL SULFATE 90 UG/1
2 AEROSOL, METERED RESPIRATORY (INHALATION) EVERY 6 HOURS PRN
Qty: 18 G | Refills: 3 | Status: SHIPPED | OUTPATIENT
Start: 2023-06-21

## 2023-06-21 RX ORDER — DOCUSATE SODIUM 100 MG/1
CAPSULE, LIQUID FILLED ORAL
Qty: 60 CAPSULE | Refills: 1 | Status: SHIPPED | OUTPATIENT
Start: 2023-06-21

## 2023-06-21 RX ORDER — ESCITALOPRAM OXALATE 5 MG/1
5 TABLET ORAL DAILY
Qty: 30 TABLET | Refills: 1 | Status: SHIPPED | OUTPATIENT
Start: 2023-06-21

## 2023-07-03 ENCOUNTER — TELEPHONE (OUTPATIENT)
Dept: OBGYN CLINIC | Facility: CLINIC | Age: 48
End: 2023-07-03

## 2023-07-03 NOTE — TELEPHONE ENCOUNTER
Pt used Monistat 3 and all discharge stopped. She still itches on the outside. No odor. Her monistat 3 came with a cream for her vulva. Pt will use the cream and see if itching stops.

## 2023-07-07 ENCOUNTER — OFFICE VISIT (OUTPATIENT)
Dept: FAMILY MEDICINE CLINIC | Facility: CLINIC | Age: 48
End: 2023-07-07
Payer: COMMERCIAL

## 2023-07-07 VITALS
OXYGEN SATURATION: 97 % | DIASTOLIC BLOOD PRESSURE: 67 MMHG | SYSTOLIC BLOOD PRESSURE: 118 MMHG | HEIGHT: 66 IN | WEIGHT: 181 LBS | BODY MASS INDEX: 29.09 KG/M2 | HEART RATE: 78 BPM

## 2023-07-07 DIAGNOSIS — F41.9 ANXIETY: ICD-10-CM

## 2023-07-07 PROCEDURE — 99213 OFFICE O/P EST LOW 20 MIN: CPT | Performed by: PHYSICIAN ASSISTANT

## 2023-07-07 RX ORDER — ESCITALOPRAM OXALATE 5 MG/1
2.5 TABLET ORAL DAILY
Qty: 90 TABLET | Refills: 0 | Status: SHIPPED | OUTPATIENT
Start: 2023-07-07 | End: 2024-01-03

## 2023-07-07 NOTE — PROGRESS NOTES
Name: Annika Rater      : 1975      MRN: [id number]  Encounter Provider: Joya Flower PA-C  Encounter Date: 2023   Encounter department: [address] [place] [address]. Anxiety  -     escitalopram (LEXAPRO) 5 mg tablet; Take 0.5 tablets (2.5 mg total) by mouth daily    pt is doing much improved and tolerating the 2.5mg dose of lexapro. We will continue on this dose. No acute concerns today. 3 month follow up, earlier prn       Subjective     Pt presents for 1 month follow up. Her mood is much improved on 2.5mg lexapro and she is tolerating better than the 5mg. She is happy, sleeping well. She joined a [employer] group and enjoys this. She is smiling and communicative during the visit today. She offers no complaints. Review of Systems   Constitutional: Negative for chills, fatigue and fever. HENT: Negative for congestion, ear pain, hearing loss, nosebleeds, postnasal drip, rhinorrhea, sinus pressure, sinus pain, sneezing and sore throat. Eyes: Negative for pain, discharge, itching and visual disturbance. Respiratory: Negative for cough, chest tightness, shortness of breath and wheezing. Cardiovascular: Negative for chest pain, palpitations and leg swelling. Gastrointestinal: Negative for abdominal pain, blood in stool, constipation, diarrhea, nausea and vomiting. Genitourinary: Negative for frequency and urgency. Neurological: Negative for dizziness, light-headedness and numbness. Psychiatric/Behavioral: The patient is nervous/anxious (much improved).         Past Medical History:   Diagnosis Date   • Abnormal Pap smear of cervix    • Anxiety    • Arthritis    • Asthma    • Depression    • Osteoporosis    • Urinary tract infection    • Varicella      Past Surgical History:   Procedure Laterality Date   •  SECTION     • [place] NEUROPLASTY &/TRANSPOS MEDIAN NRV CARPAL TUNNE Right 2/10/2023    Procedure: RELEASE CARPAL TUNNEL;  Surgeon: Anabela Huynh Omaira Puentes MD;  [facility];  Service: Orthopedics   • PA NEUROPLASTY &/TRANSPOSITION ULNAR NERVE ELBOW Right 2/10/2023    Procedure: RELEASE CUBITAL TUNNEL;  Surgeon: Jd Moore MD;  [facility];  Service: Orthopedics   • TUBAL LIGATION       Family History   Problem Relation Age of Onset   • Cancer Mother    • Breast cancer Mother         age unknown   • Diabetes Mother    • Coronary artery disease Mother    • Ovarian cancer Mother    • Cancer Father    • Prostate cancer Father    • Colon cancer Father    • No Known Problems Sister    • No Known Problems Sister    • No Known Problems Sister    • No Known Problems Sister    • No Known Problems Sister    • No Known Problems Daughter    • No Known Problems Daughter    • No Known Problems Maternal Grandmother    • No Known Problems Paternal Grandmother    • No Known Problems Maternal Aunt    • No Known Problems Maternal Aunt    • No Known Problems Maternal Aunt    • No Known Problems Maternal Aunt    • No Known Problems Maternal Aunt    • No Known Problems Maternal Aunt    • No Known Problems Maternal Aunt    • No Known Problems Maternal Aunt    • No Known Problems Paternal Aunt    • No Known Problems Paternal Aunt    • No Known Problems Paternal Aunt    • No Known Problems Paternal Aunt    • No Known Problems Paternal Aunt    • No Known Problems Half-Sister    • Breast cancer Half-Sister         age unknown   • No Known Problems Half-Sister      Social History     Socioeconomic History   • Marital status: /Civil Union     Spouse name: None   • Number of children: None   • Years of education: None   • Highest education level: None   Occupational History   • Occupation: homemaker   Tobacco Use   • Smoking status: Former     Packs/day: 0.50     Types: Cigarettes   • Smokeless tobacco: Never   • Tobacco comments:     Almost 2 two years wit no smoking  patient report ss he vapes    Vaping Use   • Vaping Use: Never used   Substance and Sexual Activity   • Alcohol use: Yes     Comment: Social    • Drug use: Not Currently   • Sexual activity: Yes     Partners: Male     Birth control/protection: Female Sterilization   Other Topics Concern   • None   Social History Narrative   • None     Social Determinants of Health     Financial Resource Strain: Not on file   Food Insecurity: Not on file   Transportation Needs: Not on file   Physical Activity: Not on file   Stress: Not on file   Social Connections: Not on file   Intimate Partner Violence: Not on file   Housing Stability: Not on file     Current Outpatient Medications on File Prior to Visit   Medication Sig   • albuterol (PROVENTIL HFA,VENTOLIN HFA) 90 mcg/act inhaler Inhale 2 puffs every 6 (six) hours as needed for wheezing (cough)   • Cholecalciferol (Vitamin D3) 25 MCG (1000 UT) CAPS Take 1 capsule by mouth daily   • clobetasol (TEMOVATE) 0.05 % ointment Apply 1 application topically 2 (two) times a day To affected area (Patient taking differently: Apply 1 application.  topically 2 (two) times a day To affected area as needed)   • cyanocobalamin (VITAMIN B-12) 100 MCG tablet Take 100 mcg by mouth in the morning   • docusate sodium (COLACE) 100 mg capsule TAKE 1 CAPSULE BY MOUTH TWICE A DAY AS NEEDED FOR CONSTIPATION   • fluticasone (FLONASE) 50 mcg/act nasal spray SPRAY 1 SPRAY INTO EACH NOSTRIL EVERY DAY (Patient taking differently: 2 sprays into each nostril daily)   • methocarbamol (ROBAXIN) 500 mg tablet Take 1 tablet (500 mg total) by mouth 4 (four) times a day as needed for muscle spasms for up to 10 days   • Multiple Vitamin (MULTIVITAMIN) capsule Take 1 capsule by mouth daily   • olopatadine (PATANOL) 0.1 % ophthalmic solution Administer 1 drop to both eyes 2 (two) times a day (Patient not taking: Reported on 5/6/2023)   • Turmeric 400 MG CAPS Take 1 capsule by mouth in the morning   • Vit C-Cholecalciferol-Alessandra Hip (Vitamin C & D3/Alessandra Hips) 500-1000-20 MG-UNIT-MG CAPS Take 1 capsule by mouth in the morning   • [DISCONTINUED] escitalopram (LEXAPRO) 5 mg tablet Take 1 tablet (5 mg total) by mouth daily     Allergies   Allergen Reactions   • Pollen Extract Other (See Comments)     Seasonal allergies. • Tilactase GI Intolerance and Vomiting     Immunization History   Administered Date(s) Administered   • Pneumococcal Conjugate Vaccine 20-valent (Pcv20), Polysace 07/07/2022   • Pneumococcal Polysaccharide PPV23 01/15/2020   • Tdap 10/05/2016       Objective     /67   Pulse 78   Ht 5' 6" (1.676 m)   Wt 82.1 kg (181 lb)   LMP 12/11/2022 (Exact Date)   SpO2 97%   BMI 29.21 kg/m²     Physical Exam  Vitals and nursing note reviewed. Constitutional:       Appearance: Normal appearance. HENT:      Head: Normocephalic and atraumatic. Nose: Nose normal.   Pulmonary:      Effort: Pulmonary effort is normal. No respiratory distress. Musculoskeletal:      Cervical back: Normal range of motion. Neurological:      Mental Status: She is alert and oriented to person, place, and time. Psychiatric:         Attention and Perception: Attention and perception normal.         Mood and Affect: Mood and affect normal.         Speech: Speech normal.         Behavior: Behavior normal.         Thought Content:  Thought content normal.       Alfred Muñoz PA-C

## 2023-07-26 ENCOUNTER — TELEPHONE (OUTPATIENT)
Dept: FAMILY MEDICINE CLINIC | Facility: CLINIC | Age: 48
End: 2023-07-26

## 2023-07-26 DIAGNOSIS — F41.9 ANXIETY: ICD-10-CM

## 2023-07-26 RX ORDER — ESCITALOPRAM OXALATE 5 MG/1
5 TABLET ORAL DAILY
Qty: 90 TABLET | Refills: 0 | Status: SHIPPED | OUTPATIENT
Start: 2023-07-26 | End: 2024-01-22

## 2023-08-10 ENCOUNTER — OFFICE VISIT (OUTPATIENT)
Dept: OBGYN CLINIC | Facility: MEDICAL CENTER | Age: 48
End: 2023-08-10
Payer: COMMERCIAL

## 2023-08-10 VITALS
HEIGHT: 65 IN | SYSTOLIC BLOOD PRESSURE: 122 MMHG | WEIGHT: 186 LBS | BODY MASS INDEX: 30.99 KG/M2 | DIASTOLIC BLOOD PRESSURE: 88 MMHG

## 2023-08-10 DIAGNOSIS — N95.0 PMB (POSTMENOPAUSAL BLEEDING): Primary | ICD-10-CM

## 2023-08-10 DIAGNOSIS — N90.7 VULVAR CYST: ICD-10-CM

## 2023-08-10 DIAGNOSIS — N89.8 VAGINAL ITCHING: ICD-10-CM

## 2023-08-10 PROCEDURE — 99214 OFFICE O/P EST MOD 30 MIN: CPT | Performed by: NURSE PRACTITIONER

## 2023-08-10 PROCEDURE — 81514 NFCT DS BV&VAGINITIS DNA ALG: CPT | Performed by: NURSE PRACTITIONER

## 2023-08-10 RX ORDER — CEPHALEXIN 250 MG/1
500 CAPSULE ORAL EVERY 12 HOURS SCHEDULED
Qty: 28 CAPSULE | Refills: 0 | Status: SHIPPED | OUTPATIENT
Start: 2023-08-10 | End: 2023-08-17
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
## 2023-08-11 ENCOUNTER — TELEPHONE (OUTPATIENT)
Dept: OBGYN CLINIC | Facility: CLINIC | Age: 48
End: 2023-08-11

## 2023-08-11 DIAGNOSIS — B96.89 BV (BACTERIAL VAGINOSIS): Primary | ICD-10-CM

## 2023-08-11 DIAGNOSIS — N76.0 BV (BACTERIAL VAGINOSIS): Primary | ICD-10-CM

## 2023-08-11 LAB
C GLABRATA DNA VAG QL NAA+PROBE: NEGATIVE
C KRUSEI DNA VAG QL NAA+PROBE: NEGATIVE
CANDIDA SP 6 PNL VAG NAA+PROBE: NEGATIVE
T VAGINALIS DNA VAG QL NAA+PROBE: NEGATIVE
VAGINOSIS/ITIS DNA PNL VAG PROBE+SIG AMP: POSITIVE

## 2023-08-11 RX ORDER — METRONIDAZOLE 500 MG/1
500 TABLET ORAL EVERY 12 HOURS SCHEDULED
Qty: 14 TABLET | Refills: 0 | Status: SHIPPED | OUTPATIENT
Start: 2023-08-11 | End: 2023-08-18

## 2023-08-11 NOTE — TELEPHONE ENCOUNTER
----- Message from Ariane Forde, [facility] sent at 8/11/2023  1:06 PM EDT -----  Bacterial infection noted on vaginal culture. Rx sent to pharmacy on file. Complete all medication. No sex during treatment. No alcohol ingestion 24 before, during or 24 hours after taking this medication.

## 2023-08-29 NOTE — PROGRESS NOTES
Daily Note     Today's date: 2021  Patient name: Ann-Marie Hills  : 1975  MRN: [id number]  Referring provider: Zee Motley PA-C  Dx:   Encounter Diagnosis     ICD-10-CM    1  Chronic pain of right ankle  M25 571     G89 29    2  Patellofemoral syndrome of both knees  M22 2X1 Ambulatory referral to Physical Therapy    M22 2X2    3  Radiculopathy, lumbar region  M54 16 Ambulatory referral to Physical Therapy   4  Sacroiliac joint dysfunction of both sides  M53 3 Ambulatory referral to Physical Therapy   5  Weakness of both hips  R29 898 Ambulatory referral to Physical Therapy   6  Strain of right ankle, initial encounter  D91 223A Ambulatory referral to Physical Therapy   7  Right foot pain  M79 671        Start Time: 1230  Stop Time: 1315  Total time in clinic (min): 45 minutes    Subjective: patient reports R foot pain 3/10 today      Objective: See treatment diary below      Assessment: tolerated new exercise with verbal and tactile cueing for proper mechanics, good endurance noted      Plan: Continue per plan of care        Precautions: RA, no heat      Manual  2         MFR  15' 10 10         Jt mobs   5 5                                   Neuro Re-Ed             Stance on foam             Stability disc             Foam balance beam             Glides with ball    x30ea                                                Ther Ex             Pro stretch  30" 3x           Prom/stretch  10' 10' 5         Bike  12' 15' 10         CS/HS  10x 10x 10x         // bars 5-way             Pt ed - HEP 20            Circuit LEs    x20                      Ther Activity                                       Gait Training             gym             TM             Modalities             [facility]    Subjective:     Chief Complaint:   Chief Complaint   Patient presents with    Left Knee - Pain     Left knee pain follow up. Here for MRI results       Past Medical History:   Diagnosis Date    Basal cell carcinoma     Diabetes mellitus, type 2     History of colonic polyps     HTN (hypertension)     Hyperlipidemia        Past Surgical History:   Procedure Laterality Date    BREAST BIOPSY      COLONOSCOPY  05/2017    EYE SURGERY Right 05/2021    TRIGGER FINGER RELEASE Left 10/26/2022    Procedure: RELEASE, TRIGGER FINGER;  Surgeon: Sachin Tong MD;  Location: [facility];  Service: Orthopedics;  Laterality: Left;       Current Outpatient Medications   Medication Sig    calcium carbonate (OS-SANDEEP) 500 mg calcium (1,250 mg) chewable tablet Take 1 tablet by mouth once daily.    losartan (COZAAR) 50 MG tablet Take 1 tablet (50 mg total) by mouth once daily.    metFORMIN (GLUCOPHAGE) 500 MG tablet Take 1 tablet (500 mg total) by mouth 2 (two) times daily with meals.    multivitamin (THERAGRAN) per tablet Take 1 tablet by mouth once daily.    rosuvastatin (CRESTOR) 10 MG tablet Take 1 tablet (10 mg total) by mouth once daily.     No current facility-administered medications for this visit.       Review of patient's allergies indicates:   Allergen Reactions    Sulfa (sulfonamide antibiotics) Rash       Family History   Problem Relation Age of Onset    Colon cancer Mother 76    Hypertension Mother     Obesity Mother     Prostate cancer Father     Breast cancer Neg Hx        Social History     Socioeconomic History    Marital status:    Occupational History    Occupation: retired   Tobacco Use    Smoking status: Never    Smokeless tobacco: Never   Substance and Sexual Activity    Alcohol use: Yes     Comment: occasional    Drug use: Never    Sexual activity: Not Currently   Social History Narrative    Live with son     Social Determinants of Health     Stress: No Stress Concern Present  (12/16/2020)    [employer] - Occupational Stress Questionnaire     Feeling of Stress : Only a little       History of present illness:  May comes in today for follow-up for her left knee.  She has had her MRI.  She comes in today with those results.  She continues to complain of pain of the left knee.  It is worse with weight-bearing activities.  She has feelings of instability and as if the knee wants to buckle and give way on her.    Review of Systems:    Constitution: Negative for chills, fever, and sweats.  Negative for unexplained weight loss.    HENT:  Negative for headaches and blurry vision.    Cardiovascular:Negative for chest pain or irregular heart beat. Negative for hypertension.    Respiratory:  Negative for cough and shortness of breath.    Gastrointestinal: Negative for abdominal pain, heartburn, melena, nausea, and vomitting.    Genitourinary:  Negative bladder incontinence and dysuria.    Musculoskeletal:  See HPI for details.     Neurological: Negative for numbness.    Psychiatric/Behavioral: Negative for depression.  The patient is not nervous/anxious.      Endocrine: Negative for polyuria    Hematologic/Lymphatic: Negative for bleeding problem.  Does not bruise/bleed easily.    Skin: Negative for poor would healing and rash    Objective:      Physical Examination:    Vital Signs:  There were no vitals filed for this visit.    Body mass index is 30.11 kg/m².    This a well-developed, well nourished patient in no acute distress.  They are alert and oriented and cooperative to examination.        Left knee exam:  Skin to left knee is clean dry and intact.  No erythema or ecchymosis.  No signs or symptoms of infection.  She is neurovascularly intact throughout the left lower extremity.  Left calf is soft and nontender.  Left knee range of motion well-preserved at 0-110 degrees.  The knee is stable to varus and valgus stresses.  She can weightbear as tolerated on left  "lower extremity.  She does have tenderness to palpation about the medial and lateral joint lines on the left knee.  She has a 1+ effusion in the left knee.      Pertinent New Results:    XRAY Report / Interpretation:   No new radiographs were taken on today's clinic visit.  However did review images and report of left knee MRI which was consistent with tearing of the medial and lateral menisci.    Assessment/Plan:      1. Left knee medial meniscus tear.    2. Left knee lateral meniscus tear.      Risks and benefits of proceeding with left knee arthroscopy with partial medial and lateral meniscectomy were discussed with her today in detail.  All of her questions were answered.  She clearly understood and wished to proceed.  Surgical consents were obtained today.    Risks of the procedure were reviewed with the patient.  This includes, but is not limited to: infection, bleeding, pain, swelling, decreased range of motion, nerve injury/damage, deep vein thrombosis, pulmonary embolism, wound dehiscence, and possible need for further surgery.    Ari Lama, Physician Assistant, served in the capacity as a "scribe" for this patient encounter.  A "face-to-face" encounter occurred with Dr. Sachin Tong on this date.  The treatment plan and medical decision-making is outlined above. Patient was seen and examined with a chaperone.       This note was created using Dragon voice recognition software that occasionally misinterpreted phrases or words.        "

## 2023-09-14 ENCOUNTER — TELEMEDICINE (OUTPATIENT)
Dept: FAMILY MEDICINE CLINIC | Facility: CLINIC | Age: 48
End: 2023-09-14
Payer: COMMERCIAL

## 2023-09-14 DIAGNOSIS — K13.79 MOUTH SORE: Primary | ICD-10-CM

## 2023-09-14 PROCEDURE — 99213 OFFICE O/P EST LOW 20 MIN: CPT

## 2023-09-14 NOTE — PROGRESS NOTES
Virtual Regular Visit    Verification of patient location:    Patient is located at Home in the following state in which I hold an active license [place]      Assessment/Plan:    Problem List Items Addressed This Visit        Digestive    Mouth sore - Primary     Patient presents with a white abrasian over the inside of her right upper lip. She has significant pain and irritation associated with it. Visibility of the area was limited due to virtual visit. Patient likely has oral mouth sore that she irritated and potentially scratched when she scrubbed the area with a toothbrush. Advised patient to leave area alone and avoid using toothbrush near area for the next week. The sore needs time to heal, told patient she could use OTC topical benzocaine (OraGel) if needed for the pain control to the area but otherwise to leave the area alone and let it heal. If pain gets worse instead of better in the next 3-6 days, told patient she needs to come in and be evaluated in person for further assessment of the area. Reason for visit is   Chief Complaint   Patient presents with   • Virtual Regular Visit        Encounter provider Phill Torres PA-C    Provider located at [address] [place] [phone]      Recent Visits  No visits were found meeting these conditions. Showing recent visits within past 7 days and meeting all other requirements  Today's Visits  Date Type Provider Dept   09/14/23 Telemedicine WILLIAM [facility]   Showing today's visits and meeting all other requirements  Future Appointments  No visits were found meeting these conditions. Showing future appointments within next 150 days and meeting all other requirements       The patient was identified by name and date of birth. Nikossana Larry was informed that this is a telemedicine visit and that the visit is being conducted through the [address] [place] [address] [employer] platform.  She agrees to proceed. .  My office door was closed. No one else was in the room. She acknowledged consent and understanding of privacy and security of the video platform. The patient has agreed to participate and understands they can discontinue the visit at any time. Patient is aware this is a billable service. Liseth Fernandes is a 57 y.o. female who presents for evaluation of a "mouth sore" . Patient reports that after eating a "very spicy" hot wing five days ago she noticed a small sore on the inside of her upper lip. She tried using Listerine mouth wash and scrubbing the area with her toothbrush but noticed the area seemed to get larger and more painful after she did this. She notes that she feels like it is "spreading" onto her gum and that the area is white and painful. She has not tried putting anything else on the area.         Past Medical History:   Diagnosis Date   • Abnormal Pap smear of cervix    • Anxiety    • Arthritis    • Asthma    • Depression    • Ear problems    • Osteoporosis    • Urinary tract infection    • Varicella        Past Surgical History:   Procedure Laterality Date   •  SECTION     • IN NEUROPLASTY &/TRANSPOS MEDIAN NRV CARPAL TUNNE Right 2/10/2023    Procedure: RELEASE CARPAL TUNNEL;  Surgeon: Argentina Schmidt MD;  [facility]: [facility];  Service: Orthopedics   • IN NEUROPLASTY &/TRANSPOSITION ULNAR NERVE ELBOW Right 2/10/2023    Procedure: RELEASE CUBITAL TUNNEL;  Surgeon: Argentina Schmidt MD;  Location: [facility];  Service: Orthopedics   • TUBAL LIGATION         Current Outpatient Medications   Medication Sig Dispense Refill   • albuterol (PROVENTIL HFA,VENTOLIN HFA) 90 mcg/act inhaler Inhale 2 puffs every 6 (six) hours as needed for wheezing (cough) 18 g 3   • Cholecalciferol (Vitamin D3) 25 MCG (1000 UT) CAPS Take 1 capsule by mouth daily     • clobetasol (TEMOVATE) 0.05 % ointment Apply 1 application topically 2 (two) times a day To affected area (Patient taking differently: Apply 1 application. topically 2 (two) times a day To affected area as needed) 30 g 0   • cyanocobalamin (VITAMIN B-12) 100 MCG tablet Take 100 mcg by mouth in the morning     • docusate sodium (COLACE) 100 mg capsule TAKE 1 CAPSULE BY MOUTH TWICE A DAY AS NEEDED FOR CONSTIPATION 60 capsule 1   • escitalopram (LEXAPRO) 5 mg tablet Take 1 tablet (5 mg total) by mouth daily 90 tablet 0   • fluticasone (FLONASE) 50 mcg/act nasal spray SPRAY 1 SPRAY INTO EACH NOSTRIL EVERY DAY (Patient taking differently: 2 sprays into each nostril daily) 16 mL 1   • methocarbamol (ROBAXIN) 500 mg tablet Take 1 tablet (500 mg total) by mouth 4 (four) times a day as needed for muscle spasms for up to 10 days 40 tablet 0   • Multiple Vitamin (MULTIVITAMIN) capsule Take 1 capsule by mouth daily     • olopatadine (PATANOL) 0.1 % ophthalmic solution Administer 1 drop to both eyes 2 (two) times a day 5 mL 3   • Turmeric 400 MG CAPS Take 1 capsule by mouth in the morning     • Vit C-Cholecalciferol-Alessandra Hip (Vitamin C & D3/Alessandra Hips) 500-1000-20 MG-UNIT-MG CAPS Take 1 capsule by mouth in the morning       No current facility-administered medications for this visit. Allergies   Allergen Reactions   • Pollen Extract Other (See Comments)     Seasonal allergies. • Tilactase GI Intolerance and Vomiting       Review of Systems   Constitutional: Positive for appetite change (notes it sometimes hurts to eat certain things because of the sore). Negative for diaphoresis, fatigue and fever. HENT: Positive for mouth sores (inside of right upper lip). Negative for congestion, drooling, facial swelling, rhinorrhea, sore throat and trouble swallowing. Respiratory: Negative for chest tightness and shortness of breath. Cardiovascular: Negative for chest pain. Video Exam    There were no vitals filed for this visit. Physical Exam  Constitutional:       General: She is not in acute distress.      Appearance: Normal appearance. She is not ill-appearing or diaphoretic. HENT:      Head: Normocephalic. Right Ear: External ear normal.      Left Ear: External ear normal.      Nose: Nose normal.      Mouth/Throat:      Mouth: Mucous membranes are moist. Oral lesions (small white abrasian located over right upper lip - limited visibility due to virtual visit) present. Neurological:      Mental Status: She is alert.           Visit Time  Total Visit Duration: 8 minutes

## 2023-09-17 DIAGNOSIS — J30.2 SEASONAL ALLERGIC RHINITIS, UNSPECIFIED TRIGGER: ICD-10-CM

## 2023-09-17 DIAGNOSIS — Z91.09 ENVIRONMENTAL ALLERGIES: ICD-10-CM

## 2023-09-18 RX ORDER — FLUTICASONE PROPIONATE 50 MCG
SPRAY, SUSPENSION (ML) NASAL
Qty: 16 ML | Refills: 1 | Status: SHIPPED | OUTPATIENT
Start: 2023-09-18

## 2023-09-19 ENCOUNTER — OFFICE VISIT (OUTPATIENT)
Dept: FAMILY MEDICINE CLINIC | Facility: CLINIC | Age: 48
End: 2023-09-19
Payer: COMMERCIAL

## 2023-09-19 ENCOUNTER — HOSPITAL ENCOUNTER (OUTPATIENT)
Dept: ULTRASOUND IMAGING | Facility: HOSPITAL | Age: 48
Discharge: HOME | End: 2023-09-19
Payer: COMMERCIAL

## 2023-09-19 VITALS
DIASTOLIC BLOOD PRESSURE: 84 MMHG | OXYGEN SATURATION: 100 % | HEART RATE: 68 BPM | HEIGHT: 65 IN | SYSTOLIC BLOOD PRESSURE: 118 MMHG | WEIGHT: 188 LBS | TEMPERATURE: 96.4 F | BODY MASS INDEX: 31.32 KG/M2

## 2023-09-19 DIAGNOSIS — K13.79 ACUTE PAIN OF MOUTH: ICD-10-CM

## 2023-09-19 DIAGNOSIS — N95.0 PMB (POSTMENOPAUSAL BLEEDING): ICD-10-CM

## 2023-09-19 DIAGNOSIS — B96.89 BACTERIAL ORAL INFECTION: Primary | ICD-10-CM

## 2023-09-19 DIAGNOSIS — K12.1 BACTERIAL ORAL INFECTION: Primary | ICD-10-CM

## 2023-09-19 PROCEDURE — 76830 TRANSVAGINAL US NON-OB: CPT

## 2023-09-19 PROCEDURE — 76856 US EXAM PELVIC COMPLETE: CPT

## 2023-09-19 PROCEDURE — 99213 OFFICE O/P EST LOW 20 MIN: CPT

## 2023-09-19 RX ORDER — DIPHENHYDRAMINE HYDROCHLORIDE AND LIDOCAINE HYDROCHLORIDE AND ALUMINUM HYDROXIDE AND MAGNESIUM HYDRO
10 KIT EVERY 4 HOURS PRN
Qty: 119 ML | Refills: 0 | Status: SHIPPED | OUTPATIENT
Start: 2023-09-19

## 2023-09-19 RX ORDER — AMOXICILLIN AND CLAVULANATE POTASSIUM 875; 125 MG/1; MG/1
1 TABLET, FILM COATED ORAL EVERY 12 HOURS SCHEDULED
Qty: 14 TABLET | Refills: 0 | Status: SHIPPED | OUTPATIENT
Start: 2023-09-19 | End: 2023-09-26

## 2023-09-19 RX ORDER — AMOXICILLIN 500 MG/1
500 TABLET, FILM COATED ORAL 2 TIMES DAILY
Qty: 10 TABLET | Refills: 0 | Status: CANCELLED | OUTPATIENT
Start: 2023-09-19 | End: 2023-09-24

## 2023-09-19 NOTE — PROGRESS NOTES
Name: Dmitry Sifuentes      : 1975      MRN: [id number]  Encounter Provider: Christian Cruz PA-C  Encounter Date: 2023   Encounter department: [address] [place] [address]     1. Bacterial oral infection  -     amoxicillin-clavulanate (AUGMENTIN) 875-125 mg per tablet; Take 1 tablet by mouth every 12 (twelve) hours for 7 days    2. Acute pain of mouth  -     diphenhydramine, lidocaine, Al/Mg hydroxide, simethicone (Magic Mouthwash) SUSP; Swish and spit 10 mL every 4 (four) hours as needed for mouth pain or discomfort    Patient presents for a follow-up of an oral ulcer that has spread over the last week and is associated with significant oral pain. Physical exam revealed erythematous lesions on the inside of the upper lip along with mild gum swelling. With patient's history of thumb sucking and physical exam findings, covering for an oral bacterial infection with 7 day course of Augmentin. Also giving patient magic mouthwash for pain relief as she has been in so much pain from this it has been difficult to eat - she was advised to swish the solution around her mouth/over the area of pain and then spit the solution out, emphasized not to swallow the solution. Patient has an appointment with her dentist this Saturday. If no relief with prescribed treatment or her dentist visit, consider referral to ENT. Subjective      CC: mouth pain from "blister"    Patient presents for evaluation of a "blister " in her mouth that is not improving. Patient saw me through a virtual visit on 23 but due to limited visibility I was unable to accurately assess the lesion in her mouth and advised her to come in to be evaluated in person if it did not improve. Patient notes that the "blister" has spread to the inside of the other side of her lip and now her gums hurt as well.  She used the topical oralgel for pain relief but states it felt "more irritated" so she only used it once and then stopped. She notes she woke up yesterday morning and her top lip felt swollen and that it has been hard to eat due to the pain. Patient notes she "sucks her thumb" frequently as a coping mechanism for her PTSD. She wears artifical nails as well so she is concerned she got bacteria in her mouth from this. Review of Systems   Constitutional: Positive for appetite change (decreased because of pain). Negative for chills, diaphoresis, fatigue and fever. HENT: Positive for mouth sores (inside of upper lip). Negative for congestion, dental problem, sore throat, trouble swallowing and voice change. Respiratory: Negative for cough and shortness of breath. Cardiovascular: Negative for chest pain and palpitations. Neurological: Negative for dizziness, light-headedness and headaches. Current Outpatient Medications on File Prior to Visit   Medication Sig   • albuterol (PROVENTIL HFA,VENTOLIN HFA) 90 mcg/act inhaler Inhale 2 puffs every 6 (six) hours as needed for wheezing (cough)   • Cholecalciferol (Vitamin D3) 25 MCG (1000 UT) CAPS Take 1 capsule by mouth daily   • clobetasol (TEMOVATE) 0.05 % ointment Apply 1 application topically 2 (two) times a day To affected area (Patient taking differently: Apply 1 application.  topically 2 (two) times a day To affected area as needed)   • cyanocobalamin (VITAMIN B-12) 100 MCG tablet Take 100 mcg by mouth in the morning   • docusate sodium (COLACE) 100 mg capsule TAKE 1 CAPSULE BY MOUTH TWICE A DAY AS NEEDED FOR CONSTIPATION   • escitalopram (LEXAPRO) 5 mg tablet Take 1 tablet (5 mg total) by mouth daily   • fluticasone (FLONASE) 50 mcg/act nasal spray SPRAY 1 SPRAY INTO EACH NOSTRIL EVERY DAY   • methocarbamol (ROBAXIN) 500 mg tablet Take 1 tablet (500 mg total) by mouth 4 (four) times a day as needed for muscle spasms for up to 10 days   • Multiple Vitamin (MULTIVITAMIN) capsule Take 1 capsule by mouth daily   • olopatadine (PATANOL) 0.1 % ophthalmic solution Administer 1 drop to both eyes 2 (two) times a day   • Turmeric 400 MG CAPS Take 1 capsule by mouth in the morning   • Vit C-Cholecalciferol-Alessandra Hip (Vitamin C & D3/Alessandra Hips) 500-1000-20 MG-UNIT-MG CAPS Take 1 capsule by mouth in the morning       Objective     /84   Pulse 68   Temp (!) 96.4 °F (35.8 °C)   Ht 5' 4.5" (1.638 m)   Wt 85.3 kg (188 lb)   LMP 12/11/2022 (Exact Date)   SpO2 100%   BMI 31.77 kg/m²     Physical Exam  Constitutional:       General: She is not in acute distress. Appearance: Normal appearance. She is not ill-appearing or diaphoretic. HENT:      Head: Normocephalic and atraumatic. Right Ear: External ear normal.      Left Ear: External ear normal.      Nose: Nose normal.      Mouth/Throat:      Mouth: Mucous membranes are moist. Oral lesions (erythematous lesion on inside of top lip on left and right side. Minor swelling of the gums under the lesion. No drainage.) present. Dentition: Gingival swelling present. Tongue: No lesions. Tongue does not deviate from midline. Palate: No lesions. Pharynx: Oropharynx is clear. No oropharyngeal exudate or posterior oropharyngeal erythema. Eyes:      General:         Right eye: No discharge. Left eye: No discharge. Cardiovascular:      Rate and Rhythm: Normal rate and regular rhythm. Pulses: Normal pulses. Heart sounds: Normal heart sounds. Pulmonary:      Effort: Pulmonary effort is normal. No respiratory distress. Breath sounds: Normal breath sounds. No wheezing, rhonchi or rales. Musculoskeletal:         General: Normal range of motion. Cervical back: Normal range of motion. Lymphadenopathy:      Cervical: No cervical adenopathy. Skin:     General: Skin is warm. Neurological:      Mental Status: She is alert and oriented to person, place, and time.        Luca Alonso PA-C

## 2023-09-25 ENCOUNTER — TELEPHONE (OUTPATIENT)
Dept: OBGYN CLINIC | Facility: CLINIC | Age: 48
End: 2023-09-25

## 2023-09-25 NOTE — TELEPHONE ENCOUNTER
----- Message from [place], [place] sent at 9/24/2023  7:06 PM EDT -----  Pelvic ultrasound showed slightly thickened endometrium for postmenopausal woman. Recommend an endometrial biopsy. Please schedule with me.

## 2023-10-12 ENCOUNTER — TELEPHONE (OUTPATIENT)
Dept: OBGYN CLINIC | Facility: CLINIC | Age: 48
End: 2023-10-12

## 2023-11-03 ENCOUNTER — TELEPHONE (OUTPATIENT)
Dept: NEUROLOGY | Facility: CLINIC | Age: 48
End: 2023-11-03

## 2023-11-08 ENCOUNTER — OFFICE VISIT (OUTPATIENT)
Dept: FAMILY MEDICINE CLINIC | Facility: CLINIC | Age: 48
End: 2023-11-08
Payer: COMMERCIAL

## 2023-11-08 VITALS
WEIGHT: 188 LBS | BODY MASS INDEX: 31.32 KG/M2 | HEIGHT: 65 IN | DIASTOLIC BLOOD PRESSURE: 74 MMHG | OXYGEN SATURATION: 98 % | TEMPERATURE: 97.4 F | HEART RATE: 63 BPM | SYSTOLIC BLOOD PRESSURE: 116 MMHG

## 2023-11-08 DIAGNOSIS — K05.6 PERIODONTAL DISEASE: ICD-10-CM

## 2023-11-08 DIAGNOSIS — F41.9 ANXIETY: Primary | ICD-10-CM

## 2023-11-08 DIAGNOSIS — N95.0 PMB (POSTMENOPAUSAL BLEEDING): ICD-10-CM

## 2023-11-08 PROCEDURE — 99214 OFFICE O/P EST MOD 30 MIN: CPT | Performed by: PHYSICIAN ASSISTANT

## 2023-11-08 NOTE — PROGRESS NOTES
Name: Richard Price      : 1975      MRN: [id number]  Encounter Provider: Emelia Villafuerte PA-C  Encounter Date: 2023   Encounter department: [address] [place] [address]     1. Anxiety    2. Periodontal disease    3. PMB (postmenopausal bleeding)    Her mood is stable will continue lexapro at 5mg  Continue with GYN and complete EMB  Continue with dentistry and periodontics. She was on carbamazepine for many years. She notes good oral hygiene. Subjective     Pt presents for follow up    Depression/anxiety: mood stable on lexapro 5mg, tolerating well now  She is due for mammography. She is following with GYN and will be getting EMB next week given PMB and thickened stripe on US  She has been experiencing mouth/gum sores, gum pain/inflammation and has been seen by a few different providers and given antibiotics and mouth washes without much benefit. She is seeing her dentist and has a periodontist appt upcoming       Review of Systems   Constitutional:  Negative for chills, fatigue and fever. HENT:  Negative for congestion, ear pain, hearing loss, nosebleeds, postnasal drip, rhinorrhea, sinus pressure, sinus pain, sneezing and sore throat. Gum pain, swelling, inflammation, redness   Eyes:  Negative for pain, discharge, itching and visual disturbance. Respiratory:  Negative for cough, chest tightness, shortness of breath and wheezing. Cardiovascular:  Negative for chest pain, palpitations and leg swelling. Gastrointestinal:  Negative for abdominal pain, blood in stool, constipation, diarrhea, nausea and vomiting. Genitourinary:  Negative for frequency and urgency. Neurological:  Negative for dizziness, light-headedness and numbness.    Psychiatric/Behavioral:          Mood stable no acute concerns       Past Medical History:   Diagnosis Date   • Abnormal Pap smear of cervix    • Anxiety    • Arthritis    • Asthma    • Depression    • Ear problems • Osteoporosis    • Urinary tract infection    • Varicella      Past Surgical History:   Procedure Laterality Date   •  SECTION     • [place] NEUROPLASTY &/TRANSPOS MEDIAN NRV CARPAL TUNNE Right 2/10/2023    Procedure: RELEASE CARPAL TUNNEL;  Surgeon: Sarah Kohler MD;  [facility];  Service: Orthopedics   • [place] NEUROPLASTY &/TRANSPOSITION ULNAR NERVE ELBOW Right 2/10/2023    Procedure: RELEASE CUBITAL TUNNEL;  Surgeon: Sarah Kohler MD;  Location[facility];  Service: Orthopedics   • TUBAL LIGATION       Family History   Problem Relation Age of Onset   • Cancer Mother    • Breast cancer Mother         age unknown   • Diabetes Mother    • Coronary artery disease Mother    • Ovarian cancer Mother    • Colon cancer Mother    • Cancer Father    • Prostate cancer Father    • Colon cancer Father    • No Known Problems Sister    • No Known Problems Sister    • No Known Problems Sister    • No Known Problems Sister    • No Known Problems Sister    • No Known Problems Daughter    • No Known Problems Daughter    • No Known Problems Maternal Grandmother    • No Known Problems Paternal Grandmother    • No Known Problems Maternal Aunt    • No Known Problems Maternal Aunt    • No Known Problems Maternal Aunt    • No Known Problems Maternal Aunt    • No Known Problems Maternal Aunt    • No Known Problems Maternal Aunt    • No Known Problems Maternal Aunt    • No Known Problems Maternal Aunt    • No Known Problems Paternal Aunt    • No Known Problems Paternal Aunt    • No Known Problems Paternal Aunt    • No Known Problems Paternal Aunt    • No Known Problems Paternal Aunt    • No Known Problems Half-Sister    • Breast cancer Half-Sister         age unknown   • No Known Problems Half-Sister    • Breast cancer Sister      Social History     Socioeconomic History   • Marital status: /Civil Union     Spouse name: None   • Number of children: None   • Years of education: None   • Highest education level: None   Occupational History   • Occupation: homemaker   Tobacco Use   • Smoking status: Some Days     Packs/day: 0.50     Types: Cigarettes     Start date: 2007     Last attempt to quit: 1/3/2021     Years since quittin.8   • Smokeless tobacco: Never   • Tobacco comments:     Almost 2 two years wit no smoking  patient report she vapes    Vaping Use   • Vaping Use: Some days   • Substances: Nicotine   Substance and Sexual Activity   • Alcohol use: Not Currently     Comment: Social    • Drug use: Not Currently   • Sexual activity: Yes     Partners: Male     Birth control/protection: Female Sterilization   Other Topics Concern   • None   Social History Narrative   • None     Social Determinants of Health     Financial Resource Strain: Not on file   Food Insecurity: Not on file   Transportation Needs: Not on file   Physical Activity: Not on file   Stress: Not on file   Social Connections: Not on file   Intimate Partner Violence: Not on file   Housing Stability: Not on file     Current Outpatient Medications on File Prior to Visit   Medication Sig   • albuterol (PROVENTIL HFA,VENTOLIN HFA) 90 mcg/act inhaler Inhale 2 puffs every 6 (six) hours as needed for wheezing (cough)   • Cholecalciferol (Vitamin D3) 25 MCG (1000 UT) CAPS Take 1 capsule by mouth daily   • clobetasol (TEMOVATE) 0.05 % ointment Apply 1 application topically 2 (two) times a day To affected area (Patient taking differently: Apply 1 application.  topically 2 (two) times a day To affected area as needed)   • cyanocobalamin (VITAMIN B-12) 100 MCG tablet Take 100 mcg by mouth in the morning   • diphenhydramine, lidocaine, Al/Mg hydroxide, simethicone (Magic Mouthwash) SUSP Swish and spit 10 mL every 4 (four) hours as needed for mouth pain or discomfort   • docusate sodium (COLACE) 100 mg capsule TAKE 1 CAPSULE BY MOUTH TWICE A DAY AS NEEDED FOR CONSTIPATION   • escitalopram (LEXAPRO) 5 mg tablet Take 1 tablet (5 mg total) by mouth daily   • fluticasone (FLONASE) 50 mcg/act nasal spray SPRAY 1 SPRAY INTO EACH NOSTRIL EVERY DAY   • methocarbamol (ROBAXIN) 500 mg tablet Take 1 tablet (500 mg total) by mouth 4 (four) times a day as needed for muscle spasms for up to 10 days   • Multiple Vitamin (MULTIVITAMIN) capsule Take 1 capsule by mouth daily   • olopatadine (PATANOL) 0.1 % ophthalmic solution Administer 1 drop to both eyes 2 (two) times a day   • Turmeric 400 MG CAPS Take 1 capsule by mouth in the morning   • Vit C-Cholecalciferol-Alessandra Hip (Vitamin C & D3/Alessandra Hips) 500-1000-20 MG-UNIT-MG CAPS Take 1 capsule by mouth in the morning     Allergies   Allergen Reactions   • Pollen Extract Other (See Comments)     Seasonal allergies. • Tilactase GI Intolerance and Vomiting     Immunization History   Administered Date(s) Administered   • Pneumococcal Conjugate Vaccine 20-valent (Pcv20), Polysace 07/07/2022   • Pneumococcal Polysaccharide PPV23 01/15/2020   • Tdap 10/05/2016       Objective     /74 (BP Location: Left arm, Patient Position: Sitting, Cuff Size: Large)   Pulse 63   Temp (!) 97.4 °F (36.3 °C)   Ht 5' 4.5" (1.638 m)   Wt 85.3 kg (188 lb)   LMP 12/11/2022 (Exact Date)   SpO2 98%   BMI 31.77 kg/m²     Physical Exam  Vitals and nursing note reviewed. Constitutional:       General: She is not in acute distress. Appearance: Normal appearance. HENT:      Head: Normocephalic and atraumatic. Nose: Nose normal.      Mouth/Throat:      Mouth: Mucous membranes are moist.      Pharynx: Oropharynx is clear. No oropharyngeal exudate or posterior oropharyngeal erythema. Comments: Gums with erythema, mild hypertrophy of upper gums, no lesions/sores  Eyes:      Pupils: Pupils are equal, round, and reactive to light. Cardiovascular:      Rate and Rhythm: Normal rate and regular rhythm. Heart sounds: Normal heart sounds. No murmur heard. Pulmonary:      Effort: Pulmonary effort is normal. No respiratory distress.       Breath sounds: Normal breath sounds. No wheezing, rhonchi or rales. Musculoskeletal:         General: Normal range of motion. Cervical back: Normal range of motion and neck supple. Skin:     General: Skin is warm and dry. Neurological:      Mental Status: She is alert and oriented to person, place, and time.    Psychiatric:         Mood and Affect: Affect normal.      Comments: Mood stable       Neftaly Guevara PA-C

## 2023-11-22 ENCOUNTER — CONSULT (OUTPATIENT)
Dept: NEUROLOGY | Facility: CLINIC | Age: 48
End: 2023-11-22
Payer: COMMERCIAL

## 2023-11-22 VITALS
OXYGEN SATURATION: 100 % | BODY MASS INDEX: 31.28 KG/M2 | SYSTOLIC BLOOD PRESSURE: 120 MMHG | HEIGHT: 65 IN | HEART RATE: 72 BPM | DIASTOLIC BLOOD PRESSURE: 64 MMHG

## 2023-11-22 DIAGNOSIS — R20.0 NUMBNESS: Primary | ICD-10-CM

## 2023-11-22 DIAGNOSIS — M79.7 FIBROMYALGIA: ICD-10-CM

## 2023-11-22 PROCEDURE — 99245 OFF/OP CONSLTJ NEW/EST HI 55: CPT | Performed by: PSYCHIATRY & NEUROLOGY

## 2023-11-22 NOTE — PROGRESS NOTES
Arpita Townsend is a 57 y.o. female. Chief Complaint   Patient presents with    Numbness       Assessment:  1. Numbness    2. Fibromyalgia         Plan:  Differential diagnosis was discussed with the patient, we will need to rule out demyelinating disorder, would recommend an MRI of the brain and cervical spine with and without contrast and blood work, patient to call me after the test to discuss the results, she may also need an MRI of the thoracic and lumbar spine in the future, we will do an EMG of her right upper and right lower extremity, she will call me after the above test to discuss the results and depending on the test will decide further management, medications and has had issues with Neurontin in the past, I have advised her to follow-up with Dr. Moo Macias for her low back issues since she had seen him in the past, also was advised to follow-up with her rheumatologist ,to take fall and safety precautions, to keep her blood pressure cholesterol sugar under control, advised to quit smoking ,eat healthy, to take a multivitamin every day, to go to the hospital if has any worsening symptoms and call me otherwise to see me back in 1-2 3 months or sooner if needed and follow-up with the other physicians.           Subjective:    HPI   Patient is here for evaluation of numbness, explained that she has had paresthesias with numbness and tingling on and off of the body since she was a teenager after the [facility] she has pretty much numbness in her whole body she has chronic pain has been diagnosed with fibromyalgia, she has never had loss of vision in the eyes, he had carpal tunnel syndrome and ulnar nerve surgery in the right hand, she does complain of low back pain for which she has been in follow-up with sports medicine, denies any bowel or bladder incontinence no focal weakness, sometimes she might have issues with her balance, memory is good, no focal weakness, appetite is good weight has been stable, she  has history of anxiety and is on Lexapro, no other complaints. Vitals:    11/22/23 1238   BP: 120/64   BP Location: Left arm   Patient Position: Sitting   Cuff Size: Standard   Pulse: 72   SpO2: 100%   Height: 5' 5" (1.651 m)       Current Medications    Current Outpatient Medications:     albuterol (PROVENTIL HFA,VENTOLIN HFA) 90 mcg/act inhaler, Inhale 2 puffs every 6 (six) hours as needed for wheezing (cough), Disp: 18 g, Rfl: 3    Cholecalciferol (Vitamin D3) 25 MCG (1000 UT) CAPS, Take 1 capsule by mouth daily, Disp: , Rfl:     clobetasol (TEMOVATE) 0.05 % ointment, Apply 1 application topically 2 (two) times a day To affected area (Patient taking differently: Apply 1 application.  topically 2 (two) times a day To affected area as needed), Disp: 30 g, Rfl: 0    cyanocobalamin (VITAMIN B-12) 100 MCG tablet, Take 100 mcg by mouth in the morning, Disp: , Rfl:     diphenhydramine, lidocaine, Al/Mg hydroxide, simethicone (Magic Mouthwash) SUSP, Swish and spit 10 mL every 4 (four) hours as needed for mouth pain or discomfort, Disp: 119 mL, Rfl: 0    docusate sodium (COLACE) 100 mg capsule, TAKE 1 CAPSULE BY MOUTH TWICE A DAY AS NEEDED FOR CONSTIPATION, Disp: 60 capsule, Rfl: 1    escitalopram (LEXAPRO) 5 mg tablet, Take 1 tablet (5 mg total) by mouth daily, Disp: 90 tablet, Rfl: 0    fluticasone (FLONASE) 50 mcg/act nasal spray, SPRAY 1 SPRAY INTO EACH NOSTRIL EVERY DAY, Disp: 16 mL, Rfl: 1    methocarbamol (ROBAXIN) 500 mg tablet, Take 1 tablet (500 mg total) by mouth 4 (four) times a day as needed for muscle spasms for up to 10 days, Disp: 40 tablet, Rfl: 0    Multiple Vitamin (MULTIVITAMIN) capsule, Take 1 capsule by mouth daily, Disp: , Rfl:     olopatadine (PATANOL) 0.1 % ophthalmic solution, Administer 1 drop to both eyes 2 (two) times a day, Disp: 5 mL, Rfl: 3    Turmeric 400 MG CAPS, Take 1 capsule by mouth in the morning, Disp: , Rfl:     Vit C-Cholecalciferol-Alessandra Hip (Vitamin C & D3/Alessandra Hips) 500-1000-20 MG-UNIT-MG CAPS, Take 1 capsule by mouth in the morning, Disp: , Rfl:       Allergies  Pollen extract and Tilactase    Past Medical History  Past Medical History:   Diagnosis Date    Abnormal Pap smear of cervix     Anxiety     Arthritis     Asthma     Depression     Ear problems     Osteoporosis     Urinary tract infection     Varicella          Past Surgical History:  Past Surgical History:   Procedure Laterality Date     SECTION      [place] NEUROPLASTY &/TRANSPOS MEDIAN NRV CARPAL TUNNE Right 2/10/2023    Procedure: RELEASE CARPAL TUNNEL;  Surgeon: Arlin Segal MD;  [facility];  Service: Orthopedics    [place] NEUROPLASTY &/TRANSPOSITION ULNAR NERVE ELBOW Right 2/10/2023    Procedure: RELEASE CUBITAL TUNNEL;  Surgeon: Arlin Segal MD;  [facility];  Service: Orthopedics    TUBAL LIGATION           Family History:  Family History   Problem Relation Age of Onset    Cancer Mother     Breast cancer Mother         age unknown    Diabetes Mother     Coronary artery disease Mother     Ovarian cancer Mother     Colon cancer Mother     Cancer Father     Prostate cancer Father     Colon cancer Father     No Known Problems Sister     No Known Problems Sister     No Known Problems Sister     No Known Problems Sister     No Known Problems Sister     No Known Problems Daughter     No Known Problems Daughter     No Known Problems Maternal Grandmother     No Known Problems Paternal Grandmother     No Known Problems Maternal Aunt     No Known Problems Maternal Aunt     No Known Problems Maternal Aunt     No Known Problems Maternal Aunt     No Known Problems Maternal Aunt     No Known Problems Maternal Aunt     No Known Problems Maternal Aunt     No Known Problems Maternal Aunt     No Known Problems Paternal Aunt     No Known Problems Paternal Aunt     No Known Problems Paternal Aunt     No Known Problems Paternal Aunt     No Known Problems Paternal Aunt     No Known Problems Half-Sister     Breast cancer Half-Sister         age unknown    No Known Problems Half-Sister     Breast cancer Sister        Social History:   reports that she has been smoking cigarettes. She started smoking about 16 years ago. She has been smoking an average of .5 packs per day. She has never used smokeless tobacco. She reports that she does not currently use alcohol. She reports that she does not currently use drugs. I have reviewed the past medical history, surgical history, social and family history, current medications, allergies vitals, review of systems, and updated this information as appropriate today. Objective:    Physical Exam    Neurological Exam     GENERAL:  Cooperative in no acute distress. Well-developed and well-nourished     HEAD and NECK   Head is atraumatic normocephalic with no lesions or masses. Neck is supple with full range of motion     CARDIOVASCULAR  Carotid Arteries-no carotid bruits. NEUROLOGIC:  Mental Status-the patient is awake alert and oriented without aphasia or apraxia  Cranial Nerves: Visual fields are full to confrontation. Visual acuity is 20/20 with hand-held chart extraocular movements are full without nystagmus. Pupils are 2-1/2 mm and reactive. Face is symmetrical to light touch. Movements of facial expression move symmetrically. Hearing is normal to finger rub bilaterally. Soft palate lifts symmetrically. Shoulder shrug is symmetrical. Tongue is midline without atrophy. Motor: No drift is noted on arm extension. Strength is full in the upper and lower extremities with normal bulk and tone. Sensory: Decreased light touch pinprick temperature sensation in the right upper and right lower extremity compared to the left ,cortical function is intact. Coordination: Finger to nose testing is performed accurately. Romberg is negative. Gait reveals a normal base with symmetrical arm swing.  Tandem walk is normal.  Reflexes:      2+ and symmetrical in the  upper extremities slightly brisk in bilateral lower extremities ,toes are downgoing  Paraspinal muscle tenderness in the lumbar area,no  sensory level    ROS:  Review of Systems   Constitutional:  Negative for appetite change, fatigue and fever. HENT: Negative. Negative for hearing loss, tinnitus, trouble swallowing and voice change. Eyes: Negative. Negative for photophobia, pain and visual disturbance. Respiratory: Negative. Negative for shortness of breath. Cardiovascular: Negative. Negative for palpitations. Gastrointestinal: Negative. Negative for nausea and vomiting. Endocrine: Negative. Negative for cold intolerance. Genitourinary: Negative. Negative for dysuria, frequency and urgency. Musculoskeletal:  Positive for gait problem. Negative for myalgias and neck pain. Skin: Negative. Negative for rash. Allergic/Immunologic: Negative. Neurological:  Positive for numbness (whole body numbness). Negative for dizziness, tremors, seizures, syncope, facial asymmetry, speech difficulty, weakness, light-headedness and headaches. Hematological: Negative. Does not bruise/bleed easily. Psychiatric/Behavioral: Negative. Negative for confusion, hallucinations and sleep disturbance.

## 2023-11-24 ENCOUNTER — APPOINTMENT (OUTPATIENT)
Dept: RADIOLOGY | Facility: CLINIC | Age: 48
End: 2023-11-24
Payer: COMMERCIAL

## 2023-11-24 ENCOUNTER — OFFICE VISIT (OUTPATIENT)
Dept: OBGYN CLINIC | Facility: CLINIC | Age: 48
End: 2023-11-24
Payer: COMMERCIAL

## 2023-11-24 VITALS
SYSTOLIC BLOOD PRESSURE: 119 MMHG | BODY MASS INDEX: 31.46 KG/M2 | HEART RATE: 75 BPM | WEIGHT: 188.8 LBS | DIASTOLIC BLOOD PRESSURE: 77 MMHG | HEIGHT: 65 IN

## 2023-11-24 DIAGNOSIS — F40.240 CLAUSTROPHOBIA: ICD-10-CM

## 2023-11-24 DIAGNOSIS — M25.551 PAIN IN RIGHT HIP: ICD-10-CM

## 2023-11-24 DIAGNOSIS — M54.16 RADICULOPATHY, LUMBAR REGION: ICD-10-CM

## 2023-11-24 DIAGNOSIS — M54.16 RADICULOPATHY, LUMBAR REGION: Primary | ICD-10-CM

## 2023-11-24 PROCEDURE — 73502 X-RAY EXAM HIP UNI 2-3 VIEWS: CPT

## 2023-11-24 PROCEDURE — 99213 OFFICE O/P EST LOW 20 MIN: CPT | Performed by: FAMILY MEDICINE

## 2023-11-24 RX ORDER — PREDNISONE 20 MG/1
20 TABLET ORAL 2 TIMES DAILY WITH MEALS
Qty: 10 TABLET | Refills: 0 | Status: SHIPPED | OUTPATIENT
Start: 2023-11-24 | End: 2023-11-29

## 2023-11-24 RX ORDER — ALPRAZOLAM 0.5 MG/1
0.5 TABLET ORAL
Qty: 2 TABLET | Refills: 0 | Status: SHIPPED | OUTPATIENT
Start: 2023-11-24

## 2023-11-24 NOTE — PROGRESS NOTES
Assessment/Plan:  Assessment/Plan   Diagnoses and all orders for this visit:    Radiculopathy, lumbar region  -     MRI lumbar spine wo contrast; Future  -     predniSONE 20 mg tablet; Take 1 tablet (20 mg total) by mouth 2 (two) times a day with meals for 5 days    Pain in right hip  -     MRI hip right wo contrast; Future  -     XR hip/pelv 2-3 vws right if performed; Future    Claustrophobia  -     ALPRAZolam (XANAX) 0.5 mg tablet; Take 1 tablet (0.5 mg total) by mouth 30 min pre-procedure Take 2nd dose 30 mins after 1st dose if still anxious. 71-year-old female with low back and bilateral lower extremity pain many years duration. Discussed with patient physical exam, radiographs, impression, and plan. X-rays noted for degenerative changes both hips, mild dextrocurvature lumbar spine. Physical exam lumbar spine noted for midline tenderness L3-S1 and bilateral paraspinal tenderness, more pronounced on the right. She has limited range of motion with extension and sidebending to both sides. She has tenderness on the right at the piriformis. There is groin pain on the right with FADDIR maneuver. She has weakness in the right lower extremity hip flexion, hip abduction, knee extension, and ankle plantarflexion at 4/5. She has decreased sensation to light touch right lower extremity dermatomes L3-S1. There is positive straight leg raise on the right. Clinical impression is that she has symptoms of lumbar spine pathology causing nerve impingement and symptoms to radiate to the lower extremities. Symptoms persist despite conservative management of formal therapy 2/11/2021 through 6/30/2021, 4/17/2023 through 5/19/2023 and since then continuing with home exercise program, more than 3 months of alternating between ice and heat, multiple courses of oral steroid and muscle relaxers.   At this time I will refer her for MRI of lumbar spine to evaluate for disc pathology, stenosis, and nerve impingement and I will refer for MRI of the right hip to evaluate for sciatic nerve impingement and avascular necrosis as invasive management may be warranted. She will return after having MRIs done. Subjective:   Patient ID: Gil Gannon is a 57 y.o. female. Chief Complaint   Patient presents with    Spine - Follow-up         46-year-old female following up for low back pain and bilateral lower extremity pain many years duration. She was last seen by me in this regard more than 2 years ago at which point she was referred to formal therapy for her lumbar radiculopathy. She attended formal therapy 2/11/2021 through 6/30/2021. She was also tried on medications of gabapentin and muscle relaxers. Symptoms have been worsening over the past 1 year. She has pain described localized to the low back but worse on the right, radiating to right buttock and distally to both lower extremities, associated with numbness and tingling worse in the right lower extremity, worse with activity particularly bearing weight and ambulating, and improved with resting. She was seen by primary care provider, referred for x-rays and referred for another round of formal therapy. She attended formal therapy 4/17/2023 through 5/19/2023 and since then has continued with home exercise program.  Symptoms have been worsening and she been alternating between ice and heat to help with symptoms. She denies bowel or bladder incontinence. Back Pain  This is a chronic problem. The current episode started more than 1 year ago. The problem occurs constantly. The problem has been gradually worsening. Associated symptoms include arthralgias, joint swelling, numbness and weakness. The symptoms are aggravated by standing, walking and twisting. She has tried rest, position changes, ice, heat and acetaminophen (Gabapentin, methocarbamol, physical therapy, home exercise, oral steroid) for the symptoms. The treatment provided mild relief.                Review of Systems   Musculoskeletal:  Positive for arthralgias, back pain and joint swelling. Neurological:  Positive for weakness and numbness. Objective:  Vitals:    11/24/23 0807   BP: 119/77   Pulse: 75   Weight: 85.6 kg (188 lb 12.8 oz)   Height: 5' 5" (1.651 m)      Right Ankle Exam     Muscle Strength   Plantar flexion:  4/5       Left Ankle Exam     Muscle Strength   Plantar flexion:  5/5       Right Hip Exam     Tenderness   Right hip tenderness location: Piriformis. Muscle Strength   Abduction: 4/5   Flexion: 4/5     Tests   FLORENTIN: negative    Comments:  Positive FADDIR      Left Hip Exam     Muscle Strength   Flexion: 5/5     Tests   FLORENTIN: negative    Comments:  Negative FADDIR      Back Exam     Tenderness   The patient is experiencing tenderness in the lumbar. Range of Motion   Extension:  abnormal   Flexion:  normal   Lateral bend right:  abnormal   Lateral bend left:  abnormal   Rotation right:  normal   Rotation left:  normal     Muscle Strength   Right Quadriceps:  4/5   Left Quadriceps:  5/5     Tests   Straight leg raise right: positive  Straight leg raise left: negative    Other   Sensation: normal  Gait: abnormal           Strength/Myotome Testing     Left Ankle/Foot   Plantar flexion: 5    Right Ankle/Foot   Plantar flexion: 4      Physical Exam  Vitals and nursing note reviewed. Constitutional:       General: She is not in acute distress. Appearance: Normal appearance. She is well-developed. She is not ill-appearing or diaphoretic. HENT:      Head: Normocephalic and atraumatic. Right Ear: External ear normal.      Left Ear: External ear normal.   Eyes:      Conjunctiva/sclera: Conjunctivae normal.   Neck:      Trachea: No tracheal deviation. Cardiovascular:      Rate and Rhythm: Normal rate. Pulmonary:      Effort: Pulmonary effort is normal. No respiratory distress. Abdominal:      General: There is no distension. Musculoskeletal:         General: Tenderness present. No swelling, deformity or signs of injury. Lumbar back: Positive right straight leg raise test. Negative left straight leg raise test.   Skin:     General: Skin is warm and dry. Coloration: Skin is not jaundiced or pale. Neurological:      Mental Status: She is alert and oriented to person, place, and time. Psychiatric:         Mood and Affect: Mood normal.         Behavior: Behavior normal.         Thought Content: Thought content normal.         Judgment: Judgment normal.         I have personally reviewed pertinent films in PACS and my interpretation is  . Degenerative changes both hips.

## 2023-11-25 DIAGNOSIS — F41.9 ANXIETY: ICD-10-CM

## 2023-11-27 RX ORDER — ESCITALOPRAM OXALATE 5 MG/1
5 TABLET ORAL DAILY
Qty: 30 TABLET | Refills: 1 | Status: SHIPPED | OUTPATIENT
Start: 2023-11-27 | End: 2024-05-25

## 2023-12-05 ENCOUNTER — PROCEDURE VISIT (OUTPATIENT)
Dept: OBGYN CLINIC | Facility: MEDICAL CENTER | Age: 48
End: 2023-12-05
Payer: COMMERCIAL

## 2023-12-05 VITALS — DIASTOLIC BLOOD PRESSURE: 84 MMHG | WEIGHT: 192 LBS | BODY MASS INDEX: 31.95 KG/M2 | SYSTOLIC BLOOD PRESSURE: 124 MMHG

## 2023-12-05 DIAGNOSIS — R93.89 THICKENED ENDOMETRIUM: Primary | ICD-10-CM

## 2023-12-05 LAB — SL AMB POCT URINE HCG: NEGATIVE

## 2023-12-05 PROCEDURE — 81025 URINE PREGNANCY TEST: CPT | Performed by: NURSE PRACTITIONER

## 2023-12-05 PROCEDURE — 88305 TISSUE EXAM BY PATHOLOGIST: CPT | Performed by: PATHOLOGY

## 2023-12-05 PROCEDURE — 58100 BIOPSY OF UTERUS LINING: CPT | Performed by: NURSE PRACTITIONER

## 2023-12-05 NOTE — PATIENT INSTRUCTIONS
Call for any fever, prolonged or severe pain or bleeding. Use OTC medication as needed for mild to moderate pain. Avoid vaginal intercourse for 48 hours or until the bleeding has completely stopped.

## 2023-12-05 NOTE — PROGRESS NOTES
Endometrial biopsy    Date/Time: 12/5/2023 9:39 AM    Performed by: CORI Spicer  Authorized by: CORI Spicer  Universal Protocol:  Consent: Verbal consent obtained. Risks and benefits: risks, benefits and alternatives were discussed  Consent given by: patient  Time out: Immediately prior to procedure a "time out" was called to verify the correct patient, procedure, equipment, support staff and site/side marked as required. Timeout called at: 12/5/2023 9:39 AM.  Patient understanding: patient states understanding of the procedure being performed  Patient consent: the patient's understanding of the procedure matches consent given  Procedure consent: procedure consent matches procedure scheduled  Relevant documents: relevant documents present and verified  Patient identity confirmed: verbally with patient    Indication:     Indications: Post-menopausal bleeding      Chronicity of post-menopausal bleeding:  Recurrent    Progression of post-menopausal bleeding:  Unchanged  Pre-procedure:     Premeds:  Acetaminophen (0065)  Procedure:     Procedure: endometrial biopsy with Pipelle      A bivalve speculum was placed in the vagina: yes      Cervix cleaned and prepped: yes      Uterus sounded: yes      Uterus sound depth (cm):  8    Curettes used:  2    Specimen collected: specimen collected and sent to pathology      Unable to perform due to comment:  Discontinued after second pipelle due to pain intolerance  Findings:     Cervix: normal    Comments:     Procedure comments:  9/19/23 pelvic US-> Somewhat heterogeneous uterine echotexture, which is nonspecific, but may suggest adenomyosis in the appropriate clinical setting. Otherwise, normal examination. Stripe 5 mm. Admits to hot flashes all day, insertive and thrusting dyspareunia and vaginal bleeding monthly x 8 days with heavy flow x 2 days then mod to light flow. Also c/o suprapubic pain with vaginal bleeding rated 10/10.  Tylenol is effective in lessening the pain.

## 2023-12-11 PROCEDURE — 88305 TISSUE EXAM BY PATHOLOGIST: CPT | Performed by: PATHOLOGY

## 2023-12-13 ENCOUNTER — HOSPITAL ENCOUNTER (OUTPATIENT)
Dept: MRI IMAGING | Facility: HOSPITAL | Age: 48
Discharge: HOME | End: 2023-12-13
Attending: PSYCHIATRY & NEUROLOGY
Payer: COMMERCIAL

## 2023-12-13 DIAGNOSIS — R20.0 NUMBNESS: ICD-10-CM

## 2023-12-13 PROCEDURE — A9585 GADOBUTROL INJECTION: HCPCS | Performed by: PHYSICIAN ASSISTANT

## 2023-12-13 PROCEDURE — G1004 CDSM NDSC: HCPCS

## 2023-12-13 PROCEDURE — 70553 MRI BRAIN STEM W/O & W/DYE: CPT

## 2023-12-13 RX ORDER — GADOBUTROL 604.72 MG/ML
8 INJECTION INTRAVENOUS
Status: COMPLETED | OUTPATIENT
Start: 2023-12-13 | End: 2023-12-13

## 2023-12-13 RX ADMIN — GADOBUTROL 8 ML: 604.72 INJECTION INTRAVENOUS at 07:41

## 2023-12-15 ENCOUNTER — TELEPHONE (OUTPATIENT)
Dept: OBGYN CLINIC | Facility: CLINIC | Age: 48
End: 2023-12-15

## 2023-12-15 NOTE — TELEPHONE ENCOUNTER
----- Message from Savannah Raphael sent at 12/15/2023  1:24 PM EST -----  Tissue collected from biopsy is benign but not expected to be secretory in a post menopausal woman. Please schedule follow up with physician for consultation and possible  discussion for D&C.

## 2023-12-15 NOTE — TELEPHONE ENCOUNTER
Tissue collected from biopsy is benign but not expected to be secretory in a post menopausal woman. Please schedule follow up with physician for consultation and possible  discussion for D&C.     This may be a duplicate message

## 2023-12-15 NOTE — TELEPHONE ENCOUNTER
Message from [address] relayed to patient  Sent to  to schedule pre-op consult for d&c in [place]

## 2023-12-18 ENCOUNTER — HOSPITAL ENCOUNTER (OUTPATIENT)
Dept: MRI IMAGING | Facility: HOSPITAL | Age: 48
Discharge: HOME | End: 2023-12-18
Attending: FAMILY MEDICINE
Payer: COMMERCIAL

## 2023-12-18 DIAGNOSIS — M25.551 PAIN IN RIGHT HIP: ICD-10-CM

## 2023-12-18 PROCEDURE — 73721 MRI JNT OF LWR EXTRE W/O DYE: CPT

## 2023-12-18 PROCEDURE — G1004 CDSM NDSC: HCPCS

## 2023-12-28 ENCOUNTER — PROCEDURE VISIT (OUTPATIENT)
Dept: NEUROLOGY | Facility: CLINIC | Age: 48
End: 2023-12-28
Payer: COMMERCIAL

## 2023-12-28 DIAGNOSIS — R20.0 NUMBNESS: ICD-10-CM

## 2023-12-28 PROCEDURE — 95886 MUSC TEST DONE W/N TEST COMP: CPT | Performed by: PSYCHIATRY & NEUROLOGY

## 2023-12-28 PROCEDURE — 95911 NRV CNDJ TEST 9-10 STUDIES: CPT | Performed by: PSYCHIATRY & NEUROLOGY

## 2023-12-28 NOTE — PROGRESS NOTES
EMG 1 Upper/1 Lower Neuropathy     Date/Time  12/28/2023 8:20 AM     Performed by  Michael Diaz MD   Authorized by  April Saini MD         EMG RIGHT/UPPER/LOWER EXTREMITY    Patient reports long history of numbness and tingling in the hands and feet    Motor and sensory conduction studies were performed on the right median, ulnar, peroneal, tibial and sural nerves. The distal motor latencies were normal. The motor action potential amplitudes were normal. Motor conduction velocities were normal including conduction velocity of the ulnar nerve across the elbow and peroneal nerve across the fibular head.    F waves were normal.    The right median, radial, ulnar and sural distal sensory latencies were normal with normal palmar conduction with median stimulation. Action potential amplitudes were normal.    H. reflexes were symmetrically normal.    Concentric needle EMG was performed in the right APB, FDI, EDC, brachial radialis, biceps, triceps, EDB, tibialis anterior, gastrocnemius medius, vastus lateralis, biceps femoralis short head in the low cervical and lumbar paraspinal regions. There was no evidence of spontaneous activity seen. The compound motor unit potentials were of normal configuration and interference patterns were full were full for effort.    IMPRESSION: This is a normal EMG of the right upper and lower extremity.    Michael Diaz M.D.    [facility] [address] [address] [phone]    Electromyography & Nerve Conduction Studies Report          Full Name: CARRINGTON ARAGON Gender: Female  Patient ID: [id number] YOB: 1975      Visit Date: 12/28/2023 8:42 AM  Age: 48 Years  Examining Physician: DR GONZALEZ DIAZ  Referring Physician: DR. saini  Technologist: JENNA  Temperature: 34  Height: 5 feet 6 inch      Sensory Nerve Conduction Study       Nerve / Sites Rec. Site Onset Lat Peak Lat  Amp Segments Distance Peak Diff  Velocity     ms ms µV  cm ms m/s   R Median - Dig II (Antidromic).      Wrist Index 2.6 3.6 33.2 Wrist - Index 14  54      Ref.  ?3.4  ?20.0 Ref.   ?50   R Ulnar - Dig V (Antidromic).      Wrist Dig V 2.2 3.2 22.2 Wrist - Dig V 12  54      Ref.  ?2.9  ?17.0 Ref.   ?50   R Median, Ulnar - Palmar      Median Palm Wrist 1.5 1.9 59.7 Median Palm - Wrist 8  53      Ref.   ?2.2 ?50.0 Ref.   ?50      Ulnar Palm Wrist 1.1 1.6 29.8 Ulnar Palm - Wrist 8  70      Ref.   ?2.2 ?12.0 Ref.   ?50        Median Palm - Ulnar Palm  0.3         Ref.  ?0.4    R Radial - Superficial (Antidromic)      Forearm Wrist 1.7 2.3 41.5 Forearm - Wrist 10  60      Ref.   ?2.9 ?15.0 Ref.   ?50   R Sural - (Antidromic).      Calf Ankle 2.1 2.6 10.4 Calf - Ankle 14  67      Ref.  ?3.3  ?6.0 Ref.   ?40       Motor Nerve Conduction Study       Nerve / Sites Muscle Latency Ref. Amplitude Ref. Segments Distance Lat Diff Velocity Ref.     ms ms mV mV  cm ms m/s m/s   R Median - APB      Wrist APB 3.7 ?4.4 7.8 ?4.0 Wrist - APB 7         Elbow APB 7.8  7.1  Elbow - Wrist 22 4.10 54 ?49   R Ulnar - ADM      Wrist ADM 2.1 ?3.3 11.0 ?6.0 Wrist - ADM 7         B.Elbow ADM 6.0  10.8  B.Elbow - Wrist 21 3.88 54 ?49      A.Elbow ADM 8.0  10.7  A.Elbow - B.Elbow 10 2.02 49 ?49   R Peroneal - EDB      Ankle EDB 3.4 ?6.5 6.0 ?2.0 Ankle - EDB 9         B. Fib Head EDB 10.1  5.5  B. Fib Head - Ankle 30 6.73 45 ?44      A. Fib Head EDB 11.2  5.5  A. Fib Head - B. Fib Head 10 1.08 92 ?44   R Tibial - AH      Ankle AH 2.9 ?5.8 10.8 ?4.0 Ankle - AH 9         Knee AH 10.7  10.6  Knee - Ankle 36 7.81 46 ?41       F Waves       Nerve F Latency M Latency F - M Lat    ms ms ms   R Peroneal - EDB 45.6 4.1 41.5   R Tibial - AH 48.3 4.0 44.3   R Median - APB 26.7 3.9 22.8   R Ulnar - ADM 24.9 2.8 22.1       H Reflex       Nerve H Latency    ms   R Tibial - Soleus 28.7   L Tibial - Soleus 29.4       EMG Summary Table     Spontaneous MUAP Recruitment   Muscle Nerve Roots IA Fib PSW  Fasc H.F. Dur. Amp PPP Config Pattern   R. First dorsal interosseous Ulnar C8-T1 NL None None None None NL NL None NL NL   R. Abductor pollicis brevis Median C8-T1 NL None None None None NL NL None NL NL   R. Brachioradialis Radial C5-C6 NL None None None None NL NL None NL NL   R. Extensor digitorum communis Radial C7-C8 NL None None None None NL NL None NL NL   R. Biceps brachii Musculocutaneous C5-C6 NL None None None None NL NL None NL NL   R. Triceps brachii Radial C6-C8 NL None None None None NL NL None NL NL   R. Extensor digitorum brevis Tibial L5-S1 NL None None None None NL NL None NL NL   R. Tibialis anterior Peroneal L4-L5 NL None None None None NL NL None NL NL   R. Gastrocnemius (Medial head) Tibial S1-S2 NL None None None None NL NL None NL NL   R. Quadriceps Femoral L2-L4 NL None None None None NL NL None NL NL   R. Biceps femoris (short head) Sciatic (peroneal division) L5-S2 NL None None None None NL NL None NL NL   R. Cervical paraspinals Spinal C4-C8 NL None None None None NL NL None NL NL   R. Lumbar paraspinals (low)  - NL None None None None NL NL None NL NL

## 2023-12-29 ENCOUNTER — OFFICE VISIT (OUTPATIENT)
Dept: FAMILY MEDICINE CLINIC | Facility: CLINIC | Age: 48
End: 2023-12-29
Payer: COMMERCIAL

## 2023-12-29 VITALS
OXYGEN SATURATION: 98 % | HEART RATE: 88 BPM | TEMPERATURE: 96.1 F | BODY MASS INDEX: 31.99 KG/M2 | HEIGHT: 65 IN | SYSTOLIC BLOOD PRESSURE: 136 MMHG | WEIGHT: 192 LBS | DIASTOLIC BLOOD PRESSURE: 82 MMHG

## 2023-12-29 DIAGNOSIS — R05.9 COUGH: ICD-10-CM

## 2023-12-29 DIAGNOSIS — R06.2 WHEEZING: ICD-10-CM

## 2023-12-29 DIAGNOSIS — J02.9 SORE THROAT: ICD-10-CM

## 2023-12-29 DIAGNOSIS — Z13.1 SCREENING FOR DIABETES MELLITUS: ICD-10-CM

## 2023-12-29 DIAGNOSIS — J02.0 STREP PHARYNGITIS: Primary | ICD-10-CM

## 2023-12-29 DIAGNOSIS — Z13.220 SCREENING FOR LIPID DISORDERS: ICD-10-CM

## 2023-12-29 LAB — S PYO AG THROAT QL: POSITIVE

## 2023-12-29 PROCEDURE — 99213 OFFICE O/P EST LOW 20 MIN: CPT | Performed by: PHYSICIAN ASSISTANT

## 2023-12-29 PROCEDURE — 87880 STREP A ASSAY W/OPTIC: CPT | Performed by: PHYSICIAN ASSISTANT

## 2023-12-29 RX ORDER — ALBUTEROL SULFATE 90 UG/1
2 AEROSOL, METERED RESPIRATORY (INHALATION) EVERY 6 HOURS PRN
Qty: 18 G | Refills: 3 | Status: SHIPPED | OUTPATIENT
Start: 2023-12-29

## 2023-12-29 RX ORDER — AMOXICILLIN 500 MG/1
500 TABLET, FILM COATED ORAL 2 TIMES DAILY
Qty: 20 TABLET | Refills: 0 | Status: SHIPPED | OUTPATIENT
Start: 2023-12-29 | End: 2024-01-08

## 2023-12-29 NOTE — PROGRESS NOTES
Name: Jami Gates      : 1975      MRN: [id number]  Encounter Provider: Jerome Berger PA-C  Encounter Date: 2023   Encounter department: [facility]    Assessment & Plan     1. Strep pharyngitis  -     amoxicillin (AMOXIL) 500 MG tablet; Take 1 tablet (500 mg total) by mouth 2 (two) times a day for 10 days    2. Sore throat  -     POCT rapid strepA    3. Cough  -     albuterol (PROVENTIL HFA,VENTOLIN HFA) 90 mcg/act inhaler; Inhale 2 puffs every 6 (six) hours as needed for wheezing (cough)    4. Wheezing  -     albuterol (PROVENTIL HFA,VENTOLIN HFA) 90 mcg/act inhaler; Inhale 2 puffs every 6 (six) hours as needed for wheezing (cough)    Rapid strep + and exam is consistent with such. Complete amoxicillin. Given recurrence recommend she follows with her ENT. Prn albuterol for wheeze. Fluids, rest. Follow up prn       Subjective     Pt presents with sore throat. Multiple episodes of strep in her hx. No sick contacts. Sx started yesterday, also notes R ear pain. No SOB, fevers.     Sore Throat   This is a new problem. The current episode started yesterday. The problem has been rapidly worsening. The pain is worse on the right side. There has been no fever. The pain is at a severity of 10/10. Associated symptoms include ear pain and trouble swallowing. Pertinent negatives include no abdominal pain, congestion, coughing, diarrhea, drooling, ear discharge, headaches, hoarse voice, plugged ear sensation, neck pain, shortness of breath, stridor, swollen glands or vomiting. She has had exposure to strep. She has had no exposure to mono.     Review of Systems   Constitutional:  Negative for chills, fatigue and fever.   HENT:  Positive for ear pain, sore throat and trouble swallowing. Negative for congestion, drooling, ear discharge, hearing loss, hoarse voice, nosebleeds, postnasal drip, rhinorrhea, sinus pressure, sinus pain and sneezing.    Eyes:  Negative for pain, discharge,  itching and visual disturbance.   Respiratory:  Negative for cough, chest tightness, shortness of breath, wheezing and stridor.    Cardiovascular:  Negative for chest pain, palpitations and leg swelling.   Gastrointestinal:  Negative for abdominal pain, blood in stool, constipation, diarrhea, nausea and vomiting.   Genitourinary:  Negative for frequency and urgency.   Musculoskeletal:  Negative for neck pain.   Neurological:  Negative for dizziness, light-headedness, numbness and headaches.       Past Medical History:   Diagnosis Date    Abnormal Pap smear of cervix     Anxiety     Arthritis     Asthma     Depression     Ear problems     Osteoporosis     Urinary tract infection     Varicella      Past Surgical History:   Procedure Laterality Date     SECTION      NV NEUROPLASTY &/TRANSPOS MEDIAN NRV CARPAL TUNNE Right 2/10/2023    Procedure: RELEASE CARPAL TUNNEL;  Surgeon: Loni Chi MD;  Location: [facility];  Service: Orthopedics    NV NEUROPLASTY &/TRANSPOSITION ULNAR NERVE ELBOW Right 2/10/2023    Procedure: RELEASE CUBITAL TUNNEL;  Surgeon: Loni Chi MD;  Location: [facility];  Service: Orthopedics    TUBAL LIGATION       Family History   Problem Relation Age of Onset    Cancer Mother     Breast cancer Mother         age unknown    Diabetes Mother     Coronary artery disease Mother     Ovarian cancer Mother     Colon cancer Mother     Cancer Father     Prostate cancer Father     Colon cancer Father     No Known Problems Sister     No Known Problems Sister     No Known Problems Sister     No Known Problems Sister     No Known Problems Sister     No Known Problems Daughter     No Known Problems Daughter     No Known Problems Maternal Grandmother     No Known Problems Paternal Grandmother     No Known Problems Maternal Aunt     No Known Problems Maternal Aunt     No Known Problems Maternal Aunt     No Known Problems Maternal Aunt     No Known Problems Maternal Aunt     No Known Problems  Maternal Aunt     No Known Problems Maternal Aunt     No Known Problems Maternal Aunt     No Known Problems Paternal Aunt     No Known Problems Paternal Aunt     No Known Problems Paternal Aunt     No Known Problems Paternal Aunt     No Known Problems Paternal Aunt     No Known Problems Half-Sister     Breast cancer Half-Sister         age unknown    No Known Problems Half-Sister     Breast cancer Sister      Social History     Socioeconomic History    Marital status: /Civil Union     Spouse name: None    Number of children: None    Years of education: None    Highest education level: None   Occupational History    Occupation: homemaker   Tobacco Use    Smoking status: Former     Current packs/day: 0.00     Average packs/day: 0.5 packs/day for 14.0 years (7.0 ttl pk-yrs)     Types: Cigarettes     Start date: 2007     Quit date: 1/3/2021     Years since quittin.9    Smokeless tobacco: Never    Tobacco comments:     Almost 2 two years wit no smoking  patient report she vapes    Vaping Use    Vaping status: Some Days    Substances: Nicotine   Substance and Sexual Activity    Alcohol use: Not Currently     Comment: Social     Drug use: Not Currently    Sexual activity: Yes     Partners: Male     Birth control/protection: Female Sterilization   Other Topics Concern    None   Social History Narrative    None     Social Determinants of Health     Financial Resource Strain: Not on file   Food Insecurity: Not on file   Transportation Needs: Not on file   Physical Activity: Not on file   Stress: Not on file   Social Connections: Not on file   Intimate Partner Violence: Not on file   Housing Stability: Not on file     Current Outpatient Medications on File Prior to Visit   Medication Sig    ALPRAZolam (XANAX) 0.5 mg tablet Take 1 tablet (0.5 mg total) by mouth 30 min pre-procedure Take 2nd dose 30 mins after 1st dose if still anxious.    Cholecalciferol (Vitamin D3) 25 MCG (1000 UT) CAPS Take 1 capsule by mouth  "daily    clobetasol (TEMOVATE) 0.05 % ointment Apply 1 application topically 2 (two) times a day To affected area (Patient taking differently: Apply 1 application. topically 2 (two) times a day To affected area as needed)    cyanocobalamin (VITAMIN B-12) 100 MCG tablet Take 100 mcg by mouth in the morning    diphenhydramine, lidocaine, Al/Mg hydroxide, simethicone (Magic Mouthwash) SUSP Swish and spit 10 mL every 4 (four) hours as needed for mouth pain or discomfort    docusate sodium (COLACE) 100 mg capsule TAKE 1 CAPSULE BY MOUTH TWICE A DAY AS NEEDED FOR CONSTIPATION    escitalopram (LEXAPRO) 5 mg tablet TAKE 1 TABLET (5 MG TOTAL) BY MOUTH DAILY.    fluticasone (FLONASE) 50 mcg/act nasal spray SPRAY 1 SPRAY INTO EACH NOSTRIL EVERY DAY    methocarbamol (ROBAXIN) 500 mg tablet Take 1 tablet (500 mg total) by mouth 4 (four) times a day as needed for muscle spasms for up to 10 days    Multiple Vitamin (MULTIVITAMIN) capsule Take 1 capsule by mouth daily    olopatadine (PATANOL) 0.1 % ophthalmic solution Administer 1 drop to both eyes 2 (two) times a day    Turmeric 400 MG CAPS Take 1 capsule by mouth in the morning    Vit C-Cholecalciferol-Alessandra Hip (Vitamin C & D3/Alessandra Hips) 500-1000-20 MG-UNIT-MG CAPS Take 1 capsule by mouth in the morning    [DISCONTINUED] albuterol (PROVENTIL HFA,VENTOLIN HFA) 90 mcg/act inhaler Inhale 2 puffs every 6 (six) hours as needed for wheezing (cough)     Allergies   Allergen Reactions    Pollen Extract Other (See Comments)     Seasonal allergies.    Tilactase GI Intolerance and Vomiting     Immunization History   Administered Date(s) Administered    Pneumococcal Conjugate Vaccine 20-valent (Pcv20), Polysace 07/07/2022    Pneumococcal Polysaccharide PPV23 01/15/2020    Tdap 10/05/2016       Objective     /82   Pulse 88   Temp (!) 96.1 °F (35.6 °C)   Ht 5' 5\" (1.651 m)   Wt 87.1 kg (192 lb)   LMP 12/11/2022 (Exact Date)   SpO2 98%   BMI 31.95 kg/m²     Physical Exam  Vitals " and nursing note reviewed.   Constitutional:       General: She is not in acute distress.     Appearance: Normal appearance.   HENT:      Head: Normocephalic and atraumatic.      Right Ear: Tympanic membrane is erythematous.      Left Ear: Tympanic membrane normal.      Nose: Nose normal.      Mouth/Throat:      Mouth: Mucous membranes are moist.      Pharynx: Oropharyngeal exudate and posterior oropharyngeal erythema present.      Tonsils: 1+ on the right. 1+ on the left.   Eyes:      Pupils: Pupils are equal, round, and reactive to light.   Cardiovascular:      Rate and Rhythm: Normal rate and regular rhythm.      Heart sounds: Normal heart sounds.   Pulmonary:      Effort: Pulmonary effort is normal. No respiratory distress.      Breath sounds: Wheezing (mild, scattered) present.   Musculoskeletal:         General: Normal range of motion.      Cervical back: Normal range of motion and neck supple.   Lymphadenopathy:      Cervical: Cervical adenopathy present.   Skin:     General: Skin is warm and dry.   Neurological:      Mental Status: She is alert and oriented to person, place, and time.   Psychiatric:         Mood and Affect: Mood and affect normal.       Jerome Berger PA-C

## 2024-01-09 ENCOUNTER — APPOINTMENT (OUTPATIENT)
Dept: LAB | Facility: CLINIC | Age: 49
End: 2024-01-09
Payer: COMMERCIAL

## 2024-01-09 DIAGNOSIS — Z13.1 SCREENING FOR DIABETES MELLITUS: ICD-10-CM

## 2024-01-09 DIAGNOSIS — R20.0 NUMBNESS: ICD-10-CM

## 2024-01-09 DIAGNOSIS — Z13.220 SCREENING FOR LIPID DISORDERS: ICD-10-CM

## 2024-01-09 LAB
ALBUMIN SERPL BCP-MCNC: 4.6 G/DL (ref 3.5–5)
ALP SERPL-CCNC: 80 U/L (ref 34–104)
ALT SERPL W P-5'-P-CCNC: 19 U/L (ref 7–52)
ANION GAP SERPL CALCULATED.3IONS-SCNC: 11 MMOL/L
AST SERPL W P-5'-P-CCNC: 17 U/L (ref 13–39)
B BURGDOR IGG+IGM SER QL IA: NEGATIVE
BILIRUB SERPL-MCNC: 0.29 MG/DL (ref 0.2–1)
BUN SERPL-MCNC: 14 MG/DL (ref 5–25)
CALCIUM SERPL-MCNC: 9.9 MG/DL (ref 8.4–10.2)
CHLORIDE SERPL-SCNC: 102 MMOL/L (ref 96–108)
CHOLEST SERPL-MCNC: 192 MG/DL
CO2 SERPL-SCNC: 26 MMOL/L (ref 21–32)
CREAT SERPL-MCNC: 0.5 MG/DL (ref 0.6–1.3)
CRP SERPL QL: 5.3 MG/L
ERYTHROCYTE [SEDIMENTATION RATE] IN BLOOD: 19 MM/HOUR (ref 0–19)
GFR SERPL CREATININE-BSD FRML MDRD: 116 ML/MIN/1.73SQ M
GLUCOSE P FAST SERPL-MCNC: 86 MG/DL (ref 65–99)
HDLC SERPL-MCNC: 49 MG/DL
LDLC SERPL CALC-MCNC: 122 MG/DL (ref 0–100)
POTASSIUM SERPL-SCNC: 4.1 MMOL/L (ref 3.5–5.3)
PROT SERPL-MCNC: 7.7 G/DL (ref 6.4–8.4)
SODIUM SERPL-SCNC: 139 MMOL/L (ref 135–147)
TRIGL SERPL-MCNC: 106 MG/DL

## 2024-01-09 PROCEDURE — 85652 RBC SED RATE AUTOMATED: CPT

## 2024-01-09 PROCEDURE — 86618 LYME DISEASE ANTIBODY: CPT

## 2024-01-09 PROCEDURE — 36415 COLL VENOUS BLD VENIPUNCTURE: CPT

## 2024-01-09 PROCEDURE — 80053 COMPREHEN METABOLIC PANEL: CPT

## 2024-01-09 PROCEDURE — 86140 C-REACTIVE PROTEIN: CPT

## 2024-01-09 PROCEDURE — 80061 LIPID PANEL: CPT

## 2024-01-10 ENCOUNTER — TELEPHONE (OUTPATIENT)
Dept: NEUROLOGY | Facility: CLINIC | Age: 49
End: 2024-01-10

## 2024-01-10 ENCOUNTER — NURSE TRIAGE (OUTPATIENT)
Dept: OTHER | Facility: OTHER | Age: 49
End: 2024-01-10

## 2024-01-10 DIAGNOSIS — R23.2 HOT FLASHES: Primary | ICD-10-CM

## 2024-01-10 NOTE — TELEPHONE ENCOUNTER
No acute or concerning findings on her blood work, please let her know this and I will call her by end of day

## 2024-01-10 NOTE — TELEPHONE ENCOUNTER
"Reason for Disposition   Caller requesting lab results    Additional Information   Lab result questions    Answer Assessment - Initial Assessment Questions  1. REASON FOR CALL or QUESTION: \"What is your reason for calling today?\" or \"How can I best  help you?\" or \"What question do you have that I can help answer?\"      Patient was calling wondering about the results to her lab test. She would like a call from the nurse or doctor to explain her results    2. CALLER: Document the source of call. (e.g., laboratory, patient).      patient    Protocols used: Information Only Call-ADULT-[facility], PCP Call - No Triage-ADULT-[facility]    "

## 2024-01-10 NOTE — TELEPHONE ENCOUNTER
----- Message from April Saini MD sent at 1/10/2024  9:26 AM EST -----  Please have the patient follow-up with family physician regarding increased C-reactive protein

## 2024-01-15 ENCOUNTER — HOSPITAL ENCOUNTER (OUTPATIENT)
Dept: MRI IMAGING | Facility: CLINIC | Age: 49
Discharge: HOME | End: 2024-01-15
Attending: PSYCHIATRY & NEUROLOGY
Payer: COMMERCIAL

## 2024-01-15 DIAGNOSIS — R20.0 NUMBNESS: ICD-10-CM

## 2024-01-15 PROCEDURE — G1004 CDSM NDSC: HCPCS

## 2024-01-15 PROCEDURE — 72156 MRI NECK SPINE W/O & W/DYE: CPT

## 2024-01-15 PROCEDURE — A9585 GADOBUTROL INJECTION: HCPCS | Performed by: PHYSICIAN ASSISTANT

## 2024-01-15 RX ORDER — GADOBUTROL 604.72 MG/ML
8 INJECTION INTRAVENOUS
Status: COMPLETED | OUTPATIENT
Start: 2024-01-15 | End: 2024-01-15

## 2024-01-15 RX ADMIN — GADOBUTROL 8 ML: 604.72 INJECTION INTRAVENOUS at 10:51

## 2024-01-19 ENCOUNTER — OFFICE VISIT (OUTPATIENT)
Dept: FAMILY MEDICINE CLINIC | Facility: CLINIC | Age: 49
End: 2024-01-19
Payer: COMMERCIAL

## 2024-01-19 DIAGNOSIS — U07.1 COVID-19: Primary | ICD-10-CM

## 2024-01-19 PROCEDURE — 99214 OFFICE O/P EST MOD 30 MIN: CPT | Performed by: PHYSICIAN ASSISTANT

## 2024-01-19 RX ORDER — NIRMATRELVIR AND RITONAVIR 300-100 MG
3 KIT ORAL 2 TIMES DAILY
Qty: 30 TABLET | Refills: 0 | Status: SHIPPED | OUTPATIENT
Start: 2024-01-19 | End: 2024-01-24

## 2024-01-19 NOTE — PROGRESS NOTES
COVID-19 Outpatient Progress Note    Assessment/Plan:    Problem List Items Addressed This Visit    None  Visit Diagnoses     COVID-19    -  Primary    Relevant Medications    nirmatrelvir & ritonavir (Paxlovid, 300/100,) tablet therapy pack      Increased risk given hx of asthma, BMI 31.9     Disposition:     Patient with asymptomatic/mild COVID-19: They were recommended to isolate for at least 5 days (day 0 is the day symptoms appeared or the date the specimen was collected for the positive test for people who are asymptomatic). If they are asymptomatic or symptoms are improving with no fevers in the past 24 hours, isolation may be ended followed by 5 days of wearing a high quality mask when around others to minimize risk of infecting others. They should wear a mask through day 10 and a test-based strategy may be used to remove a mask sooner.      Discussed symptom directed medication options with patient.     Patient meets criteria for Paxlovid and they have been counseled appropriately regarding risks, benefits, side effects, and alternative treatment options. After discussion, patient agrees to treatment.    Possible side effects of Paxlovid?    Possible side effects of Paxlovid are:  - Liver Problems. Notify us right away if you start to experience loss of appetite, yellowing of your skin and the whites of eyes (jaundice), dark-colored urine, pale colored stools and itchy skin, stomach area (abdominal) pain.  - Resistance to HIV Medicines. If you have untreated HIV infection, Paxlovid may lead to some HIV medicines not working as well in the future.  - Other possible side effects include: altered sense of taste, diarrhea, high blood pressure, or muscle aches.    I have spent a total time of 15 minutes on the day of the encounter for this patient including       Encounter provider: Jerome Berger PA-C     Provider located at: [facility]  [address]  [facility]  [id number]     Recent Visits  No visits were found meeting these conditions.  Showing recent visits within past 7 days and meeting all other requirements  Today's Visits  Date Type Provider Dept   01/19/24 Office Visit WILLIAM Zhang [facility]g   Showing today's visits and meeting all other requirements  Future Appointments  No visits were found meeting these conditions.  Showing future appointments within next 150 days and meeting all other requirements     Subjective:   Jami Gates is a 48 y.o. female who is concerned about COVID-19. Patient is currently asymptomatic. Patient's symptoms include chills, fatigue, nasal congestion, sore throat, cough, myalgias and headache. Patient denies fever, malaise, rhinorrhea, anosmia, loss of taste, shortness of breath, chest tightness, abdominal pain, nausea, vomiting and diarrhea.     - Date of symptom onset: 1/18/2024  - Date of exposure: 1/19/2024      COVID-19 vaccination status: Not vaccinated    Daughter has covid as well    Lab Results   Component Value Date    SARSCOV2 Negative 12/10/2021    SARSCOV2 Detected (A) 01/02/2021    SARSCOVAG Positive (A) 01/19/2024       Review of Systems   Constitutional:  Positive for chills and fatigue. Negative for fever.   HENT:  Positive for congestion and sore throat. Negative for rhinorrhea.    Respiratory:  Positive for cough. Negative for chest tightness and shortness of breath.    Gastrointestinal:  Negative for abdominal pain, diarrhea, nausea and vomiting.   Musculoskeletal:  Positive for myalgias.   Neurological:  Positive for headaches.     Current Outpatient Medications on File Prior to Visit   Medication Sig   • albuterol (PROVENTIL HFA,VENTOLIN HFA) 90 mcg/act inhaler Inhale 2 puffs every 6 (six) hours as needed for wheezing (cough)   • ALPRAZolam (XANAX) 0.5 mg tablet Take 1 tablet (0.5 mg total) by mouth 30 min pre-procedure Take 2nd dose 30 mins after 1st dose if still anxious.   • Cholecalciferol  (Vitamin D3) 25 MCG (1000 UT) CAPS Take 1 capsule by mouth daily   • clobetasol (TEMOVATE) 0.05 % ointment Apply 1 application topically 2 (two) times a day To affected area (Patient taking differently: Apply 1 application. topically 2 (two) times a day To affected area as needed)   • cyanocobalamin (VITAMIN B-12) 100 MCG tablet Take 100 mcg by mouth in the morning   • diphenhydramine, lidocaine, Al/Mg hydroxide, simethicone (Magic Mouthwash) SUSP Swish and spit 10 mL every 4 (four) hours as needed for mouth pain or discomfort   • docusate sodium (COLACE) 100 mg capsule TAKE 1 CAPSULE BY MOUTH TWICE A DAY AS NEEDED FOR CONSTIPATION   • escitalopram (LEXAPRO) 5 mg tablet TAKE 1 TABLET (5 MG TOTAL) BY MOUTH DAILY.   • fluticasone (FLONASE) 50 mcg/act nasal spray SPRAY 1 SPRAY INTO EACH NOSTRIL EVERY DAY   • methocarbamol (ROBAXIN) 500 mg tablet Take 1 tablet (500 mg total) by mouth 4 (four) times a day as needed for muscle spasms for up to 10 days   • Multiple Vitamin (MULTIVITAMIN) capsule Take 1 capsule by mouth daily   • olopatadine (PATANOL) 0.1 % ophthalmic solution Administer 1 drop to both eyes 2 (two) times a day   • Turmeric 400 MG CAPS Take 1 capsule by mouth in the morning   • Vit C-Cholecalciferol-Alessandra Hip (Vitamin C & D3/Alessandra Hips) 500-1000-20 MG-UNIT-MG CAPS Take 1 capsule by mouth in the morning       Objective:    [facility] 12/11/2022 (Exact Date)        Physical Exam  Vitals and nursing note reviewed.   Constitutional:       General: She is not in acute distress.     Appearance: She is well-developed.   HENT:      Head: Normocephalic and atraumatic.      Mouth/Throat:      Mouth: Mucous membranes are moist.      Pharynx: Oropharynx is clear. Posterior oropharyngeal erythema present.   Eyes:      Conjunctiva/sclera: Conjunctivae normal.   Cardiovascular:      Rate and Rhythm: Normal rate and regular rhythm.      Heart sounds: No murmur heard.  Pulmonary:      Effort: Pulmonary effort is normal. No  respiratory distress.      Breath sounds: Normal breath sounds. No wheezing, rhonchi or rales.   Musculoskeletal:         General: No swelling.      Cervical back: Neck supple.   Skin:     General: Skin is warm and dry.      Capillary Refill: Capillary refill takes less than 2 seconds.   Neurological:      Mental Status: She is alert.   Psychiatric:         Mood and Affect: Mood normal.       Jerome Berger PA-C
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
## 2024-02-08 ENCOUNTER — OFFICE VISIT (OUTPATIENT)
Age: 49
End: 2024-02-08
Payer: COMMERCIAL

## 2024-02-08 ENCOUNTER — APPOINTMENT (OUTPATIENT)
Age: 49
End: 2024-02-08
Payer: COMMERCIAL

## 2024-02-08 VITALS
DIASTOLIC BLOOD PRESSURE: 70 MMHG | WEIGHT: 194.2 LBS | HEIGHT: 65 IN | BODY MASS INDEX: 32.36 KG/M2 | SYSTOLIC BLOOD PRESSURE: 112 MMHG

## 2024-02-08 DIAGNOSIS — Z01.818 PRE-OP TESTING: ICD-10-CM

## 2024-02-08 DIAGNOSIS — N95.0 PMB (POSTMENOPAUSAL BLEEDING): Primary | ICD-10-CM

## 2024-02-08 DIAGNOSIS — Z80.41 FAMILY HISTORY OF OVARIAN CANCER: ICD-10-CM

## 2024-02-08 LAB
ANION GAP SERPL CALCULATED.3IONS-SCNC: 7 MMOL/L
BASOPHILS # BLD AUTO: 0.12 THOUSANDS/ÂΜL (ref 0–0.1)
BASOPHILS NFR BLD AUTO: 1 % (ref 0–1)
BUN SERPL-MCNC: 12 MG/DL (ref 5–25)
CALCIUM SERPL-MCNC: 10.2 MG/DL (ref 8.4–10.2)
CHLORIDE SERPL-SCNC: 102 MMOL/L (ref 96–108)
CO2 SERPL-SCNC: 28 MMOL/L (ref 21–32)
CREAT SERPL-MCNC: 0.57 MG/DL (ref 0.6–1.3)
EOSINOPHIL # BLD AUTO: 0.42 THOUSAND/ÂΜL (ref 0–0.61)
EOSINOPHIL NFR BLD AUTO: 4 % (ref 0–6)
ERYTHROCYTE [DISTWIDTH] IN BLOOD BY AUTOMATED COUNT: 13.5 % (ref 11.6–15.1)
GFR SERPL CREATININE-BSD FRML MDRD: 110 ML/MIN/1.73SQ M
GLUCOSE P FAST SERPL-MCNC: 81 MG/DL (ref 65–99)
HCT VFR BLD AUTO: 38.7 % (ref 34.8–46.1)
HGB BLD-MCNC: 12.6 G/DL (ref 11.5–15.4)
IMM GRANULOCYTES # BLD AUTO: 0.03 THOUSAND/UL (ref 0–0.2)
IMM GRANULOCYTES NFR BLD AUTO: 0 % (ref 0–2)
LYMPHOCYTES # BLD AUTO: 3.95 THOUSANDS/ÂΜL (ref 0.6–4.47)
LYMPHOCYTES NFR BLD AUTO: 38 % (ref 14–44)
MCH RBC QN AUTO: 27.6 PG (ref 26.8–34.3)
MCHC RBC AUTO-ENTMCNC: 32.6 G/DL (ref 31.4–37.4)
MCV RBC AUTO: 85 FL (ref 82–98)
MONOCYTES # BLD AUTO: 0.92 THOUSAND/ÂΜL (ref 0.17–1.22)
MONOCYTES NFR BLD AUTO: 9 % (ref 4–12)
NEUTROPHILS # BLD AUTO: 4.86 THOUSANDS/ÂΜL (ref 1.85–7.62)
NEUTS SEG NFR BLD AUTO: 48 % (ref 43–75)
NRBC BLD AUTO-RTO: 0 /100 WBCS
PLATELET # BLD AUTO: 300 THOUSANDS/UL (ref 149–390)
PMV BLD AUTO: 10.7 FL (ref 8.9–12.7)
POTASSIUM SERPL-SCNC: 4.2 MMOL/L (ref 3.5–5.3)
RBC # BLD AUTO: 4.57 MILLION/UL (ref 3.81–5.12)
SODIUM SERPL-SCNC: 137 MMOL/L (ref 135–147)
WBC # BLD AUTO: 10.3 THOUSAND/UL (ref 4.31–10.16)

## 2024-02-08 PROCEDURE — 99214 OFFICE O/P EST MOD 30 MIN: CPT | Performed by: OBSTETRICS & GYNECOLOGY

## 2024-02-08 PROCEDURE — 85025 COMPLETE CBC W/AUTO DIFF WBC: CPT

## 2024-02-08 PROCEDURE — 36415 COLL VENOUS BLD VENIPUNCTURE: CPT

## 2024-02-08 PROCEDURE — 80048 BASIC METABOLIC PNL TOTAL CA: CPT

## 2024-02-08 NOTE — ASSESSMENT & PLAN NOTE
Pt with complaints of PMB.   Pt reports no period from 0682-5033. She also notes significant vasomotor symptoms around this time.   She notes her mother also went through menopause young.  Her bleeding now is sporadic, not regular monthly bleeding.   Her EMB showed benign secretory endometrium.  She had many questions today regarding management and ultimately desires surgical management of her PMB as she does not do well with hormonal medications.   We discussed hysterectomy in detail. We initially discussed leaving her ovaries given her young age, however, she then mentioned to me at the end of her visit that her mother had ovarian cancer and cervical cancer- diagnosed at the same time. She underwent surgery, chemo and radiation for this.  The pt underwent genetic testing and was negative for BRCA 1 and BRCA 2. We discussed the option of a consultation with gynonc for a second opinion regarding leaving ovaries vs performing a b/l oophorectomy given her family history. She desires this. This referral was placed for her today.   We discussed having a hysterectomy with gyn onc vs general obgyn and that gyn onc may recommend surgery with them- in which case she will let us know of this plan.   Otherwise, RTO at the end of March to finalize surgery planning and to sign surgery consent.   Kalani expressed today that she gets frequent BV infections- we discussed the addition of flagyl to her preop abx today to prevent postop infection.   All questions were answered to her satisfaction.

## 2024-02-08 NOTE — PROGRESS NOTES
Assessment/Plan:       Problem List Items Addressed This Visit       PMB (postmenopausal bleeding) - Primary     Pt with complaints of PMB.   Pt reports no period from 6036-7115. She also notes significant vasomotor symptoms around this time.   She notes her mother also went through menopause young.  Her bleeding now is sporadic, not regular monthly bleeding.   Her EMB showed benign secretory endometrium.  She had many questions today regarding management and ultimately desires surgical management of her PMB as she does not do well with hormonal medications.   We discussed hysterectomy in detail. We initially discussed leaving her ovaries given her young age, however, she then mentioned to me at the end of her visit that her mother had ovarian cancer and cervical cancer- diagnosed at the same time. She underwent surgery, chemo and radiation for this.  The pt underwent genetic testing and was negative for BRCA 1 and BRCA 2. We discussed the option of a consultation with gynonc for a second opinion regarding leaving ovaries vs performing a b/l oophorectomy given her family history. She desires this. This referral was placed for her today.   We discussed having a hysterectomy with gyn onc vs general obgyn and that gyn onc may recommend surgery with them- in which case she will let us know of this plan.   Otherwise, RTO at the end of March to finalize surgery planning and to sign surgery consent.   Kalani expressed today that she gets frequent BV infections- we discussed the addition of flagyl to her preop abx today to prevent postop infection.   All questions were answered to her satisfaction.            Relevant Orders    Ambulatory Referral to Gynecologic Oncology    Family history of ovarian cancer         Subjective:      Patient ID: Jami Gates is a 49 y.o. female.    HPI    Jami Gates is a 49 y.o.  who presents today to discuss postmenopausal bleeding.     She notes menopausal symptoms started  3 years ago, periods became irregular. And then she went a year without a period 3568-5161.     Then started bleeding again 8 months ago. Irregular, two-three times a week.   She went the entire month of January without any bleeding. Then bled b 2-5.   Also has cramping and pain.     Notes bleeding after intercourse at times. [place] is painful, notes vaginal pain. Not pelvic pain.     Last pap 23  NILM, HPV negative    Had pelvic u/s   UTERUS:  The uterus is anteverted in position, measuring 8.8 x 4.8 x 5.1 cm.  Somewhat heterogeneous uterine echotexture  The cervix appears within normal limits.     ENDOMETRIUM:  The endometrial echo complex has an AP caliber of 5.0 mm.  Its appearance is within normal limits for age and cycle and shows no filling defects.     OVARIES/ADNEXA:  Right ovary:  1.3 x 1.2 x 0.9 cm. 0.7 mL.  Left ovary:  1.8 x 0.8 x 0.8 cm. 0.6 mL.  Flow within the left ovary appears diminished, this is likely secondary to technical factors rather than ovarian torsion, as the ovary is not enlarged.  No suspicious ovarian or adnexal abnormality.     OTHER:  No free fluid or loculated fluid collections.     IMPRESSION:     Somewhat heterogeneous uterine echotexture, which is nonspecific, but may suggest adenomyosis in the appropriate clinical setting.    EMB 23  Final Diagnosis   A. Endometrium, EMB:  Fragments of secretory endometrium with stromal breakdown intermixed with blood  No atypia or malignancy identified        Patient gets frequent BV infections.   Patient reports maternal hx of ovarian cancer and cervical cancer 12 years ago.  She has completed genetic testing- neg for BRCA 1 and 2.     Past Medical History:   Diagnosis Date    Abnormal Pap smear of cervix     Anxiety     Arthritis     Asthma     Depression     Ear problems     Osteoporosis     Urinary tract infection     Varicella        Past Surgical History:   Procedure Laterality Date     SECTION      [place] NEUROPLASTY  "&/TRANSPOS MEDIAN NRV CARPAL TUNNE Right 2/10/2023    Procedure: RELEASE CARPAL TUNNEL;  Surgeon: Loni Chi MD;  [facility]: [facility];  Service: Orthopedics    [place] NEUROPLASTY &/TRANSPOSITION ULNAR NERVE ELBOW Right 2/10/2023    Procedure: RELEASE CUBITAL TUNNEL;  Surgeon: Loni Chi MD;  Location: [facility];  Service: Orthopedics    TUBAL LIGATION         The following portions of the patient's history were reviewed and updated as appropriate: allergies, current medications, past family history, past medical history, past social history, past surgical history, and problem list.    Review of Systems    Negative unless otherwise noted in HPI.     Objective:      /70 (BP Location: Left arm, Patient Position: Sitting, Cuff Size: Large)   Ht 5' 5\" (1.651 m)   Wt 88.1 kg (194 lb 3.2 oz)   LMP 12/11/2022 (Exact Date)   BMI 32.32 kg/m²          Physical Exam  Constitutional:       Appearance: Normal appearance. She is obese.   HENT:      Head: Normocephalic and atraumatic.      Mouth/Throat:      Mouth: Mucous membranes are moist.   Cardiovascular:      Rate and Rhythm: Normal rate.   Pulmonary:      Effort: Pulmonary effort is normal. No respiratory distress.   Abdominal:      General: There is no distension.      Palpations: Abdomen is soft.   Genitourinary:     Comments: Deferred today   Neurological:      Mental Status: She is alert.   Psychiatric:         Mood and Affect: Mood normal.         Behavior: Behavior normal.           "

## 2024-02-09 ENCOUNTER — TELEPHONE (OUTPATIENT)
Dept: HEMATOLOGY ONCOLOGY | Facility: CLINIC | Age: 49
End: 2024-02-09

## 2024-02-09 DIAGNOSIS — N95.0 PMB (POSTMENOPAUSAL BLEEDING): Primary | ICD-10-CM

## 2024-02-09 NOTE — TELEPHONE ENCOUNTER
I called Jami in response to a referral that was received for patient to establish care with Gynecologic Oncology.     Outreach was made to complete patient's intake questionnaire .    Patient's intake questionnaire was reviewed and complete. Patient's intake has been sent to the team for clinical review.

## 2024-02-12 ENCOUNTER — OFFICE VISIT (OUTPATIENT)
Dept: FAMILY MEDICINE CLINIC | Facility: CLINIC | Age: 49
End: 2024-02-12
Payer: COMMERCIAL

## 2024-02-12 VITALS
SYSTOLIC BLOOD PRESSURE: 122 MMHG | BODY MASS INDEX: 32.65 KG/M2 | OXYGEN SATURATION: 100 % | WEIGHT: 196 LBS | TEMPERATURE: 97.7 F | HEART RATE: 77 BPM | DIASTOLIC BLOOD PRESSURE: 74 MMHG | HEIGHT: 65 IN

## 2024-02-12 DIAGNOSIS — J03.01 RECURRENT STREPTOCOCCAL TONSILLITIS: Primary | ICD-10-CM

## 2024-02-12 DIAGNOSIS — E66.09 CLASS 1 OBESITY DUE TO EXCESS CALORIES WITH SERIOUS COMORBIDITY AND BODY MASS INDEX (BMI) OF 32.0 TO 32.9 IN ADULT: ICD-10-CM

## 2024-02-12 DIAGNOSIS — Z01.818 PRE-OPERATIVE CLEARANCE: ICD-10-CM

## 2024-02-12 PROCEDURE — 99214 OFFICE O/P EST MOD 30 MIN: CPT | Performed by: PHYSICIAN ASSISTANT

## 2024-02-12 PROCEDURE — 93000 ELECTROCARDIOGRAM COMPLETE: CPT | Performed by: PHYSICIAN ASSISTANT

## 2024-02-12 NOTE — PROGRESS NOTES
Jami Gates 1975 female MRN: [id number]        ASSESSMENT/PLAN  Problem List Items Addressed This Visit    None  Visit Diagnoses     Recurrent streptococcal tonsillitis    -  Primary    Relevant Orders    CBC and differential    Pre-operative clearance        Relevant Orders    POCT ECG (Completed)    Class 1 obesity due to excess calories with serious comorbidity and body mass index (BMI) of 32.0 to 32.9 in adult        Relevant Orders    Ambulatory Referral to Weight Management      Labs reviewed and EKG in office. Unremarkable exam. She may proceed with surgery as planned at this time.   Referral to weight management provided     BMI Counseling: Body mass index is 32.62 kg/m². The BMI is above normal. Nutrition recommendations include reducing portion sizes, decreasing overall calorie intake, moderation in carbohydrate intake, increasing intake of lean protein, reducing intake of saturated fat and trans fat, and reducing intake of cholesterol. Exercise recommendations include moderate aerobic physical activity for 150 minutes/week.      SUBJECTIVE  CC: Pre-op Exam (Patient seen in office today for a pre-op clearance for upcoming surgery. TONSILLECTOMY)      HPI:  Jami Gates is a 49 y.o. female with Streptococcal sore throat [J02.0]  who is planning to undergo  TONSILLECTOMY (Throat)  under general by Mode Verdin MD  on 2/21/2024.  Patient has not had complications with anesthesia in the past.  Functional status: independent    Labs as below. No cardiac complaints. No thinners.   Lab Results   Component Value Date    SODIUM 137 02/08/2024    K 4.2 02/08/2024     02/08/2024    CO2 28 02/08/2024    BUN 12 02/08/2024    CREATININE 0.57 (L) 02/08/2024    CALCIUM 10.2 02/08/2024     Lab Results   Component Value Date    WBC 10.30 (H) 02/08/2024    HGB 12.6 02/08/2024    HCT 38.7 02/08/2024    MCV 85 02/08/2024     02/08/2024       She is concerned about her weight gain, she would  like to see weight management again     Review of Systems   Constitutional:  Negative for chills, fatigue and fever.   HENT:  Positive for sore throat. Negative for congestion, ear pain, hearing loss, nosebleeds, postnasal drip, rhinorrhea, sinus pressure, sinus pain and sneezing.    Eyes:  Negative for pain, discharge, itching and visual disturbance.   Respiratory:  Negative for cough, chest tightness, shortness of breath and wheezing.    Cardiovascular:  Negative for chest pain, palpitations and leg swelling.   Gastrointestinal:  Negative for abdominal pain, blood in stool, constipation, diarrhea, nausea and vomiting.   Genitourinary:  Negative for frequency and urgency.   Neurological:  Negative for dizziness, light-headedness, numbness and headaches.         Historical Information   The patient history was reviewed as follows:    Past Medical History:   Diagnosis Date   • Abnormal Pap smear of cervix    • Anxiety    • Arthritis    • Asthma    • Depression    • Ear problems    • Osteoporosis    • Urinary tract infection    • Varicella      Past Surgical History:   Procedure Laterality Date   •  SECTION     • WI NEUROPLASTY &/TRANSPOS MEDIAN NRV CARPAL TUNNE Right 2/10/2023    Procedure: RELEASE CARPAL TUNNEL;  Surgeon: Loni Chi MD;  Location: [facility];  Service: Orthopedics   • WI NEUROPLASTY &/TRANSPOSITION ULNAR NERVE ELBOW Right 2/10/2023    Procedure: RELEASE CUBITAL TUNNEL;  Surgeon: Loni Chi MD;  Location: [facility];  Service: Orthopedics   • TUBAL LIGATION       Family History   Problem Relation Age of Onset   • Cancer Mother    • Breast cancer Mother         age unknown   • Diabetes Mother    • Coronary artery disease Mother    • Ovarian cancer Mother    • Colon cancer Mother    • Cancer Father    • Prostate cancer Father    • Colon cancer Father    • No Known Problems Sister    • No Known Problems Sister    • No Known Problems Sister    • No Known Problems Sister    • No  Known Problems Sister    • No Known Problems Daughter    • No Known Problems Daughter    • No Known Problems Maternal Grandmother    • No Known Problems Paternal Grandmother    • No Known Problems Maternal Aunt    • No Known Problems Maternal Aunt    • No Known Problems Maternal Aunt    • No Known Problems Maternal Aunt    • No Known Problems Maternal Aunt    • No Known Problems Maternal Aunt    • No Known Problems Maternal Aunt    • No Known Problems Maternal Aunt    • No Known Problems Paternal Aunt    • No Known Problems Paternal Aunt    • No Known Problems Paternal Aunt    • No Known Problems Paternal Aunt    • No Known Problems Paternal Aunt    • No Known Problems Half-Sister    • Breast cancer Half-Sister         age unknown   • No Known Problems Half-Sister    • Breast cancer Sister       Social History       Medications:     Current Outpatient Medications:   •  albuterol (PROVENTIL HFA,VENTOLIN HFA) 90 mcg/act inhaler, Inhale 2 puffs every 6 (six) hours as needed for wheezing (cough), Disp: 18 g, Rfl: 3  •  ALPRAZolam (XANAX) 0.5 mg tablet, Take 1 tablet (0.5 mg total) by mouth 30 min pre-procedure Take 2nd dose 30 mins after 1st dose if still anxious., Disp: 2 tablet, Rfl: 0  •  Cholecalciferol (Vitamin D3) 25 MCG (1000 UT) CAPS, Take 1 capsule by mouth daily, Disp: , Rfl:   •  clobetasol (TEMOVATE) 0.05 % ointment, Apply 1 application topically 2 (two) times a day To affected area (Patient taking differently: Apply 1 application. topically 2 (two) times a day To affected area as needed), Disp: 30 g, Rfl: 0  •  cyanocobalamin (VITAMIN B-12) 100 MCG tablet, Take 100 mcg by mouth in the morning, Disp: , Rfl:   •  diphenhydramine, lidocaine, Al/Mg hydroxide, simethicone (Magic Mouthwash) SUSP, Swish and spit 10 mL every 4 (four) hours as needed for mouth pain or discomfort, Disp: 119 mL, Rfl: 0  •  docusate sodium (COLACE) 100 mg capsule, TAKE 1 CAPSULE BY MOUTH TWICE A DAY AS NEEDED FOR CONSTIPATION, Disp: 60  "capsule, Rfl: 1  •  escitalopram (LEXAPRO) 5 mg tablet, TAKE 1 TABLET (5 MG TOTAL) BY MOUTH DAILY., Disp: 30 tablet, Rfl: 1  •  fluticasone (FLONASE) 50 mcg/act nasal spray, SPRAY 1 SPRAY INTO EACH NOSTRIL EVERY DAY, Disp: 16 mL, Rfl: 1  •  HYDROcodone-acetaminophen (HYCET) 7.5-325 MG/15ML solution, Take 15 mL by mouth every 6 (six) hours as needed for moderate pain Max Daily Amount: 60 mL, Disp: 473 mL, Rfl: 0  •  methocarbamol (ROBAXIN) 500 mg tablet, Take 1 tablet (500 mg total) by mouth 4 (four) times a day as needed for muscle spasms for up to 10 days, Disp: 40 tablet, Rfl: 0  •  Multiple Vitamin (MULTIVITAMIN) capsule, Take 1 capsule by mouth daily, Disp: , Rfl:   •  olopatadine (PATANOL) 0.1 % ophthalmic solution, Administer 1 drop to both eyes 2 (two) times a day, Disp: 5 mL, Rfl: 3  •  Turmeric 400 MG CAPS, Take 1 capsule by mouth in the morning, Disp: , Rfl:   •  Vit C-Cholecalciferol-Alessandra Hip (Vitamin C & D3/Alessandra Hips) 500-1000-20 MG-UNIT-MG CAPS, Take 1 capsule by mouth in the morning, Disp: , Rfl:   Allergies   Allergen Reactions   • Pollen Extract Other (See Comments)     Seasonal allergies.   • Tilactase GI Intolerance and Vomiting       OBJECTIVE    Vitals:   Vitals:    02/12/24 1046   BP: 122/74   BP Location: Left arm   Patient Position: Sitting   Cuff Size: Large   Pulse: 77   Temp: 97.7 °F (36.5 °C)   SpO2: 100%   Weight: 88.9 kg (196 lb)   Height: 5' 5\" (1.651 m)           Physical Exam  Vitals and nursing note reviewed.   Constitutional:       General: She is not in acute distress.     Appearance: Normal appearance.   HENT:      Head: Normocephalic and atraumatic.      Nose: Nose normal.   Eyes:      Pupils: Pupils are equal, round, and reactive to light.   Cardiovascular:      Rate and Rhythm: Normal rate and regular rhythm.      Heart sounds: Normal heart sounds.   Pulmonary:      Effort: Pulmonary effort is normal. No respiratory distress.      Breath sounds: Normal breath sounds. No " wheezing, rhonchi or rales.   Musculoskeletal:         General: Normal range of motion.      Cervical back: Normal range of motion and neck supple.   Skin:     General: Skin is warm and dry.   Neurological:      Mental Status: She is alert and oriented to person, place, and time.   Psychiatric:         Mood and Affect: Mood and affect normal.          High Risk Surgery: no  CAD: no  CHF: no  CVD: no  DM2 on insulin: no  Cr>2: no        Jami Gates is undergoing a Low Risk surgery. She is at Low Risk for major adverse cardiac event (MACE). She may proceed with surgery as planned without further workup.      Jerome Berger PA-C  [facility]   2/12/2024  12:08 PM

## 2024-02-13 ENCOUNTER — TELEPHONE (OUTPATIENT)
Dept: HEMATOLOGY ONCOLOGY | Facility: CLINIC | Age: 49
End: 2024-02-13

## 2024-02-13 NOTE — TELEPHONE ENCOUNTER
I called Jami in response to a referral that was received for patient to establish care with Gynecologic Oncology.     Outreach was made to schedule a consultation.    A consultation was scheduled for patient during this call. Patient is scheduled on 4/17/24 at 2:45pm with Dr. Chaidez at the [facility]

## 2024-02-14 ENCOUNTER — ANESTHESIA EVENT (OUTPATIENT)
Dept: PERIOP | Facility: HOSPITAL | Age: 49
End: 2024-02-14
Payer: COMMERCIAL

## 2024-02-14 NOTE — PATIENT INSTRUCTIONS
Breast Self Exam for Women   AMBULATORY CARE:   A breast self-exam (BSE)  is a way to check your breasts for lumps and other changes. Regular BSEs can help you know how your breasts normally look and feel. Most breast lumps or changes are not cancer, but you should always have them checked by a healthcare provider.  Why you should do a BSE:  Breast cancer is the most common type of cancer in women. Even if you have mammograms, you may still want to do a BSE regularly. If you know how your breasts normally feel and look, it may help you know when to contact your healthcare provider. Mammograms can miss some cancers. You may find a lump during a BSE that did not show up on a mammogram.  When you should do a BSE:  If you have periods, you may want to do your BSE 1 week after your period ends. This is the time when your breasts may be the least swollen, lumpy, or tender. You can do regular BSEs even if you are breastfeeding or have breast implants.  Call your doctor if:   You find any lumps or changes in your breasts.    You have breast pain or fluid coming from your nipples.    You have questions or concerns about your condition or care.    How to do a BSE:       Look at your breasts in a mirror.  Look at the size and shape of each breast and nipple. Check for swelling, lumps, dimpling, scaly skin, or other skin changes. Look for nipple changes, such as a nipple that is painful or beginning to pull inward. Gently squeeze both nipples and check to see if fluid (that is not breast milk) comes out of them. If you find any of these or other breast changes, contact your healthcare provider. Check your breasts while you sit or  the following 3 positions:    Hang your arms down at your sides.    Raise your hands and join them behind your head.    Put firm pressure with your hands on your hips. Bend slightly forward while you look at your breasts in the mirror.    Lie down and feel your breasts.  When you lie down,  your breast tissue spreads out evenly over your chest. This makes it easier for you to feel for lumps and anything that may not be normal for your breasts. Do a BSE on one breast at a time.    Place a small pillow or towel under your left shoulder.  Put your left arm behind your head.    Use the 3 middle fingers of your right hand.  Use your fingertip pads, on the top of your fingers. Your fingertip pad is the most sensitive part of your finger.    Use small circles to feel your breast tissue.  Use your fingertip pads to make dime-sized, overlapping circles on your breast and armpits. Use light, medium, and firm pressure. First, press lightly. Second, press with medium pressure to feel a little deeper into the breast. Last, use firm pressure to feel deep within your breast.    Examine your entire breast area.  Examine the breast area from above the breast to below the breast where you feel only ribs. Make small circles with your fingertips, starting in the middle of your armpit. Make circles going up and down the breast area. Continue toward your breast and all the way across it. Examine the area from your armpit all the way over to the middle of your chest (breastbone). Stop at the middle of your chest.    Move the pillow or towel to your right shoulder, and put your right arm behind your head.  Use the 3 fingertip pads of your left hand, and repeat the above steps to do a BSE on your right breast.  What else you can do to check for breast problems or cancer:  Talk to your healthcare provider about mammograms. A mammogram is an x-ray of your breasts to screen for breast cancer or other problems. Your provider can tell you the benefits and risks of mammograms. The first mammogram is usually at age 45 or 50. Your provider may recommend you start at 40 or younger if your risk for breast cancer is high. Mammograms usually continue every 1 to 2 years until age 74.       Follow up with your doctor as directed:  Write  down your questions so you remember to ask them during your visits.  © Copyright [employer] 2023 Information is for End User's use only and may not be sold, redistributed or otherwise used for commercial purposes.  The above information is an  only. It is not intended as medical advice for individual conditions or treatments. Talk to your doctor, nurse or pharmacist before following any medical regimen to see if it is safe and effective for you.  Wellness Visit for Adults   AMBULATORY CARE:   A wellness visit  is when you see your healthcare provider to get screened for health problems. Your healthcare provider will also give you advice on how to stay healthy. Write down your questions so you remember to ask them. Ask your healthcare provider how often you should have a wellness visit.  What happens at a wellness visit:  Your healthcare provider will ask about your health, and your family history of health problems. This includes high blood pressure, heart disease, and cancer. He or she will ask if you have symptoms that concern you, if you smoke, and about your mood. You may also be asked about your intake of medicines, supplements, food, and alcohol. Any of the following may be done:  Your weight  will be checked. Your height may also be checked so your body mass index (BMI) can be calculated. Your BMI shows if you are at a healthy weight.    Your blood pressure  and heart rate will be checked. Your temperature may also be checked.    Blood and urine tests  may be done. Blood tests may be done to check your cholesterol levels. Abnormal cholesterol levels increase your risk for heart disease and stroke. You may also need a blood or urine test to check for diabetes if you are at increased risk. Urine tests may be done to look for signs of an infection or kidney disease.    A physical exam  includes checking your heartbeat and lungs with a stethoscope. Your healthcare provider may also check your skin to  look for sun damage.    Screening tests  may be recommended. A screening test is done to check for diseases that may not cause symptoms. The screening tests you may need depend on your age, gender, family history, and lifestyle habits. For example, colorectal screening may be recommended if you are 50 years old or older.    Screening tests you need if you are a woman:   A Pap smear  is used to screen for cervical cancer. Pap smears are usually done every 3 to 5 years depending on your age. You may need them more often if you have had abnormal Pap smear test results in the past. Ask your healthcare provider how often you should have a Pap smear.    A mammogram  is an x-ray of your breasts to screen for breast cancer. Experts recommend mammograms every 2 years starting at age 50 years. You may need a mammogram at age 49 years or younger if you have an increased risk for breast cancer. Talk to your healthcare provider about when you should start having mammograms and how often you need them.    Vaccines you may need:   Get an influenza vaccine  every year. The influenza vaccine protects you from the flu. Several types of viruses cause the flu. The viruses change over time, so new vaccines are made each year.    Get a tetanus-diphtheria (Td) booster vaccine  every 10 years. This vaccine protects you against tetanus and diphtheria. Tetanus is a severe infection that may cause painful muscle spasms and lockjaw. Diphtheria is a severe bacterial infection that causes a thick covering in the back of your mouth and throat.    Get a human papillomavirus (HPV) vaccine  if you are female and aged 19 to 26 or male 19 to 21 and never received it. This vaccine protects you from HPV infection. HPV is the most common infection spread by sexual contact. HPV may also cause vaginal, penile, and anal cancers.    Get a pneumococcal vaccine  if you are aged 65 years or older. The pneumococcal vaccine is an injection given to protect you  from pneumococcal disease. Pneumococcal disease is an infection caused by pneumococcal bacteria. The infection may cause pneumonia, meningitis, or an ear infection.    Get a shingles vaccine  if you are 60 or older, even if you have had shingles before. The shingles vaccine is an injection to protect you from the varicella-zoster virus. This is the same virus that causes chickenpox. Shingles is a painful rash that develops in people who had chickenpox or have been exposed to the virus.    How to eat healthy:  My Plate is a model for planning healthy meals. It shows the types and amounts of foods that should go on your plate. Fruits and vegetables make up about half of your plate, and grains and protein make up the other half. A serving of dairy is included on the side of your plate. The amount of calories and serving sizes you need depends on your age, gender, weight, and height. Examples of healthy foods are listed below:  Eat a variety of vegetables  such as dark green, red, and orange vegetables. You can also include canned vegetables low in sodium (salt) and frozen vegetables without added butter or sauces.    Eat a variety of fresh fruits , canned fruit in 100% juice, frozen fruit, and dried fruit.    Include whole grains.  At least half of the grains you eat should be whole grains. Examples include whole-wheat bread, wheat pasta, brown rice, and whole-grain cereals such as oatmeal.    Eat a variety of protein foods such as seafood (fish and shellfish), lean meat, and poultry without skin (turkey and chicken). Examples of lean meats include pork leg, shoulder, or tenderloin, and beef round, sirloin, tenderloin, and extra lean ground beef. Other protein foods include eggs and egg substitutes, beans, peas, soy products, nuts, and seeds.    Choose low-fat dairy products such as skim or 1% milk or low-fat yogurt, cheese, and cottage cheese.    Limit unhealthy fats  such as butter, hard margarine, and shortening.        Exercise:  Exercise at least 30 minutes per day on most days of the week. Some examples of exercise include walking, biking, dancing, and swimming. You can also fit in more physical activity by taking the stairs instead of the elevator or parking farther away from stores. Include muscle strengthening activities 2 days each week. Regular exercise provides many health benefits. It helps you manage your weight, and decreases your risk for type 2 diabetes, heart disease, stroke, and high blood pressure. Exercise can also help improve your mood. Ask your healthcare provider about the best exercise plan for you.       General health and safety guidelines:   Do not smoke.  Nicotine and other chemicals in cigarettes and cigars can cause lung damage. Ask your healthcare provider for information if you currently smoke and need help to quit. E-cigarettes or smokeless tobacco still contain nicotine. Talk to your healthcare provider before you use these products.    Limit alcohol.  A drink of alcohol is 12 ounces of beer, 5 ounces of wine, or 1½ ounces of liquor.    Lose weight, if needed.  Being overweight increases your risk of certain health conditions. These include heart disease, high blood pressure, type 2 diabetes, and certain types of cancer.    Protect your skin.  Do not sunbathe or use tanning beds. Use sunscreen with a SPF 15 or higher. Apply sunscreen at least 15 minutes before you go outside. Reapply sunscreen every 2 hours. Wear protective clothing, hats, and sunglasses when you are outside.    Drive safely.  Always wear your seatbelt. Make sure everyone in your car wears a seatbelt. A seatbelt can save your life if you are in an accident. Do not use your cell phone when you are driving. This could distract you and cause an accident. Pull over if you need to make a call or send a text message.    Practice safe sex.  Use latex condoms if are sexually active and have more than one partner. Your healthcare  provider may recommend screening tests for sexually transmitted infections (STIs).    Wear helmets, lifejackets, and protective gear.  Always wear a helmet when you ride a bike or motorcycle, go skiing, or play sports that could cause a head injury. Wear protective equipment when you play sports. Wear a lifejacket when you are on a boat or doing water sports.    © Copyright [employer] 2023 Information is for End User's use only and may not be sold, redistributed or otherwise used for commercial purposes.  The above information is an  only. It is not intended as medical advice for individual conditions or treatments. Talk to your doctor, nurse or pharmacist before following any medical regimen to see if it is safe and effective for you.  Kegel Exercises for Women   AMBULATORY CARE:   Kegel exercises  help strengthen your pelvic muscles. Pelvic muscles hold your pelvic organs, such as your bladder and uterus, in place. Kegel exercises help prevent or control certain conditions, such as urine incontinence (leakage) or uterine prolapse.       Call your doctor or physical therapist if:   You cannot feel your muscles tighten or relax.    You continue to leak urine.    You have questions or concerns about your condition or care.    Use the correct muscles:  Pelvic muscles are the muscles you use to control urine flow. To target these muscles, stop and start the flow of urine several times. This will help you become familiar with how it feels to tighten and relax these muscles.  How to do Kegel exercises:   Get into a comfortable position.  You may lie down, stand up, or sit down to do these exercises. When you first try to do these exercises, it may be easier if you lie down.    Tighten or squeeze your pelvic muscles slowly.  It may feel like you are trying to hold back urine or gas. Hold this position for 3 seconds. Relax for 3 seconds. Repeat this cycle 10 times. Do not hold your breath when you do Kegel  exercises. Keep your stomach, back, and leg muscles relaxed.    Do 10 sets of Kegel exercises, at least 3 times a day.  When you know how to do Kegel exercises, use different positions. This will help to strengthen your pelvic muscles as much as possible. You can do these exercises while you lie on the floor, watch TV, or while you stand. Tighten your pelvic muscles before you sneeze, cough, or lift to prevent urine leakage. You may notice improved bladder control within about 6 weeks.    Follow up with your doctor or physical therapist as directed:  Write down your questions so you remember to ask them during your visits.  © Copyright [employer] 2023 Information is for End User's use only and may not be sold, redistributed or otherwise used for commercial purposes.  The above information is an  only. It is not intended as medical advice for individual conditions or treatments. Talk to your doctor, nurse or pharmacist before following any medical regimen to see if it is safe and effective for you.       Perineal Hygiene      Your vaginal naturally takes care of its self, it is a self washing system, the less you mess the healthier it will be     No soaps or feminine wash to the vulva, these products can cause dermitis, bacterial infections and other vulvar problems.   Use only water to cleanse, or water with Dove or Dove Sensitive Skin Bar soap if necessary.    No scented lotions or products are advised in or near your vulva.    Use only coconut oil for moisture if needed.  No douching this may cause imbalance in your vaginal PH and further issues.    If you wear panty liners, you may apply a thin coating of Vaseline, A&D ointment or coconut oil to the vulvar tissues as a skin barrier     Cotton underware, loose fitting clothing  Only perfume-free, dye-free laundry detergent, use a second rinse cycle   Avoid fabric softeners/dryer sheets.       Your partner should avoid the same products as well.        Over the counter probiotic to restore vaginal yaniv may be helpful as well, take daily.       You may also look into Boric Acid vaginal suppositories to restore vaginal PH balance for up to 2 weeks as directed on the box. You may not use these if you are pregnant      For vaginal dryness:      You may use:     Coconut oil (organic, pure, unscented) as needed for moisture or lubrication. ( Do not use if allergic)       Replens moisture restore external comfort gel daily ( use as directed on the box)        Replens long lasting vaginal moisturizer  ( use as directed on the box)         For Vaginal Lubrication:          You may use:     Coconut oil (organic, pure, unscented) as a lubricant or another scent-free lubricant (Astroglide, Uberlube) if needed.  Do not use coconut oil or silicone if using a condom as this may break down the integrity of the condom and cause an unplanned pregnancy              Do not use coconut oil if allergic               Replens silky smooth lubricant, premium silicone based lubricant for intercourse. ( use as directed, a small amount will provide an enhanced natural feeling)     Any premium over the counter vaginal lubricant water or silicone based. Silicone based will have more staying power.     Menopause   WHAT YOU NEED TO KNOW:   What is menopause?  Menopause is a normal stage in a person's life when monthly periods stop. You are considered to be in menopause when you have not had a period for a full year after the age of 40. Menopause usually occurs between ages 47 to 53. Perimenopause is a stage before menopause that may cause signs and symptoms similar to menopause. Perimenopause may start about 4 years before menopause.       What causes menopause?  Menopause starts when the ovaries stop making the female hormones estrogen and progesterone. After menopause, you are no longer able to become pregnant. Any of the following may trigger menopause or early menopause:  Surgery, including  a hysterectomy or oophorectomy    Family history of early menopause    Smoking    Chemotherapy or pelvic radiation    Chromosome abnormalities, including Cooney syndrome and Fragile X syndrome    Premature ovarian insufficiency (the ovaries stop producing eggs before age 40)    What are the signs and symptoms of menopause?   Irregular menstrual cycles with heavy vaginal bleeding followed by decreased bleeding until it stops    Hot flashes (feeling warm, flushed, and sweaty)    Vaginal changes such as increased dryness    Mood changes such as anxiety, depression, or decreased desire to have sex    Trouble sleeping, joint pain, headaches    Brittle nails, hair on chin or chest where it is normally absent    Decrease in breast size and change in skin texture    Weight gain    How is menopause treated or managed?   Hormone replacement therapy (HRT) is medicine that replaces your low hormone levels.  HRT contains estrogen and sometimes progestin.    HRT has several benefits.  HRT helps prevent osteoporosis, which decreases your risk for bone fractures. HRT also protects you from colorectal cancer.    HRT also has some risks.  HRT increases your risk for breast cancer, blood clots, heart disease, a heart attack, or a stroke. If you are 65 years or older, HRT can also increase your risk for dementia. Your risk for uterine or endometrial cancer, gallbladder disease, and urinary incontinence is higher if you take estrogen-only HRT.    Manage hot flashes.  Hot flashes are brief periods of feeling very warm, flushed, and sweaty. Hot flashes can last from a few seconds to several minutes. They may happen many times during the day, and are common at night. Layer your clothing so that you can easily remove some clothing and cool yourself during a hot flash. Cold drinks may also be helpful. Non-hormone medicines can help relieve or prevent hot flashes. Examples include certain antidepressants, nerve medicines, and high blood  pressure medicines.    Reduce vaginal dryness by using over-the-counter vaginal creams.  Vaginal dryness may cause you to have pain or discomfort during sex. Only use creams that are made for vaginal use. Do  not  use petroleum jelly. You may put an estrogen cream in and around your vagina. Estrogen cream may help decrease vaginal dryness and lower your risk of vaginal infections.    Continue to use birth control during perimenopause if you do not want to get pregnant.  You may need to use birth control until it has been 1 year since your periods stopped. Ask your healthcare provider when you can stop using birth control to prevent pregnancy.    How can I live a healthy lifestyle during and after menopause?  After menopause, your risk for heart disease and bone loss increases. Ask about these and other ways to stay healthy:  Exercise regularly.  Exercise helps you maintain a healthy weight. Exercise can also help to control your blood pressure and cholesterol levels. Include weight-bearing exercise for strong bones. Weight bearing exercise is recommended for at least 30 minutes, 3 times a week. Ask your healthcare provider about the best exercise plan for you.         Eat a variety of healthy foods.  Include fruits, vegetables, whole grains (whole-wheat bread, pasta, and cereals), low-fat dairy, and lean protein foods (beans, poultry, and fish). Limit foods high in sodium (salt). Ask your healthcare provider for more information about a meal plan that is right for you.         Do not smoke.  If you smoke, it is never too late to quit. You are more likely to have a heart attack, lung disease, blood clots, and cancer if you smoke. Ask your healthcare provider for information if you need help quitting.    Take supplements as directed.  You may need extra calcium and vitamin D to help prevent osteoporosis.            Limit alcohol and caffeine.  Alcohol and caffeine may worsen your symptoms.    When should I call my  doctor?   You have vaginal bleeding after menopause.    You have questions or concerns about your condition or care.    CARE AGREEMENT:   You have the right to help plan your care. Learn about your health condition and how it may be treated. Discuss treatment options with your healthcare providers to decide what care you want to receive. You always have the right to refuse treatment. The above information is an  only. It is not intended as medical advice for individual conditions or treatments. Talk to your doctor, nurse or pharmacist before following any medical regimen to see if it is safe and effective for you.  © Copyright [employer] 2023 Information is for End User's use only and may not be sold, redistributed or otherwise used for commercial purposes.

## 2024-02-14 NOTE — PRE-PROCEDURE INSTRUCTIONS
Pre-Surgery Instructions:   Medication Instructions    albuterol (PROVENTIL HFA,VENTOLIN HFA) 90 mcg/act inhaler Uses PRN- OK to take day of surgery    Cholecalciferol (Vitamin D3) 25 MCG (1000 UT) CAPS Stop taking 7 days prior to surgery.    clobetasol (TEMOVATE) 0.05 % ointment Hold day of surgery.    cyanocobalamin (VITAMIN B-12) 100 MCG tablet Stop taking 7 days prior to surgery.    diphenhydramine, lidocaine, Al/Mg hydroxide, simethicone (Magic Mouthwash) SUSP Hold day of surgery.    docusate sodium (COLACE) 100 mg capsule Hold day of surgery.    escitalopram (LEXAPRO) 5 mg tablet Uses PRN- OK to take day of surgery    fluticasone (FLONASE) 50 mcg/act nasal spray Uses PRN- OK to take day of surgery    methocarbamol (ROBAXIN) 500 mg tablet Uses PRN- OK to take day of surgery    Multiple Vitamin (MULTIVITAMIN) capsule Stop taking 7 days prior to surgery.    olopatadine (PATANOL) 0.1 % ophthalmic solution Take day of surgery.    Turmeric 400 MG CAPS Stop taking 7 days prior to surgery.    Vit C-Cholecalciferol-Alessandra Hip (Vitamin C & D3/Alessandra Hips) 500-1000-20 MG-UNIT-MG CAPS Stop taking 7 days prior to surgery.   Medication instructions for day surgery reviewed. Please use only a sip of water to take your instructed medications. Avoid all over the counter vitamins, supplements and NSAIDS for one week prior to surgery per anesthesia guidelines. Tylenol is ok to take as needed.     You will receive a call one business day prior to surgery with an arrival time and hospital directions. If your surgery is scheduled on a Monday, the hospital will be calling you on the Friday prior to your surgery. If you have not heard from anyone by 8pm, please call the hospital supervisor through the hospital  at [phone]. ([place] [phone] or [place] [phone]).    Do not eat or drink anything after midnight the night before your surgery, including candy, mints, lifesavers, or chewing gum. Do not drink alcohol  24hrs before your surgery. Try not to smoke at least 24hrs before your surgery.       Follow the pre surgery showering instructions as listed in the “My Surgical Experience Booklet” or otherwise provided by your surgeon's office. Do not use a blade to shave the surgical area 1 week before surgery. It is okay to use a clean electric clippers up to 24 hours before surgery. Do not apply any lotions, creams, including makeup, cologne, deodorant, or perfumes after showering on the day of your surgery. Do not use dry shampoo, hair spray, hair gel, or any type of hair products.     No contact lenses, eye make-up, or artificial eyelashes. Remove nail polish, including gel polish, and any artificial, gel, or acrylic nails if possible. Remove all jewelry including rings and body piercing jewelry.     Wear causal clothing that is easy to take on and off. Consider your type of surgery.    Keep any valuables, jewelry, piercings at home. Please bring any specially ordered equipment (sling, braces) if indicated.    Arrange for a responsible person to drive you to and from the hospital on the day of your surgery. Visitor Guidelines discussed.     Call the surgeon's office with any new illnesses, exposures, or additional questions prior to surgery.    Please reference your “My Surgical Experience Booklet” for additional information to prepare for your upcoming surgery.

## 2024-02-14 NOTE — PROGRESS NOTES
Diagnoses and all orders for this visit:    Encounter for gynecological examination without abnormal finding    Encounter for screening mammogram for malignant neoplasm of breast        Advised to call with any Post Menopausal bleeding.   Calcium/ Vit D dietary requirements discussed,   Weight bearing exercises minium of 150 mins/weekly advised.   Kegel exercises advised daily, see after visit summary for instructions and recommendations  Reviewed perineal hygiene and vaginal dryness post menopause  SBE and yearly mammography advised.  ASCCP guidelines reviewed. Will discontinue PAP's according to recommendations. Condoms encouraged with all sexual activity to prevent STI's.   Health maintenance encouraged with PMD, remain current with Colonoscopy, Dexa scan, and recommended vaccines.  Advised to call with any issues, all concerns & questions addressed.   See after visit summary for further information    F/U annually or Biannual if Medicare       Health Maintenance:    Last PAP: 2023  Next PAP Due:    Last Mammogram:2021, diagnostic for abnormal mammo, routine screening recommended   Life time Tyrer Cuzick % 9.57, Density B scattered areas of fibroglandular density , Bi-Rads 2 Benign  Next Mammogram: scheduled  3/5/2024    Last Colonoscopy:2021 RTO in 5 years     Last DEXA: Not on file    Subjective    CC: Yearly Exam     Pleasant 49 y.o. , female   postmenopausal here for annual exam.   GYN hx includes:  2 vaginal delivery , 1 c section   No personal hx of breast, cervical, ovarian or colon CA  Family Hx: Mother - Breast cancer ,Ovarian cancer , Colon cancer , Father - colon cancer, Half sister - Breast cancer , Sister - Breast cancer .  Pt had genetic testing completed BRCA 1 & 2 Neg,  Had PMB, EMB benign, consult for Hysterectomy with Dr Cano,   Has appt with oncology 23 for 2nd opinion and decision regarding hysterectomy   Daily smoker   Reports history of abnormal pap  smears distant past with Neg Colpo  Denies abnormal Mammograms   Reports postmenopausal bleeding in Aug for 9 days, had EMB, Fragments of secretory endometrium with stromal breakdown intermixed with blood  No atypia or malignancy identified.  Reports issues with vasomotor symptoms at times, Manageable   Denies pelvic pain   Reports vaginal dryness, uses coconut oil with relief, occasional itch which resolves with use of coconut oil as well   Denies  symptoms of pelvic organ prolapse, urinary, or fecal incontinence.  Last year she reported urinary leakage, has been doing her Kegel's with improvement of leakage   Is sexually active. Monogamous relationship.   Reports  dyspareunia, advised coconut oil and perineal massage prior to penetration   Tubal for BC   Denies STI concerns. declines STD testing   Denies intimate partner violence    Past Medical History:   Diagnosis Date    Abnormal Pap smear of cervix     Anxiety     Arthritis     Asthma     Depression     Ear problems     Osteoporosis     Urinary tract infection     Varicella      Past Surgical History:   Procedure Laterality Date     SECTION      FL NEUROPLASTY &/TRANSPOS MEDIAN NRV CARPAL TUNNE Right 2/10/2023    Procedure: RELEASE CARPAL TUNNEL;  Surgeon: Loni Chi MD;  Location: [facility];  Service: Orthopedics    FL NEUROPLASTY &/TRANSPOSITION ULNAR NERVE ELBOW Right 2/10/2023    Procedure: RELEASE CUBITAL TUNNEL;  Surgeon: Loni Chi MD;  Location: [facility];  Service: Orthopedics    TUBAL LIGATION        Family History   Problem Relation Age of Onset    Cancer Mother     Breast cancer Mother         age unknown    Diabetes Mother     Coronary artery disease Mother     Ovarian cancer Mother     Colon cancer Mother     Cancer Father     Prostate cancer Father     Colon cancer Father     No Known Problems Sister     No Known Problems Sister     No Known Problems Sister     No Known Problems Sister     No Known Problems Sister      Breast cancer Sister     No Known Problems Daughter     No Known Problems Daughter     No Known Problems Son     No Known Problems Maternal Grandmother     No Known Problems Paternal Grandmother     No Known Problems Maternal Aunt     No Known Problems Maternal Aunt     No Known Problems Maternal Aunt     No Known Problems Maternal Aunt     No Known Problems Maternal Aunt     No Known Problems Maternal Aunt     No Known Problems Maternal Aunt     No Known Problems Maternal Aunt     No Known Problems Paternal Aunt     No Known Problems Paternal Aunt     No Known Problems Paternal Aunt     No Known Problems Paternal Aunt     No Known Problems Paternal Aunt     No Known Problems Half-Sister     Breast cancer Half-Sister         age unknown    No Known Problems Half-Sister      Social History     Tobacco Use    Smoking status: Former     Current packs/day: 0.00     Average packs/day: 0.5 packs/day for 14.0 years (7.0 ttl pk-yrs)     Types: Cigarettes     Start date: 1/1/2007     Quit date: 1/3/2021     Years since quitting: 3.1    Smokeless tobacco: Never    Tobacco comments:     Almost 2 two years wit no smoking  patient report she vapes    Vaping Use    Vaping status: Some Days    Substances: Nicotine   Substance Use Topics    Alcohol use: Not Currently    Drug use: Not Currently       Current Outpatient Medications:     albuterol (PROVENTIL HFA,VENTOLIN HFA) 90 mcg/act inhaler, Inhale 2 puffs every 6 (six) hours as needed for wheezing (cough), Disp: 18 g, Rfl: 3    ALPRAZolam (XANAX) 0.5 mg tablet, Take 1 tablet (0.5 mg total) by mouth 30 min pre-procedure Take 2nd dose 30 mins after 1st dose if still anxious., Disp: 2 tablet, Rfl: 0    Cholecalciferol (Vitamin D3) 25 MCG (1000 UT) CAPS, Take 1 capsule by mouth daily, Disp: , Rfl:     clobetasol (TEMOVATE) 0.05 % ointment, Apply 1 application topically 2 (two) times a day To affected area (Patient taking differently: Apply 1 application. topically 2 (two) times a day  To affected area as needed), Disp: 30 g, Rfl: 0    cyanocobalamin (VITAMIN B-12) 100 MCG tablet, Take 100 mcg by mouth in the morning, Disp: , Rfl:     diphenhydramine, lidocaine, Al/Mg hydroxide, simethicone (Magic Mouthwash) SUSP, Swish and spit 10 mL every 4 (four) hours as needed for mouth pain or discomfort, Disp: 119 mL, Rfl: 0    docusate sodium (COLACE) 100 mg capsule, TAKE 1 CAPSULE BY MOUTH TWICE A DAY AS NEEDED FOR CONSTIPATION, Disp: 60 capsule, Rfl: 1    escitalopram (LEXAPRO) 5 mg tablet, TAKE 1 TABLET (5 MG TOTAL) BY MOUTH DAILY. (Patient taking differently: Take 5 mg by mouth as needed), Disp: 30 tablet, Rfl: 1    fluticasone (FLONASE) 50 mcg/act nasal spray, SPRAY 1 SPRAY INTO EACH NOSTRIL EVERY DAY (Patient taking differently: 2 sprays as needed), Disp: 16 mL, Rfl: 1    Multiple Vitamin (MULTIVITAMIN) capsule, Take 1 capsule by mouth daily, Disp: , Rfl:     olopatadine (PATANOL) 0.1 % ophthalmic solution, Administer 1 drop to both eyes 2 (two) times a day, Disp: 5 mL, Rfl: 3    Turmeric 400 MG CAPS, Take 1 capsule by mouth in the morning, Disp: , Rfl:     Vit C-Cholecalciferol-Alessandra Hip (Vitamin C & D3/Alessandra Hips) 500-1000-20 MG-UNIT-MG CAPS, Take 1 capsule by mouth in the morning, Disp: , Rfl:     HYDROcodone-acetaminophen (HYCET) 7.5-325 MG/15ML solution, Take 15 mL by mouth every 6 (six) hours as needed for moderate pain Max Daily Amount: 60 mL, Disp: 473 mL, Rfl: 0    methocarbamol (ROBAXIN) 500 mg tablet, Take 1 tablet (500 mg total) by mouth 4 (four) times a day as needed for muscle spasms for up to 10 days, Disp: 40 tablet, Rfl: 0  Patient Active Problem List    Diagnosis Date Noted    PMB (postmenopausal bleeding) 02/08/2024    Family history of ovarian cancer 02/08/2024    Numbness 11/22/2023    Mouth sore 09/14/2023    Right low back pain 04/10/2023    Cubital tunnel syndrome, right 02/10/2023    Carpal tunnel syndrome on right 02/10/2023    Anxiety 12/08/2022    Overweight 12/08/2022     "History of COVID-19 2021    Fibromyalgia 2021    Chronic pain of multiple joints 2020    Neuropathy 2020    Chronic neck pain 2020    Allergic conjunctivitis of both eyes 2020    Contact dermatitis 2020    Mild intermittent asthma without complication 01/15/2020       Allergies   Allergen Reactions    Tilactase GI Intolerance and Vomiting    Pollen Extract Other (See Comments)     Seasonal allergies.       OB History    Para Term  AB Living   3 3 3 0 0 3   SAB IAB Ectopic Multiple Live Births   0 0 0 0 3      # Outcome Date GA Lbr Wayne/2nd Weight Sex Delivery Anes PTL Lv   3 Term      Vag-Spont      2 Term      Vag-Spont      1 Term      CS-Unspec         Obstetric Comments   Menarche - 20    2 VAGINAL & 1         Vitals:    24 0917   BP: 120/80   BP Location: Right arm   Patient Position: Sitting   Cuff Size: Large   Weight: 88 kg (194 lb)   Height: 5' 5\" (1.651 m)     Body mass index is 32.28 kg/m².    Review of Systems     Constitutional: Negative for chills, fatigue, fever, headaches, visual disturbances, and unexpected weight change.   Respiratory: Negative for cough, & shortness of breath.  Cardiovascular: Negative for chest pain. .    Gastrointestinal: Negative for Abd pain, nausea & vomiting, constipation and diarrhea.   Genitourinary: Negative for difficulty urinating, dysuria, hematuria, , unusual vaginal bleeding or discharge. dyspareunia  Skin: Negative skin changes    Physical Exam     Constitutional: Alert & Oriented x3, well-developed and well-nourished. No distress.   HENT: Atraumatic, Normocephalic, Conjunctivae clear  Neck: Normal range of motion. Neck supple. No thyromegaly, mass, nodules or tenderness  Pulmonary: Effort normal.   Abdominal: Soft. No tenderness or masses  Musculoskeletal: Normal ROM  Skin: Warm & Dry  Psychological: Normal mood, thought content, behavior & judgement     Breasts:   Right: tissue soft " without masses, tenderness, skin changes or nipple discharge. No areas of erythema or pain. No subclavicular, axillary, pectoral adenopathy  Left:  tissue soft without masses, tenderness, skin changes or nipple discharge. No areas of erythema or pain. No subclavicular, axillary, pectoral adenopathy    Pelvic exam was performed with patient supine, lithotomy position.     Exam is consistent with  atrophic changes.  Vulva/ Vestibule: Right: Negative rash, tenderness, lesion or injury                               Left: Negative rash, tenderness, lesion or injury   Urethral meatus: Negative for  tenderness, inflammation or discharge.   Uterus: not deviated, enlarged, fixed or tender.   Cervix: No CMT, no discharge or friability.   Right adnexa: no mass, no tenderness and no fullness.  Left adnexa: no mass, no tenderness and no fullness.   Vagina: Consistent with  atrophic changes. No erythema, tenderness, masses, or foreign body in the vagina. No signs of injury around the vagina. No unusual vaginal discharge   Perineum without lesions, signs of injury, erythema or swelling.  Inguinal Canal:        Right: No inguinal adenopathy or hernia present.        Left: No inguinal adenopathy or hernia present.

## 2024-02-16 ENCOUNTER — ANNUAL EXAM (OUTPATIENT)
Dept: OBGYN CLINIC | Facility: CLINIC | Age: 49
End: 2024-02-16
Payer: COMMERCIAL

## 2024-02-16 VITALS
HEIGHT: 65 IN | BODY MASS INDEX: 32.32 KG/M2 | SYSTOLIC BLOOD PRESSURE: 120 MMHG | DIASTOLIC BLOOD PRESSURE: 80 MMHG | WEIGHT: 194 LBS

## 2024-02-16 DIAGNOSIS — Z12.31 ENCOUNTER FOR SCREENING MAMMOGRAM FOR MALIGNANT NEOPLASM OF BREAST: ICD-10-CM

## 2024-02-16 DIAGNOSIS — Z01.419 ENCOUNTER FOR GYNECOLOGICAL EXAMINATION WITHOUT ABNORMAL FINDING: Primary | ICD-10-CM

## 2024-02-16 PROCEDURE — 99396 PREV VISIT EST AGE 40-64: CPT | Performed by: OBSTETRICS & GYNECOLOGY

## 2024-02-19 ENCOUNTER — HOSPITAL ENCOUNTER (OUTPATIENT)
Dept: MRI IMAGING | Facility: CLINIC | Age: 49
Discharge: HOME | End: 2024-02-19
Attending: FAMILY MEDICINE
Payer: COMMERCIAL

## 2024-02-19 DIAGNOSIS — M54.16 RADICULOPATHY, LUMBAR REGION: ICD-10-CM

## 2024-02-19 PROCEDURE — 72148 MRI LUMBAR SPINE W/O DYE: CPT

## 2024-02-19 PROCEDURE — G1004 CDSM NDSC: HCPCS

## 2024-02-21 ENCOUNTER — HOSPITAL ENCOUNTER (OUTPATIENT)
Facility: HOSPITAL | Age: 49
Setting detail: OUTPATIENT SURGERY
Discharge: HOME | End: 2024-02-21
Attending: OTOLARYNGOLOGY | Admitting: OTOLARYNGOLOGY
Payer: COMMERCIAL

## 2024-02-21 ENCOUNTER — ANESTHESIA (OUTPATIENT)
Dept: PERIOP | Facility: HOSPITAL | Age: 49
End: 2024-02-21
Payer: COMMERCIAL

## 2024-02-21 VITALS
WEIGHT: 194 LBS | BODY MASS INDEX: 32.32 KG/M2 | SYSTOLIC BLOOD PRESSURE: 126 MMHG | HEART RATE: 73 BPM | DIASTOLIC BLOOD PRESSURE: 73 MMHG | HEIGHT: 65 IN | RESPIRATION RATE: 16 BRPM | TEMPERATURE: 97.7 F | OXYGEN SATURATION: 96 %

## 2024-02-21 PROBLEM — H10.13 ALLERGIC CONJUNCTIVITIS OF BOTH EYES: Status: RESOLVED | Noted: 2020-07-29 | Resolved: 2024-02-21

## 2024-02-21 LAB
EXT PREGNANCY TEST URINE: NEGATIVE
EXT. CONTROL: NORMAL

## 2024-02-21 PROCEDURE — 42826 REMOVAL OF TONSILS: CPT | Performed by: OTOLARYNGOLOGY

## 2024-02-21 PROCEDURE — 81025 URINE PREGNANCY TEST: CPT | Performed by: STUDENT IN AN ORGANIZED HEALTH CARE EDUCATION/TRAINING PROGRAM

## 2024-02-21 RX ORDER — MAGNESIUM HYDROXIDE 1200 MG/15ML
LIQUID ORAL AS NEEDED
Status: DISCONTINUED | OUTPATIENT
Start: 2024-02-21 | End: 2024-02-21 | Stop reason: HOSPADM

## 2024-02-21 RX ORDER — OXYCODONE HCL 5 MG/5 ML
5 SOLUTION, ORAL ORAL EVERY 4 HOURS PRN
Status: DISCONTINUED | OUTPATIENT
Start: 2024-02-21 | End: 2024-02-21 | Stop reason: HOSPADM

## 2024-02-21 RX ORDER — METOCLOPRAMIDE HYDROCHLORIDE 5 MG/ML
10 INJECTION INTRAMUSCULAR; INTRAVENOUS ONCE AS NEEDED
Status: DISCONTINUED | OUTPATIENT
Start: 2024-02-21 | End: 2024-02-21 | Stop reason: HOSPADM

## 2024-02-21 RX ORDER — ACETAMINOPHEN 160 MG/5ML
650 SUSPENSION ORAL EVERY 4 HOURS PRN
Status: DISCONTINUED | OUTPATIENT
Start: 2024-02-21 | End: 2024-02-21 | Stop reason: HOSPADM

## 2024-02-21 RX ORDER — FENTANYL CITRATE/PF 50 MCG/ML
50 SYRINGE (ML) INJECTION
Status: DISCONTINUED | OUTPATIENT
Start: 2024-02-21 | End: 2024-02-21 | Stop reason: HOSPADM

## 2024-02-21 RX ORDER — HYDROMORPHONE HCL/PF 1 MG/ML
0.5 SYRINGE (ML) INJECTION
Status: DISCONTINUED | OUTPATIENT
Start: 2024-02-21 | End: 2024-02-21 | Stop reason: HOSPADM

## 2024-02-21 RX ORDER — ROCURONIUM BROMIDE 10 MG/ML
INJECTION, SOLUTION INTRAVENOUS AS NEEDED
Status: DISCONTINUED | OUTPATIENT
Start: 2024-02-21 | End: 2024-02-21

## 2024-02-21 RX ORDER — FENTANYL CITRATE 50 UG/ML
INJECTION, SOLUTION INTRAMUSCULAR; INTRAVENOUS AS NEEDED
Status: DISCONTINUED | OUTPATIENT
Start: 2024-02-21 | End: 2024-02-21

## 2024-02-21 RX ORDER — MIDAZOLAM HYDROCHLORIDE 2 MG/2ML
INJECTION, SOLUTION INTRAMUSCULAR; INTRAVENOUS AS NEEDED
Status: DISCONTINUED | OUTPATIENT
Start: 2024-02-21 | End: 2024-02-21

## 2024-02-21 RX ORDER — SODIUM CHLORIDE, SODIUM LACTATE, POTASSIUM CHLORIDE, CALCIUM CHLORIDE 600; 310; 30; 20 MG/100ML; MG/100ML; MG/100ML; MG/100ML
125 INJECTION, SOLUTION INTRAVENOUS CONTINUOUS
Status: DISCONTINUED | OUTPATIENT
Start: 2024-02-21 | End: 2024-02-21 | Stop reason: HOSPADM

## 2024-02-21 RX ORDER — ONDANSETRON 2 MG/ML
INJECTION INTRAMUSCULAR; INTRAVENOUS AS NEEDED
Status: DISCONTINUED | OUTPATIENT
Start: 2024-02-21 | End: 2024-02-21

## 2024-02-21 RX ORDER — DEXAMETHASONE SODIUM PHOSPHATE 10 MG/ML
INJECTION, SOLUTION INTRAMUSCULAR; INTRAVENOUS AS NEEDED
Status: DISCONTINUED | OUTPATIENT
Start: 2024-02-21 | End: 2024-02-21

## 2024-02-21 RX ORDER — PROPOFOL 10 MG/ML
INJECTION, EMULSION INTRAVENOUS AS NEEDED
Status: DISCONTINUED | OUTPATIENT
Start: 2024-02-21 | End: 2024-02-21

## 2024-02-21 RX ORDER — LIDOCAINE HYDROCHLORIDE 10 MG/ML
INJECTION, SOLUTION EPIDURAL; INFILTRATION; INTRACAUDAL; PERINEURAL AS NEEDED
Status: DISCONTINUED | OUTPATIENT
Start: 2024-02-21 | End: 2024-02-21

## 2024-02-21 RX ORDER — HYDROMORPHONE HCL/PF 1 MG/ML
SYRINGE (ML) INJECTION AS NEEDED
Status: DISCONTINUED | OUTPATIENT
Start: 2024-02-21 | End: 2024-02-21

## 2024-02-21 RX ADMIN — FENTANYL CITRATE 50 MCG: 50 INJECTION, SOLUTION INTRAMUSCULAR; INTRAVENOUS at 11:53

## 2024-02-21 RX ADMIN — SODIUM CHLORIDE 8 MCG: 9 INJECTION, SOLUTION INTRAVENOUS at 12:20

## 2024-02-21 RX ADMIN — HYDROMORPHONE HYDROCHLORIDE 0.5 MG: 1 INJECTION, SOLUTION INTRAMUSCULAR; INTRAVENOUS; SUBCUTANEOUS at 13:07

## 2024-02-21 RX ADMIN — FENTANYL CITRATE 50 MCG: 50 INJECTION INTRAMUSCULAR; INTRAVENOUS at 12:59

## 2024-02-21 RX ADMIN — ONDANSETRON 4 MG: 2 INJECTION INTRAMUSCULAR; INTRAVENOUS at 12:25

## 2024-02-21 RX ADMIN — OXYCODONE HYDROCHLORIDE 5 MG: 5 SOLUTION ORAL at 13:38

## 2024-02-21 RX ADMIN — SODIUM CHLORIDE 8 MCG: 9 INJECTION, SOLUTION INTRAVENOUS at 12:30

## 2024-02-21 RX ADMIN — SODIUM CHLORIDE, SODIUM LACTATE, POTASSIUM CHLORIDE, AND CALCIUM CHLORIDE: .6; .31; .03; .02 INJECTION, SOLUTION INTRAVENOUS at 11:36

## 2024-02-21 RX ADMIN — LIDOCAINE HYDROCHLORIDE 50 MG: 10 INJECTION, SOLUTION EPIDURAL; INFILTRATION; INTRACAUDAL; PERINEURAL at 11:42

## 2024-02-21 RX ADMIN — FENTANYL CITRATE 50 MCG: 50 INJECTION INTRAMUSCULAR; INTRAVENOUS at 12:50

## 2024-02-21 RX ADMIN — MIDAZOLAM HYDROCHLORIDE 2 MG: 1 INJECTION, SOLUTION INTRAMUSCULAR; INTRAVENOUS at 11:36

## 2024-02-21 RX ADMIN — DEXAMETHASONE SODIUM PHOSPHATE 10 MG: 10 INJECTION INTRAMUSCULAR; INTRAVENOUS at 11:42

## 2024-02-21 RX ADMIN — FENTANYL CITRATE 50 MCG: 50 INJECTION, SOLUTION INTRAMUSCULAR; INTRAVENOUS at 11:42

## 2024-02-21 RX ADMIN — HYDROMORPHONE HYDROCHLORIDE 0.25 MG: 1 INJECTION, SOLUTION INTRAMUSCULAR; INTRAVENOUS; SUBCUTANEOUS at 12:06

## 2024-02-21 RX ADMIN — PROPOFOL 200 MG: 10 INJECTION, EMULSION INTRAVENOUS at 11:42

## 2024-02-21 RX ADMIN — ROCURONIUM BROMIDE 40 MG: 10 INJECTION, SOLUTION INTRAVENOUS at 11:42

## 2024-02-21 NOTE — ANESTHESIA POSTPROCEDURE EVALUATION
Post-Op Assessment Note    CV Status:  Stable  Pain Score: 0    Pain management: adequate    Multimodal analgesia used between 6 hours prior to anesthesia start to PACU discharge    Mental Status:  Alert and sleepy   Hydration Status:  Stable   PONV Controlled:  None   Airway Patency:  Patent  Airway: intubated  Two or more mitigation strategies used for obstructive sleep apnea   Post Op Vitals Reviewed: Yes    No anethesia notable event occurred.    Staff: CRNA               BP   162/70   Temp   97.5   Pulse  72   Resp   14   SpO2   100

## 2024-02-21 NOTE — ANESTHESIA PREPROCEDURE EVALUATION
Procedure:  TONSILLECTOMY (Throat)    Relevant Problems   MUSCULOSKELETAL   (+) Fibromyalgia   (+) Right low back pain      NEURO/PSYCH   (+) Anxiety   (+) Chronic neck pain   (+) Fibromyalgia      PULMONARY   (+) Mild intermittent asthma without complication        Physical Exam    Airway    Mallampati score: I  TM Distance: >3 FB  Neck ROM: full     Dental   No notable dental hx     Cardiovascular      Pulmonary      Other Findings  post-pubertal.      Anesthesia Plan  ASA Score- 2     Anesthesia Type- general with ASA Monitors.         Additional Monitors:     Airway Plan: ETT.           Plan Factors-Exercise tolerance (METS): >4 METS.    Chart reviewed. EKG reviewed.  Existing labs reviewed. Patient summary reviewed.    Patient is not a current smoker.      There is medical exclusion for perioperative obstructive sleep apnea risk education.        Induction- intravenous.    Postoperative Plan- Plan for postoperative opioid use.     Informed Consent- Anesthetic plan and risks discussed with patient.  I personally reviewed this patient with the CRNA. Discussed and agreed on the Anesthesia Plan with the CRNA..

## 2024-02-21 NOTE — OP NOTE
OPERATIVE REPORT  PATIENT NAME: Jami Gates    :  1975  MRN: [id number]  Pt Location: [facility] OR ROOM 01    SURGERY DATE: 2024    Surgeons and Role:     * Mode Verdin MD - Primary    Preop Diagnosis:  Streptococcal sore throat [J02.0]    Post-Op Diagnosis Codes:     * Streptococcal sore throat [J02.0]    Procedure(s):  TONSILLECTOMY    Specimen(s):  * No specimens in log *    Estimated Blood Loss:   Minimal    Drains:  * No LDAs found *    Anesthesia Type:   General    Operative Indications:  Streptococcal sore throat [J02.0]    Operative Findings:  1+ cryptic tonsils bilaterally with tonsil stones    Complications:   None    Procedure and Technique:  The patient was positively identified and transferred onto the operating table in the supine position. Appropriate monitoring devices were put in place, anesthesia was induced and the patient was intubated without difficulty. The operating room table was then turned 90 degrees, and a shoulder roll was placed. Before proceeding further, the time out procedure was completed.    A McIvor oral gag was introduced opened and suspended from the edge of the Grider stand. Palpation of the hard palate revealed no submucosal cleft. The right tonsil was grasped, retracted medially and dissected free of the surrounding tissue using Bovie cautery at a setting of 25. In a similar fashion, the left tonsil was removed, and hemostasis was accomplished in bilateral tonsillar fossae using suction Bovie cautery.     The McIvor oral gag was let down for a minute and reopened. Hemostasis was again accomplished using suction Bovie cautery. The McIvor oral gag were then removed. Anesthesia was reversed. The patient was awakened, extubated and taken to the recovery room in stable condition. All counts were correct at the end of the case, and no complications were encountered.     I was present for the entire procedure.    Patient Disposition:  PACU         SIGNATURE:  Mode Verdin MD  DATE: February 21, 2024  TIME: 12:31 PM

## 2024-02-21 NOTE — DISCHARGE INSTR - AVS FIRST PAGE
Tonsillectomy and Adenoidectomy Postoperative Instructions    What to expect:  -Pink or blood streaked saliva during the first 24 hours  -Patient may refuse to eat or drink anything by mouth.  Limited food intake is acceptable in the first 2-3 days as long as he or she is drinking plenty of fluids (urine remains light yellow or clear).  Offer sips of liquid (water, juice, Gatorade, Pedialyte) every hour.  -Bad breath  -White/Yellow/Gray coating in the back of the throat  -Pain with swallowing/talking  -Ear pain    What to Avoid x 2 weeks:  -Do Not eat foods with sharp edges or crunchy coatings for 2 weeks following surgery; Stick with soft/mushy foods (pasta, mashed potatoes, baked chicken, cooked vegetables, pudding, etc.)  -Do Not drink fluids with red dye since it can look like blood  -Do Not eat or drink anything that is hot or acidic (orange juice, soda, etc.)  -Do Not gargle  -Do Not strain or lift anything heavy  -Do Not take aspirin or blood thinners until instructed to do so by your doctor    When to call the doctor or go to Emergency Room:  -Bright red blood coming from the mouth or nose  -Coughing up dark blood or blood clots  -Shortness of breath  -Persistent nausea/vomiting  -Temperature above 101 F  -Feeling faint or dizzy  -Decreased urine output compared to before surgery     Follow up with your doctor in 2-3 weeks, or as instructed.

## 2024-02-21 NOTE — INTERVAL H&P NOTE
H&P reviewed. After examining the patient I find no changes in the patients condition since the H&P had been written.    Vitals:    02/21/24 1032   BP: 125/59   Pulse: 64   Resp: 22   Temp: (!) 97.3 °F (36.3 °C)   SpO2: 99%     RRR, CTAB, Abd Soft, Ext w/o cyanosis    Plan:

## 2024-02-25 NOTE — ANESTHESIA POSTPROCEDURE EVALUATION
Post-Op Assessment Note                Reason for prolonged intubation > 24 hours:  N/AReason for prolonged intubation > 48 hours:  N/A          BP      Temp      Pulse     Resp      SpO2

## 2024-02-27 ENCOUNTER — TELEPHONE (OUTPATIENT)
Dept: OTOLARYNGOLOGY | Facility: CLINIC | Age: 49
End: 2024-02-27

## 2024-02-27 DIAGNOSIS — J03.01 RECURRENT STREPTOCOCCAL TONSILLITIS: ICD-10-CM

## 2024-02-27 DIAGNOSIS — J02.0 RECURRENT STREPTOCOCCAL PHARYNGITIS: ICD-10-CM

## 2024-02-27 NOTE — TELEPHONE ENCOUNTER
----- Message from Randa Gudino RN sent at 2/27/2024  4:12 PM EST -----  Regarding: RE: refill request/ looks like Dr. Verdin on [place]  Looks like he is on vacation.  Can another provider address it?    ----- Message -----  From: Eleanor Stahl PA-C  Sent: 2/27/2024   4:06 PM EST  To: Susan Giang PA-C; Mode Verdin MD; #  Subject: RE: refill request                               Forwarding to Dr. Verdin because this is a narcotic     ----- Message -----  From: Randa Gudino RN  Sent: 2/27/2024   3:23 PM EST  To: Susan Giang PA-C; Eleanor Stahl PA-C; #  Subject: refill request                                   Patient of Dr. Verdin's having severe ear pain following tonsillectomy on 2/21 which motrin is not helping.  I did make aware that it is a referred pain.  Patient is asking if a refill can be made on the pain medication please.

## 2024-02-29 ENCOUNTER — OFFICE VISIT (OUTPATIENT)
Dept: FAMILY MEDICINE CLINIC | Facility: CLINIC | Age: 49
End: 2024-02-29
Payer: COMMERCIAL

## 2024-02-29 VITALS
SYSTOLIC BLOOD PRESSURE: 161 MMHG | DIASTOLIC BLOOD PRESSURE: 82 MMHG | HEIGHT: 65 IN | TEMPERATURE: 96.1 F | BODY MASS INDEX: 30.66 KG/M2 | HEART RATE: 72 BPM | OXYGEN SATURATION: 97 % | WEIGHT: 184 LBS

## 2024-02-29 DIAGNOSIS — G89.18 POSTOPERATIVE PAIN: Primary | ICD-10-CM

## 2024-02-29 DIAGNOSIS — Z90.89 HISTORY OF TONSILLECTOMY: ICD-10-CM

## 2024-02-29 PROCEDURE — 99214 OFFICE O/P EST MOD 30 MIN: CPT | Performed by: PHYSICIAN ASSISTANT

## 2024-02-29 RX ORDER — DEXAMETHASONE 4 MG/1
6 TABLET ORAL
COMMUNITY
Start: 2024-02-28 | End: 2024-03-01

## 2024-02-29 NOTE — PROGRESS NOTES
Name: Jami Gates      : 1975      MRN: [id number]  Encounter Provider: Jerome Berger PA-C  Encounter Date: 2024   Encounter department: [facility]    Assessment & Plan     1. Postoperative pain    2. History of tonsillectomy    Decreased PO intake with poor pain control post op. Discussed the importance of taking her hydrocodone-acetaminophen as long as she is having severe pain and the importance of fluids/PO intake. She has lost 10lbs since surgery. The ear pain is likely 2/2 jaw positioning during surgery, there is no acute ear infection. The throat continues to have a post-operative appearance. Abdominal pain likely 2/2 nsaid use with oral steroids. Stop this combo. Take famotidine. Follow with ENT. Return and ER precautions provided.        Subjective     Pt presents with concerns of throat, eat pain, abdominal pain, decreased PO intake since tonsillectomy . She is having a rough postoperative course. Her pain is not well controlled. She went to the ER on ,  for pain control. She is not taking much PO. She is tearful. ENT sent hydrocodone-acetaminophen to her pharmacy but was not started yet. In the ER she was given oral steroids and she has been taking bottles of liquid motrin. She has a hx of stomach ulcers. She has epigastric pain.       Review of Systems   Constitutional:  Negative for chills, fatigue and fever.   HENT:  Negative for congestion, ear pain, hearing loss, nosebleeds, postnasal drip, rhinorrhea, sinus pressure, sinus pain, sneezing and sore throat.    Eyes:  Negative for pain, discharge, itching and visual disturbance.   Respiratory:  Negative for cough, chest tightness, shortness of breath and wheezing.    Cardiovascular:  Negative for chest pain, palpitations and leg swelling.   Gastrointestinal:  Negative for abdominal pain, blood in stool, constipation, diarrhea, nausea and vomiting.   Genitourinary:  Negative for frequency and  urgency.   Neurological:  Negative for dizziness, light-headedness and numbness.       Past Medical History:   Diagnosis Date   • Abnormal Pap smear of cervix    • Anxiety    • Arthritis    • Asthma    • Depression    • Ear problems    • Fibromyalgia, primary    • Osteoporosis    • Urinary tract infection    • Varicella      Past Surgical History:   Procedure Laterality Date   •  SECTION     • [place] NEUROPLASTY &/TRANSPOS MEDIAN NRV CARPAL TUNNE Right 2/10/2023    Procedure: RELEASE CARPAL TUNNEL;  Surgeon: Loni Chi MD;  [facility];  Service: Orthopedics   • [place] NEUROPLASTY &/TRANSPOSITION ULNAR NERVE ELBOW Right 2/10/2023    Procedure: RELEASE CUBITAL TUNNEL;  Surgeon: Loni Chi MD;  [facility]: [facility];  Service: Orthopedics   • MA TONSILLECTOMY PRIMARY/SECONDARY AGE 14/> N/A 2024    Procedure: TONSILLECTOMY;  Surgeon: Mode Verdin MD;  Location: [facility];  Service: ENT   • TUBAL LIGATION       Family History   Problem Relation Age of Onset   • Cancer Mother    • Breast cancer Mother         age unknown   • Diabetes Mother    • Coronary artery disease Mother    • Ovarian cancer Mother    • Colon cancer Mother    • Cancer Father    • Prostate cancer Father    • Colon cancer Father    • No Known Problems Sister    • No Known Problems Sister    • No Known Problems Sister    • No Known Problems Sister    • No Known Problems Sister    • Breast cancer Sister    • No Known Problems Daughter    • No Known Problems Daughter    • No Known Problems Son    • No Known Problems Maternal Grandmother    • No Known Problems Paternal Grandmother    • No Known Problems Maternal Aunt    • No Known Problems Maternal Aunt    • No Known Problems Maternal Aunt    • No Known Problems Maternal Aunt    • No Known Problems Maternal Aunt    • No Known Problems Maternal Aunt    • No Known Problems Maternal Aunt    • No Known Problems Maternal Aunt    • No Known Problems Paternal Aunt    • No  Known Problems Paternal Aunt    • No Known Problems Paternal Aunt    • No Known Problems Paternal Aunt    • No Known Problems Paternal Aunt    • No Known Problems Half-Sister    • Breast cancer Half-Sister         age unknown   • No Known Problems Half-Sister      Social History     Socioeconomic History   • Marital status: /Civil Union     Spouse name: None   • Number of children: None   • Years of education: None   • Highest education level: None   Occupational History   • Occupation: homemaker   Tobacco Use   • Smoking status: Former     Current packs/day: 0.00     Average packs/day: 0.5 packs/day for 14.0 years (7.0 ttl pk-yrs)     Types: Cigarettes     Start date: 1/1/2007     Quit date: 1/3/2021     Years since quitting: 3.1   • Smokeless tobacco: Never   • Tobacco comments:     Almost 2 two years wit no smoking  patient report she vapes    Vaping Use   • Vaping status: Some Days   • Substances: Nicotine   Substance and Sexual Activity   • Alcohol use: Not Currently   • Drug use: Not Currently   • Sexual activity: Yes     Partners: Male     Birth control/protection: Female Sterilization   Other Topics Concern   • None   Social History Narrative   • None     Social Determinants of Health     Financial Resource Strain: Not on file   Food Insecurity: Not on file   Transportation Needs: Not on file   Physical Activity: Not on file   Stress: Not on file   Social Connections: Not on file   Intimate Partner Violence: Not on file   Housing Stability: Not on file     Current Outpatient Medications on File Prior to Visit   Medication Sig   • dexamethasone (DECADRON) 4 mg tablet Take 6 mg by mouth daily with breakfast   • albuterol (PROVENTIL HFA,VENTOLIN HFA) 90 mcg/act inhaler Inhale 2 puffs every 6 (six) hours as needed for wheezing (cough)   • ALPRAZolam (XANAX) 0.5 mg tablet Take 1 tablet (0.5 mg total) by mouth 30 min pre-procedure Take 2nd dose 30 mins after 1st dose if still anxious.   • Cholecalciferol  "(Vitamin D3) 25 MCG (1000 UT) CAPS Take 1 capsule by mouth daily   • clobetasol (TEMOVATE) 0.05 % ointment Apply 1 application topically 2 (two) times a day To affected area (Patient taking differently: Apply 1 application. topically 2 (two) times a day To affected area as needed)   • cyanocobalamin (VITAMIN B-12) 100 MCG tablet Take 100 mcg by mouth in the morning   • docusate sodium (COLACE) 100 mg capsule TAKE 1 CAPSULE BY MOUTH TWICE A DAY AS NEEDED FOR CONSTIPATION   • escitalopram (LEXAPRO) 5 mg tablet TAKE 1 TABLET (5 MG TOTAL) BY MOUTH DAILY. (Patient taking differently: Take 5 mg by mouth as needed)   • fluticasone (FLONASE) 50 mcg/act nasal spray SPRAY 1 SPRAY INTO EACH NOSTRIL EVERY DAY (Patient taking differently: 2 sprays as needed)   • HYDROcodone-acetaminophen (HYCET) 7.5-325 MG/15ML solution Take 15 mL by mouth every 6 (six) hours as needed for moderate pain for up to 5 days Max Daily Amount: 60 mL   • methocarbamol (ROBAXIN) 500 mg tablet Take 1 tablet (500 mg total) by mouth 4 (four) times a day as needed for muscle spasms for up to 10 days   • Multiple Vitamin (MULTIVITAMIN) capsule Take 1 capsule by mouth daily   • olopatadine (PATANOL) 0.1 % ophthalmic solution Administer 1 drop to both eyes 2 (two) times a day   • Turmeric 400 MG CAPS Take 1 capsule by mouth in the morning   • Vit C-Cholecalciferol-Alessandra Hip (Vitamin C & D3/Alessandra Hips) 500-1000-20 MG-UNIT-MG CAPS Take 1 capsule by mouth in the morning     Allergies   Allergen Reactions   • Tilactase GI Intolerance and Vomiting   • Pollen Extract Other (See Comments)     Seasonal allergies.     Immunization History   Administered Date(s) Administered   • Pneumococcal Conjugate Vaccine 20-valent (Pcv20), Polysace 07/07/2022   • Pneumococcal Polysaccharide PPV23 01/15/2020   • Tdap 10/05/2016       Objective     /82   Pulse 72   Temp (!) 96.1 °F (35.6 °C)   Ht 5' 5\" (1.651 m)   Wt 83.5 kg (184 lb)   LMP 12/11/2022 (Exact Date)   SpO2 97% "   BMI 30.62 kg/m²     Physical Exam  Vitals and nursing note reviewed.   Constitutional:       Appearance: Normal appearance.   HENT:      Head: Normocephalic and atraumatic.      Right Ear: Tympanic membrane and external ear normal.      Left Ear: Tympanic membrane and external ear normal.      Nose: Nose normal.      Mouth/Throat:      Mouth: Mucous membranes are moist.      Pharynx: Oropharynx is clear.      Comments: Postoperative changes of recent tonsillectomy without evidence of complication such as infection, abscess  Pulmonary:      Effort: Pulmonary effort is normal.      Breath sounds: Normal breath sounds.   Abdominal:      General: Abdomen is flat. Bowel sounds are normal.      Palpations: Abdomen is soft.      Tenderness: There is no abdominal tenderness. There is no guarding.   Musculoskeletal:      Cervical back: Normal range of motion and neck supple.   Skin:     General: Skin is warm and dry.   Neurological:      Mental Status: She is alert and oriented to person, place, and time.       Jerome Berger PA-C

## 2024-04-15 ENCOUNTER — TELEPHONE (OUTPATIENT)
Dept: HEMATOLOGY ONCOLOGY | Facility: CLINIC | Age: 49
End: 2024-04-15

## 2024-04-15 NOTE — TELEPHONE ENCOUNTER
Appointment Change  Cancel, Reschedule, Change to Virtual      Who are you speaking with? Patient   If it is not the patient, is the caller listed on the communication consent form? N/A   Which provider is the appointment scheduled with? Dr. Chaidez   When was the original appointment scheduled?    Please list date and time 4/17/24 @ 3pm   At which location is the appointment scheduled to take place? [place] ([facility])   Was the appointment rescheduled?     Was the appointment changed from an in person visit to a virtual visit?    If so, please list the details of the change. Yes 5/8/24 @ 9am   What is the reason for the appointment change? Patient has another appointment on the same date       Was STAR transport scheduled? no   Does STAR transport need to be scheduled for the new visit (if applicable) no   Does the patient need an infusion appointment rescheduled? no   Does the patient have an upcoming infusion appointment scheduled? If so, when? no   Is the patient undergoing chemotherapy? no   For appointments cancelled with less than 24 hours:  Was the no-show policy reviewed? yes

## 2024-04-17 ENCOUNTER — OFFICE VISIT (OUTPATIENT)
Dept: NEUROLOGY | Facility: CLINIC | Age: 49
End: 2024-04-17
Payer: COMMERCIAL

## 2024-04-17 VITALS
DIASTOLIC BLOOD PRESSURE: 80 MMHG | SYSTOLIC BLOOD PRESSURE: 110 MMHG | BODY MASS INDEX: 31.99 KG/M2 | HEIGHT: 65 IN | WEIGHT: 192 LBS

## 2024-04-17 DIAGNOSIS — E66.3 OVERWEIGHT: ICD-10-CM

## 2024-04-17 DIAGNOSIS — M54.16 LUMBAR RADICULOPATHY: Primary | ICD-10-CM

## 2024-04-17 DIAGNOSIS — R20.0 NUMBNESS: ICD-10-CM

## 2024-04-17 PROCEDURE — 99214 OFFICE O/P EST MOD 30 MIN: CPT | Performed by: PSYCHIATRY & NEUROLOGY

## 2024-04-17 NOTE — PROGRESS NOTES
Jami Gates is a 49 y.o. female.   Chief Complaint   Patient presents with    Numbness       Assessment:  1. Lumbar radiculopathy    2. Numbness    3. Overweight          Plan:  Patient's MRI of the brain results from 12/13/2023 were reviewed with her she does have some minimal white matter disease likely chronic microangiopathy, MRI of the C-spine results from 1/15/2024 showed mild degenerative disc disease resulting in minimal to mild stenosis, EMG of her right upper and right lower extremity from 12/28/2023 was reported as normal.    She had an MRI of the lumbar spine and right hip that was ordered by Dr. Mcnally from 2/19/2024 and was reported as a small central disc herniation at L5-S1 and mild to moderate bilateral hip osteoarthrosis which may be sequelae of bilateral cam type femoral acetabular impingement and degenerative tear of the right superior acetabulum.    Patient's symptoms are mostly confined to the left groin I did advise the patient to follow-up with Dr. Mcnally regarding that she has not tolerated gabapentin in the past secondary to side effects she has been using methocarbamol as needed I have advised her to lose weight, continue follow-up with her rheumatologist regarding her fibromyalgia and to keep her blood pressure cholesterol and sugar under control she is going for weight management to go to the hospital if has any worsening symptoms and call me otherwise to see me back in 6 months or sooner if needed and follow-up with her other physicians.          Subjective:    HPI   Patient is here in follow-up for numbness on and off mostly confined to the ulnar aspect of the right hand and into the left foot she denies any numbness on the face, she has a history of right sided ulnar nerve surgery done couple of years ago and recently had an EMG that was unremarkable she also has low back pain and is in follow-up with Dr. Mcnally and also complains of some left-sided groin pain she tells me  "that she was recently in [place] for a month and did not have any symptoms at all, her low back pain can be anywhere between 3-10 on 10 especially the left groin pain denies any focal weakness no bowel and bladder incontinence she has not had any episodes of loss of vision in the eyes, she is trying to lose weight and has an appointment for weight management program, she has not tolerated gabapentin in the past, she also has history of anxiety and fibromyalgia and is on Lexapro and does follow-up with rheumatology and family physician, no other complaints.    Vitals:    04/17/24 1141   BP: 110/80   BP Location: Right arm   Patient Position: Sitting   Cuff Size: Large   Height: 5' 5\" (1.651 m)       Current Medications    Current Outpatient Medications:     albuterol (PROVENTIL HFA,VENTOLIN HFA) 90 mcg/act inhaler, Inhale 2 puffs every 6 (six) hours as needed for wheezing (cough), Disp: 18 g, Rfl: 3    Cholecalciferol (Vitamin D3) 25 MCG (1000 UT) CAPS, Take 1 capsule by mouth daily, Disp: , Rfl:     clobetasol (TEMOVATE) 0.05 % ointment, Apply 1 application topically 2 (two) times a day To affected area (Patient taking differently: Apply 1 application. topically 2 (two) times a day To affected area as needed), Disp: 30 g, Rfl: 0    cyanocobalamin (VITAMIN B-12) 100 MCG tablet, Take 100 mcg by mouth in the morning, Disp: , Rfl:     docusate sodium (COLACE) 100 mg capsule, TAKE 1 CAPSULE BY MOUTH TWICE A DAY AS NEEDED FOR CONSTIPATION, Disp: 60 capsule, Rfl: 1    escitalopram (LEXAPRO) 5 mg tablet, TAKE 1 TABLET (5 MG TOTAL) BY MOUTH DAILY. (Patient taking differently: Take 5 mg by mouth as needed), Disp: 30 tablet, Rfl: 1    fluticasone (FLONASE) 50 mcg/act nasal spray, SPRAY 1 SPRAY INTO EACH NOSTRIL EVERY DAY (Patient taking differently: 2 sprays as needed), Disp: 16 mL, Rfl: 1    methocarbamol (ROBAXIN) 500 mg tablet, Take 1 tablet (500 mg total) by mouth 4 (four) times a day as needed for muscle spasms for up " to 10 days, Disp: 40 tablet, Rfl: 0    Multiple Vitamin (MULTIVITAMIN) capsule, Take 1 capsule by mouth daily, Disp: , Rfl:     olopatadine (PATANOL) 0.1 % ophthalmic solution, Administer 1 drop to both eyes 2 (two) times a day, Disp: 5 mL, Rfl: 3    Turmeric 400 MG CAPS, Take 1 capsule by mouth in the morning, Disp: , Rfl:     Vit C-Cholecalciferol-Alessandra Hip (Vitamin C & D3/Alessandra Hips) 500-1000-20 MG-UNIT-MG CAPS, Take 1 capsule by mouth in the morning, Disp: , Rfl:     ALPRAZolam (XANAX) 0.5 mg tablet, Take 1 tablet (0.5 mg total) by mouth 30 min pre-procedure Take 2nd dose 30 mins after 1st dose if still anxious. (Patient not taking: Reported on 2024), Disp: 2 tablet, Rfl: 0      Allergies  Tilactase and Pollen extract    Past Medical History  Past Medical History:   Diagnosis Date    Abnormal Pap smear of cervix     Anxiety     Arthritis     Asthma     Depression     Ear problems     Fibromyalgia, primary     Osteoporosis     Urinary tract infection     Varicella          Past Surgical History:  Past Surgical History:   Procedure Laterality Date     SECTION      [place] NEUROPLASTY &/TRANSPOS MEDIAN NRV CARPAL TUNNE Right 2/10/2023    Procedure: RELEASE CARPAL TUNNEL;  Surgeon: Loni Chi MD;  [facility];  Service: Orthopedics    [place] NEUROPLASTY &/TRANSPOSITION ULNAR NERVE ELBOW Right 2/10/2023    Procedure: RELEASE CUBITAL TUNNEL;  Surgeon: Loni Chi MD;  [facility];  Service: Orthopedics    [place] TONSILLECTOMY PRIMARY/SECONDARY AGE 14/> N/A 2024    Procedure: TONSILLECTOMY;  Surgeon: Mode Verdin MD;  [facility];  Service: ENT    TUBAL LIGATION           Family History:  Family History   Problem Relation Age of Onset    Cancer Mother     Breast cancer Mother         age unknown    Diabetes Mother     Coronary artery disease Mother     Ovarian cancer Mother     Colon cancer Mother     Cancer Father     Prostate cancer Father     Colon cancer Father      No Known Problems Sister     No Known Problems Sister     No Known Problems Sister     No Known Problems Sister     No Known Problems Sister     Breast cancer Sister     No Known Problems Daughter     No Known Problems Daughter     No Known Problems Son     No Known Problems Maternal Grandmother     No Known Problems Paternal Grandmother     No Known Problems Maternal Aunt     No Known Problems Maternal Aunt     No Known Problems Maternal Aunt     No Known Problems Maternal Aunt     No Known Problems Maternal Aunt     No Known Problems Maternal Aunt     No Known Problems Maternal Aunt     No Known Problems Maternal Aunt     No Known Problems Paternal Aunt     No Known Problems Paternal Aunt     No Known Problems Paternal Aunt     No Known Problems Paternal Aunt     No Known Problems Paternal Aunt     No Known Problems Half-Sister     Breast cancer Half-Sister         age unknown    No Known Problems Half-Sister        Social History:   reports that she quit smoking about 3 years ago. Her smoking use included cigarettes. She started smoking about 17 years ago. She has a 7 pack-year smoking history. She has never used smokeless tobacco. She reports that she does not currently use alcohol. She reports that she does not currently use drugs.    I have reviewed the past medical history, surgical history, social and family history, current medications, allergies vitals, review of systems, and updated this information as appropriate today.   Objective:    Physical Exam    Neurological Exam   GENERAL:  Cooperative in no acute distress. Well-developed and well-nourished     HEAD and NECK   Head is atraumatic normocephalic with no lesions or masses. Neck is supple with full range of motion     CARDIOVASCULAR  Carotid Arteries-no carotid bruits.     NEUROLOGIC:  Mental Status-the patient is awake alert and oriented without aphasia or apraxia  Cranial Nerves: Visual fields are full to confrontation.  Extraocular movements are  full without nystagmus. Pupils are 2-1/2 mm and reactive. Face is symmetrical to light touch. Movements of facial expression move symmetrically. Hearing is normal to finger rub bilaterally. Soft palate lifts symmetrically. Shoulder shrug is symmetrical. Tongue is midline without atrophy.  Motor: No drift is noted on arm extension. Strength is full in the upper and lower extremities with normal bulk and tone.  Gait is unremarkable.  Paraspinal muscle tenderness in the lumbar area, no sensory level.    ROS:  Review of Systems   Constitutional:  Negative for appetite change, fatigue and fever.   HENT: Negative.  Negative for hearing loss, tinnitus, trouble swallowing and voice change.    Eyes: Negative.  Negative for photophobia, pain and visual disturbance.   Respiratory: Negative.  Negative for shortness of breath.    Cardiovascular: Negative.  Negative for palpitations.   Gastrointestinal: Negative.  Negative for nausea and vomiting.   Endocrine: Negative.  Negative for cold intolerance.   Genitourinary: Negative.  Negative for dysuria, frequency and urgency.   Musculoskeletal:  Negative for back pain, gait problem, myalgias, neck pain and neck stiffness.   Skin: Negative.  Negative for rash.   Allergic/Immunologic: Negative.    Neurological: Negative.  Negative for dizziness, tremors, seizures, syncope, facial asymmetry, speech difficulty, weakness, light-headedness, numbness and headaches.   Hematological: Negative.  Does not bruise/bleed easily.   Psychiatric/Behavioral: Negative.  Negative for confusion, hallucinations and sleep disturbance.

## 2024-05-02 ENCOUNTER — TELEPHONE (OUTPATIENT)
Dept: HEMATOLOGY ONCOLOGY | Facility: CLINIC | Age: 49
End: 2024-05-02

## 2024-05-02 NOTE — TELEPHONE ENCOUNTER
Appointment Change  Cancel, Reschedule, Change to Virtual      Who are you speaking with? Patient   If it is not the patient, is the caller listed on the communication consent form? N/A   Which provider is the appointment scheduled with? Dr. Chaidez   When was the original appointment scheduled?    Please list date and time 5/8/24 9:00 AM   At which location is the appointment scheduled to take place? [place] ([facility])   Was the appointment rescheduled?     Was the appointment changed from an in person visit to a virtual visit?    If so, please list the details of the change. 5/15/24 11:15 AM   What is the reason for the appointment change? Pt requested change due to scheduling conflict.        Was STAR transport scheduled? No   Does STAR transport need to be scheduled for the new visit (if applicable) No   Does the patient need an infusion appointment rescheduled? No   Does the patient have an upcoming infusion appointment scheduled? If so, when? No   Is the patient undergoing chemotherapy? No   For appointments cancelled with less than 24 hours:  Was the no-show policy reviewed? Yes

## 2024-05-15 ENCOUNTER — TELEPHONE (OUTPATIENT)
Dept: HEMATOLOGY ONCOLOGY | Facility: CLINIC | Age: 49
End: 2024-05-15

## 2024-05-15 NOTE — TELEPHONE ENCOUNTER
Appointment Change  Cancel, Reschedule, Change to Virtual      Who are you speaking with? Patient   If it is not the patient, is the caller listed on the communication consent form? N/A   Which provider is the appointment scheduled with? Dr. Chaidez   When was the original appointment scheduled?    Please list date and time 5/15/24 11:15 AM   At which location is the appointment scheduled to take place? [place] ([facility])   Was the appointment rescheduled?     Was the appointment changed from an in person visit to a virtual visit?    If so, please list the details of the change. 6/5/24 11:15 AM   What is the reason for the appointment change? Pt does not have transportation for her appt today. Pt requested to reschedule for June       Was STAR transport scheduled? No   Does STAR transport need to be scheduled for the new visit (if applicable) No   Does the patient need an infusion appointment rescheduled? No   Does the patient have an upcoming infusion appointment scheduled? If so, when? No   Is the patient undergoing chemotherapy? No   For appointments cancelled with less than 24 hours:  Was the no-show policy reviewed? Yes

## 2024-05-21 ENCOUNTER — TELEPHONE (OUTPATIENT)
Dept: OBGYN CLINIC | Facility: CLINIC | Age: 49
End: 2024-05-21

## 2024-05-21 ENCOUNTER — OFFICE VISIT (OUTPATIENT)
Dept: OBGYN CLINIC | Facility: CLINIC | Age: 49
End: 2024-05-21
Payer: COMMERCIAL

## 2024-05-21 VITALS
HEIGHT: 65 IN | WEIGHT: 195 LBS | DIASTOLIC BLOOD PRESSURE: 83 MMHG | HEART RATE: 78 BPM | SYSTOLIC BLOOD PRESSURE: 119 MMHG | BODY MASS INDEX: 32.49 KG/M2

## 2024-05-21 DIAGNOSIS — M51.26 LUMBAR DISC HERNIATION: Primary | ICD-10-CM

## 2024-05-21 DIAGNOSIS — M25.851 FEMOROACETABULAR IMPINGEMENT OF BOTH HIPS: ICD-10-CM

## 2024-05-21 DIAGNOSIS — M25.852 FEMOROACETABULAR IMPINGEMENT OF BOTH HIPS: ICD-10-CM

## 2024-05-21 DIAGNOSIS — M16.0 ARTHRITIS OF BOTH HIPS: ICD-10-CM

## 2024-05-21 PROCEDURE — 99214 OFFICE O/P EST MOD 30 MIN: CPT | Performed by: FAMILY MEDICINE

## 2024-05-21 NOTE — PROGRESS NOTES
Assessment/Plan:  Assessment & Plan   Diagnoses and all orders for this visit:    Lumbar disc herniation  -     Ambulatory Referral to Chiropractic; Future    Arthritis of both hips  -     Ambulatory Referral to Chiropractic; Future  -     diclofenac sodium (VOLTAREN) 50 mg EC tablet; Take 1 tablet (50 mg total) by mouth 2 (two) times a day    Femoroacetabular impingement of both hips  -     Ambulatory Referral to Chiropractic; Future      49-year-old female with low back and bilateral lower extremity pain many years duration.  Discussed with patient MRI results, impression, and plan.  MRI lumbar spine noted for L5-S1 disc desiccation without loss of disc height, annular bulging with small central disc herniation abutting the ventral aspect of the thecal sac.  MRI of the right hip noted for suggestion of cam morphology with convex border at the anterior margin femoral head and neck and moderate cartilage loss superior aspect, mild to moderate osteoarthritis of the left hip.  On physical exam there is reproduction of left anterior hip/groin pain with FADDIR maneuver.  There is positive FADDIR on the right however less intense.  Clinical impression is that she has symptoms from degenerative changes and impingement of the hip.  She has had extensive formal therapy without improvement.  I advised patient on trial of steroid injection to which she agreed.  I will refer her to my sports medicine colleague for evaluation and consideration of left hip joint ultrasound-guided steroid injection.  In interim we will do trial low-dose NSAID.  She is to take diclofenac 50 mg twice daily with food for 30 days and not to take ibuprofen or Aleve, but may take Tylenol.  She is also interested in chiropractic and she was given referral to chiropractor.  She will follow-up with me 2 weeks after injection at which point she will be reevaluated.            Subjective:   Patient ID: Jami Gates is a 49 y.o. female.  Chief Complaint  "  Patient presents with    Left Hip - Follow-up, Pain    Lower Back - Pain, Follow-up        49-year-old female following for low back and bilateral lower extremity pain many years duration.  She was last seen by me 6 months ago at which point she was referred for MRIs of the lumbar spine and the right hip.  She was given a course of oral steroid for 5 days.  She denies improvement in symptoms since her last visit.  She has pain described as localized to the left anterior hip/groin, worse with twisting, worse with prolonged standing and ambulation, not radiating, and improved with resting.  She has similar symptoms in the right hip however not as intense.  She states that she was in [place] for about 1 month and while she was there she was feeling much better.    Hip Pain  This is a chronic problem. The current episode started more than 1 year ago. The problem occurs daily. The problem has been gradually worsening. Associated symptoms include arthralgias and weakness. Pertinent negatives include no numbness. The symptoms are aggravated by twisting, standing and walking. She has tried rest and position changes (Physical therapy, home exercise, oral steroid) for the symptoms. The treatment provided mild relief.               Review of Systems   Musculoskeletal:  Positive for arthralgias.   Neurological:  Positive for weakness. Negative for numbness.       Objective:  Vitals:    05/21/24 1222   BP: 119/83   Pulse: 78   Weight: 88.5 kg (195 lb)   Height: 5' 5\" (1.651 m)      Right Hip Exam     Muscle Strength   Flexion: 4/5     Tests   FLORENTIN: negative    Comments:  Positive FADDIR      Left Hip Exam     Muscle Strength   Flexion: 4/5     Tests   FLORENTIN: negative    Comments:  Positive FADDIR            Physical Exam  Vitals and nursing note reviewed.   Constitutional:       Appearance: Normal appearance. She is well-developed. She is not ill-appearing or diaphoretic.   HENT:      Head: Normocephalic and atraumatic. "      Right Ear: External ear normal.      Left Ear: External ear normal.   Eyes:      Conjunctiva/sclera: Conjunctivae normal.   Neck:      Trachea: No tracheal deviation.   Cardiovascular:      Rate and Rhythm: Normal rate.   Pulmonary:      Effort: Pulmonary effort is normal. No respiratory distress.   Abdominal:      General: There is no distension.   Skin:     General: Skin is warm and dry.      Coloration: Skin is not jaundiced or pale.   Neurological:      Mental Status: She is alert and oriented to person, place, and time.   Psychiatric:         Mood and Affect: Mood normal.         Behavior: Behavior normal.         Thought Content: Thought content normal.         Judgment: Judgment normal.         I have personally reviewed pertinent films in PACS and my interpretation is  .  L5-S1 small disc herniation.  No appreciable disc space narrowing.  Degenerative changes of both hips joints.  No appreciable AVN or stress fracture.      More than 31 minutes were spent with reviewing patient chart, reviewing and interpreting imaging studies, obtaining history from patient, examining patient, discussing and implementing treatment plan.

## 2024-05-21 NOTE — PATIENT INSTRUCTIONS
Rx: Diclofenac 50 mg  - twice daily  - eat first  - everyday  - 30 days  - no ibuprofen  - no aleve  - tylenol is Ok

## 2024-05-28 ENCOUNTER — OFFICE VISIT (OUTPATIENT)
Dept: FAMILY MEDICINE CLINIC | Facility: CLINIC | Age: 49
End: 2024-05-28
Payer: COMMERCIAL

## 2024-05-28 VITALS
HEIGHT: 65 IN | OXYGEN SATURATION: 98 % | BODY MASS INDEX: 32.22 KG/M2 | HEART RATE: 80 BPM | TEMPERATURE: 98.6 F | SYSTOLIC BLOOD PRESSURE: 122 MMHG | DIASTOLIC BLOOD PRESSURE: 76 MMHG | WEIGHT: 193.4 LBS

## 2024-05-28 DIAGNOSIS — E66.09 CLASS 1 OBESITY DUE TO EXCESS CALORIES WITH SERIOUS COMORBIDITY AND BODY MASS INDEX (BMI) OF 32.0 TO 32.9 IN ADULT: ICD-10-CM

## 2024-05-28 DIAGNOSIS — M25.50 CHRONIC PAIN OF MULTIPLE JOINTS: Primary | ICD-10-CM

## 2024-05-28 DIAGNOSIS — M54.50 RIGHT LOW BACK PAIN, UNSPECIFIED CHRONICITY, UNSPECIFIED WHETHER SCIATICA PRESENT: ICD-10-CM

## 2024-05-28 DIAGNOSIS — Z12.31 ENCOUNTER FOR SCREENING MAMMOGRAM FOR MALIGNANT NEOPLASM OF BREAST: ICD-10-CM

## 2024-05-28 DIAGNOSIS — F41.9 ANXIETY: ICD-10-CM

## 2024-05-28 DIAGNOSIS — G89.29 CHRONIC PAIN OF MULTIPLE JOINTS: Primary | ICD-10-CM

## 2024-05-28 PROCEDURE — 99213 OFFICE O/P EST LOW 20 MIN: CPT | Performed by: PHYSICIAN ASSISTANT

## 2024-05-28 NOTE — PROGRESS NOTES
Ambulatory Visit  Name: Jami Gates      : 1975      MRN: [id number]  Encounter Provider: Jerome Berger PA-C  Encounter Date: 2024   Encounter department: [facility]    Assessment & Plan   1. Chronic pain of multiple joints  2. Encounter for screening mammogram for malignant neoplasm of breast  -     Mammo screening bilateral w 3d & cad; Future  3. Right low back pain, unspecified chronicity, unspecified whether sciatica present  4. Anxiety  5. Class 1 obesity due to excess calories with serious comorbidity and body mass index (BMI) of 32.0 to 32.9 in adult     Handout provided for medical marijuana providers locally. Continue with ortho, PT, weight management. Continue diclofenac, tylenol, tumeric. Follow up prn    History of Present Illness   {Disappearing Hyperlinks I Encounters * My Last Note * Since Last Visit * History :91096}  Pt presents for follow up. She wishes to discuss potential medical marijuana. She is following with orthopedics for chronic hip, back, knee pain. She notes when she was in [facility] she had no pain and also her mood was much better. She was using marijuana there and this did not negatively effect her. She notes once returning home all her chronic sx recurred. Also, she plans to follow up with weight management and recognizes the importance of weight loss in her chronic pain.      Review of Systems   Constitutional:  Negative for chills, fatigue and fever.   HENT:  Negative for congestion, ear pain, hearing loss, nosebleeds, postnasal drip, rhinorrhea, sinus pressure, sinus pain, sneezing and sore throat.    Eyes:  Negative for pain, discharge, itching and visual disturbance.   Respiratory:  Negative for cough, chest tightness, shortness of breath and wheezing.    Cardiovascular:  Negative for chest pain, palpitations and leg swelling.   Gastrointestinal:  Negative for abdominal pain, blood in stool, constipation, diarrhea, nausea and  vomiting.   Genitourinary:  Negative for frequency and urgency.   Musculoskeletal:  Positive for arthralgias, back pain and gait problem.   Neurological:  Negative for dizziness, light-headedness and numbness.   Psychiatric/Behavioral:  The patient is nervous/anxious.      Past Medical History:   Diagnosis Date   • Abnormal Pap smear of cervix    • Anxiety    • Arthritis    • Asthma    • Depression    • Ear problems    • Fibromyalgia, primary    • Osteoporosis    • Urinary tract infection    • Varicella      Past Surgical History:   Procedure Laterality Date   •  SECTION     • [place] NEUROPLASTY &/TRANSPOS MEDIAN NRV CARPAL TUNNE Right 2/10/2023    Procedure: RELEASE CARPAL TUNNEL;  Surgeon: Loni Chi MD;  [facility]: [facility];  Service: Orthopedics   • [place] NEUROPLASTY &/TRANSPOSITION ULNAR NERVE ELBOW Right 2/10/2023    Procedure: RELEASE CUBITAL TUNNEL;  Surgeon: Loni Chi MD;  Location: [facility];  Service: Orthopedics   • [place] TONSILLECTOMY PRIMARY/SECONDARY AGE 14/> N/A 2024    Procedure: TONSILLECTOMY;  Surgeon: Mode Verdin MD;  Location: [facility];  Service: ENT   • TUBAL LIGATION       Family History   Problem Relation Age of Onset   • Cancer Mother    • Breast cancer Mother         age unknown   • Diabetes Mother    • Coronary artery disease Mother    • Ovarian cancer Mother    • Colon cancer Mother    • Cancer Father    • Prostate cancer Father    • Colon cancer Father    • No Known Problems Sister    • No Known Problems Sister    • No Known Problems Sister    • No Known Problems Sister    • No Known Problems Sister    • Breast cancer Sister    • No Known Problems Daughter    • No Known Problems Daughter    • No Known Problems Son    • No Known Problems Maternal Grandmother    • No Known Problems Paternal Grandmother    • No Known Problems Maternal Aunt    • No Known Problems Maternal Aunt    • No Known Problems Maternal Aunt    • No Known Problems Maternal Aunt    • No  Known Problems Maternal Aunt    • No Known Problems Maternal Aunt    • No Known Problems Maternal Aunt    • No Known Problems Maternal Aunt    • No Known Problems Paternal Aunt    • No Known Problems Paternal Aunt    • No Known Problems Paternal Aunt    • No Known Problems Paternal Aunt    • No Known Problems Paternal Aunt    • No Known Problems Half-Sister    • Breast cancer Half-Sister         age unknown   • No Known Problems Half-Sister      Social History     Tobacco Use   • Smoking status: Former     Current packs/day: 0.00     Average packs/day: 0.5 packs/day for 14.0 years (7.0 ttl pk-yrs)     Types: Cigarettes     Start date: 1/1/2007     Quit date: 1/3/2021     Years since quitting: 3.4   • Smokeless tobacco: Never   • Tobacco comments:     Almost 2 two years wit no smoking  patient report she vapes    Vaping Use   • Vaping status: Some Days   • Substances: Nicotine   Substance and Sexual Activity   • Alcohol use: Not Currently   • Drug use: Not Currently   • Sexual activity: Yes     Partners: Male     Birth control/protection: Female Sterilization     Current Outpatient Medications on File Prior to Visit   Medication Sig   • albuterol (PROVENTIL HFA,VENTOLIN HFA) 90 mcg/act inhaler Inhale 2 puffs every 6 (six) hours as needed for wheezing (cough)   • Cholecalciferol (Vitamin D3) 25 MCG (1000 UT) CAPS Take 1 capsule by mouth daily   • clobetasol (TEMOVATE) 0.05 % ointment Apply 1 application topically 2 (two) times a day To affected area (Patient taking differently: Apply 1 application. topically 2 (two) times a day To affected area as needed)   • cyanocobalamin (VITAMIN B-12) 100 MCG tablet Take 100 mcg by mouth in the morning   • diclofenac sodium (VOLTAREN) 50 mg EC tablet Take 1 tablet (50 mg total) by mouth 2 (two) times a day   • docusate sodium (COLACE) 100 mg capsule TAKE 1 CAPSULE BY MOUTH TWICE A DAY AS NEEDED FOR CONSTIPATION   • fluticasone (FLONASE) 50 mcg/act nasal spray SPRAY 1 SPRAY INTO  "EACH NOSTRIL EVERY DAY (Patient taking differently: 2 sprays as needed)   • Multiple Vitamin (MULTIVITAMIN) capsule Take 1 capsule by mouth daily   • olopatadine (PATANOL) 0.1 % ophthalmic solution Administer 1 drop to both eyes 2 (two) times a day   • Turmeric 400 MG CAPS Take 1 capsule by mouth in the morning   • Vit C-Cholecalciferol-Alessandra Hip (Vitamin C & D3/Alessandra Hips) 500-1000-20 MG-UNIT-MG CAPS Take 1 capsule by mouth in the morning   • ALPRAZolam (XANAX) 0.5 mg tablet Take 1 tablet (0.5 mg total) by mouth 30 min pre-procedure Take 2nd dose 30 mins after 1st dose if still anxious. (Patient not taking: Reported on 4/17/2024)   • escitalopram (LEXAPRO) 5 mg tablet TAKE 1 TABLET (5 MG TOTAL) BY MOUTH DAILY. (Patient taking differently: Take 5 mg by mouth as needed)   • methocarbamol (ROBAXIN) 500 mg tablet Take 1 tablet (500 mg total) by mouth 4 (four) times a day as needed for muscle spasms for up to 10 days     Allergies   Allergen Reactions   • Tilactase GI Intolerance and Vomiting   • Pollen Extract Other (See Comments)     Seasonal allergies.     Immunization History   Administered Date(s) Administered   • Pneumococcal Conjugate Vaccine 20-valent (Pcv20), Polysace 07/07/2022   • Pneumococcal Polysaccharide PPV23 01/15/2020   • Tdap 10/05/2016     Objective   {Disappearing Hyperlinks   Review Vitals * Enter New Vitals * Results Review * Labs * Imaging * Cardiology * Procedures * Lung Cancer Screening :97302}  /76   Pulse 80   Temp 98.6 °F (37 °C)   Ht 5' 5\" (1.651 m)   Wt 87.7 kg (193 lb 6.4 oz)   LMP 12/11/2022 (Exact Date)   SpO2 98%   BMI 32.18 kg/m²     Physical Exam  Vitals and nursing note reviewed.   Constitutional:       General: She is not in acute distress.     Appearance: Normal appearance.   Pulmonary:      Effort: Pulmonary effort is normal. No respiratory distress.   Neurological:      Mental Status: She is alert and oriented to person, place, and time.   Psychiatric:         Mood " and Affect: Mood is anxious.         Speech: Speech normal.         Behavior: Behavior normal.       Administrative Statements {Disappearing Hyperlinks I  Level [facility] Service * [id number]}

## 2024-06-03 ENCOUNTER — TELEPHONE (OUTPATIENT)
Age: 49
End: 2024-06-03

## 2024-06-03 ENCOUNTER — NURSE TRIAGE (OUTPATIENT)
Age: 49
End: 2024-06-03

## 2024-06-03 ENCOUNTER — OFFICE VISIT (OUTPATIENT)
Dept: BARIATRICS | Facility: CLINIC | Age: 49
End: 2024-06-03
Payer: COMMERCIAL

## 2024-06-03 ENCOUNTER — TELEPHONE (OUTPATIENT)
Dept: BARIATRICS | Facility: CLINIC | Age: 49
End: 2024-06-03

## 2024-06-03 VITALS
HEIGHT: 65 IN | BODY MASS INDEX: 32.59 KG/M2 | RESPIRATION RATE: 18 BRPM | SYSTOLIC BLOOD PRESSURE: 118 MMHG | WEIGHT: 195.6 LBS | HEART RATE: 76 BPM | OXYGEN SATURATION: 98 % | DIASTOLIC BLOOD PRESSURE: 64 MMHG

## 2024-06-03 DIAGNOSIS — E66.09 CLASS 1 OBESITY DUE TO EXCESS CALORIES WITH SERIOUS COMORBIDITY AND BODY MASS INDEX (BMI) OF 32.0 TO 32.9 IN ADULT: ICD-10-CM

## 2024-06-03 PROBLEM — E66.811 CLASS 1 OBESITY DUE TO EXCESS CALORIES WITH SERIOUS COMORBIDITY AND BODY MASS INDEX (BMI) OF 32.0 TO 32.9 IN ADULT: Status: ACTIVE | Noted: 2024-06-03

## 2024-06-03 PROCEDURE — 99214 OFFICE O/P EST MOD 30 MIN: CPT

## 2024-06-03 NOTE — ASSESSMENT & PLAN NOTE
Discussed options of HealthyCORE, VLCD, and Conservative Program and the role of weight loss medications.   Discussed the importance of making healthy lifestyle changes with anti-obesity medications.   Patient is interested in pursing Conservative Plan and follow up visits with medical weight management provider.    Initial weight loss goal of 5-10% weight loss for improved health  Discussed how weight loss can improve co-morbid conditions and or prevent weight-related complications.   Patient lifestyle habits were reviewed and barriers to weight loss were addressed today.   Discussed with patient portion control as well as maintaining a well balanced diet with adequate protein and fiber throughout the day.    Patient was encouraged to start a regular exercise routine which may involve walking or light strength training for at least 30 minutes   Labs reviewed    Menopause: last LMP 2022.  Anti-obesity medications discussed with patient.  Patient would like to meet with registered dietician for body stats package.  Discussed eating schedule, portions, healthy food alternatives, and calorie tracking.      Daily Calorie Goal: 1,200    Recommendations:  Nutrition:  Don't skip meals, eat at least three times per day.   Pay attention to your body. When you feel like you have enough to eat, stop before feeling full.   Pick lean proteins, low-fat or nonfat options, get plenty of fiber.    Food log to keep track of your daily caloric intake.         *Food log (ie.) [email or web address], [employer].com, [employer].com, [employer].com, etc.

## 2024-06-03 NOTE — TELEPHONE ENCOUNTER
Patient reached out to [facility] and had blood work placed today 6/3/2024 at 2:28 pm. [facility] notes that they contacted the patient making her aware they were ordered.

## 2024-06-03 NOTE — PROGRESS NOTES
Assessment/Plan:     Class 1 obesity due to excess calories with serious comorbidity and body mass index (BMI) of 32.0 to 32.9 in adult  Discussed options of HealthyCORE, VLCD, and Conservative Program and the role of weight loss medications.   Discussed the importance of making healthy lifestyle changes with anti-obesity medications.   Patient is interested in pursing Conservative Plan and follow up visits with medical weight management provider.    Initial weight loss goal of 5-10% weight loss for improved health  Discussed how weight loss can improve co-morbid conditions and or prevent weight-related complications.   Patient lifestyle habits were reviewed and barriers to weight loss were addressed today.   Discussed with patient portion control as well as maintaining a well balanced diet with adequate protein and fiber throughout the day.    Patient was encouraged to start a regular exercise routine which may involve walking or light strength training for at least 30 minutes   Labs reviewed    Menopause: last LMP 2022.  Anti-obesity medications discussed with patient.  Patient would like to meet with registered dietician for body stats package.  Discussed eating schedule, portions, healthy food alternatives, and calorie tracking.      Daily Calorie Goal: 1,200    Recommendations:  Nutrition:  Don't skip meals, eat at least three times per day.   Pay attention to your body. When you feel like you have enough to eat, stop before feeling full.   Pick lean proteins, low-fat or nonfat options, get plenty of fiber.    Food log to keep track of your daily caloric intake.         *Food log (ie.) [email or web address], sparkpeople.com, loseit.com, [employer].com, etc.       Kalani was seen today for follow-up.    Diagnoses and all orders for this visit:    Class 1 obesity due to excess calories with serious comorbidity and body mass index (BMI) of 32.0 to 32.9 in adult  -     Ambulatory Referral to Weight  Management      Total time spent reviewing chart, interviewing patient, examining patient, discussing plan, answering all questions, and documentin minutes with >50% face-to-face time with the patient.    Follow up in approximately 2 months with Non-Surgical Physician/Advanced Practitioner.    Subjective:   Chief Complaint   Patient presents with    Follow-up     Waist: 45 inches       Patient ID: Jami Gates  is a 49 y.o. female with excess weight/obesity here to pursue weight management.  Patient is pursuing Conservative Program.   Most recent notes and records were reviewed.    HPI    Wt Readings from Last 20 Encounters:   24 88.7 kg (195 lb 9.6 oz)   24 87.7 kg (193 lb 6.4 oz)   24 88.5 kg (195 lb)   24 87.1 kg (192 lb)   24 83.5 kg (184 lb)   24 83.5 kg (184 lb)   24 88 kg (194 lb 0.1 oz)   24 88 kg (194 lb)   24 88.9 kg (196 lb)   24 88.1 kg (194 lb 3.2 oz)   24 87.1 kg (192 lb)   23 87.1 kg (192 lb)   23 87.1 kg (192 lb)   23 87.1 kg (192 lb)   23 85.6 kg (188 lb 12.8 oz)   23 87.1 kg (192 lb)   23 85.3 kg (188 lb)   23 85.3 kg (188 lb)   08/10/23 84.4 kg (186 lb)   23 79.8 kg (176 lb)       Patient presents today to medical weight management office for follow up. Patient was last seen by [facility] provider in . She is struggling to loose weight and would like to get back on track with her weight loss. Patient states she has hit menopause and since then its been a struggle to loose weight. She has made dietary lifestyle changes and sometimes she struggles with knowing exactly what she should eat. She denies any cravings, or emotional eating.   once she got her tonsil removed.       Obesity/Excess Weight:  Severity: Severe  Onset:  Menopause    Modifiers: Diet and Exercise  Contributing factors: Poor Food Choices, Lack of knowledge of appropriate lifestyle changes, and Insufficient time to  make appropriate lifestyle changes, Menopause  Associated symptoms: comorbid conditions, fatigue, increased joint pain, and clothes do not fit    Diet recall:  B: oatmeal with strawberries with aguila seeds   S: carrots  L: skip  S: skip  D: 3-7 pm - rice and beans, chicken    Hydration: 4 cups of coffee, ( almond milk and sugar) 12 cups of water daily.    Alcohol: none  Smoking: cigarettes,   Exercise: walk daily 15-30 min   Occupation: remote  Sleep: 6-7 hours    Current weight: 195 lbs  Current BMI: 32.55   Current Waist Measurement: 45 inch   Goal weight: 20-30 pounds lighter        The following portions of the patient's history were reviewed and updated as appropriate: allergies, current medications, past family history, past medical history, past social history, past surgical history, and problem list.    Family History   Problem Relation Age of Onset    Cancer Mother     Breast cancer Mother         age unknown    Diabetes Mother     Coronary artery disease Mother     Ovarian cancer Mother     Colon cancer Mother     Cancer Father     Prostate cancer Father     Colon cancer Father     No Known Problems Sister     No Known Problems Sister     No Known Problems Sister     No Known Problems Sister     No Known Problems Sister     Breast cancer Sister     No Known Problems Daughter     No Known Problems Daughter     No Known Problems Son     No Known Problems Maternal Grandmother     No Known Problems Paternal Grandmother     No Known Problems Maternal Aunt     No Known Problems Maternal Aunt     No Known Problems Maternal Aunt     No Known Problems Maternal Aunt     No Known Problems Maternal Aunt     No Known Problems Maternal Aunt     No Known Problems Maternal Aunt     No Known Problems Maternal Aunt     No Known Problems Paternal Aunt     No Known Problems Paternal Aunt     No Known Problems Paternal Aunt     No Known Problems Paternal Aunt     No Known Problems Paternal Aunt     No Known Problems Half-Sister  "    Breast cancer Half-Sister         age unknown    No Known Problems Half-Sister         Review of Systems   Constitutional:  Negative for activity change, appetite change, chills and fever.   HENT:  Negative for ear pain, sore throat and trouble swallowing.    Eyes:  Negative for pain and visual disturbance.   Respiratory:  Negative for cough, chest tightness and shortness of breath.    Cardiovascular:  Negative for chest pain, palpitations and leg swelling.   Gastrointestinal:  Negative for abdominal pain, constipation, diarrhea, nausea and vomiting.   Genitourinary:  Negative for dysuria and hematuria.   Musculoskeletal:  Negative for arthralgias and back pain.   Skin:  Negative for color change and rash.   Neurological:  Negative for seizures and syncope.   Psychiatric/Behavioral:  Negative for behavioral problems, self-injury, sleep disturbance and suicidal ideas.    All other systems reviewed and are negative.      Objective:  /64 (BP Location: Left arm, Patient Position: Sitting, Cuff Size: Adult)   Pulse 76   Resp 18   Ht 5' 5\" (1.651 m)   Wt 88.7 kg (195 lb 9.6 oz)   LMP 12/11/2022 (Exact Date)   SpO2 98%   BMI 32.55 kg/m²     Physical Exam  Constitutional:       Appearance: Normal appearance. She is obese.   HENT:      Head: Normocephalic and atraumatic.   Cardiovascular:      Rate and Rhythm: Normal rate.   Pulmonary:      Effort: No respiratory distress.   Chest:      Chest wall: No tenderness.   Abdominal:      General: There is distension.      Palpations: Abdomen is soft.   Musculoskeletal:         General: Normal range of motion.      Cervical back: Normal range of motion.   Neurological:      General: No focal deficit present.      Mental Status: She is alert and oriented to person, place, and time.   Psychiatric:         Mood and Affect: Mood normal.         Behavior: Behavior normal.         Thought Content: Thought content normal.         Judgment: Judgment normal.            Labs "   Most recent labs reviewed   Lab Results   Component Value Date    SODIUM 137 02/27/2024    K 4.6 02/27/2024     02/27/2024    CO2 27 02/27/2024    AGAP 9 02/27/2024    BUN 9 02/27/2024    CREATININE 0.63 02/27/2024    GLUC 100 (H) 02/27/2024    GLUF 81 02/08/2024    CALCIUM 10.0 02/27/2024    AST 63 (H) 02/27/2024    ALT 97 (H) 02/27/2024    ALKPHOS 93 02/27/2024    TP 8.4 (H) 02/27/2024    TBILI 0.8 02/27/2024    EGFR 110 02/27/2024     Lab Results   Component Value Date    HGBA1C 5.2 01/16/2020     Lab Results   Component Value Date    GTZ8VVNOAHHY 1.340 05/27/2022     Lab Results   Component Value Date    CHOLESTEROL 192 01/09/2024     Lab Results   Component Value Date    HDL 49 (L) 01/09/2024     Lab Results   Component Value Date    TRIG 106 01/09/2024     Lab Results   Component Value Date    LDLCALC 122 (H) 01/09/2024

## 2024-06-03 NOTE — TELEPHONE ENCOUNTER
Patient of CORI Gibbs.  Patient called regarding lab work.  Patient was just seen in office today, was told needed bloodwork,  went to lab and there is nothing in chart.  Patient would like the orders placed so she can have drawn tomorrow.

## 2024-06-03 NOTE — TELEPHONE ENCOUNTER
Went to see weight management can doctor Celine call her back [phone] concerning last lab results was told that she doesn't quality for weight loss or any medication need to be redrawn again weight management were supposed to put in new order not in her chart.

## 2024-06-07 ENCOUNTER — CONSULT (OUTPATIENT)
Dept: GASTROENTEROLOGY | Facility: CLINIC | Age: 49
End: 2024-06-07
Payer: COMMERCIAL

## 2024-06-07 VITALS
HEART RATE: 70 BPM | DIASTOLIC BLOOD PRESSURE: 68 MMHG | WEIGHT: 190 LBS | OXYGEN SATURATION: 99 % | TEMPERATURE: 97.5 F | SYSTOLIC BLOOD PRESSURE: 112 MMHG | HEIGHT: 65 IN | BODY MASS INDEX: 31.65 KG/M2

## 2024-06-07 DIAGNOSIS — R10.13 EPIGASTRIC PAIN: Primary | ICD-10-CM

## 2024-06-07 DIAGNOSIS — R11.2 NAUSEA AND VOMITING, UNSPECIFIED VOMITING TYPE: ICD-10-CM

## 2024-06-07 DIAGNOSIS — R13.19 ESOPHAGEAL DYSPHAGIA: ICD-10-CM

## 2024-06-07 PROCEDURE — 99214 OFFICE O/P EST MOD 30 MIN: CPT | Performed by: INTERNAL MEDICINE

## 2024-06-07 NOTE — PATIENT INSTRUCTIONS
Scheduled date of EGD(as of today):6/24/24  Physician performing EGD:Bandar  Location of EGD:[place]  Instructions reviewed with patient by:Garret mejia  Clearances:  none

## 2024-06-09 NOTE — PROGRESS NOTES
[facility] - Outpatient Follow-up Note  Jami Gates 49 y.o. female MRN: [id number]  Encounter: [id number]          ASSESSMENT AND PLAN:      1. Epigastric pain  - EGD; Future    2. Nausea and vomiting, unspecified vomiting type  - EGD; Future    3. Esophageal dysphagia    4.  Positive family history for colon cancer, father    5.  Personal history of colon polyp  -Her next colonoscopy is due in 2026  ______________________________________________________________________    SUBJECTIVE: Kalani is a 49-year-old female who comes the office today with a complaint of abdominal pain and heartburn.  She states has been having some difficulty with eating.  She was prescribed diclofenac which she was supposed to take twice a day and she states whenever she is given a new medication she seems to always have gastrointestinal symptoms as a consequence of the new medication.  She also states that she cycles every 6 to 12 months with these recurring episodes of abdominal pain, heartburn, nausea, and vomiting.  She has had the symptoms for the past 6 days.  The symptoms come and go.  She been taking famotidine without any significant improvement.  She is also complaining of dysphagia to solid foods.      She had a visit with Dr. Ma on 11/12/2021 with a chief complaint of a change in bowel habits.  At that time she was asked.  Seeing constipation alternating with diarrhea.  7 months prior to that she had developed COVID.  She was experiencing lower abdominal cramping pain.  There is a positive family history for colon cancer involving her father.  She never had a colonoscopy at that point in time.  A colonoscopy was performed on 12/13/2021.  She had a rectosigmoid polyp that was removed.  There was also a polyp in the transverse colon that was removed.  The polyp in the rectosigmoid junction was hyperplastic.  Polyp in the proximal transverse colon was a serrated polyp most likely  representing an adenoma.  Routine biopsies taken throughout the ileum were normal.  Routine biopsies taken throughout the ascending and descending colon were normal.      REVIEW OF SYSTEMS IS OTHERWISE NEGATIVE.      Historical Information   Past Medical History:   Diagnosis Date    Abnormal Pap smear of cervix     Anxiety     Arthritis     Asthma     Depression     Ear problems     Fibromyalgia, primary     Osteoporosis     Urinary tract infection     Varicella      Past Surgical History:   Procedure Laterality Date     SECTION      AR NEUROPLASTY &/TRANSPOS MEDIAN NRV CARPAL TUNNE Right 2/10/2023    Procedure: RELEASE CARPAL TUNNEL;  Surgeon: Loni Chi MD;  [facility];  Service: Orthopedics    AR NEUROPLASTY &/TRANSPOSITION ULNAR NERVE ELBOW Right 2/10/2023    Procedure: RELEASE CUBITAL TUNNEL;  Surgeon: Loni Chi MD;  [facility];  Service: Orthopedics    AR TONSILLECTOMY PRIMARY/SECONDARY AGE 14/> N/A 2024    Procedure: TONSILLECTOMY;  Surgeon: Mode Verdin MD;  [facility];  Service: ENT    TUBAL LIGATION       Social History   Social History     Substance and Sexual Activity   Alcohol Use Not Currently     Social History     Substance and Sexual Activity   Drug Use Not Currently     Social History     Tobacco Use   Smoking Status Some Days    Current packs/day: 0.00    Average packs/day: 0.5 packs/day for 14.0 years (7.0 ttl pk-yrs)    Types: Cigarettes    Start date: 2007    Last attempt to quit: 1/3/2021    Years since quitting: 3.4   Smokeless Tobacco Never   Tobacco Comments    Almost 2 two years wit no smoking  patient report she vapes      Family History   Problem Relation Age of Onset    Cancer Mother     Breast cancer Mother         age unknown    Diabetes Mother     Coronary artery disease Mother     Ovarian cancer Mother     Colon cancer Mother     Cancer Father     Prostate cancer Father     Colon cancer Father     No Known  "Problems Sister     No Known Problems Sister     No Known Problems Sister     No Known Problems Sister     No Known Problems Sister     Breast cancer Sister     No Known Problems Daughter     No Known Problems Daughter     No Known Problems Son     No Known Problems Maternal Grandmother     No Known Problems Paternal Grandmother     No Known Problems Maternal Aunt     No Known Problems Maternal Aunt     No Known Problems Maternal Aunt     No Known Problems Maternal Aunt     No Known Problems Maternal Aunt     No Known Problems Maternal Aunt     No Known Problems Maternal Aunt     No Known Problems Maternal Aunt     No Known Problems Paternal Aunt     No Known Problems Paternal Aunt     No Known Problems Paternal Aunt     No Known Problems Paternal Aunt     No Known Problems Paternal Aunt     No Known Problems Half-Sister     Breast cancer Half-Sister         age unknown    No Known Problems Half-Sister        Meds/Allergies       Current Outpatient Medications:     albuterol (PROVENTIL HFA,VENTOLIN HFA) 90 mcg/act inhaler    ALPRAZolam (XANAX) 0.5 mg tablet    Cholecalciferol (Vitamin D3) 25 MCG (1000 UT) CAPS    clobetasol (TEMOVATE) 0.05 % ointment    cyanocobalamin (VITAMIN B-12) 100 MCG tablet    diclofenac sodium (VOLTAREN) 50 mg EC tablet    docusate sodium (COLACE) 100 mg capsule    escitalopram (LEXAPRO) 5 mg tablet    fluticasone (FLONASE) 50 mcg/act nasal spray    Multiple Vitamin (MULTIVITAMIN) capsule    olopatadine (PATANOL) 0.1 % ophthalmic solution    Turmeric 400 MG CAPS    Vit C-Cholecalciferol-Alessandra Hip (Vitamin C & D3/Alessandra Hips) 500-1000-20 MG-UNIT-MG CAPS    methocarbamol (ROBAXIN) 500 mg tablet    Allergies   Allergen Reactions    Tilactase GI Intolerance and Vomiting    Pollen Extract Other (See Comments)     Seasonal allergies.           Objective     Blood pressure 112/68, pulse 70, temperature 97.5 °F (36.4 °C), temperature source Tympanic, height 5' 5\" (1.651 m), weight 86.2 kg (190 lb), " last menstrual period 12/11/2022, SpO2 99%, not currently breastfeeding. Body mass index is 31.62 kg/m².      PHYSICAL EXAM:      General Appearance:   Alert, cooperative, no distress   HEENT:   Normocephalic, atraumatic, anicteric.     Neck:  Supple, symmetrical, trachea midline   Lungs:   Clear to auscultation bilaterally; no rales, rhonchi or wheezing; respirations unlabored    Heart::   Regular rate and rhythm; no murmur, rub, or gallop.   Abdomen:   Soft, non-tender, non-distended; normal bowel sounds; no masses, no organomegaly    Genitalia:   Deferred    Rectal:   Deferred    Extremities:  No cyanosis, clubbing or edema    Pulses:  2+ and symmetric    Skin:  No jaundice, rashes, or lesions    Lymph nodes:  No palpable cervical lymphadenopathy        Lab Results:   No visits with results within 1 Day(s) from this visit.   Latest known visit with results is:   Admission on 02/21/2024, Discharged on 02/21/2024   Component Date Value    EXT Preg Test, Ur 02/21/2024 Negative     Control 02/21/2024 Valid          Radiology Results:   No results found.    Answers submitted by the patient for this visit:  Abdominal Pain Questionnaire (Submitted on 6/7/2024)  Chief Complaint: Abdominal pain  Progression since onset: unchanged  Pain location: generalized abdominal region  Pain - numeric: 10/10  Pain quality: burning, cramping, sharp  Radiates to: epigastric region  anorexia: Yes  belching: Yes  constipation: Yes  flatus: Yes  nausea: Yes  vomiting: Yes  Aggravated by: NSAIDs  Relieved by: certain positions, palpation, vomiting  Diagnostic workup: CT scan, lower endoscopy, ultrasound

## 2024-06-10 ENCOUNTER — OFFICE VISIT (OUTPATIENT)
Dept: OBGYN CLINIC | Facility: CLINIC | Age: 49
End: 2024-06-10
Payer: COMMERCIAL

## 2024-06-10 VITALS
TEMPERATURE: 98.1 F | HEIGHT: 65 IN | HEART RATE: 61 BPM | DIASTOLIC BLOOD PRESSURE: 70 MMHG | OXYGEN SATURATION: 99 % | SYSTOLIC BLOOD PRESSURE: 107 MMHG | BODY MASS INDEX: 32.15 KG/M2 | RESPIRATION RATE: 18 BRPM | WEIGHT: 193 LBS

## 2024-06-10 DIAGNOSIS — M16.12 PRIMARY OSTEOARTHRITIS OF ONE HIP, LEFT: Primary | ICD-10-CM

## 2024-06-10 PROCEDURE — 20611 DRAIN/INJ JOINT/BURSA W/US: CPT | Performed by: FAMILY MEDICINE

## 2024-06-10 PROCEDURE — 99213 OFFICE O/P EST LOW 20 MIN: CPT | Performed by: FAMILY MEDICINE

## 2024-06-10 RX ORDER — BUPIVACAINE HYDROCHLORIDE 2.5 MG/ML
8 INJECTION, SOLUTION INFILTRATION; PERINEURAL
Status: COMPLETED | OUTPATIENT
Start: 2024-06-10 | End: 2024-06-10

## 2024-06-10 RX ORDER — TRIAMCINOLONE ACETONIDE 40 MG/ML
40 INJECTION, SUSPENSION INTRA-ARTICULAR; INTRAMUSCULAR
Status: COMPLETED | OUTPATIENT
Start: 2024-06-10 | End: 2024-06-10

## 2024-06-10 RX ADMIN — TRIAMCINOLONE ACETONIDE 40 MG: 40 INJECTION, SUSPENSION INTRA-ARTICULAR; INTRAMUSCULAR at 11:30

## 2024-06-10 RX ADMIN — BUPIVACAINE HYDROCHLORIDE 8 ML: 2.5 INJECTION, SOLUTION INFILTRATION; PERINEURAL at 11:30

## 2024-06-10 NOTE — PROGRESS NOTES
"    No chief complaint on file.      Jami Gates is a 49 y.o. female referred for [facility] Csi for left hip.     The following portions of the patient's history were reviewed and updated as appropriate: allergies, current medications, past family history, past medical history, past social history, past surgical history and problem list.    Occupation:    Review of Systems   Constitutional: Negative for fever.   HENT: Negative for dental problem and headaches.    Eyes: Negative for vision loss.   Respiratory: Negative for cough and shortness of breath.    Cardiovascular: Negative for leg swelling and palpitations.   Gastrointestinal: Negative for constipation and diarrhea.   Genitourinary: Negative for bladder incontinence and difficulty urinating.   Musculoskeletal: Negative for back pain and difficulty walking.   Skin: Negative for rash and ulcer.   Neurological: Negative for dizziness and headaches.   Hem/Lymph/Immuno: Negative for blood clots. Does not bruise/bleed easily.   Psychiatric/Behavioral: Negative for confusion.         Objective:  /70   Pulse 61   Temp 98.1 °F (36.7 °C)   Resp 18   Ht 5' 5\" (1.651 m)   Wt 87.5 kg (193 lb)   LMP 12/11/2022 (Exact Date)   SpO2 99%   BMI 32.12 kg/m²          Assessment/Plan:  1. Primary osteoarthritis of one hip, left    - Large joint arthrocentesis: L hip joint    "

## 2024-06-10 NOTE — PROGRESS NOTES
Large joint arthrocentesis: L hip joint  Universal Protocol:  Consent: Verbal consent obtained.  Risks and benefits: risks, benefits and alternatives were discussed  Consent given by: patient  Patient identity confirmed: verbally with patient  Supporting Documentation  Indications: pain   Procedure Details  Location: hip - L hip joint  Preparation: Patient was prepped and draped in the usual sterile fashion  Needle size: 22 G  Ultrasound guidance: yes  Approach: anterolateral  Medications administered: 8 mL bupivacaine 0.25 %; 40 mg triamcinolone acetonide 40 mg/mL    Patient tolerance: patient tolerated the procedure well with no immediate complications    Risks and benefits of CSI were discussed with patient extensively. Risks were highlighted which included but were not limited to infection, pain, local site swelling, and chance that injection may not be effective. Patient was also counseled regarding glucose elevation days after receiving CSI and to be mindful of diet and check sugars daily. Patient agreeable to proceed with CSI after counseling.     Us images and video clip saved to ge logic machine

## 2024-06-24 ENCOUNTER — ANESTHESIA EVENT (OUTPATIENT)
Dept: GASTROENTEROLOGY | Facility: HOSPITAL | Age: 49
End: 2024-06-24

## 2024-06-24 ENCOUNTER — TREATMENT (OUTPATIENT)
Dept: GASTROENTEROLOGY | Facility: CLINIC | Age: 49
End: 2024-06-24

## 2024-06-24 ENCOUNTER — HOSPITAL ENCOUNTER (OUTPATIENT)
Dept: GASTROENTEROLOGY | Facility: HOSPITAL | Age: 49
Setting detail: OUTPATIENT SURGERY
Discharge: HOME | End: 2024-06-24
Attending: INTERNAL MEDICINE
Payer: COMMERCIAL

## 2024-06-24 ENCOUNTER — ANESTHESIA (OUTPATIENT)
Dept: GASTROENTEROLOGY | Facility: HOSPITAL | Age: 49
End: 2024-06-24

## 2024-06-24 VITALS
TEMPERATURE: 97.5 F | OXYGEN SATURATION: 97 % | BODY MASS INDEX: 32.18 KG/M2 | SYSTOLIC BLOOD PRESSURE: 141 MMHG | WEIGHT: 193.12 LBS | DIASTOLIC BLOOD PRESSURE: 80 MMHG | HEART RATE: 60 BPM | RESPIRATION RATE: 14 BRPM | HEIGHT: 65 IN

## 2024-06-24 DIAGNOSIS — R11.2 NAUSEA AND VOMITING, UNSPECIFIED VOMITING TYPE: ICD-10-CM

## 2024-06-24 DIAGNOSIS — K26.9 DUODENAL ULCER: Primary | ICD-10-CM

## 2024-06-24 DIAGNOSIS — R10.13 EPIGASTRIC PAIN: ICD-10-CM

## 2024-06-24 PROCEDURE — 43239 EGD BIOPSY SINGLE/MULTIPLE: CPT | Performed by: INTERNAL MEDICINE

## 2024-06-24 PROCEDURE — 88305 TISSUE EXAM BY PATHOLOGIST: CPT | Performed by: PATHOLOGY

## 2024-06-24 PROCEDURE — 43248 EGD GUIDE WIRE INSERTION: CPT | Performed by: INTERNAL MEDICINE

## 2024-06-24 RX ORDER — SODIUM CHLORIDE, SODIUM LACTATE, POTASSIUM CHLORIDE, CALCIUM CHLORIDE 600; 310; 30; 20 MG/100ML; MG/100ML; MG/100ML; MG/100ML
INJECTION, SOLUTION INTRAVENOUS CONTINUOUS PRN
Status: DISCONTINUED | OUTPATIENT
Start: 2024-06-24 | End: 2024-06-24

## 2024-06-24 RX ORDER — PROPOFOL 10 MG/ML
INJECTION, EMULSION INTRAVENOUS AS NEEDED
Status: DISCONTINUED | OUTPATIENT
Start: 2024-06-24 | End: 2024-06-24

## 2024-06-24 RX ORDER — LIDOCAINE HYDROCHLORIDE 20 MG/ML
INJECTION, SOLUTION EPIDURAL; INFILTRATION; INTRACAUDAL; PERINEURAL AS NEEDED
Status: DISCONTINUED | OUTPATIENT
Start: 2024-06-24 | End: 2024-06-24

## 2024-06-24 RX ORDER — PANTOPRAZOLE SODIUM 40 MG/1
40 TABLET, DELAYED RELEASE ORAL DAILY
Qty: 31 TABLET | Refills: 6 | Status: SHIPPED | OUTPATIENT
Start: 2024-06-24

## 2024-06-24 RX ADMIN — PROPOFOL 100 MG: 10 INJECTION, EMULSION INTRAVENOUS at 08:13

## 2024-06-24 RX ADMIN — SODIUM CHLORIDE, SODIUM LACTATE, POTASSIUM CHLORIDE, AND CALCIUM CHLORIDE: .6; .31; .03; .02 INJECTION, SOLUTION INTRAVENOUS at 08:13

## 2024-06-24 RX ADMIN — PROPOFOL 50 MG: 10 INJECTION, EMULSION INTRAVENOUS at 08:17

## 2024-06-24 RX ADMIN — LIDOCAINE HYDROCHLORIDE 100 MG: 20 INJECTION, SOLUTION EPIDURAL; INFILTRATION; INTRACAUDAL; PERINEURAL at 08:13

## 2024-06-24 RX ADMIN — PROPOFOL 50 MG: 10 INJECTION, EMULSION INTRAVENOUS at 08:19

## 2024-06-24 RX ADMIN — PROPOFOL 50 MG: 10 INJECTION, EMULSION INTRAVENOUS at 08:15

## 2024-06-24 NOTE — H&P
History and Physical - [facility]  Jami Gates 49 y.o. female MRN: [id number]      HPI: Jami Gates is a 49 y.o. year old female who presents for dysphagia, epigastric pain, acid reflux      REVIEW OF SYSTEMS: Per the HPI, and otherwise unremarkable.    Historical Information   Past Medical History:   Diagnosis Date    Abnormal Pap smear of cervix     Anxiety     Arthritis     Asthma     Depression     Ear problems     Fibromyalgia, primary     Osteoarthritis 2021    Osteoporosis     Urinary tract infection     Varicella      Past Surgical History:   Procedure Laterality Date     SECTION      TN NEUROPLASTY &/TRANSPOS MEDIAN NRV CARPAL TUNNE Right 2/10/2023    Procedure: RELEASE CARPAL TUNNEL;  Surgeon: Loni Chi MD;  [facility];  Service: Orthopedics    TN NEUROPLASTY &/TRANSPOSITION ULNAR NERVE ELBOW Right 2/10/2023    Procedure: RELEASE CUBITAL TUNNEL;  Surgeon: Loni Chi MD;  [facility];  Service: Orthopedics    TN TONSILLECTOMY PRIMARY/SECONDARY AGE 14/> N/A 2024    Procedure: TONSILLECTOMY;  Surgeon: Mode Verdin MD;  [facility];  Service: ENT    TUBAL LIGATION       Social History   Social History     Substance and Sexual Activity   Alcohol Use Not Currently     Social History     Substance and Sexual Activity   Drug Use Not Currently     Social History     Tobacco Use   Smoking Status Former    Current packs/day: 0.00    Average packs/day: 0.5 packs/day for 14.0 years (7.0 ttl pk-yrs)    Types: Cigarettes    Start date: 2007    Quit date: 1/3/2021    Years since quitting: 3.4   Smokeless Tobacco Never   Tobacco Comments    Almost 2 two years wit no smoking  patient report she vapes      Family History   Problem Relation Age of Onset    Cancer Mother     Breast cancer Mother         age unknown    Diabetes Mother     Coronary artery disease Mother     Ovarian cancer Mother     Colon cancer Mother   "   Cancer Father     Prostate cancer Father     Colon cancer Father     No Known Problems Sister     No Known Problems Sister     No Known Problems Sister     No Known Problems Sister     No Known Problems Sister     Breast cancer Sister     No Known Problems Daughter     No Known Problems Daughter     No Known Problems Son     No Known Problems Maternal Grandmother     No Known Problems Paternal Grandmother     No Known Problems Maternal Aunt     No Known Problems Maternal Aunt     No Known Problems Maternal Aunt     No Known Problems Maternal Aunt     No Known Problems Maternal Aunt     No Known Problems Maternal Aunt     No Known Problems Maternal Aunt     No Known Problems Maternal Aunt     No Known Problems Paternal Aunt     No Known Problems Paternal Aunt     No Known Problems Paternal Aunt     No Known Problems Paternal Aunt     No Known Problems Paternal Aunt     No Known Problems Half-Sister     Breast cancer Half-Sister         age unknown    No Known Problems Half-Sister        Meds/Allergies     Not in a hospital admission.    Allergies   Allergen Reactions    Tilactase GI Intolerance and Vomiting    Pollen Extract Other (See Comments)     Seasonal allergies.       Objective     Blood pressure 130/62, pulse 71, temperature 97.5 °F (36.4 °C), temperature source Temporal, resp. rate 20, height 5' 5\" (1.651 m), weight 87.6 kg (193 lb 2 oz), last menstrual period 12/11/2022, SpO2 100%, not currently breastfeeding.      PHYSICAL EXAM    Gen: NAD  CV: RRR  CHEST: Clear  ABD: soft, NT/ND  EXT: no edema      ASSESSMENT/PLAN:  This is a 49 y.o. year old female here for egd with dilation, and she is stable and optimized for her procedure.          "

## 2024-06-24 NOTE — ANESTHESIA POSTPROCEDURE EVALUATION
Post-Op Assessment Note    CV Status:  Stable    Pain management: adequate       Mental Status:  Alert and awake   Hydration Status:  Euvolemic   PONV Controlled:  Controlled   Airway Patency:  Patent     Post Op Vitals Reviewed: Yes    No anethesia notable event occurred.    Staff: SHIRLEY               /60 (06/24/24 0828)    Temp 97.5 °F (36.4 °C) (06/24/24 0828)    Pulse 64 (06/24/24 0828)   Resp 20 (06/24/24 0828)    SpO2 100 % (06/24/24 0828)

## 2024-06-24 NOTE — ANESTHESIA PREPROCEDURE EVALUATION
Procedure:  EGD    Relevant Problems   MUSCULOSKELETAL   (+) Fibromyalgia   (+) Right low back pain      NEURO/PSYCH   (+) Anxiety   (+) Chronic neck pain   (+) Fibromyalgia      PULMONARY   (+) Mild intermittent asthma without complication      Digestive   (+) Mouth sore      Obstetrics/Gynecology   (+) PMB (postmenopausal bleeding)      Neurology/Sleep   (+) Neuropathy   (+) Numbness      Oncology   (+) Family history of ovarian cancer      Orthopedic/Musculoskeletal   (+) Carpal tunnel syndrome on right   (+) Chronic pain of multiple joints   (+) Cubital tunnel syndrome, right      Dermatology   (+) Contact dermatitis      Other   (+) Class 1 obesity due to excess calories with serious comorbidity and body mass index (BMI) of 32.0 to 32.9 in adult   (+) History of COVID-19        Physical Exam    Airway    Mallampati score: II  TM Distance: >3 FB  Neck ROM: full     Dental       Cardiovascular  Cardiovascular exam normal    Pulmonary  Pulmonary exam normal     Other Findings  post-pubertal.      Anesthesia Plan  ASA Score- 2     Anesthesia Type- IV sedation with anesthesia with ASA Monitors.         Additional Monitors:     Airway Plan:            Plan Factors-Exercise tolerance (METS): >4 METS.    Chart reviewed. EKG reviewed. Imaging results reviewed. Existing labs reviewed. Patient summary reviewed.    Patient is not a current smoker.              Induction- intravenous.    Postoperative Plan-         Informed Consent- Anesthetic plan and risks discussed with patient.  I personally reviewed this patient with the CRNA. Discussed and agreed on the Anesthesia Plan with the CRNA..

## 2024-06-25 ENCOUNTER — APPOINTMENT (OUTPATIENT)
Dept: RADIOLOGY | Facility: CLINIC | Age: 49
End: 2024-06-25
Payer: COMMERCIAL

## 2024-06-25 ENCOUNTER — OFFICE VISIT (OUTPATIENT)
Dept: OBGYN CLINIC | Facility: CLINIC | Age: 49
End: 2024-06-25
Payer: COMMERCIAL

## 2024-06-25 VITALS
HEART RATE: 72 BPM | HEIGHT: 65 IN | BODY MASS INDEX: 32.02 KG/M2 | DIASTOLIC BLOOD PRESSURE: 77 MMHG | SYSTOLIC BLOOD PRESSURE: 123 MMHG | WEIGHT: 192.2 LBS

## 2024-06-25 DIAGNOSIS — M50.30 DEGENERATIVE DISC DISEASE, CERVICAL: ICD-10-CM

## 2024-06-25 DIAGNOSIS — M25.511 RIGHT SHOULDER PAIN, UNSPECIFIED CHRONICITY: ICD-10-CM

## 2024-06-25 DIAGNOSIS — M48.02 FORAMINAL STENOSIS OF CERVICAL REGION: ICD-10-CM

## 2024-06-25 DIAGNOSIS — M50.30 BULGING OF CERVICAL INTERVERTEBRAL DISC: ICD-10-CM

## 2024-06-25 DIAGNOSIS — M62.838 TRAPEZIUS MUSCLE SPASM: ICD-10-CM

## 2024-06-25 DIAGNOSIS — M54.12 RADICULOPATHY, CERVICAL REGION: Primary | ICD-10-CM

## 2024-06-25 PROCEDURE — 73030 X-RAY EXAM OF SHOULDER: CPT

## 2024-06-25 PROCEDURE — 20552 NJX 1/MLT TRIGGER POINT 1/2: CPT | Performed by: FAMILY MEDICINE

## 2024-06-25 PROCEDURE — 99214 OFFICE O/P EST MOD 30 MIN: CPT | Performed by: FAMILY MEDICINE

## 2024-06-25 PROCEDURE — 88305 TISSUE EXAM BY PATHOLOGIST: CPT | Performed by: PATHOLOGY

## 2024-06-25 RX ORDER — TIZANIDINE 4 MG/1
4 TABLET ORAL 2 TIMES DAILY PRN
Qty: 60 TABLET | Refills: 0 | Status: SHIPPED | OUTPATIENT
Start: 2024-06-25

## 2024-06-25 RX ORDER — TRIAMCINOLONE ACETONIDE 40 MG/ML
20 INJECTION, SUSPENSION INTRA-ARTICULAR; INTRAMUSCULAR
Status: COMPLETED | OUTPATIENT
Start: 2024-06-25 | End: 2024-06-25

## 2024-06-25 RX ORDER — BUPIVACAINE HYDROCHLORIDE 2.5 MG/ML
1 INJECTION, SOLUTION INFILTRATION; PERINEURAL
Status: COMPLETED | OUTPATIENT
Start: 2024-06-25 | End: 2024-06-25

## 2024-06-25 RX ADMIN — BUPIVACAINE HYDROCHLORIDE 1 ML: 2.5 INJECTION, SOLUTION INFILTRATION; PERINEURAL at 10:15

## 2024-06-25 RX ADMIN — TRIAMCINOLONE ACETONIDE 20 MG: 40 INJECTION, SUSPENSION INTRA-ARTICULAR; INTRAMUSCULAR at 10:15

## 2024-06-25 NOTE — PROGRESS NOTES
Assessment/Plan:  Assessment & Plan   Diagnoses and all orders for this visit:    Radiculopathy, cervical region  -     Ambulatory Referral to Physical Therapy; Future    Degenerative disc disease, cervical  -     Ambulatory Referral to Physical Therapy; Future    Bulging of cervical intervertebral disc  -     Ambulatory Referral to Physical Therapy; Future    Foraminal stenosis of cervical region  -     Ambulatory Referral to Physical Therapy; Future    Trapezius muscle spasm  -     XR shoulder 2+ vw right; Future  -     tiZANidine (ZANAFLEX) 4 mg tablet; Take 1 tablet (4 mg total) by mouth 2 (two) times a day as needed for muscle spasms  -     Ambulatory Referral to Physical Therapy; Future  -     Inj Trigger Point Single/Multiple      49-year-old right-hand-dominant female with sided neck and right upper shoulder pain 2 weeks duration.  Discussed the patient physical exam, imaging studies, impression, and plan.  MRI cervical spine noted for multilevel disc bulging and uncovertebral hypertrophy with mild central canal and right foraminal narrowing.  Multilevel mild disc height loss.  X-rays right shoulder unremarkable for significant degenerative changes.  Physical exam cervical spine noted for right paraspinal tenderness.  She has limited range of motion with extension and rotating and sidebending to the right.  There is mild pain with axial load.  Spurling's unremarkable.  Right shoulder for moderate tenderness to the upper trapezius.  She has range of motion forward flexion to 160 degrees, abduction 160 degrees, and internal rotation to the sacrum.  She has normal strength in rotator cuff.  Empty can test and Rodriges are unremarkable.  She has normal sensation and radial pulse both upper extremities.  Clinical impression is that she has symptoms from aggravation of cervical spine degenerative changes leading to muscle spasm.  I discussed treatment regimen of trigger point injection and muscle relaxer as well as  "formal therapy.  I administered mixture 1 cc 0.25% bupivacaine and 0.5 cc Kenalog to the right upper trapezius without complication.  She may take tizanidine 4 mg twice daily as needed but not for driving.  She may begin by first taking 1/2 tablet to test her tolerance.  I will refer her to physical therapy which she is to start as soon as possible and do home exercises as directed.  She will follow-up as needed.        Subjective:   Patient ID: Jami Gates is a 49 y.o. female.  Chief Complaint   Patient presents with    Right Shoulder - Pain    Neck - Pain        49-year-old right-hand-dominant female presents for evaluation of right-sided neck and right upper shoulder pain 2 weeks duration.  She denies trauma or inciting event.  Pain described as sudden in onset, localized to the right side of neck and radiating to the right upper shoulder, achy and throbbing, constant, worse with turning her head, associated with limited range of motion, and improved with resting.  She denies numbness or tingling in the upper extremity.  She has been managing symptoms with ice and heat and massage.  She is unable to tolerate NSAIDs due to ulcer.    Neck Pain  This is a new problem. The current episode started 1 to 4 weeks ago. The problem occurs constantly. The problem has been unchanged. Associated symptoms include arthralgias and neck pain. Pertinent negatives include no numbness or weakness. The symptoms are aggravated by twisting and bending. She has tried rest, position changes, ice and heat (Massage) for the symptoms. The treatment provided mild relief.               Review of Systems   Musculoskeletal:  Positive for arthralgias and neck pain.   Neurological:  Negative for weakness and numbness.       Objective:  Vitals:    06/25/24 1028   BP: 123/77   Pulse: 72   Weight: 87.2 kg (192 lb 3.2 oz)   Height: 5' 5\" (1.651 m)      Back Exam     Comments:    Cervical spine  -Right paraspinal tenderness  -Limited range of " motion with extension and rotating and  the right  - Positive axial loading  - Negative Spurling's      Right Hand Exam     Muscle Strength   Wrist extension: 5/5   Wrist flexion: 5/5   : 5/5     Other   Sensation: normal  Pulse: present      Left Hand Exam     Muscle Strength   Wrist extension: 5/5   Wrist flexion: 5/5   :  5/5     Other   Sensation: normal  Pulse: present      Right Elbow Exam     Other   Sensation: normal      Left Elbow Exam     Other   Sensation: normal      Right Shoulder Exam     Tenderness   Right shoulder tenderness location: Upper trapezius.    Range of Motion   Active abduction:  160   Forward flexion:  160   Internal rotation 0 degrees:  Sacrum     Muscle Strength   Abduction: 5/5   Internal rotation: 5/5   External rotation: 5/5   Supraspinatus: 5/5     Tests   Rodriges test: negative    Other   Sensation: normal    Comments:  Negative empty can      Left Shoulder Exam     Other   Sensation: normal           Strength/Myotome Testing     Left Wrist/Hand   Wrist extension: 5  Wrist flexion: 5    Right Wrist/Hand   Wrist extension: 5  Wrist flexion: 5      Physical Exam  Vitals and nursing note reviewed.   Constitutional:       Appearance: Normal appearance. She is well-developed. She is not ill-appearing or diaphoretic.   HENT:      Head: Normocephalic and atraumatic.      Right Ear: External ear normal.      Left Ear: External ear normal.   Eyes:      Conjunctiva/sclera: Conjunctivae normal.   Neck:      Trachea: No tracheal deviation.   Cardiovascular:      Rate and Rhythm: Normal rate.   Pulmonary:      Effort: Pulmonary effort is normal. No respiratory distress.   Abdominal:      General: There is no distension.   Musculoskeletal:         General: Tenderness present.   Skin:     General: Skin is warm and dry.      Coloration: Skin is not jaundiced or pale.   Neurological:      Mental Status: She is alert and oriented to person, place, and time.   Psychiatric:          "Mood and Affect: Mood normal.         Behavior: Behavior normal.         Thought Content: Thought content normal.         Judgment: Judgment normal.         I have personally reviewed pertinent films in PACS and my interpretation is  .  No acute osseous abnormality or significant degenerative change of right shoulder.  Cervical spine multilevel bulging discs.     Universal Protocol:  Consent: Verbal consent obtained.  Risks and benefits: risks, benefits and alternatives were discussed  Consent given by: patient  Time out: Immediately prior to procedure a \"time out\" was called to verify the correct patient, procedure, equipment, support staff and site/side marked as required.  Patient understanding: patient states understanding of the procedure being performed  Patient consent: the patient's understanding of the procedure matches consent given  Procedure consent: procedure consent matches procedure scheduled  Relevant documents: relevant documents present and verified  Test results: test results available and properly labeled  Site marked: the operative site was marked  Radiology Images displayed and confirmed. If images not available, report reviewed: imaging studies available  Required items: required blood products, implants, devices, and special equipment available  Patient identity confirmed: verbally with patient  Supporting Documentation  Indications: pain   Trigger Point Injections: single/multiple trigger point(s): 1-2 muscle groups    Injection site identified by: palpation  Procedure Details  Location(s):    Neck/Upper Back: R upper trapezius     Prep: patient was prepped and draped in usual sterile fashion  Needle size: 27 G  Medications: 1 mL bupivacaine 0.25 %; 20 mg triamcinolone acetonide 40 mg/mL  Patient tolerance: patient tolerated the procedure well with no immediate complications            "

## 2024-07-01 ENCOUNTER — TELEPHONE (OUTPATIENT)
Dept: OBGYN CLINIC | Facility: MEDICAL CENTER | Age: 49
End: 2024-07-01

## 2024-07-01 NOTE — TELEPHONE ENCOUNTER
Caller: Kalani     Doctor: Dr Escalona    Reason for call: She would like to cancel her appointment for today at 3:30 [place]/ Dr Escalona and reschedule. Thank you     [id number] HIP/       Call back#: [phone]

## 2024-07-15 ENCOUNTER — TELEPHONE (OUTPATIENT)
Age: 49
End: 2024-07-15

## 2024-07-15 ENCOUNTER — PROCEDURE VISIT (OUTPATIENT)
Dept: OBGYN CLINIC | Facility: CLINIC | Age: 49
End: 2024-07-15
Payer: COMMERCIAL

## 2024-07-15 VITALS
SYSTOLIC BLOOD PRESSURE: 112 MMHG | TEMPERATURE: 98.5 F | BODY MASS INDEX: 31.82 KG/M2 | RESPIRATION RATE: 18 BRPM | HEIGHT: 65 IN | OXYGEN SATURATION: 98 % | HEART RATE: 71 BPM | DIASTOLIC BLOOD PRESSURE: 76 MMHG | WEIGHT: 191 LBS

## 2024-07-15 DIAGNOSIS — M16.11 PRIMARY OSTEOARTHRITIS OF RIGHT HIP: Primary | ICD-10-CM

## 2024-07-15 PROCEDURE — 20611 DRAIN/INJ JOINT/BURSA W/US: CPT | Performed by: FAMILY MEDICINE

## 2024-07-15 RX ORDER — TRIAMCINOLONE ACETONIDE 40 MG/ML
40 INJECTION, SUSPENSION INTRA-ARTICULAR; INTRAMUSCULAR
Status: COMPLETED | OUTPATIENT
Start: 2024-07-15 | End: 2024-07-15

## 2024-07-15 RX ORDER — BUPIVACAINE HYDROCHLORIDE 2.5 MG/ML
8 INJECTION, SOLUTION INFILTRATION; PERINEURAL
Status: COMPLETED | OUTPATIENT
Start: 2024-07-15 | End: 2024-07-15

## 2024-07-15 RX ADMIN — TRIAMCINOLONE ACETONIDE 40 MG: 40 INJECTION, SUSPENSION INTRA-ARTICULAR; INTRAMUSCULAR at 11:30

## 2024-07-15 RX ADMIN — BUPIVACAINE HYDROCHLORIDE 8 ML: 2.5 INJECTION, SOLUTION INFILTRATION; PERINEURAL at 11:30

## 2024-07-15 NOTE — TELEPHONE ENCOUNTER
Caller: Patient     Doctor: Uli     Reason for call: Patient asked about getting injections on her back    Call back#: [phone]

## 2024-07-15 NOTE — PROGRESS NOTES
Large joint arthrocentesis: R hip joint  Universal Protocol:  Consent: Verbal consent obtained.  Risks and benefits: risks, benefits and alternatives were discussed  Consent given by: patient  Patient identity confirmed: verbally with patient  Supporting Documentation  Indications: pain   Procedure Details  Location: hip - R hip joint  Preparation: Patient was prepped and draped in the usual sterile fashion  Needle size: 22 G  Ultrasound guidance: yes  Approach: anterolateral  Medications administered: 8 mL bupivacaine 0.25 %; 40 mg triamcinolone acetonide 40 mg/mL    Patient tolerance: patient tolerated the procedure well with no immediate complications    Risks and benefits of CSI were discussed with patient extensively. Risks were highlighted which included but were not limited to infection, pain, local site swelling, and chance that injection may not be effective. Patient was also counseled regarding glucose elevation days after receiving CSI and to be mindful of diet and check sugars daily. Patient agreeable to proceed with CSI after counseling.     US images saved to ge logic machine, video clip saved

## 2024-07-17 DIAGNOSIS — M54.12 RADICULOPATHY, CERVICAL REGION: Primary | ICD-10-CM

## 2024-07-17 DIAGNOSIS — M51.26 LUMBAR DISC HERNIATION: ICD-10-CM

## 2024-07-17 DIAGNOSIS — M47.812 FACET ARTHROPATHY, CERVICAL: ICD-10-CM

## 2024-07-17 DIAGNOSIS — M47.816 LUMBAR FACET ARTHROPATHY: ICD-10-CM

## 2024-07-17 DIAGNOSIS — M54.16 RADICULOPATHY, LUMBAR REGION: ICD-10-CM

## 2024-07-17 NOTE — TELEPHONE ENCOUNTER
Caller: Patient      Doctor: Uli      Reason for call: Patient asked about getting injections on her back. Patient calling back again to check on this     Call back#: [phone]

## 2024-07-22 ENCOUNTER — EVALUATION (OUTPATIENT)
Dept: PHYSICAL THERAPY | Facility: CLINIC | Age: 49
End: 2024-07-22
Payer: COMMERCIAL

## 2024-07-22 DIAGNOSIS — M62.838 TRAPEZIUS MUSCLE SPASM: ICD-10-CM

## 2024-07-22 DIAGNOSIS — M54.12 RADICULOPATHY, CERVICAL REGION: ICD-10-CM

## 2024-07-22 DIAGNOSIS — M50.30 DEGENERATIVE DISC DISEASE, CERVICAL: ICD-10-CM

## 2024-07-22 DIAGNOSIS — M50.30 BULGING OF CERVICAL INTERVERTEBRAL DISC: ICD-10-CM

## 2024-07-22 DIAGNOSIS — M48.02 FORAMINAL STENOSIS OF CERVICAL REGION: ICD-10-CM

## 2024-07-22 PROCEDURE — 97161 PT EVAL LOW COMPLEX 20 MIN: CPT | Performed by: PHYSICAL THERAPIST

## 2024-07-22 PROCEDURE — 97014 ELECTRIC STIMULATION THERAPY: CPT | Performed by: PHYSICAL THERAPIST

## 2024-07-22 PROCEDURE — 97140 MANUAL THERAPY 1/> REGIONS: CPT | Performed by: PHYSICAL THERAPIST

## 2024-07-29 ENCOUNTER — APPOINTMENT (OUTPATIENT)
Dept: PHYSICAL THERAPY | Facility: CLINIC | Age: 49
End: 2024-07-29
Payer: COMMERCIAL

## 2024-07-30 ENCOUNTER — APPOINTMENT (OUTPATIENT)
Dept: PHYSICAL THERAPY | Facility: CLINIC | Age: 49
End: 2024-07-30
Payer: COMMERCIAL

## 2024-08-02 ENCOUNTER — OFFICE VISIT (OUTPATIENT)
Dept: PHYSICAL THERAPY | Facility: CLINIC | Age: 49
End: 2024-08-02
Payer: COMMERCIAL

## 2024-08-02 DIAGNOSIS — M54.12 RADICULOPATHY, CERVICAL REGION: Primary | ICD-10-CM

## 2024-08-02 DIAGNOSIS — M50.30 DEGENERATIVE DISC DISEASE, CERVICAL: ICD-10-CM

## 2024-08-02 DIAGNOSIS — M62.838 TRAPEZIUS MUSCLE SPASM: ICD-10-CM

## 2024-08-02 DIAGNOSIS — M50.30 BULGING OF CERVICAL INTERVERTEBRAL DISC: ICD-10-CM

## 2024-08-02 DIAGNOSIS — M48.02 FORAMINAL STENOSIS OF CERVICAL REGION: ICD-10-CM

## 2024-08-02 PROCEDURE — 97014 ELECTRIC STIMULATION THERAPY: CPT

## 2024-08-02 PROCEDURE — 97140 MANUAL THERAPY 1/> REGIONS: CPT

## 2024-08-02 PROCEDURE — 97110 THERAPEUTIC EXERCISES: CPT

## 2024-08-02 NOTE — PROGRESS NOTES
"Daily Note     Today's date: 2024  Patient name: Jami Gates  : 1975  MRN: [id number]  Referring provider: Lars Mcnally*  Dx:   Encounter Diagnosis     ICD-10-CM    1. Radiculopathy, cervical region  M54.12       2. Trapezius muscle spasm  M62.838       3. Degenerative disc disease, cervical  M50.30       4. Bulging of cervical intervertebral disc  M50.30       5. Foraminal stenosis of cervical region  M48.02                      Subjective: pt reports her neck is feeling a little better since previous session.       Objective: See treatment diary below      Assessment: Tolerated treatment well. Patient would benefit from continued PT. Started w/ MH/stim pre tx w/ good tolerance. Decreased tightness in BL UT post STM and gentle traction. Began pt in gentle mobility exercises w/ good tolerance, cueing for sequencing and positioning.       Plan: Continue per plan of care.      Diagnosis: Chronic neck pain    Precautions: Fibromyalgia, anxiety     POC Expires: 24   Re-evaluation Date: 24   FOTO Scores/Date: Goal - 52;  -    Visit Count /10 2/10      Manuals       Cervical traction KD JH      Right UT STM/stretch KD JH              Ther Ex       Seated thoracic extension NV 5\" x10      Cervical ROT w/ towel NV 5\" x10      Scapular retractions NV                                                       Neuro Re-Ed                                                              Ther Act                                                                                 Modalities           IFC/MHP 10 min post tx (try pre-tx NV) 10 min post tx                              "

## 2024-08-05 ENCOUNTER — APPOINTMENT (OUTPATIENT)
Dept: PHYSICAL THERAPY | Facility: CLINIC | Age: 49
End: 2024-08-05
Payer: COMMERCIAL

## 2024-08-05 NOTE — PROGRESS NOTES
"Daily Note     Today's date: 2024  Patient name: Jami Gates  : 1975  MRN: [id number]  Referring provider: Lars Mcnally*  Dx: No diagnosis found.               Subjective: pt reports      Objective: See treatment diary below      Assessment: Tolerated treatment well. Patient would benefit from continued PT.      Plan: Continue per plan of care.      Diagnosis: Chronic neck pain    Precautions: Fibromyalgia, anxiety     POC Expires: 24   Re-evaluation Date: 24   FOTO Scores/Date: Goal - 52;  -    Visit Count 10 2/10 3/10     Manuals      Cervical traction KD       Right UT STM/stretch KD JH              Ther Ex  8     Seated thoracic extension NV 5\" x10      Cervical ROT w/ towel NV 5\" x10      Scapular retractions NV                                                       Neuro Re-Ed  8/                                                            Ther Act                                                                                 Modalities    8       IFC/MHP 10 min post tx (try pre-tx NV) 10 min post tx                                "

## 2024-08-07 ENCOUNTER — TELEPHONE (OUTPATIENT)
Dept: PAIN MEDICINE | Facility: CLINIC | Age: 49
End: 2024-08-07

## 2024-08-12 ENCOUNTER — APPOINTMENT (OUTPATIENT)
Dept: PHYSICAL THERAPY | Facility: CLINIC | Age: 49
End: 2024-08-12
Payer: COMMERCIAL

## 2024-08-13 ENCOUNTER — CONSULT (OUTPATIENT)
Dept: GYNECOLOGIC ONCOLOGY | Facility: CLINIC | Age: 49
End: 2024-08-13
Payer: COMMERCIAL

## 2024-08-13 VITALS
OXYGEN SATURATION: 98 % | BODY MASS INDEX: 31.99 KG/M2 | HEART RATE: 65 BPM | WEIGHT: 192 LBS | SYSTOLIC BLOOD PRESSURE: 120 MMHG | DIASTOLIC BLOOD PRESSURE: 88 MMHG | HEIGHT: 65 IN

## 2024-08-13 DIAGNOSIS — N95.0 PMB (POSTMENOPAUSAL BLEEDING): ICD-10-CM

## 2024-08-13 PROCEDURE — 88305 TISSUE EXAM BY PATHOLOGIST: CPT | Performed by: PATHOLOGY

## 2024-08-13 PROCEDURE — 99244 OFF/OP CNSLTJ NEW/EST MOD 40: CPT | Performed by: OBSTETRICS & GYNECOLOGY

## 2024-08-13 PROCEDURE — 58100 BIOPSY OF UTERUS LINING: CPT | Performed by: OBSTETRICS & GYNECOLOGY

## 2024-08-13 NOTE — PROGRESS NOTES
Assessment/Plan:    Problem List Items Addressed This Visit       PMB (postmenopausal bleeding)     Patient with a history of recurrent postmenopausal bleeding.  Pap smear was unremarkable.  Pipelle biopsy 6 months ago was unremarkable and repeated today.  Ultrasound showed a 5 mm endometrial stripe without any other significant abnormality.  Exam is otherwise unremarkable.    I have recommended MRI of the pelvis to rule out any other abnormality.  Will see the patient back in 2 weeks for biopsy review and MRI review.         Relevant Orders    MRI pelvis female wo and w contrast    Tissue Exam         CHIEF COMPLAINT: Postmenopausal bleeding        Patient ID: Jami Gates is a 49 y.o. adult  Patient presents today with chief complaint of intermittent vaginal bleeding.  She notes this every few months.  She is postmenopausal for approximately 2 years.  At that time she went through menopause with significant hot flashes and night sweats.  Patient had a Pipelle biopsy performed in February 2024.  This revealed no evidence of malignancy or hyperplasia.      Patient had a pelvic ultrasound in September 2023 which revealed the following:  FINDINGS:     UTERUS:  The uterus is anteverted in position, measuring 8.8 x 4.8 x 5.1 cm.  Somewhat heterogeneous uterine echotexture  The cervix appears within normal limits.     ENDOMETRIUM:  The endometrial echo complex has an AP caliber of 5.0 mm.  Its appearance is within normal limits for age and cycle and shows no filling defects.     OVARIES/ADNEXA:  Right ovary:  1.3 x 1.2 x 0.9 cm. 0.7 mL.  Left ovary:  1.8 x 0.8 x 0.8 cm. 0.6 mL.  Flow within the left ovary appears diminished, this is likely secondary to technical factors rather than ovarian torsion, as the ovary is not enlarged.  No suspicious ovarian or adnexal abnormality.     OTHER:  No free fluid or loculated fluid collections.        IMPRESSION:     Somewhat heterogeneous uterine echotexture, which is  nonspecific, but may suggest adenomyosis in the appropriate clinical setting.    Patient noted onset of heavy vaginal bleeding like a period approximately 2 weeks ago.  She had some vulvar itching but this is improved with change of soap and underwear.  She describes a family history of ovary and uterine cancer but herself has been genetic tested and reports no significant abnormalities.    Patient now presents for evaluation of vaginal bleeding.Today, the patient is doing well.  She denies significant abdominal pain, pelvic pain, nausea, vomiting, constipation, diarrhea, fevers, chills,              The following portions of the patient's history were reviewed and updated as appropriate: allergies, current medications, past family history, past medical history, past social history, past surgical history, and problem list.    Review of Systems   Constitutional: Negative.    HENT: Negative.     Eyes: Negative.    Respiratory: Negative.     Cardiovascular: Negative.    Gastrointestinal: Negative.    Endocrine: Negative.    Genitourinary:  Positive for vaginal bleeding.   Musculoskeletal: Negative.    Skin: Negative.    Neurological: Negative.    Hematological: Negative.    Psychiatric/Behavioral: Negative.         Current Outpatient Medications   Medication Sig Dispense Refill    albuterol (PROVENTIL HFA,VENTOLIN HFA) 90 mcg/act inhaler Inhale 2 puffs every 6 (six) hours as needed for wheezing (cough) (Patient taking differently: Inhale 2 puffs every 6 (six) hours as needed for wheezing (cough) prn) 18 g 3    ALPRAZolam (XANAX) 0.5 mg tablet Take 1 tablet (0.5 mg total) by mouth 30 min pre-procedure Take 2nd dose 30 mins after 1st dose if still anxious. (Patient taking differently: Take 0.5 mg by mouth 30 min pre-procedure Take 2nd dose 30 mins after 1st dose if still anxious. prn) 2 tablet 0    Cholecalciferol (Vitamin D3) 25 MCG (1000 UT) CAPS Take 1 capsule by mouth daily      clobetasol (TEMOVATE) 0.05 % ointment  "Apply 1 application topically 2 (two) times a day To affected area 30 g 0    cyanocobalamin (VITAMIN B-12) 100 MCG tablet Take 100 mcg by mouth in the morning      docusate sodium (COLACE) 100 mg capsule TAKE 1 CAPSULE BY MOUTH TWICE A DAY AS NEEDED FOR CONSTIPATION (Patient taking differently: prn) 60 capsule 1    escitalopram (LEXAPRO) 5 mg tablet TAKE 1 TABLET (5 MG TOTAL) BY MOUTH DAILY. (Patient taking differently: Take 5 mg by mouth as needed) 30 tablet 1    fluticasone (FLONASE) 50 mcg/act nasal spray SPRAY 1 SPRAY INTO EACH NOSTRIL EVERY DAY (Patient taking differently: 2 sprays as needed) 16 mL 1    methocarbamol (ROBAXIN) 500 mg tablet Take 1 tablet (500 mg total) by mouth 4 (four) times a day as needed for muscle spasms for up to 10 days 40 tablet 0    Multiple Vitamin (MULTIVITAMIN) capsule Take 1 capsule by mouth daily      olopatadine (PATANOL) 0.1 % ophthalmic solution Administer 1 drop to both eyes 2 (two) times a day 5 mL 3    pantoprazole (PROTONIX) 40 mg tablet Take 1 tablet (40 mg total) by mouth daily 31 tablet 6    tiZANidine (ZANAFLEX) 4 mg tablet Take 1 tablet (4 mg total) by mouth 2 (two) times a day as needed for muscle spasms 60 tablet 0    Turmeric 400 MG CAPS Take 1 capsule by mouth in the morning      Vit C-Cholecalciferol-Alessandra Hip (Vitamin C & D3/Alessandra Hips) 500-1000-20 MG-UNIT-MG CAPS Take 1 capsule by mouth in the morning       No current facility-administered medications for this visit.           Objective:    Blood pressure 120/88, pulse 65, height 5' 5\" (1.651 m), weight 87.1 kg (192 lb), last menstrual period 12/11/2022, SpO2 98%, not currently breastfeeding.  Body mass index is 31.95 kg/m².  Body surface area is 1.94 meters squared.    Physical Exam  Constitutional:       Appearance: She is well-developed.   HENT:      Head: Normocephalic and atraumatic.   Eyes:      Pupils: Pupils are equal, round, and reactive to light.   Cardiovascular:      Rate and Rhythm: Normal rate and " "regular rhythm.      Heart sounds: Normal heart sounds.   Pulmonary:      Effort: Pulmonary effort is normal. No respiratory distress.      Breath sounds: Normal breath sounds.   Abdominal:      General: Bowel sounds are normal. There is no distension.      Palpations: Abdomen is soft. Abdomen is not rigid.      Tenderness: There is no abdominal tenderness. There is no guarding or rebound.   Genitourinary:     Comments: -Normal external female genitalia, normal Bartholin's and [place]'s glands                  -Normal midline urethral meatus. No lesions notes                  -Bladder without fullness mass or tenderness                  -Vagina without lesion or discharge No significant cystocele or rectocele noted                  -Cervix normal appearing without visible lesions multiple nabothian cysts small.                  -Uterus with normal contour, mobility. No tenderness,                  -Adnexae without  mass or tenderness                  - Anus without fissure of lesion    Musculoskeletal:         General: Normal range of motion.      Cervical back: Normal range of motion and neck supple.   Lymphadenopathy:      Cervical: No cervical adenopathy.      Upper Body:      Right upper body: No supraclavicular adenopathy.      Left upper body: No supraclavicular adenopathy.   Skin:     General: Skin is warm and dry.   Neurological:      Mental Status: She is alert and oriented to person, place, and time.   Psychiatric:         Behavior: Behavior normal.         No results found for: \"\"  Lab Results   Component Value Date    K 4.6 02/27/2024     02/27/2024    CO2 27 02/27/2024    BUN 9 02/27/2024    CREATININE 0.63 02/27/2024    GLUF 81 02/08/2024    CALCIUM 10.0 02/27/2024    AST 63 (H) 02/27/2024    ALT 97 (H) 02/27/2024    ALKPHOS 93 02/27/2024    EGFR 110 02/27/2024     Lab Results   Component Value Date    WBC 10.30 (H) 02/08/2024    HGB 12.6 02/08/2024    HCT 38.7 02/08/2024    MCV 85 02/08/2024 "     02/08/2024     Lab Results   Component Value Date    NEUTROABS 4.86 02/08/2024       Endometrial biopsy    Date/Time: 8/13/2024 11:15 AM    Performed by: Scott Chaidez MD  Authorized by: Scott Chaidez MD  Universal Protocol:  procedure performed by consultantConsent: Verbal consent obtained.  Patient understanding: patient states understanding of the procedure being performed  Patient identity confirmed: verbally with patient    Indication:     Indications: Post-menopausal bleeding      Chronicity of post-menopausal bleeding:  Recurrent    Progression of post-menopausal bleeding:  Waxing and waning  Procedure:     Procedure: endometrial biopsy with Pipelle      A bivalve speculum was placed in the vagina: yes      Cervix cleaned and prepped: yes      The cervix was dilated: no      Uterus sounded: yes      Uterus sound depth (cm):  7    Specimen collected: specimen collected and sent to pathology    Findings:     Uterus size:  Non-gravid  Comments:     Procedure comments:  Pipelle biopsy performed after cleansing cervix with Betadine.  Single pass performed due to significant pain.

## 2024-08-13 NOTE — ASSESSMENT & PLAN NOTE
Patient with a history of recurrent postmenopausal bleeding.  Pap smear was unremarkable.  Pipelle biopsy 6 months ago was unremarkable and repeated today.  Ultrasound showed a 5 mm endometrial stripe without any other significant abnormality.  Exam is otherwise unremarkable.    I have recommended MRI of the pelvis to rule out any other abnormality.  Will see the patient back in 2 weeks for biopsy review and MRI review.

## 2024-08-15 ENCOUNTER — APPOINTMENT (OUTPATIENT)
Dept: PHYSICAL THERAPY | Facility: CLINIC | Age: 49
End: 2024-08-15
Payer: COMMERCIAL

## 2024-08-15 NOTE — PROGRESS NOTES
Assessment:  1. Chronic pain syndrome    2. Fibromyalgia    3. Myofascial pain syndrome    4. Cervical spondylosis    5. Spine pain        Plan:  Orders Placed This Encounter   Procedures    Durable Medical Equipment     1 TENS UNIT, 4 lead    FL spine and pain procedure     Standing Status:   Future     Standing Expiration Date:   8/16/2028     Order Specific Question:   Reason for Exam:     Answer:   TPI USGI     Order Specific Question:   Anticoagulant hold needed?     Answer:   No     Order Specific Question:   Is the patient pregnant?     Answer:   No    Ambulatory referral to Physical Therapy     Standing Status:   Future     Standing Expiration Date:   8/16/2025     Referral Priority:   Routine     Referral Type:   Physical Therapy     Referral Reason:   Specialty Services Required     Requested Specialty:   Physical Therapy     Number of Visits Requested:   1     Expiration Date:   8/16/2025       No orders of the defined types were placed in this encounter.      My impressions and treatment recommendations were discussed in detail with the patient, who verbalized understanding and had no further questions.    49-year-old female presents for office chief complaint of midline lumbar spine pain radiating up the midline to the cervical region.  Pain radiates into buttocks at times but does not travel down the lower extremities.  Symptoms are not secondary to lumbar radiculopathy.  MRI also corroborates these findings.  There is no nerve compression noted.  MRI is virtually unremarkable aside from slight disc desiccation at L5-S1 with bulging disks but no clear nerve compression.      She does have notable myofascial tenderness throughout the cervical and lumbar spines.  We discussed trigger point ejections to help with this.  I will also order a TENS unit for her.  We will also refer her to aquatic therapy.    Patient notably would like to hold off on any oral medication options as she reports history of  stomach ulcer in the past.  In the future if willing to consider medications, may consider adding Lyrica to help with underlying chronic pain syndrome/fibromyalgia symptoms.    [place] Prescription Drug Monitoring Program report was reviewed and was appropriate     Complete risks and benefits including bleeding, infection, tissue reaction, nerve injury and allergic reaction were discussed. The approach was demonstrated using models and literature was provided. Verbal and written consent was obtained.    Discharge instructions were provided. I personally saw and examined the patient and I agree with the above discussed plan of care.    History of Present Illness:    Jami Gates is a 49 y.o. adult who presents to [facility] for initial evaluation of the above stated pain complaints. The patient has a past medical and chronic pain history as outlined in the assessment section. She was referred by Lars Batista [place], DO  [address]  [address]  [place] [address] .    Patient here with chief complaint of lower back pain.  Ongoing since 2020.  Severe over the past month.  10 out of 10.  Constant.  All day.  Burning, shooting, sharp, throbbing, dull/aching.  Subjective weakness of upper and lower extremities.  Denies bowel bladder incontinence or saddle anesthesia.  Next  Pain is increased with standing, bending, sitting, walking, exercise.  Decreased with coughing, sneezing, bowel movement.    Past medical she is notable for fibromyalgia.    Reports no relief with exercise.  Moderate relief with PT.    She reports pain in her lower back and radiates up the spine to  her neck. She reports it as a burning pain. It is mostly midline lumbar up and down. Radiates into the buttocks but not down legs.     Review of Systems:    Review of Systems   Musculoskeletal:  Positive for back pain.           Past Medical History:   Diagnosis Date    Abnormal Pap smear of cervix      Anxiety     Arthritis     Asthma     Depression     Ear problems     Fibromyalgia, primary     Osteoarthritis 2021    Osteoporosis     Urinary tract infection     Varicella        Past Surgical History:   Procedure Laterality Date     SECTION      SD NEUROPLASTY &/TRANSPOS MEDIAN NRV CARPAL TUNNE Right 02/10/2023    Procedure: RELEASE CARPAL TUNNEL;  Surgeon: Loni Chi MD;  Location: [facility];  Service: Orthopedics    SD NEUROPLASTY &/TRANSPOSITION ULNAR NERVE ELBOW Right 02/10/2023    Procedure: RELEASE CUBITAL TUNNEL;  Surgeon: Loni Chi MD;  Location: [facility];  Service: Orthopedics    SD TONSILLECTOMY PRIMARY/SECONDARY AGE 14/> N/A 2024    Procedure: TONSILLECTOMY;  Surgeon: Mode Verdin MD;  Location: [facility];  Service: ENT    TONSILLECTOMY      [facility]     TUBAL LIGATION         Family History   Problem Relation Age of Onset    Cancer Mother     Breast cancer Mother         age unknown    Diabetes Mother     Coronary artery disease Mother     Ovarian cancer Mother     Colon cancer Mother     Cancer Father     Prostate cancer Father     Colon cancer Father     No Known Problems Sister     No Known Problems Sister     No Known Problems Sister     No Known Problems Sister     No Known Problems Sister     Breast cancer Sister     No Known Problems Daughter     No Known Problems Daughter     No Known Problems Son     No Known Problems Maternal Grandmother     No Known Problems Paternal Grandmother     No Known Problems Maternal Aunt     No Known Problems Maternal Aunt     No Known Problems Maternal Aunt     No Known Problems Maternal Aunt     No Known Problems Maternal Aunt     No Known Problems Maternal Aunt     No Known Problems Maternal Aunt     No Known Problems Maternal Aunt     No Known Problems Paternal Aunt     No Known Problems Paternal Aunt     No Known Problems Paternal Aunt     No Known Problems Paternal Aunt     No Known Problems Paternal  Aunt     No Known Problems Half-Sister     Breast cancer Half-Sister         age unknown    No Known Problems Half-Sister        Social History     Occupational History    Occupation: homemaker   Tobacco Use    Smoking status: Some Days     Current packs/day: 0.00     Average packs/day: 0.5 packs/day for 14.0 years (7.0 ttl pk-yrs)     Types: Cigarettes     Start date: 1/1/2007     Last attempt to quit: 1/3/2021     Years since quitting: 3.6    Smokeless tobacco: Never    Tobacco comments:     Almost 2 two years wit no smoking  patient report she vapes    Vaping Use    Vaping status: Some Days    Substances: Nicotine   Substance and Sexual Activity    Alcohol use: Not Currently    Drug use: Not Currently    Sexual activity: Yes     Partners: Male     Birth control/protection: Female Sterilization         Current Outpatient Medications:     albuterol (PROVENTIL HFA,VENTOLIN HFA) 90 mcg/act inhaler, Inhale 2 puffs every 6 (six) hours as needed for wheezing (cough) (Patient taking differently: Inhale 2 puffs every 6 (six) hours as needed for wheezing (cough) prn), Disp: 18 g, Rfl: 3    ALPRAZolam (XANAX) 0.5 mg tablet, Take 1 tablet (0.5 mg total) by mouth 30 min pre-procedure Take 2nd dose 30 mins after 1st dose if still anxious. (Patient taking differently: Take 0.5 mg by mouth 30 min pre-procedure Take 2nd dose 30 mins after 1st dose if still anxious. prn), Disp: 2 tablet, Rfl: 0    Cholecalciferol (Vitamin D3) 25 MCG (1000 UT) CAPS, Take 1 capsule by mouth daily, Disp: , Rfl:     clobetasol (TEMOVATE) 0.05 % ointment, Apply 1 application topically 2 (two) times a day To affected area, Disp: 30 g, Rfl: 0    cyanocobalamin (VITAMIN B-12) 100 MCG tablet, Take 100 mcg by mouth in the morning, Disp: , Rfl:     docusate sodium (COLACE) 100 mg capsule, TAKE 1 CAPSULE BY MOUTH TWICE A DAY AS NEEDED FOR CONSTIPATION (Patient taking differently: prn), Disp: 60 capsule, Rfl: 1    fluticasone (FLONASE) 50 mcg/act nasal spray,  "SPRAY 1 SPRAY INTO EACH NOSTRIL EVERY DAY (Patient taking differently: 2 sprays as needed), Disp: 16 mL, Rfl: 1    Multiple Vitamin (MULTIVITAMIN) capsule, Take 1 capsule by mouth daily, Disp: , Rfl:     olopatadine (PATANOL) 0.1 % ophthalmic solution, Administer 1 drop to both eyes 2 (two) times a day, Disp: 5 mL, Rfl: 3    pantoprazole (PROTONIX) 40 mg tablet, Take 1 tablet (40 mg total) by mouth daily, Disp: 31 tablet, Rfl: 6    tiZANidine (ZANAFLEX) 4 mg tablet, Take 1 tablet (4 mg total) by mouth 2 (two) times a day as needed for muscle spasms, Disp: 60 tablet, Rfl: 0    Turmeric 400 MG CAPS, Take 1 capsule by mouth in the morning, Disp: , Rfl:     Vit C-Cholecalciferol-Alessandra Hip (Vitamin C & D3/Alessandra Hips) 500-1000-20 MG-UNIT-MG CAPS, Take 1 capsule by mouth in the morning, Disp: , Rfl:     escitalopram (LEXAPRO) 5 mg tablet, TAKE 1 TABLET (5 MG TOTAL) BY MOUTH DAILY. (Patient taking differently: Take 5 mg by mouth as needed), Disp: 30 tablet, Rfl: 1    methocarbamol (ROBAXIN) 500 mg tablet, Take 1 tablet (500 mg total) by mouth 4 (four) times a day as needed for muscle spasms for up to 10 days, Disp: 40 tablet, Rfl: 0    Allergies   Allergen Reactions    Tilactase GI Intolerance and Vomiting    Pollen Extract Other (See Comments)     Seasonal allergies.       Physical Exam:    /90   Pulse 63   Ht 5' 5\" (1.651 m)   Wt 87.1 kg (192 lb)   LMP 12/11/2022 (Exact Date)   BMI 31.95 kg/m²     Constitutional: normal, well developed, well nourished, alert, in no distress and non-toxic and no overt pain behavior.  Eyes: anicteric  HEENT: grossly intact  Neck: supple, symmetric, trachea midline and no masses   Pulmonary:even and unlabored  Cardiovascular:No edema or pitting edema present  Skin:Normal without rashes or lesions and well hydrated  Psychiatric:Mood and affect appropriate  Neurologic:Cranial Nerves II-XII grossly intact  Musculoskeletal:normal    Lumbar Spine Exam    Appearance:  Normal " lordosis  Palpation/Tenderness:  Diffuse tenderness to light touch midline cervical, thoracic, lumbar region and RIGHT SI joint  Sensory:  no sensory deficits noted  Range of Motion:  Full range of motion with no pain or limitations in flexion, extension, lateral flexion and rotation  Motor Strength:  Left hip flexion:  5/5  Left hip extension:  5/5  Right hip flexion:  5/5  Right hip extension:  5/5  Left knee flexion:  5/5  Left knee extension:  5/5  Right knee flexion:  5/5  Right knee extension:  5/5  Left foot dorsiflexion:  5/5  Left foot plantar flexion:  5/5  Right foot dorsiflexion:  5/5  Right foot plantar flexion:  5/5  Reflexes:  Left Patellar:  2+   Right Patellar:  2+   Left Achilles:  2+   Right Achilles:  2+   Special Tests:  Left Straight Leg Test:  negative  Right Straight Leg Test:  negative        Imaging      MRI LUMBAR SPINE WITHOUT CONTRAST  2/19/24     INDICATION: M54.16: Radiculopathy, lumbar region.     COMPARISON: X-rays dated 4/10/2023     TECHNIQUE:  Multiplanar, multisequence imaging of the lumbar spine was performed. .        IMAGE QUALITY:  Diagnostic     FINDINGS:     VERTEBRAL BODIES:  There are 5 lumbar type vertebral bodies.  Normal alignment of the lumbar spine.  No spondylolysis or spondylolisthesis. No scoliosis.  No compression fracture.    Normal marrow signal is identified within the visualized bony   structures.  No discrete marrow lesion.     SACRUM:  Normal signal within the sacrum. No evidence of insufficiency or stress fracture.     DISTAL CORD AND CONUS:  Normal size and signal within the distal cord and conus.     PARASPINAL SOFT TISSUES:  Paraspinal soft tissues are unremarkable.     LOWER THORACIC DISC SPACES:  Normal disc height and signal.  No disc herniation, canal stenosis or foraminal narrowing.     LUMBAR DISC SPACES:     L1-L2:  Normal.     L2-L3:  Normal.     L3-L4:  Normal.     L4-L5:  Normal.     L5-S1: Disc desiccation without loss of disc height. Annular  bulging with a small central disc herniation abutting the ventral aspect of the thecal sac without cauda equina or nerve impingement. No foraminal narrowing.     OTHER FINDINGS:  None.     IMPRESSION:     Small central disc herniation at L5-S1.        MRI CERVICAL SPINE WITH AND WITHOUT CONTRAST  1/15/24     INDICATION: R20.0: Anesthesia of skin.   Chronic neck pain, unsteady gait and numbness. Fibromyalgia.     COMPARISON: 11/20/2020 radiographs     TECHNIQUE:  Multiplanar, multisequence imaging of the cervical spine was performed before and after gadolinium administration. .        IV Contrast:  8 mL of Gadobutrol injection (SINGLE-DOSE)     IMAGE QUALITY:  Diagnostic.     FINDINGS:     ALIGNMENT: Within normal limits.     MARROW SIGNAL:  Modic 2, fatty C4-C5 degenerative endplate change.     Otherwise normal marrow signal intensity for age and gender. Normal vertebral body heights.     CERVICAL AND VISUALIZED UPPER THORACIC CORD:  Preserved caliber and signal intensity.     No abnormal central canal enhancement or fluid collection.     PREVERTEBRAL AND PARASPINAL SOFT TISSUES: Nasopharyngeal mucosal retention cyst, usually clinically insignificant. No acute or suspicious findings.     VISUALIZED POSTERIOR FOSSA: Within normal limits.     CERVICAL DISC SPACES: Mild loss of disc height at C4-C5 and C5-C6.     C2-C3:  Normal.     C3-C4: Mild left uncovertebral hypertrophy. Mild left foraminal narrowing.     C4-C5: Small disc bulge. Mild right uncovertebral hypertrophy. Mild central canal and right foraminal narrowing.     C5-C6: Small disc bulge. Mild uncovertebral hypertrophy. Mild central canal and foraminal narrowing.     C6-C7: Small disc bulge. Minimal central canal narrowing. Patent foramen.     C7-T1:  Normal.     UPPER THORACIC DISC SPACES: Small T2-T3 central disc protrusion. Mild central canal narrowing. Patent foramen.     IMPRESSION:     Mild degenerative disc disease resulting in minimal to mild  stenoses. Details above.       FL spine and pain procedure    (Results Pending)       Orders Placed This Encounter   Procedures    Durable Medical Equipment    FL spine and pain procedure    Ambulatory referral to Physical Therapy

## 2024-08-16 ENCOUNTER — DOCUMENTATION (OUTPATIENT)
Dept: PAIN MEDICINE | Facility: CLINIC | Age: 49
End: 2024-08-16

## 2024-08-16 ENCOUNTER — CONSULT (OUTPATIENT)
Dept: PAIN MEDICINE | Facility: CLINIC | Age: 49
End: 2024-08-16
Payer: COMMERCIAL

## 2024-08-16 VITALS
HEIGHT: 65 IN | DIASTOLIC BLOOD PRESSURE: 90 MMHG | BODY MASS INDEX: 31.99 KG/M2 | SYSTOLIC BLOOD PRESSURE: 153 MMHG | WEIGHT: 192 LBS | HEART RATE: 63 BPM

## 2024-08-16 DIAGNOSIS — M79.7 FIBROMYALGIA: ICD-10-CM

## 2024-08-16 DIAGNOSIS — G89.4 CHRONIC PAIN SYNDROME: Primary | ICD-10-CM

## 2024-08-16 DIAGNOSIS — M79.18 MYOFASCIAL PAIN SYNDROME: ICD-10-CM

## 2024-08-16 DIAGNOSIS — M54.9 SPINE PAIN: ICD-10-CM

## 2024-08-16 DIAGNOSIS — M47.812 CERVICAL SPONDYLOSIS: ICD-10-CM

## 2024-08-16 PROCEDURE — 88305 TISSUE EXAM BY PATHOLOGIST: CPT | Performed by: PATHOLOGY

## 2024-08-16 PROCEDURE — 99244 OFF/OP CNSLTJ NEW/EST MOD 40: CPT | Performed by: STUDENT IN AN ORGANIZED HEALTH CARE EDUCATION/TRAINING PROGRAM

## 2024-08-16 NOTE — PATIENT INSTRUCTIONS
Patient Education     Trigger Point Injection   Why is this procedure done?   A trigger point is a very painful area in a muscle. You may have a lump or feel a knot. The trigger point causes pain right where it is, or in the area around the trigger point. You may have less movement in your neck, arm, or leg if you have a trigger point. Your pain may increase if someone presses on this area. You may have pain far away from the trigger point. You may even have pain when you are not moving.   This kind of injection is used to help lower pain. When your pain is less, you may be able to move more and do more physical therapy. The goal is to break the pain cycle at this very painful spot.  What will the results be?   Less pain  Less swelling in the nerves  Better movement  What happens before the procedure?   Your doctor will take your history and do an exam. Talk to the doctor about:  All the drugs you are taking. Be sure to include all prescription and over-the-counter (OTC) drugs, and herbal supplements. Tell the doctor about any drug allergy. Bring a list of drugs you take with you.  If you have high blood sugar or diabetes. Your drugs may need to be changed.  Any bleeding problems. Be sure to tell your doctor if you are taking any drugs that may cause bleeding. Some of these are warfarin, rivaroxaban, apixaban, ticagrelor, clopidogrel, ketorolac, ibuprofen, naproxen, or aspirin. Certain vitamins and herbs, such as garlic and fish oil, may also add to the risk for bleeding. You may need to stop these drugs as well. Talk to your doctor about them.  If you need to stop eating or drinking before your procedure.  You will not be allowed to drive right away after the procedure. Ask a family member or a friend to drive you home.  What happens during the procedure?   The painful area is cleaned with a special soap. Your doctor may give you a drug to numb the painful area. Once the point of the pain is located, your doctor  will inject the drug into this trigger point. The whole procedure will take only a few minutes.  What happens after the procedure?   You can go home after the procedure.  You will feel numb in the area where the shot is given.  You should feel less pain right away.  What care is needed at home?   Ask your doctor what you need to do when you go home. Make sure you ask questions if you do not understand what the doctor says. This way you will know what you need to do.   Your doctor or physical therapist may give you some muscle stretching exercises to do.  Apply ice to the injection site if it is sore. Place an ice pack or a bag of frozen peas wrapped in a towel over the painful part. Never put ice right on the skin. Do not leave the ice on more than 10 to 15 minutes at a time.  What follow-up care is needed?   Your doctor may ask you to make visits to the office to check on your progress. Be sure to keep these visits.  Your doctor may ask you to see a physical therapist for exercises. Be sure to keep these visits.  Your doctor may ask you to do exercises for the area that was affected by the trigger point. Do the exercises as directed at home.  What problems could happen?   Infection  Blood clot  Lung collapse or trouble breathing if the injection was in the chest or back  Muscle pain does not go away  When do I need to call the doctor?   Signs of infection. These include a fever of 100.4°F (38°C) or higher, chills.  Signs of infection at shot site. These include swelling, redness, warmth around the wound; too much pain when touched; yellowish, greenish, or bloody discharge.  Shortness of breath or chest pain. If you have this sign, go to the ER right away.  Numbness, tingling, or weakness where the shot was given  Last Reviewed Date   2020-10-13  Consumer Information Use and Disclaimer   This generalized information is a limited summary of diagnosis, treatment, and/or medication information. It is not meant to be  "comprehensive and should be used as a tool to help the user understand and/or assess potential diagnostic and treatment options. It does NOT include all information about conditions, treatments, medications, side effects, or risks that may apply to a specific patient. It is not intended to be medical advice or a substitute for the medical advice, diagnosis, or treatment of a health care provider based on the health care provider's examination and assessment of a patient’s specific and unique circumstances. Patients must speak with a health care provider for complete information about their health, medical questions, and treatment options, including any risks or benefits regarding use of medications. This information does not endorse any treatments or medications as safe, effective, or approved for treating a specific patient. Up[employer] Inc[employer] and its affiliates disclaim any warranty or liability relating to this information or the use thereof. The use of this information is governed by the Terms of Use, available at https://www.[employer]/en/know/clinical-effectiveness-terms   Copyright   Copyright © 2024 UpTo[employer], Inc. and its affiliates and/or licensors. All rights reserved.    Patient Education     Back exercises   The Basics   Written by the doctors and editors at [employer]   Why do I need to do back exercises? -- Back exercises can help ease back pain and might help prevent future back pain. Long term, it is important to strengthen the muscles in your lower back, buttocks, and belly. These are your \"core muscles.\" Stretching exercises are also important to keep your muscles flexible.  Below are some stretching and strengthening exercises that might help you. Other forms of movement can help ease or prevent back pain, too. For example, some people like to walk, do aerobic exercise, or do yoga or estefanía chi. The most important thing is to move your body. Your doctor, nurse, or physical therapist can help you " "find different types of activity that work for you.  What stretching exercises should I do? -- Below are some examples of stretching exercises. Warm up your muscles before stretching to help prevent injury. To warm up, you can walk, jog, or cycle for a few minutes.  Start by repeating each of these stretches 2 to 3 times. Try to hold each stretch for 5 to 10 seconds, and try to do the stretches 2 to 3 times each day. Breathe slowly and deeply as you do the exercises. Never bounce when doing stretches.   Cat-cow stretch (figure 1) - Start on all fours on the floor, with your hands under your shoulders, knees under your hips, and your back flat. First, tuck your chin and tighten your stomach muscles as you round your back (like a \"cat\"). Hold the stretch for about 10 seconds. Rest for a few seconds as you flatten your back. Next, lift your chin and let your belly and lower back sink toward the floor (like a \"cow\"). Hold the stretch for about 10 seconds.   Single knee-to-chest stretches (figure 2) ? While lying on your back, bend your knees with your feet flat on the floor. Pull 1 knee toward your chest until you feel a stretch in your lower back and buttock area. Lower, and repeat with the other knee. If you have knee problems, pull your knee up by grabbing the back of your thigh instead of the front of your knee. You can also do this exercise by grabbing both knees at the same time.   Lower trunk rotations (figure 3) ? While lying on your back, bend your knees with your feet flat on the floor. Keep your knees and ankles together, and then drop them to 1 side. Keep both of your shoulders touching the floor until you feel a stretch in the muscles at the side of the back. Repeat on the other side.   Lower back stretches seated (figure 4) ? Sit in a chair with your feet spread about shoulder width apart. Then, lean forward until you feel a stretch in your lower back.  What strengthening exercises should I do? -- Below " are some examples of strengthening exercises.  Start by doing each exercise 2 to 3 times. Work up to doing each exercise 10 times. Hold each exercise for 3 to 5 seconds, and try to do the exercises 2 to 3 times each day. Do all exercises slowly.   Shoulder blade squeezes (figure 5) ? Pinch your shoulder blades together on your upper back, and hold 3 to 5 seconds. You can also do these 1 side at a time. Sit with good posture, and make sure that your shoulders do not rise up when you do this exercise. Relax.   Pelvic tilts (figure 6) ? Lie on your back with your knees bent and feet flat on the floor. Tighten your stomach muscles, and press your lower back down to the floor. Relax. You should be able to breathe comfortably during this exercise.   Hip lifts (figure 7) ? Lie on your back with your knees bent and feet flat on the floor. Tighten your stomach muscles, keep your back flat, and lift your buttocks off of the floor. Relax. You should feel this in your buttocks, not in your lower back.  What else should I know?    Exercise, including stretching, might be slightly uncomfortable. But you should not have sharp or severe pain. If you do get severe pain, stop what you are doing. If severe pain continues, call your doctor or nurse.   Do not hold your breath when exercising. If you tend to hold your breath, try counting out loud when exercising. If any exercise bothers you, stop right away.   Always warm up before exercising. Warm muscles stretch much easier than cool muscles. Stretching cool muscles can lead to injury.   Doing exercises before a meal can be a good way to get into a routine.  All topics are updated as new evidence becomes available and our peer review process is complete.  This topic retrieved from [employer] on: Apr 03, 2024.  Topic 317213 Version 2.0  Release: 32.2.4 - C32.92  © 2024 UpToDate, Inc. and/or its affiliates. All rights reserved.  figure 1: Cat-cow stretch     Start on all fours on the  "floor, with hands under your shoulders, knees under your hips, and your back flat. First, tuck your chin and tighten your stomach muscles as you round your back (like a \"cat\"). Hold the stretch for about 10 seconds. Rest for a few seconds as you flatten your back. Next, lift your chin and let your belly and lower back sink toward the floor (like a \"cow\"). Hold the stretch for about 10 seconds.  Graphic [id number] Version 1.0  figure 2: Single knee-to-chest stretch     Lie on your back, bend your knees, and have your feet flat on the floor. Pull 1 knee toward your chest until you feel a stretch in your lower back and buttock area. Repeat with the other knee. If you have knee problems, pull your knee up by grabbing the back of your thigh instead of the front of your knee. You can also do this exercise by grabbing both knees at the same time.  Graphic [id number] Version 1.0  figure 3: Lower trunk rotation     While lying on your back, bend your knees and have your feet flat on the floor. Keep your legs together and then drop them to 1 side. Keep both of your shoulders touching the floor until you feel a stretch in the muscles at the side of the back. Repeat on the other side.  Graphic [id number] Version 1.0  figure 4: Lower back stretch     Sit in a chair with your feet spread about shoulder width apart. Then, lean forward until you feel a stretch in your lower back.  Graphic [id number] Version 1.0  figure 5: Shoulder blade squeezes     Pinch your shoulder blades together on your upper back and hold for 3 to 5 seconds. Make sure that you are sitting with good posture and that your shoulders do not raise up when you do this exercise. Relax.  Graphic [id number] Version 1.0  figure 6: Pelvic tilts     Lie on your back with your knees bent and feet flat on the floor. Tighten your stomach muscles and press your lower back down to the floor. Relax.  Graphic [id number] Version 1.0  figure 7: Hip lifts     Lie on your back with your knees bent and " feet flat on the floor. Tighten your stomach muscles and lift your buttocks off of the floor. Relax.  Graphic [id number] Version 1.0  Consumer Information Use and Disclaimer   Disclaimer: This generalized information is a limited summary of diagnosis, treatment, and/or medication information. It is not meant to be comprehensive and should be used as a tool to help the user understand and/or assess potential diagnostic and treatment options. It does NOT include all information about conditions, treatments, medications, side effects, or risks that may apply to a specific patient. It is not intended to be medical advice or a substitute for the medical advice, diagnosis, or treatment of a health care provider based on the health care provider's examination and assessment of a patient's specific and unique circumstances. Patients must speak with a health care provider for complete information about their health, medical questions, and treatment options, including any risks or benefits regarding use of medications. This information does not endorse any treatments or medications as safe, effective, or approved for treating a specific patient. UpToDate, Inc. and its affiliates disclaim any warranty or liability relating to this information or the use thereof.The use of this information is governed by the Terms of Use, available at https://www.[employer].com/en/know/clinical-effectiveness-terms. 2024© UpToDate, Inc. and its affiliates and/or licensors. All rights reserved.  Copyright   © 2024 UpToDate, Inc. and/or its affiliates. All rights reserved.

## 2024-08-19 ENCOUNTER — APPOINTMENT (OUTPATIENT)
Dept: PHYSICAL THERAPY | Facility: CLINIC | Age: 49
End: 2024-08-19
Payer: COMMERCIAL

## 2024-08-19 NOTE — TELEPHONE ENCOUNTER
Patient called and was trying to reschedule her new patient appointment with Dr Rober Verduzco due to having a surgery scheduled the same day  Patient was very upset that she could not be rescheduled anytime soon with our practice  I did inform the patient that unfortunately there are more patients than Neurologists within [place]  I did mention to the patient that we have her on a wait list if anything were to open up sooner we would let her know  The patient asked if she had her family doctor call in stating that she needed to be seen sooner would that help and I informed her that her family doctor is more than welcome to call into our practice however she is on the wait list and again we would let her know if anything were to open up sooner for her to be seen  The patient said she will be taking her referral elsewhere 
Controlled with current regime

## 2024-08-22 ENCOUNTER — APPOINTMENT (OUTPATIENT)
Dept: PHYSICAL THERAPY | Facility: CLINIC | Age: 49
End: 2024-08-22
Payer: COMMERCIAL

## 2024-09-04 ENCOUNTER — TELEPHONE (OUTPATIENT)
Dept: GYNECOLOGIC ONCOLOGY | Facility: CLINIC | Age: 49
End: 2024-09-04

## 2024-09-04 NOTE — TELEPHONE ENCOUNTER
Patient called office to reschedule MRI. Per patient Mri was scheduled for 8/30 and she no showed. Patient will like office to reschedule MRI to a Wednesday, patient can do any time. Patient also wanted to reschedule  appointment on 9/11. Advised patient the office will reschedule once MRI is rescheduled due to F/U needing to be a week after MRI. I did give patient CS number but she will like office to schedule MRI.     Patient will like a call back.       Thank you!

## 2024-09-16 ENCOUNTER — TELEPHONE (OUTPATIENT)
Age: 49
End: 2024-09-16

## 2024-09-16 NOTE — TELEPHONE ENCOUNTER
Caller: Patient     Doctor:     Reason for call: Looking to reschedule 9/23 injection. She needs an appointment for after 12.     Please assist     Call back#: [phone]

## 2024-10-31 ENCOUNTER — TELEPHONE (OUTPATIENT)
Age: 49
End: 2024-10-31

## 2024-11-04 ENCOUNTER — PROCEDURE VISIT (OUTPATIENT)
Dept: PAIN MEDICINE | Facility: CLINIC | Age: 49
End: 2024-11-04
Payer: COMMERCIAL

## 2024-11-04 VITALS
DIASTOLIC BLOOD PRESSURE: 75 MMHG | HEIGHT: 65 IN | SYSTOLIC BLOOD PRESSURE: 117 MMHG | BODY MASS INDEX: 31.99 KG/M2 | WEIGHT: 192 LBS | HEART RATE: 77 BPM

## 2024-11-04 DIAGNOSIS — M79.18 MYOFASCIAL PAIN SYNDROME: ICD-10-CM

## 2024-11-04 PROCEDURE — 20552 NJX 1/MLT TRIGGER POINT 1/2: CPT | Performed by: STUDENT IN AN ORGANIZED HEALTH CARE EDUCATION/TRAINING PROGRAM

## 2024-11-04 RX ORDER — BUPIVACAINE HYDROCHLORIDE 2.5 MG/ML
4 INJECTION, SOLUTION EPIDURAL; INFILTRATION; INTRACAUDAL ONCE
Status: COMPLETED | OUTPATIENT
Start: 2024-11-04 | End: 2024-11-04

## 2024-11-04 RX ORDER — METHYLPREDNISOLONE ACETATE 40 MG/ML
40 INJECTION, SUSPENSION INTRA-ARTICULAR; INTRALESIONAL; INTRAMUSCULAR; SOFT TISSUE ONCE
Status: COMPLETED | OUTPATIENT
Start: 2024-11-04 | End: 2024-11-04

## 2024-11-04 RX ADMIN — BUPIVACAINE HYDROCHLORIDE 4 ML: 2.5 INJECTION, SOLUTION EPIDURAL; INFILTRATION; INTRACAUDAL at 16:56

## 2024-11-04 RX ADMIN — METHYLPREDNISOLONE ACETATE 40 MG: 40 INJECTION, SUSPENSION INTRA-ARTICULAR; INTRALESIONAL; INTRAMUSCULAR; SOFT TISSUE at 16:58

## 2024-11-04 NOTE — PROGRESS NOTES
" Universal Protocol:  procedure performed by consultantConsent: Verbal consent obtained. Written consent obtained.  Risks and benefits: risks, benefits and alternatives were discussed  Consent given by: patient  Time out: Immediately prior to procedure a \"time out\" was called to verify the correct patient, procedure, equipment, support staff and site/side marked as required.  Timeout called at: 11/4/2024 4:50 PM.  Patient understanding: patient states understanding of the procedure being performed  Patient consent: the patient's understanding of the procedure matches consent given  Procedure consent: procedure consent matches procedure scheduled  Relevant documents: relevant documents present and verified  Test results: test results available and properly labeled  Site marked: the operative site was marked  Radiology Images displayed and confirmed. If images not available, report reviewed: imaging studies available  Required items: required blood products, implants, devices, and special equipment available  Patient identity confirmed: verbally with patient  Supporting Documentation  Indications: pain   Procedure Details  Location(s):  Additional procedure details: PROCEDURE NOTE    PATIENT NAME:  Jami Gates    MEDICAL RECORD NUMBER:  [id number]    YOB: 1975    DATE OF PROCEDURE:  11/04/24    PROCEDURE: Trigger point injection x 1 with local anesthetic and steroid in the right lumbar paraspinal muscle groups.  ATTENDING PHYSICIAN: Carmelo Moss M.D.  PREPROCEDURE DIAGNOSIS: Myofascial pain with identifiable trigger points.  POSTPROCEDURE DIAGNOSIS: Myofascial pain with identifiable trigger points.  ANESTHESIA: Local  ESTIMATED BLOOD LOSS: Minimal  COMPLICATIONS: None  CONSENT: Today's procedure, its potential benefits as well as its risks and potential side effects were reviewed. Discussed risks of the procedure include bleeding, infection, nerve irritation or damage, reactions to the " medications, failure of the pain to improve and exacerbation of the pain were explained to the patient, who verbalized understanding and who wished to proceed. Informed consent was signed.  DESCRIPTION OF THE PROCEDURE: After informed consent was obtained, the patient was placed in the seated position. 1 trigger points were identified via palpation and marked with a surgical skin marker. The skin was prepped with antiseptic in the usual sterile fashion. Strict aseptic technique was utilized throughout the procedure.  A 25 gauge, 1 ½ inch needle was then advanced into each identified trigger point. An injectate consisting of 4 ml of 0.25% marcaine with [] mL of 40mg/mL depo-medrol was slowly injected in divided doses after negative aspiration. The needle was removed with tip intact.  The patient tolerated the procedure and hemostasis was maintained. There were no apparent paresthesias or complications. The skin was wiped clean, and a band-aid was placed as appropriate. The patient was monitored for an appropriate period of time following the procedure and remained hemodynamically stable and neurovascularly intact. The patient was ultimately discharged to home with supervision in good condition and instructed to call the office to report the response.

## 2024-11-06 ENCOUNTER — HOSPITAL ENCOUNTER (OUTPATIENT)
Dept: MRI IMAGING | Facility: CLINIC | Age: 49
Discharge: HOME | End: 2024-11-06
Attending: OBSTETRICS & GYNECOLOGY
Payer: COMMERCIAL

## 2024-11-06 DIAGNOSIS — N95.0 PMB (POSTMENOPAUSAL BLEEDING): ICD-10-CM

## 2024-11-06 PROCEDURE — 72197 MRI PELVIS W/O & W/DYE: CPT

## 2024-11-06 PROCEDURE — A9585 GADOBUTROL INJECTION: HCPCS | Performed by: OBSTETRICS & GYNECOLOGY

## 2024-11-06 RX ORDER — GADOBUTROL 604.72 MG/ML
8 INJECTION INTRAVENOUS
Status: COMPLETED | OUTPATIENT
Start: 2024-11-06 | End: 2024-11-06

## 2024-11-06 RX ADMIN — GADOBUTROL 8 ML: 604.72 INJECTION INTRAVENOUS at 14:54

## 2024-11-13 ENCOUNTER — OFFICE VISIT (OUTPATIENT)
Dept: GYNECOLOGIC ONCOLOGY | Facility: CLINIC | Age: 49
End: 2024-11-13
Payer: COMMERCIAL

## 2024-11-13 VITALS
WEIGHT: 192 LBS | SYSTOLIC BLOOD PRESSURE: 128 MMHG | BODY MASS INDEX: 31.99 KG/M2 | HEART RATE: 75 BPM | OXYGEN SATURATION: 98 % | HEIGHT: 65 IN | RESPIRATION RATE: 16 BRPM | TEMPERATURE: 98.4 F | DIASTOLIC BLOOD PRESSURE: 84 MMHG

## 2024-11-13 DIAGNOSIS — N95.0 PMB (POSTMENOPAUSAL BLEEDING): Primary | ICD-10-CM

## 2024-11-13 PROCEDURE — 99213 OFFICE O/P EST LOW 20 MIN: CPT | Performed by: OBSTETRICS & GYNECOLOGY

## 2024-11-13 NOTE — PROGRESS NOTES
Assessment/Plan:    Problem List Items Addressed This Visit       PMB (postmenopausal bleeding) - Primary    Patient is a very pleasant 49-year-old female with a history of postmenopausal bleeding.  There is no significant risk for endometrial or uterine malignancy is visualized in studies on 9 exams as well as biopsy.  At this point since the patient is asymptomatic we have recommended no further workup.    The patient reports vaginal bleeding usually in the summer months.  We have recommended a follow-up in 6 months.  If bleeding is initiated then can patient is  consider a candidate for hysterectomy.              CHIEF COMPLAINT: Postmenopausal bleeding        Patient ID: Jami Gates is a 49 y.o. adult  Patient is a very pleasant 49-year-old female with intermittent postmenopausal bleeding.  Patient underwent ultrasound which showed potential structural abnormalities within the uterus.  Endometrial biopsy was unremarkable.  It was recommended of a follow-up ultrasound which was performed and reveals the following:  FINDINGS:     UTERUS:  Junctional zone: Normal thickness.  Myometrium: There is a 2 x 2 x 2 cm fundal subserosal fibroid. Scattered additional smaller intramural fibroids.  Endometrium: Normal thickness without mass.     ADNEXA:  Right:  Ovary normal in size and morphology.  No suspicious adnexal lesion.  No hydrosalpinx.     Left:  Ovary normal in size and morphology.  No suspicious adnexal lesion. There is a 1.6 x 1.4 x 1.3 cm left ovarian versus paraovarian simple cyst.  No hydrosalpinx.     URINARY BLADDER: Normal.     PELVIC CAVITY: No lymphadenopathy. No ascites.     BOWEL: No dilated loops of bowel.     VESSELS: No aneurysm.     PELVIC WALL: Unremarkable     BONES: No suspicious osseous lesion.     IMPRESSION:     Few small intramural/subserosal uterine fibroids. Normal endometrium.     Left ovarian versus paraovarian simple cyst measuring 1.6 cm.  - O-RADS MRI category: O-RADS MRI 2:  Almost certainly benign (<0.5% PPV for malignancy).    Since last visit the patient has had no further bleeding.  She is without complaint.    Today, the patient is doing well.  She denies significant abdominal pain, pelvic pain, nausea, vomiting, constipation, diarrhea, fevers, chills, or vaginal bleeding.             The following portions of the patient's history were reviewed and updated as appropriate: allergies, current medications, past family history, past medical history, past social history, past surgical history, and problem list.    Review of Systems   Constitutional: Negative.    HENT: Negative.     Eyes: Negative.    Respiratory: Negative.     Cardiovascular: Negative.    Gastrointestinal: Negative.    Endocrine: Negative.    Genitourinary: Negative.    Musculoskeletal: Negative.    Skin: Negative.    Neurological: Negative.    Hematological: Negative.    Psychiatric/Behavioral: Negative.         Current Outpatient Medications   Medication Sig Dispense Refill    albuterol (PROVENTIL HFA,VENTOLIN HFA) 90 mcg/act inhaler Inhale 2 puffs every 6 (six) hours as needed for wheezing (cough) (Patient taking differently: Inhale 2 puffs every 6 (six) hours as needed for wheezing (cough) prn) 18 g 3    ALPRAZolam (XANAX) 0.5 mg tablet Take 1 tablet (0.5 mg total) by mouth 30 min pre-procedure Take 2nd dose 30 mins after 1st dose if still anxious. (Patient taking differently: Take 0.5 mg by mouth 30 min pre-procedure Take 2nd dose 30 mins after 1st dose if still anxious. prn) 2 tablet 0    Cholecalciferol (Vitamin D3) 25 MCG (1000 UT) CAPS Take 1 capsule by mouth daily      clobetasol (TEMOVATE) 0.05 % ointment Apply 1 application topically 2 (two) times a day To affected area 30 g 0    cyanocobalamin (VITAMIN B-12) 100 MCG tablet Take 100 mcg by mouth in the morning      docusate sodium (COLACE) 100 mg capsule TAKE 1 CAPSULE BY MOUTH TWICE A DAY AS NEEDED FOR CONSTIPATION (Patient taking differently: No sig  "reported) 60 capsule 1    escitalopram (LEXAPRO) 5 mg tablet TAKE 1 TABLET (5 MG TOTAL) BY MOUTH DAILY. (Patient taking differently: Take 5 mg by mouth as needed) 30 tablet 1    fluticasone (FLONASE) 50 mcg/act nasal spray SPRAY 1 SPRAY INTO EACH NOSTRIL EVERY DAY (Patient taking differently: 2 sprays as needed) 16 mL 1    methocarbamol (ROBAXIN) 500 mg tablet Take 1 tablet (500 mg total) by mouth 4 (four) times a day as needed for muscle spasms for up to 10 days 40 tablet 0    Multiple Vitamin (MULTIVITAMIN) capsule Take 1 capsule by mouth daily      olopatadine (PATANOL) 0.1 % ophthalmic solution Administer 1 drop to both eyes 2 (two) times a day 5 mL 3    pantoprazole (PROTONIX) 40 mg tablet Take 1 tablet (40 mg total) by mouth daily 31 tablet 6    tiZANidine (ZANAFLEX) 4 mg tablet Take 1 tablet (4 mg total) by mouth 2 (two) times a day as needed for muscle spasms 60 tablet 0    Turmeric 400 MG CAPS Take 1 capsule by mouth in the morning      Vit C-Cholecalciferol-Alessandra Hip (Vitamin C & D3/Alessandra Hips) 500-1000-20 MG-UNIT-MG CAPS Take 1 capsule by mouth in the morning       No current facility-administered medications for this visit.           Objective:    Blood pressure 128/84, pulse 75, temperature 98.4 °F (36.9 °C), temperature source Tympanic, resp. rate 16, height 5' 5\" (1.651 m), weight 87.1 kg (192 lb), last menstrual period 12/11/2022, SpO2 98%, not currently breastfeeding.  Body mass index is 31.95 kg/m².  Body surface area is 1.94 meters squared.    Physical Exam  Constitutional:       Appearance: She is well-developed.   HENT:      Head: Normocephalic and atraumatic.   Eyes:      Pupils: Pupils are equal, round, and reactive to light.   Cardiovascular:      Rate and Rhythm: Normal rate and regular rhythm.      Heart sounds: Normal heart sounds.   Pulmonary:      Effort: Pulmonary effort is normal. No respiratory distress.      Breath sounds: Normal breath sounds.   Abdominal:      General: Bowel sounds " "are normal. There is no distension.      Palpations: Abdomen is soft. Abdomen is not rigid.      Tenderness: There is no abdominal tenderness. There is no guarding or rebound.   Genitourinary:     Comments: deferred    Musculoskeletal:         General: Normal range of motion.      Cervical back: Normal range of motion and neck supple.   Lymphadenopathy:      Cervical: No cervical adenopathy.      Upper Body:      Right upper body: No supraclavicular adenopathy.      Left upper body: No supraclavicular adenopathy.   Skin:     General: Skin is warm and dry.   Neurological:      Mental Status: She is alert and oriented to person, place, and time.   Psychiatric:         Behavior: Behavior normal.         No results found for: \"\"  Lab Results   Component Value Date    K 4.6 02/27/2024     02/27/2024    CO2 27 02/27/2024    BUN 9 02/27/2024    CREATININE 0.63 02/27/2024    GLUF 81 02/08/2024    CALCIUM 10.0 02/27/2024    AST 63 (H) 02/27/2024    ALT 97 (H) 02/27/2024    ALKPHOS 93 02/27/2024    EGFR 110 02/27/2024     Lab Results   Component Value Date    WBC 10.30 (H) 02/08/2024    HGB 12.6 02/08/2024    HCT 38.7 02/08/2024    MCV 85 02/08/2024     02/08/2024     Lab Results   Component Value Date    NEUTROABS 4.86 02/08/2024        Trend:  No results found for: \"\"              "

## 2024-11-13 NOTE — ASSESSMENT & PLAN NOTE
Patient is a very pleasant 49-year-old female with a history of postmenopausal bleeding.  There is no significant risk for endometrial or uterine malignancy is visualized in studies on 9 exams as well as biopsy.  At this point since the patient is asymptomatic we have recommended no further workup.    The patient reports vaginal bleeding usually in the summer months.  We have recommended a follow-up in 6 months.  If bleeding is initiated then can patient is  consider a candidate for hysterectomy.

## 2025-02-19 ENCOUNTER — TELEPHONE (OUTPATIENT)
Dept: FAMILY MEDICINE CLINIC | Facility: CLINIC | Age: 50
End: 2025-02-19

## 2025-02-19 ENCOUNTER — OFFICE VISIT (OUTPATIENT)
Dept: FAMILY MEDICINE CLINIC | Facility: CLINIC | Age: 50
End: 2025-02-19
Payer: COMMERCIAL

## 2025-02-19 VITALS
SYSTOLIC BLOOD PRESSURE: 126 MMHG | DIASTOLIC BLOOD PRESSURE: 84 MMHG | WEIGHT: 192 LBS | BODY MASS INDEX: 31.99 KG/M2 | TEMPERATURE: 97.9 F | OXYGEN SATURATION: 98 % | HEIGHT: 65 IN | HEART RATE: 66 BPM

## 2025-02-19 DIAGNOSIS — E66.811 CLASS 1 OBESITY DUE TO EXCESS CALORIES WITHOUT SERIOUS COMORBIDITY WITH BODY MASS INDEX (BMI) OF 31.0 TO 31.9 IN ADULT: Primary | ICD-10-CM

## 2025-02-19 DIAGNOSIS — E66.09 CLASS 1 OBESITY DUE TO EXCESS CALORIES WITHOUT SERIOUS COMORBIDITY WITH BODY MASS INDEX (BMI) OF 31.0 TO 31.9 IN ADULT: Primary | ICD-10-CM

## 2025-02-19 DIAGNOSIS — Z12.31 ENCOUNTER FOR SCREENING MAMMOGRAM FOR BREAST CANCER: ICD-10-CM

## 2025-02-19 DIAGNOSIS — Z13.1 SCREENING FOR DIABETES MELLITUS: ICD-10-CM

## 2025-02-19 DIAGNOSIS — Z13.220 SCREENING FOR LIPID DISORDERS: ICD-10-CM

## 2025-02-19 DIAGNOSIS — Z00.00 HEALTH MAINTENANCE EXAMINATION: ICD-10-CM

## 2025-02-19 PROCEDURE — 99396 PREV VISIT EST AGE 40-64: CPT | Performed by: PHYSICIAN ASSISTANT

## 2025-02-19 PROCEDURE — 99214 OFFICE O/P EST MOD 30 MIN: CPT | Performed by: PHYSICIAN ASSISTANT

## 2025-02-19 RX ORDER — TIRZEPATIDE 2.5 MG/.5ML
2.5 INJECTION, SOLUTION SUBCUTANEOUS WEEKLY
Qty: 2 ML | Refills: 0 | Status: SHIPPED | OUTPATIENT
Start: 2025-02-19 | End: 2025-03-19

## 2025-02-19 NOTE — PROGRESS NOTES
Name: Jami Gates      : 1975      MRN: [id number]  Encounter Provider: Jerome Berger PA-C  Encounter Date: 2025   Encounter department: [facility]    Assessment & Plan  Class 1 obesity due to excess calories without serious comorbidity with body mass index (BMI) of 31.0 to 31.9 in adult    BMI Counseling: Body mass index is 31.95 kg/m². The BMI is above normal. Nutrition recommendations include reducing portion sizes, decreasing overall calorie intake, moderation in carbohydrate intake, increasing intake of lean protein, reducing intake of saturated fat and trans fat, and reducing intake of cholesterol. Exercise recommendations include moderate aerobic physical activity for 150 minutes/week. Pharmacotherapy was ordered for patient to aid in weight loss.  After discussion of risks/benefits pt agreeable to begin zepbound  1#/week weight loss goal  Bmi counseling as above  2 month follow up, earlier prn  Orders:  •  tirzepatide (Zepbound) 2.5 mg/0.5 mL auto-injector; Inject 0.5 mL (2.5 mg total) under the skin once a week for 28 days    Screening for lipid disorders    Orders:  •  Lipid Panel with Direct LDL reflex; Future    Screening for diabetes mellitus    Orders:  •  Comprehensive metabolic panel; Future    Encounter for screening mammogram for breast cancer    Orders:  •  Mammo screening bilateral w 3d and cad; Future    Health maintenance examination  Hx reviewed and updated. Normal exam. Specifically normal foot exam including neurologic exam, recommend foot antiitch moisturizing cream.   Update HM, labs             History of Present Illness     Pt presents for annual physical and to discuss GLP1 therapy for weight loss    Interval hx reviewed  Due for mammography, labs  Utd with CRC screening  She is smoking a few cigarettes/day  Occasional marijuana  No abuse/misuse of alcohol  FH reviewed  Meds reviewed  Allergies reviewed      She also notes bilateral foot  itching at times when she wears shoes/socks and no rash or pain/numbness       Review of Systems   Constitutional:  Negative for chills, fatigue and fever.   HENT:  Negative for congestion, ear pain, hearing loss, nosebleeds, postnasal drip, rhinorrhea, sinus pressure, sinus pain, sneezing and sore throat.    Eyes:  Negative for pain, discharge, itching and visual disturbance.   Respiratory:  Negative for cough, chest tightness, shortness of breath and wheezing.    Cardiovascular:  Negative for chest pain, palpitations and leg swelling.   Gastrointestinal:  Negative for abdominal pain, blood in stool, constipation, diarrhea, nausea and vomiting.   Genitourinary:  Negative for frequency and urgency.   Skin:         Itching, foot   Neurological:  Negative for dizziness, light-headedness and numbness.     Past Medical History:   Diagnosis Date   • Abnormal Pap smear of cervix    • Anxiety    • Arthritis    • Asthma    • Depression    • Ear problems    • Fibromyalgia, primary    • Osteoarthritis 2021   • Osteoporosis    • Urinary tract infection    • Varicella      Past Surgical History:   Procedure Laterality Date   •  SECTION     • SD NEUROPLASTY &/TRANSPOS MEDIAN NRV CARPAL TUNNE Right 02/10/2023    Procedure: RELEASE CARPAL TUNNEL;  Surgeon: Loni Chi MD;  Location: [facility];  Service: Orthopedics   • SD NEUROPLASTY &/TRANSPOSITION ULNAR NERVE ELBOW Right 02/10/2023    Procedure: RELEASE CUBITAL TUNNEL;  Surgeon: Loni Chi MD;  Location: [facility];  Service: Orthopedics   • SD TONSILLECTOMY PRIMARY/SECONDARY AGE 14/> N/A 2024    Procedure: TONSILLECTOMY;  Surgeon: Mode Verdin MD;  Location: [facility];  Service: ENT   • TONSILLECTOMY         • TUBAL LIGATION       Family History   Problem Relation Age of Onset   • Cancer Mother    • Breast cancer Mother         age unknown   • Diabetes Mother    • Coronary artery disease Mother    • Ovarian cancer  Mother    • Colon cancer Mother    • Cancer Father    • Prostate cancer Father    • Colon cancer Father    • No Known Problems Sister    • No Known Problems Sister    • No Known Problems Sister    • No Known Problems Sister    • No Known Problems Sister    • Breast cancer Sister    • No Known Problems Daughter    • No Known Problems Daughter    • No Known Problems Son    • No Known Problems Maternal Grandmother    • No Known Problems Paternal Grandmother    • No Known Problems Maternal Aunt    • No Known Problems Maternal Aunt    • No Known Problems Maternal Aunt    • No Known Problems Maternal Aunt    • No Known Problems Maternal Aunt    • No Known Problems Maternal Aunt    • No Known Problems Maternal Aunt    • No Known Problems Maternal Aunt    • No Known Problems Paternal Aunt    • No Known Problems Paternal Aunt    • No Known Problems Paternal Aunt    • No Known Problems Paternal Aunt    • No Known Problems Paternal Aunt    • No Known Problems Half-Sister    • Breast cancer Half-Sister         age unknown   • No Known Problems Half-Sister      Social History     Tobacco Use   • Smoking status: Some Days     Current packs/day: 0.00     Average packs/day: 0.5 packs/day for 14.0 years (7.0 ttl pk-yrs)     Types: Cigarettes     Start date: 2007     Last attempt to quit: 1/3/2021     Years since quittin.1   • Smokeless tobacco: Never   • Tobacco comments:     Almost 2 two years wit no smoking  patient report she vapes    Vaping Use   • Vaping status: Former   • Substances: Nicotine   Substance and Sexual Activity   • Alcohol use: Not Currently   • Drug use: Not Currently   • Sexual activity: Yes     Partners: Male     Birth control/protection: Female Sterilization     Current Outpatient Medications on File Prior to Visit   Medication Sig   • albuterol (PROVENTIL HFA,VENTOLIN HFA) 90 mcg/act inhaler Inhale 2 puffs every 6 (six) hours as needed for wheezing (cough) (Patient taking differently: Inhale 2 puffs  every 6 (six) hours as needed for wheezing (cough) prn)   • Cholecalciferol (Vitamin D3) 25 MCG (1000 UT) CAPS Take 1 capsule by mouth daily   • cyanocobalamin (VITAMIN B-12) 100 MCG tablet Take 100 mcg by mouth in the morning   • fluticasone (FLONASE) 50 mcg/act nasal spray SPRAY 1 SPRAY INTO EACH NOSTRIL EVERY DAY (Patient taking differently: 2 sprays as needed)   • Multiple Vitamin (MULTIVITAMIN) capsule Take 1 capsule by mouth daily   • pantoprazole (PROTONIX) 40 mg tablet Take 1 tablet (40 mg total) by mouth daily   • Vit C-Cholecalciferol-Alessandra Hip (Vitamin C & D3/Alessandra Hips) 500-1000-20 MG-UNIT-MG CAPS Take 1 capsule by mouth in the morning   • [DISCONTINUED] ALPRAZolam (XANAX) 0.5 mg tablet Take 1 tablet (0.5 mg total) by mouth 30 min pre-procedure Take 2nd dose 30 mins after 1st dose if still anxious. (Patient taking differently: Take 0.5 mg by mouth 30 min pre-procedure Take 2nd dose 30 mins after 1st dose if still anxious. prn)   • [DISCONTINUED] clobetasol (TEMOVATE) 0.05 % ointment Apply 1 application topically 2 (two) times a day To affected area   • [DISCONTINUED] docusate sodium (COLACE) 100 mg capsule TAKE 1 CAPSULE BY MOUTH TWICE A DAY AS NEEDED FOR CONSTIPATION (Patient taking differently: No sig reported)   • [DISCONTINUED] escitalopram (LEXAPRO) 5 mg tablet TAKE 1 TABLET (5 MG TOTAL) BY MOUTH DAILY. (Patient taking differently: Take 5 mg by mouth as needed)   • [DISCONTINUED] methocarbamol (ROBAXIN) 500 mg tablet Take 1 tablet (500 mg total) by mouth 4 (four) times a day as needed for muscle spasms for up to 10 days   • [DISCONTINUED] olopatadine (PATANOL) 0.1 % ophthalmic solution Administer 1 drop to both eyes 2 (two) times a day   • [DISCONTINUED] tiZANidine (ZANAFLEX) 4 mg tablet Take 1 tablet (4 mg total) by mouth 2 (two) times a day as needed for muscle spasms   • [DISCONTINUED] Turmeric 400 MG CAPS Take 1 capsule by mouth in the morning     Allergies   Allergen Reactions   • Tilactase GI  "Intolerance and Vomiting   • Pollen Extract Other (See Comments)     Seasonal allergies.     Immunization History   Administered Date(s) Administered   • Pneumococcal Conjugate Vaccine 20-valent (Pcv20), Polysace 07/07/2022   • Pneumococcal Polysaccharide PPV23 01/15/2020   • Tdap 10/05/2016     Objective   /84 (BP Location: Left arm, Patient Position: Sitting, Cuff Size: Large)   Pulse 66   Temp 97.9 °F (36.6 °C)   Ht 5' 5\" (1.651 m)   Wt 87.1 kg (192 lb)   LMP 12/11/2022 (Exact Date)   SpO2 98%   BMI 31.95 kg/m²     Physical Exam  Vitals and nursing note reviewed.   Constitutional:       General: She is not in acute distress.     Appearance: She is well-developed.   HENT:      Head: Normocephalic and atraumatic.      Right Ear: Tympanic membrane normal.      Left Ear: Tympanic membrane normal.   Eyes:      Conjunctiva/sclera: Conjunctivae normal.   Cardiovascular:      Rate and Rhythm: Normal rate and regular rhythm.      Heart sounds: No murmur heard.  Pulmonary:      Effort: Pulmonary effort is normal. No respiratory distress.      Breath sounds: Normal breath sounds. No wheezing, rhonchi or rales.   Abdominal:      Palpations: Abdomen is soft.      Tenderness: There is no abdominal tenderness.   Musculoskeletal:         General: No swelling.      Cervical back: Neck supple.      Right foot: Normal.      Left foot: Normal.   Skin:     General: Skin is warm and dry.      Capillary Refill: Capillary refill takes less than 2 seconds.   Neurological:      Mental Status: She is alert.      Sensory: No sensory deficit.   Psychiatric:         Mood and Affect: Mood normal.         "

## 2025-02-20 ENCOUNTER — TELEPHONE (OUTPATIENT)
Age: 50
End: 2025-02-20

## 2025-02-20 DIAGNOSIS — E66.811 CLASS 1 OBESITY DUE TO EXCESS CALORIES WITHOUT SERIOUS COMORBIDITY WITH BODY MASS INDEX (BMI) OF 31.0 TO 31.9 IN ADULT: Primary | ICD-10-CM

## 2025-02-20 DIAGNOSIS — E66.09 CLASS 1 OBESITY DUE TO EXCESS CALORIES WITHOUT SERIOUS COMORBIDITY WITH BODY MASS INDEX (BMI) OF 31.0 TO 31.9 IN ADULT: Primary | ICD-10-CM

## 2025-02-20 NOTE — TELEPHONE ENCOUNTER
PA for Zepbound) 2.5 mg/0.5 mL auto-injector SUBMITTED to     via    [x][facility]-KEY: [id number]  []Surescripts-Case ID #   []Availity-Auth ID # NDC #   []Faxed to plan   []Other website   []Phone call Case ID #     [x]PA sent as URGENT    All office notes, labs and other pertaining documents and studies sent. Clinical questions answered. Awaiting determination from insurance company.     Turnaround time for your insurance to make a decision on your Prior Authorization can take 7-21 business days.

## 2025-02-20 NOTE — TELEPHONE ENCOUNTER
PA for Zepbound 2.5 mg  EXCLUDED from plan       Reason:(Screenshot if applicable)        Message sent to office clinical pool Yes    Appeal is not warranted unless the following is met:    DX of ESAU and the below cardiac events:   You have established cardiovascular disease as evidenced by one of the following:    (1) Prior myocardial infarction    (2) Prior ischemic or hemorrhagic stroke    (3) Symptomatic peripheral arterial disease evidenced by one of the following:     (A) Intermittent claudication with ankle-brachial index less than 0.85 (at rest)     (B) Peripheral arterial revascularization procedure     (C) Amputation due to atherosclerotic disease

## 2025-02-20 NOTE — TELEPHONE ENCOUNTER
Pt would like to schedule a [facility] appt. She was last seen on 06/03/2024. Please call pt at [phone] to schedule

## 2025-02-24 ENCOUNTER — TELEPHONE (OUTPATIENT)
Age: 50
End: 2025-02-24

## 2025-02-26 ENCOUNTER — APPOINTMENT (OUTPATIENT)
Dept: LAB | Facility: CLINIC | Age: 50
End: 2025-02-26
Payer: COMMERCIAL

## 2025-02-26 DIAGNOSIS — Z13.220 SCREENING FOR LIPID DISORDERS: ICD-10-CM

## 2025-02-26 DIAGNOSIS — Z13.1 SCREENING FOR DIABETES MELLITUS: ICD-10-CM

## 2025-02-26 LAB
ALBUMIN SERPL BCG-MCNC: 4.2 G/DL (ref 3.5–5)
ALP SERPL-CCNC: 99 U/L (ref 34–104)
ALT SERPL W P-5'-P-CCNC: 11 U/L (ref 7–52)
ANION GAP SERPL CALCULATED.3IONS-SCNC: 11 MMOL/L (ref 4–13)
AST SERPL W P-5'-P-CCNC: 13 U/L (ref 13–39)
BILIRUB SERPL-MCNC: 0.26 MG/DL (ref 0.2–1)
BUN SERPL-MCNC: 12 MG/DL (ref 5–25)
CALCIUM SERPL-MCNC: 9.4 MG/DL (ref 8.4–10.2)
CHLORIDE SERPL-SCNC: 104 MMOL/L (ref 96–108)
CHOLEST SERPL-MCNC: 186 MG/DL (ref ?–200)
CO2 SERPL-SCNC: 26 MMOL/L (ref 21–32)
CREAT SERPL-MCNC: 0.59 MG/DL (ref 0.6–1.3)
GFR SERPL CREATININE-BSD FRML MDRD: 108 ML/MIN/1.73SQ M
GLUCOSE P FAST SERPL-MCNC: 92 MG/DL (ref 65–99)
HDLC SERPL-MCNC: 39 MG/DL
LDLC SERPL CALC-MCNC: 133 MG/DL (ref 0–100)
POTASSIUM SERPL-SCNC: 4.4 MMOL/L (ref 3.5–5.3)
PROT SERPL-MCNC: 7 G/DL (ref 6.4–8.4)
SODIUM SERPL-SCNC: 141 MMOL/L (ref 135–147)
TRIGL SERPL-MCNC: 70 MG/DL (ref ?–150)

## 2025-02-26 PROCEDURE — 36415 COLL VENOUS BLD VENIPUNCTURE: CPT

## 2025-02-26 PROCEDURE — 80061 LIPID PANEL: CPT

## 2025-02-26 PROCEDURE — 80053 COMPREHEN METABOLIC PANEL: CPT

## 2025-02-27 ENCOUNTER — RESULTS FOLLOW-UP (OUTPATIENT)
Dept: FAMILY MEDICINE CLINIC | Facility: CLINIC | Age: 50
End: 2025-02-27

## 2025-03-10 ENCOUNTER — OFFICE VISIT (OUTPATIENT)
Dept: BARIATRICS | Facility: CLINIC | Age: 50
End: 2025-03-10

## 2025-03-10 VITALS
HEIGHT: 65 IN | BODY MASS INDEX: 31.52 KG/M2 | RESPIRATION RATE: 16 BRPM | HEART RATE: 74 BPM | DIASTOLIC BLOOD PRESSURE: 80 MMHG | SYSTOLIC BLOOD PRESSURE: 140 MMHG | WEIGHT: 189.2 LBS

## 2025-03-10 DIAGNOSIS — E66.09 CLASS 1 OBESITY DUE TO EXCESS CALORIES WITHOUT SERIOUS COMORBIDITY WITH BODY MASS INDEX (BMI) OF 31.0 TO 31.9 IN ADULT: ICD-10-CM

## 2025-03-10 DIAGNOSIS — E66.09 CLASS 1 OBESITY DUE TO EXCESS CALORIES WITH SERIOUS COMORBIDITY AND BODY MASS INDEX (BMI) OF 32.0 TO 32.9 IN ADULT: Primary | ICD-10-CM

## 2025-03-10 DIAGNOSIS — E66.811 CLASS 1 OBESITY DUE TO EXCESS CALORIES WITH SERIOUS COMORBIDITY AND BODY MASS INDEX (BMI) OF 32.0 TO 32.9 IN ADULT: Primary | ICD-10-CM

## 2025-03-10 DIAGNOSIS — E66.811 CLASS 1 OBESITY DUE TO EXCESS CALORIES WITHOUT SERIOUS COMORBIDITY WITH BODY MASS INDEX (BMI) OF 31.0 TO 31.9 IN ADULT: ICD-10-CM

## 2025-03-10 RX ORDER — TIRZEPATIDE 2.5 MG/.5ML
2.5 INJECTION, SOLUTION SUBCUTANEOUS WEEKLY
Qty: 2 ML | Refills: 0 | Status: SHIPPED | OUTPATIENT
Start: 2025-03-10 | End: 2025-04-07

## 2025-03-10 NOTE — PROGRESS NOTES
Assessment/Plan:     Class 1 obesity due to excess calories with serious comorbidity and body mass index (BMI) of 32.0 to 32.9 in adult  - Patient is pursuing Conservative Program and follow up visits with medical weight management provider  - Initial weight loss goal of 5-10% weight loss for improved health. Weight loss can improve patient's co-morbid conditions and/or prevent weight-related complications.  - Explained the importance of continuing lifestyle changes in addition to any anti-obesity medications.   - Labs reviewed.    General Recommendations:  Nutrition:  Eat breakfast daily.  Do not skip meals.      Food log (ie.) www.myfitnesspal.com, sparkpeople.com, loseit.com, calorieking.com, etc.     Practice mindful eating.  Be sure to set aside time to eat, eat slowly, and savor your food.     Hydration:    At least 64oz of water daily.  No sugar sweetened beverages.  No juice (eat the fruit instead).     Exercise:  Studies have shown that the ideal exercise goal is somewhere between 150 to 300 minutes of moderate intensity exercise a week.  Start with exercising 10 minutes every other day and gradually increase physical activity with a goal of at least 150 minutes of moderate intensity exercise a week, divided over at least 3 days a week.  An example of this would be exercising 30 minutes a day, 5 days a week.  Resistance training can increase muscle mass and increase our resting metabolic rate.   FULL BODY resistance training is recommended 2-3 times a week.  Do not do this on consecutive days to allow for muscle recovery.     Aim for a bare minimum 5000 steps, even on days you do not exercise.     Monitoring:   Weigh yourself daily.  If this causes undue stress, then just weigh yourself once a week.  Weigh yourself the same time of the day with the same amount of clothing on.  Preferably this should be done after waking up, before you eat, and with no clothing or minimal clothing on.     Specific  Goals:  Calorie goal:  1300 julius/day (Provided with meal plan to follow)    Zepbound Instructions:    - Begin Zepbound 2.5 mg subcutaneously once a week. Dose changes may occur after 4 doses if medication is tolerated. You will be assessed prior to each dose change to make sure you are tolerating the medication well.  - Please message me when you have 2 pens left from the prescription so there are no lapses in treatment.  - If you have been off the medication for more than 14 days please contact the office as you will need to restart the titration at the starting dose again to avoid significant side effects or adverse events.  - Visit [email or web address].com for further information/injection instructions.   -Please eat small frequent meals to help reduce nausea. Lemon water and saltine crackers may help with this.   - Side effects of Zepbound discussed: nausea, vomiting, diarrhea, and constipation. If you experience fever, nausea/vomiting, and pain radiating to your back this may be a sign of pancreatitis. Please go to the emergency room if this occurs.  - If on oral birth control a 2nd method of birth control is recommended during the 1st 8 weeks of therapy and for 4 weeks after any dosage change.   - Patient understands the side effects of the medication and proper administration. Patient agrees with the treatment plan and all questions were answered.    - Side effects of Zepbound: nausea, vomiting, diarrhea, and constipation. If severe abdominal pain develops, stop Zepbound and go to the ER, as this could be pancreatitis. Monitor heart rate while on Zepbound and if resting heart rate greater than 100 beats per minute, patient will notify me.   - If you need to have surgery or another procedure, such as an upper endoscopy or colonoscopy, please contact my office as often medications like Zepbound need to be held for a certain amount of time prior to a procedure.       GLP-1 Managing Side Effects Tips:  - focus on small  frequent meals  - moderate sugar and fat intake  - change the injection site (abdomen --> thigh--> back of arm)  - eat protein, crackers, mints and rocio-based drinks  - nighttime injections  - drink plenty of water  - consider fiber supplements like miralax     Tips for Nausea:  - Don't drink and eat simultaneously: separate fluids 30-60 minutes before and after meals when experiencing nausea or if you notice you become full quickly  - Choose mild smelling foods- strong smells can exacerbate nausea  - Rocio and peppermint- consider drinking rocio or peppermint tea, which can help alleviate symptoms  - Eat- don't skip meals, if you have nausea, it is understandable that you may not feel like eating. However, skipping meals is generally not recommended as this can lead to lower blood sugar and fatigue. Furthermore, it is essential to consume adequate protein during weight loss. You can opt for a protein shake, a meal replacement supplement, bone broth or soup.      Tips for Constipation:   - Drink plenty of water  - Eat food with a high fiber content: increase your fiber intake by consuming these types of foods:              Eat more whole grain products like barley, oats, farro, brown or wild rice and quinoa.               Look for choices with 100% whole wheat, rye, oats or bran as the first ingredient listed               Check nutrition fact labels and choose products with 4gm of dietary fiber or more per serving              Add more beans to your diet              Eat more fresh fruits and vegetables with skins on them               Exercise- increase physical activity as it helps stimulate gastric mobility, which moves stool through the digestive tract     Patient denies personal history of pancreatitis. Patient also denies personal and family history of medullary thyroid cancer and multiple endocrine neoplasia type 2 (MEN 2 tumor).     Patient understands the side effects of the medication and proper  administration. Patient agrees with the treatment plan and all questions were answered.           Kalani was seen today for follow-up.    Diagnoses and all orders for this visit:    Class 1 obesity due to excess calories with serious comorbidity and body mass index (BMI) of 32.0 to 32.9 in adult  -     tirzepatide (Zepbound) 2.5 mg/0.5 mL auto-injector; Inject 0.5 mL (2.5 mg total) under the skin once a week for 28 days    Class 1 obesity due to excess calories without serious comorbidity with body mass index (BMI) of 31.0 to 31.9 in adult  -     Ambulatory Referral to Weight Management  -     tirzepatide (Zepbound) 2.5 mg/0.5 mL auto-injector; Inject 0.5 mL (2.5 mg total) under the skin once a week for 28 days        Total time spent reviewing chart, interviewing patient, examining patient, discussing plan, answering all questions, and documentin minutes with >50% face-to-face time with the patient.    Follow up in approximately 3 months with Non-Surgical Physician/Advanced Practitioner.    Subjective:   Chief Complaint   Patient presents with    Follow-up       Patient ID: Jami Gates  is a 50 y.o. female with excess weight/obesity here to pursue weight management.  Patient is pursuing Conservative Program. Patient was prescribed Zepbound by PCP, but insurance required prescription by Weight Management.    Most recent notes and records were reviewed.    HPI    Wt Readings from Last 20 Encounters:   03/10/25 85.8 kg (189 lb 3.2 oz)   25 87.1 kg (192 lb)   24 87.1 kg (192 lb)   24 87.1 kg (192 lb)   24 87.1 kg (192 lb)   24 87.1 kg (192 lb)   24 86.6 kg (191 lb)   07/15/24 86.6 kg (191 lb)   24 87.2 kg (192 lb 3.2 oz)   24 87.6 kg (193 lb 2 oz)   24 86.2 kg (190 lb)   06/10/24 87.5 kg (193 lb)   24 86.2 kg (190 lb)   24 88.7 kg (195 lb 9.6 oz)   24 87.7 kg (193 lb 6.4 oz)   24 88.5 kg (195 lb)   24 87.1 kg (192 lb)  "  03/13/24 83.5 kg (184 lb)   02/29/24 83.5 kg (184 lb)   02/21/24 88 kg (194 lb 0.1 oz)       Patient presents today to medical weight management office for follow up.    Weight loss medication and dose: Zepbound 2.5 mg (started by PCP, but has to follow with [facility] per insurance)   Started weight and date: 195 lbs (6/3/2024)  Current weight: 189 lbs (3/10/2025)  Starting BMI: 32.55 (6/3/2024)  Current BMI: 31.48 (3/10/2025)  Difference: -6 lbs   Goal weight: 20-30 lbs lighter     Waist Measurements: starting 45\";         Diet recall:  B: oatmeal with strawberries with aguila seeds   S: carrots  L: skip  S: skip  D: 3-7 pm - rice and beans, chicken     Hydration: 4 cups of coffee, ( almond milk and sugar) 12 cups of water daily.    Alcohol: none  Smoking: cigarettes,   Exercise: walk daily 15-30 min   Occupation: remote  Sleep: 6-7 hours       Patient denies personal history of pancreatitis. Patient also denies personal and family history of medullary thyroid cancer and multiple endocrine neoplasia type 2 (MEN 2 tumor).         The following portions of the patient's history were reviewed and updated as appropriate: allergies, current medications, past family history, past medical history, past social history, past surgical history, and problem list.    Family History   Problem Relation Age of Onset    Cancer Mother     Breast cancer Mother         age unknown    Diabetes Mother     Coronary artery disease Mother     Ovarian cancer Mother     Colon cancer Mother     Cancer Father     Prostate cancer Father     Colon cancer Father     No Known Problems Sister     No Known Problems Sister     No Known Problems Sister     No Known Problems Sister     No Known Problems Sister     Breast cancer Sister     No Known Problems Daughter     No Known Problems Daughter     No Known Problems Son     No Known Problems Maternal Grandmother     No Known Problems Paternal Grandmother     No Known Problems Maternal Aunt     No Known " "Problems Maternal Aunt     No Known Problems Maternal Aunt     No Known Problems Maternal Aunt     No Known Problems Maternal Aunt     No Known Problems Maternal Aunt     No Known Problems Maternal Aunt     No Known Problems Maternal Aunt     No Known Problems Paternal Aunt     No Known Problems Paternal Aunt     No Known Problems Paternal Aunt     No Known Problems Paternal Aunt     No Known Problems Paternal Aunt     No Known Problems Half-Sister     Breast cancer Half-Sister         age unknown    No Known Problems Half-Sister         Review of Systems   Constitutional:  Negative for chills, fatigue and fever.   HENT:  Negative for congestion, ear pain and sore throat.    Eyes:  Negative for photophobia, pain, discharge and visual disturbance.   Respiratory:  Negative for cough, chest tightness and shortness of breath.    Cardiovascular:  Negative for chest pain and palpitations.   Gastrointestinal:  Negative for abdominal pain, nausea and vomiting.   Endocrine: Negative for polydipsia and polyuria.   Genitourinary:  Negative for difficulty urinating, dysuria, flank pain and hematuria.   Musculoskeletal:  Negative for arthralgias, back pain and myalgias.   Skin:  Negative for color change, pallor and rash.   Neurological:  Negative for dizziness, seizures, syncope, speech difficulty and weakness.   Hematological:  Does not bruise/bleed easily.   Psychiatric/Behavioral:  Negative for agitation, confusion and suicidal ideas.    All other systems reviewed and are negative.      Objective:  /80 (BP Location: Left arm, Patient Position: Sitting, Cuff Size: Large)   Pulse 74   Resp 16   Ht 5' 5\" (1.651 m)   Wt 85.8 kg (189 lb 3.2 oz)   LMP 12/11/2022 (Exact Date)   BMI 31.48 kg/m²     Physical Exam  Constitutional:       General: She is not in acute distress.     Appearance: Normal appearance. She is obese. She is not ill-appearing, toxic-appearing or diaphoretic.   HENT:      Head: Normocephalic and " atraumatic.   Pulmonary:      Effort: Pulmonary effort is normal.   Musculoskeletal:         General: Normal range of motion.      Cervical back: Normal range of motion.   Skin:     General: Skin is warm.   Neurological:      Mental Status: She is alert and oriented to person, place, and time.   Psychiatric:         Mood and Affect: Mood normal.         Behavior: Behavior normal.            Labs   Most recent labs reviewed   Lab Results   Component Value Date    SODIUM 141 02/26/2025    K 4.4 02/26/2025     02/26/2025    CO2 26 02/26/2025    AGAP 11 02/26/2025    BUN 12 02/26/2025    CREATININE 0.59 (L) 02/26/2025    GLUC 100 (H) 02/27/2024    GLUF 92 02/26/2025    CALCIUM 9.4 02/26/2025    AST 13 02/26/2025    ALT 11 02/26/2025    ALKPHOS 99 02/26/2025    TP 7.0 02/26/2025    TBILI 0.26 02/26/2025    EGFR 108 02/26/2025     Lab Results   Component Value Date    HGBA1C 5.2 01/16/2020     Lab Results   Component Value Date    SHO4JDETHHRY 1.340 05/27/2022     Lab Results   Component Value Date    CHOLESTEROL 186 02/26/2025     Lab Results   Component Value Date    HDL 39 (L) 02/26/2025     Lab Results   Component Value Date    TRIG 70 02/26/2025     Lab Results   Component Value Date    LDLCALC 133 (H) 02/26/2025           Weight Management  Ash Lamb PA-C

## 2025-03-10 NOTE — ASSESSMENT & PLAN NOTE
- Patient is pursuing Conservative Program and follow up visits with medical weight management provider  - Initial weight loss goal of 5-10% weight loss for improved health. Weight loss can improve patient's co-morbid conditions and/or prevent weight-related complications.  - Explained the importance of continuing lifestyle changes in addition to any anti-obesity medications.   - Labs reviewed.    General Recommendations:  Nutrition:  Eat breakfast daily.  Do not skip meals.      Food log (ie.) www.[facility].com, sparkpeople.com, loseit.com, calorieking.com, etc.     Practice mindful eating.  Be sure to set aside time to eat, eat slowly, and savor your food.     Hydration:    At least 64oz of water daily.  No sugar sweetened beverages.  No juice (eat the fruit instead).     Exercise:  Studies have shown that the ideal exercise goal is somewhere between 150 to 300 minutes of moderate intensity exercise a week.  Start with exercising 10 minutes every other day and gradually increase physical activity with a goal of at least 150 minutes of moderate intensity exercise a week, divided over at least 3 days a week.  An example of this would be exercising 30 minutes a day, 5 days a week.  Resistance training can increase muscle mass and increase our resting metabolic rate.   FULL BODY resistance training is recommended 2-3 times a week.  Do not do this on consecutive days to allow for muscle recovery.     Aim for a bare minimum 5000 steps, even on days you do not exercise.     Monitoring:   Weigh yourself daily.  If this causes undue stress, then just weigh yourself once a week.  Weigh yourself the same time of the day with the same amount of clothing on.  Preferably this should be done after waking up, before you eat, and with no clothing or minimal clothing on.     Specific Goals:  Calorie goal:  1300 julius/day (Provided with meal plan to follow)    Zepbound Instructions:    - Begin Zepbound 2.5 mg subcutaneously once a  week. Dose changes may occur after 4 doses if medication is tolerated. You will be assessed prior to each dose change to make sure you are tolerating the medication well.  - Please message me when you have 2 pens left from the prescription so there are no lapses in treatment.  - If you have been off the medication for more than 14 days please contact the office as you will need to restart the titration at the starting dose again to avoid significant side effects or adverse events.  - Visit Zepbound.com for further information/injection instructions.   -Please eat small frequent meals to help reduce nausea. Lemon water and saltine crackers may help with this.   - Side effects of Zepbound discussed: nausea, vomiting, diarrhea, and constipation. If you experience fever, nausea/vomiting, and pain radiating to your back this may be a sign of pancreatitis. Please go to the emergency room if this occurs.  - If on oral birth control a 2nd method of birth control is recommended during the 1st 8 weeks of therapy and for 4 weeks after any dosage change.   - Patient understands the side effects of the medication and proper administration. Patient agrees with the treatment plan and all questions were answered.    - Side effects of Zepbound: nausea, vomiting, diarrhea, and constipation. If severe abdominal pain develops, stop Zepbound and go to the ER, as this could be pancreatitis. Monitor heart rate while on Zepbound and if resting heart rate greater than 100 beats per minute, patient will notify me.   - If you need to have surgery or another procedure, such as an upper endoscopy or colonoscopy, please contact my office as often medications like Zepbound need to be held for a certain amount of time prior to a procedure.       GLP-1 Managing Side Effects Tips:  - focus on small frequent meals  - moderate sugar and fat intake  - change the injection site (abdomen --> thigh--> back of arm)  - eat protein, crackers, mints and  rocio-based drinks  - nighttime injections  - drink plenty of water  - consider fiber supplements like miralax     Tips for Nausea:  - Don't drink and eat simultaneously: separate fluids 30-60 minutes before and after meals when experiencing nausea or if you notice you become full quickly  - Choose mild smelling foods- strong smells can exacerbate nausea  - Rocio and peppermint- consider drinking rocio or peppermint tea, which can help alleviate symptoms  - Eat- don't skip meals, if you have nausea, it is understandable that you may not feel like eating. However, skipping meals is generally not recommended as this can lead to lower blood sugar and fatigue. Furthermore, it is essential to consume adequate protein during weight loss. You can opt for a protein shake, a meal replacement supplement, bone broth or soup.      Tips for Constipation:   - Drink plenty of water  - Eat food with a high fiber content: increase your fiber intake by consuming these types of foods:              Eat more whole grain products like barley, oats, farro, brown or wild rice and quinoa.               Look for choices with 100% whole wheat, rye, oats or bran as the first ingredient listed               Check nutrition fact labels and choose products with 4gm of dietary fiber or more per serving              Add more beans to your diet              Eat more fresh fruits and vegetables with skins on them               Exercise- increase physical activity as it helps stimulate gastric mobility, which moves stool through the digestive tract     Patient denies personal history of pancreatitis. Patient also denies personal and family history of medullary thyroid cancer and multiple endocrine neoplasia type 2 (MEN 2 tumor).     Patient understands the side effects of the medication and proper administration. Patient agrees with the treatment plan and all questions were answered.

## 2025-03-10 NOTE — PATIENT INSTRUCTIONS
- Patient is pursuing Conservative Program and follow up visits with medical weight management provider  - Initial weight loss goal of 5-10% weight loss for improved health. Weight loss can improve patient's co-morbid conditions and/or prevent weight-related complications.  - Explained the importance of continuing lifestyle changes in addition to any anti-obesity medications.   - Labs reviewed.    General Recommendations:  Nutrition:  Eat breakfast daily.  Do not skip meals.      Food log (ie.) [email or web address], sparkpeople.com, loseit.com, calorieking.com, etc.     Practice mindful eating.  Be sure to set aside time to eat, eat slowly, and savor your food.     Hydration:    At least 64oz of water daily.  No sugar sweetened beverages.  No juice (eat the fruit instead).     Exercise:  Studies have shown that the ideal exercise goal is somewhere between 150 to 300 minutes of moderate intensity exercise a week.  Start with exercising 10 minutes every other day and gradually increase physical activity with a goal of at least 150 minutes of moderate intensity exercise a week, divided over at least 3 days a week.  An example of this would be exercising 30 minutes a day, 5 days a week.  Resistance training can increase muscle mass and increase our resting metabolic rate.   FULL BODY resistance training is recommended 2-3 times a week.  Do not do this on consecutive days to allow for muscle recovery.     Aim for a bare minimum 5000 steps, even on days you do not exercise.     Monitoring:   Weigh yourself daily.  If this causes undue stress, then just weigh yourself once a week.  Weigh yourself the same time of the day with the same amount of clothing on.  Preferably this should be done after waking up, before you eat, and with no clothing or minimal clothing on.     Specific Goals:  Calorie goal:  1300 julius/day (Provided with meal plan to follow)    Zepbound Instructions:    - Begin Zepbound 2.5 mg subcutaneously once a  week. Dose changes may occur after 4 doses if medication is tolerated. You will be assessed prior to each dose change to make sure you are tolerating the medication well.  - Please message me when you have 2 pens left from the prescription so there are no lapses in treatment.  - If you have been off the medication for more than 14 days please contact the office as you will need to restart the titration at the starting dose again to avoid significant side effects or adverse events.  - Visit Zepbound.com for further information/injection instructions.   -Please eat small frequent meals to help reduce nausea. Lemon water and saltine crackers may help with this.   - Side effects of Zepbound discussed: nausea, vomiting, diarrhea, and constipation. If you experience fever, nausea/vomiting, and pain radiating to your back this may be a sign of pancreatitis. Please go to the emergency room if this occurs.  - If on oral birth control a 2nd method of birth control is recommended during the 1st 8 weeks of therapy and for 4 weeks after any dosage change.   - Patient understands the side effects of the medication and proper administration. Patient agrees with the treatment plan and all questions were answered.    - Side effects of Zepbound: nausea, vomiting, diarrhea, and constipation. If severe abdominal pain develops, stop Zepbound and go to the ER, as this could be pancreatitis. Monitor heart rate while on Zepbound and if resting heart rate greater than 100 beats per minute, patient will notify me.   - If you need to have surgery or another procedure, such as an upper endoscopy or colonoscopy, please contact my office as often medications like Zepbound need to be held for a certain amount of time prior to a procedure.       GLP-1 Managing Side Effects Tips:  - focus on small frequent meals  - moderate sugar and fat intake  - change the injection site (abdomen --> thigh--> back of arm)  - eat protein, crackers, mints and  rocio-based drinks  - nighttime injections  - drink plenty of water  - consider fiber supplements like miralax     Tips for Nausea:  - Don't drink and eat simultaneously: separate fluids 30-60 minutes before and after meals when experiencing nausea or if you notice you become full quickly  - Choose mild smelling foods- strong smells can exacerbate nausea  - Rocio and peppermint- consider drinking rocio or peppermint tea, which can help alleviate symptoms  - Eat- don't skip meals, if you have nausea, it is understandable that you may not feel like eating. However, skipping meals is generally not recommended as this can lead to lower blood sugar and fatigue. Furthermore, it is essential to consume adequate protein during weight loss. You can opt for a protein shake, a meal replacement supplement, bone broth or soup.      Tips for Constipation:   - Drink plenty of water  - Eat food with a high fiber content: increase your fiber intake by consuming these types of foods:              Eat more whole grain products like barley, oats, farro, brown or wild rice and quinoa.               Look for choices with 100% whole wheat, rye, oats or bran as the first ingredient listed               Check nutrition fact labels and choose products with 4gm of dietary fiber or more per serving              Add more beans to your diet              Eat more fresh fruits and vegetables with skins on them               Exercise- increase physical activity as it helps stimulate gastric mobility, which moves stool through the digestive tract     Patient denies personal history of pancreatitis. Patient also denies personal and family history of medullary thyroid cancer and multiple endocrine neoplasia type 2 (MEN 2 tumor).     Patient understands the side effects of the medication and proper administration. Patient agrees with the treatment plan and all questions were answered.

## 2025-03-12 ENCOUNTER — TELEPHONE (OUTPATIENT)
Age: 50
End: 2025-03-12

## 2025-03-12 NOTE — TELEPHONE ENCOUNTER
Caller: Kalani    Doctor: Dr. HADDAD    Reason for call: pt would like to discuss having a procedure for her L5-L8 facet joint. Pt was recommended by chiro     Call back#: [phone]

## 2025-03-13 NOTE — PROGRESS NOTES
Assessment:  1. Lumbar spondylosis        Plan:  The patient returns to the office    {Oral Swab Statement:77782}    {Opioid Statement:94415}    {UDS Statement:20532}    {PDMP Statement:76040}    {Pain Management Procedure Statement:33051}    My impressions and treatment recommendations were discussed in detail with the patient who verbalized understanding and had no further questions.  Discharge instructions were provided. I personally saw and examined the patient and I agree with the above discussed plan of care.    Orders Placed This Encounter   Procedures   • FL spine and pain procedure     Standing Status:   Future     Expected Date:   3/14/2025     Expiration Date:   3/14/2029     Reason for Exam::   right mbb#1 L4- L5  L5-S1     Is the patient pregnant?:   No     Anticoagulant hold needed?:   no   • FL spine and pain procedure     Standing Status:   Future     Expected Date:   3/14/2025     Expiration Date:   3/14/2029     Reason for Exam::   mbb#2  right L4-L5, L5-S1     Is the patient pregnant?:   No     Anticoagulant hold needed?:   no       No orders of the defined types were placed in this encounter.      History of Present Illness:  Jami Gates is a 50 y.o. female who presents for a follow up office visit in regards to Back Pain.   The patient’s current symptoms include ***    {SPA Pain Assessment:91105}    {Opioid Contract Date:27998}    I have personally reviewed and/or updated the patient's past medical history, past surgical history, family history, social history, current medications, allergies, and vital signs today.     Review of Systems   Respiratory:  Negative for shortness of breath.    Cardiovascular:  Negative for chest pain.   Gastrointestinal:  Negative for constipation, diarrhea, nausea and vomiting.   Musculoskeletal:  Positive for gait problem. Negative for arthralgias, joint swelling and myalgias.        Pain in Extremity    Skin:  Negative for rash.   Neurological:  Negative for  dizziness, seizures and weakness.   All other systems reviewed and are negative.      Patient Active Problem List   Diagnosis   • Mild intermittent asthma without complication   • Contact dermatitis   • Chronic pain of multiple joints   • Neuropathy   • Chronic neck pain   • History of COVID-19   • Fibromyalgia   • Anxiety   • Overweight   • Cubital tunnel syndrome, right   • Carpal tunnel syndrome on right   • Right low back pain   • Mouth sore   • Numbness   • PMB (postmenopausal bleeding)   • Family history of ovarian cancer   • Class 1 obesity due to excess calories with serious comorbidity and body mass index (BMI) of 32.0 to 32.9 in adult   • Myofascial pain syndrome       Past Medical History:   Diagnosis Date   • Abnormal Pap smear of cervix    • Anxiety    • Arthritis    • Asthma    • Depression    • Ear problems    • Fibromyalgia, primary    • Osteoarthritis 2021   • Osteoporosis    • Urinary tract infection    • Varicella        Past Surgical History:   Procedure Laterality Date   •  SECTION     • IA NEUROPLASTY &/TRANSPOS MEDIAN NRV CARPAL TUNNE Right 02/10/2023    Procedure: RELEASE CARPAL TUNNEL;  Surgeon: Loni Chi MD;  [facility]: [facility];  Service: Orthopedics   • IA NEUROPLASTY &/TRANSPOSITION ULNAR NERVE ELBOW Right 02/10/2023    Procedure: RELEASE CUBITAL TUNNEL;  Surgeon: Loni Chi MD;  Location: [facility];  Service: Orthopedics   • IA TONSILLECTOMY PRIMARY/SECONDARY AGE 14/> N/A 2024    Procedure: TONSILLECTOMY;  Surgeon: Mode Verdin MD;  Location: [facility];  Service: ENT   • TONSILLECTOMY         • TUBAL LIGATION         Family History   Problem Relation Age of Onset   • Cancer Mother    • Breast cancer Mother         age unknown   • Diabetes Mother    • Coronary artery disease Mother    • Ovarian cancer Mother    • Colon cancer Mother    • Cancer Father    • Prostate cancer Father    • Colon cancer Father    • No Known  Problems Sister    • No Known Problems Sister    • No Known Problems Sister    • No Known Problems Sister    • No Known Problems Sister    • Breast cancer Sister    • No Known Problems Daughter    • No Known Problems Daughter    • No Known Problems Son    • No Known Problems Maternal Grandmother    • No Known Problems Paternal Grandmother    • No Known Problems Maternal Aunt    • No Known Problems Maternal Aunt    • No Known Problems Maternal Aunt    • No Known Problems Maternal Aunt    • No Known Problems Maternal Aunt    • No Known Problems Maternal Aunt    • No Known Problems Maternal Aunt    • No Known Problems Maternal Aunt    • No Known Problems Paternal Aunt    • No Known Problems Paternal Aunt    • No Known Problems Paternal Aunt    • No Known Problems Paternal Aunt    • No Known Problems Paternal Aunt    • No Known Problems Half-Sister    • Breast cancer Half-Sister         age unknown   • No Known Problems Half-Sister        Social History     Occupational History   • Occupation: homemaker   Tobacco Use   • Smoking status: Some Days     Current packs/day: 0.00     Average packs/day: 0.5 packs/day for 14.0 years (7.0 ttl pk-yrs)     Types: Cigarettes     Start date: 2007     Last attempt to quit: 1/3/2021     Years since quittin.1   • Smokeless tobacco: Never   • Tobacco comments:     Almost 2 two years wit no smoking  patient report she vapes    Vaping Use   • Vaping status: Former   • Substances: Nicotine   Substance and Sexual Activity   • Alcohol use: Not Currently   • Drug use: Not Currently   • Sexual activity: Yes     Partners: Male     Birth control/protection: Female Sterilization       Current Outpatient Medications on File Prior to Visit   Medication Sig   • albuterol (PROVENTIL HFA,VENTOLIN HFA) 90 mcg/act inhaler Inhale 2 puffs every 6 (six) hours as needed for wheezing (cough)   • Cholecalciferol (Vitamin D3) 25 MCG (1000 UT) CAPS Take 1 capsule by mouth daily   • cyanocobalamin  "(VITAMIN B-12) 100 MCG tablet Take 100 mcg by mouth in the morning   • fluticasone (FLONASE) 50 mcg/act nasal spray SPRAY 1 SPRAY INTO EACH NOSTRIL EVERY DAY   • Multiple Vitamin (MULTIVITAMIN) capsule Take 1 capsule by mouth daily   • pantoprazole (PROTONIX) 40 mg tablet Take 1 tablet (40 mg total) by mouth daily   • tirzepatide (Zepbound) 2.5 mg/0.5 mL auto-injector Inject 0.5 mL (2.5 mg total) under the skin once a week for 28 days   • Vit C-Cholecalciferol-Alessandra Hip (Vitamin C & D3/Alessandra Hips) 500-1000-20 MG-UNIT-MG CAPS Take 1 capsule by mouth in the morning     No current facility-administered medications on file prior to visit.       Allergies   Allergen Reactions   • Tilactase GI Intolerance and Vomiting   • Pollen Extract Other (See Comments)     Seasonal allergies.       Physical Exam:     5' 5\" (1.651 m)   Wt 85.8 kg (189 lb 2.5 oz)   LMP 2022 (Exact Date)   BMI 31.48 kg/m²     Constitutional:{General Appearance:10461::\"normal, well developed, well nourished, alert, in no distress and non-toxic and no overt pain behavior.\"}  Eyes:{Sclera:73000::\"anicteric\"}  HEENT:{Hearin::\"grossly intact\"}  Neck:{Neck:97258::\"supple, symmetric, trachea midline and no masses \"}  Pulmonary:{Respiratory effort:95602::\"even and unlabored\"}  Cardiovascular:{Examination of Extremities:27696::\"No edema or pitting edema present\"}  Skin:{Skin and Subcutaneous tissues:08353::\"Normal without rashes or lesions and well hydrated\"}  Psychiatric:{Mood and Affect:70812::\"Mood and affect appropriate\"}  Neurologic:{Cranial Nerves:75562::\"Cranial Nerves II-XII grossly intact\"}  Musculoskeletal:{[place]:22986::\"normal\"}    Imaging    "

## 2025-03-14 ENCOUNTER — OFFICE VISIT (OUTPATIENT)
Dept: PAIN MEDICINE | Facility: CLINIC | Age: 50
End: 2025-03-14
Payer: COMMERCIAL

## 2025-03-14 ENCOUNTER — PATIENT MESSAGE (OUTPATIENT)
Dept: PAIN MEDICINE | Facility: CLINIC | Age: 50
End: 2025-03-14

## 2025-03-14 VITALS — HEIGHT: 65 IN | BODY MASS INDEX: 31.52 KG/M2 | WEIGHT: 189.15 LBS

## 2025-03-14 DIAGNOSIS — M47.816 LUMBAR SPONDYLOSIS: Primary | ICD-10-CM

## 2025-03-14 PROCEDURE — 99214 OFFICE O/P EST MOD 30 MIN: CPT

## 2025-03-14 NOTE — PATIENT COMMUNICATION
Pt is scheduled for MBBs with Dr Moss on 4/1/25 and 4/15/25    Pt is not diabetic and reports she does not have a pacemaker or defibrillator    Pt given instructions in office and via myc message    Have you completed PT/HEP/Chiro in the past 6 months for dedicated area? Current chiro with [facility], has had TPI for pain relief and completed PT with moderate relief, [facility] in 2023 and 2021  If yes, how long did you complete?  What was the frequency?  Did it provide relief?  If no, reason therapy was not completed?

## 2025-03-15 NOTE — PATIENT INSTRUCTIONS
"  Medial Branch Blocks     What are the facet joints?     The facet joints are the articulations or connections between the vertebrae in the spine.  They are like any other joint in the body like the knee or elbow that enable the bending or twisting movements of the spine.  The facet joints help support the weight and control movement between individual vertebrae.    The facet joints can get inflamed secondary to injury or arthritis and cause pain and stiffness.  To help determine whether the facet joints are a source of pain, it is necessary to perform a nerve block along the nerves that supply sensation to your joints.  Facet joints are innervated or \"supplied\" by nerves called medial branches.  These nerves carried the pain signals to the spinal cord and the signals eventually reach the brain, where the pain is noticed.    If the nerves were \"blocked\" or \"numbed\", they will not be able to carry pain sensation to the spinal cord for the short term.  Therefore, if the pain is due to facet joint arthritis, you should have relief from pain and stiffness.  This is a diagnostic procedure. You will be given a pain diary to take home for the next 24 hours.  If this does provide short-term relief, this procedure may be repeated to confirm diagnosis.  Once it is determined that the pain is indeed due to the facet joint disease, we can use a procedure called radiofrequency ablation and prevent the conduction of pain information for an extended period of time as discussed by your physician.    What happens during the procedure?    Lying on your stomach or side, the skin over year neck, midback or low back will be well cleaned.  The physician will numb a small area of skin with numbing medicine which may sting for a few seconds.  The physician will use x-ray guidance to direct a special needle along side the targeted medial branch nerves.  Then a small amount of contrast dye may be injected confirming proper placement.  Then " a nerve small amount of local anesthetic will be injected over the nerve.      What happens after the procedure?    Your arm or chest wall or leg may temporarily feel numb or weak from the anesthetic.  A dressing may be applied to the injection site.  Your will remain for about 15 to 20 minutes and the nurse will monitor blood pressure and pulse.  The nurse will review your discharge instructions and will be able to go home.  A pain diary will be provided for you to keep a record over the next 24 hours.  Over that time, you should perform activities that normally cause you pain (within reason). This will help us determine the success of the injection.  After you have finished with the diary, please mail, fax or drop off the completed diary and we will be in contact with you.   Core Strengthening Exercises   WHAT YOU NEED TO KNOW:   What do I need to know about core strengthening exercises?  Core strengthening exercises help heal and strengthen muscles of your hips, back, and abdomen to prevent reinjury. They are beginning exercises to help support your spine. Ask your healthcare provider if you need to see a physical therapist for more advanced exercises.  Do the exercises on a mat or firm surface  (not on a bed) to support your spine and avoid low back pain.     Do the exercises in the same order every time  to train your muscles to work together. Your healthcare provider will show you how to perform these exercises. Do them every day, or as directed by your healthcare provider.     Move slowly and smoothly.  Avoid fast or jerky motions.     Stop if you feel pain.  It is normal to feel some discomfort at first. Regular exercise will help decrease your discomfort over time.  How do I perform core strengthening exercises safely?  Hold each exercise for 5 seconds. When you can do the exercise without pain for 5 seconds, increase your hold to 10 to 15 seconds. When you can do the exercise without pain for 10 to 15  seconds, add the next exercise. Increase the time you hold each exercise, or repeat the exercises as directed. As you do each exercise, breathe normally. Do not hold your breath.  Abdominal bracing:  Lie on your back with your knees bent and feet flat on the floor. Place your arms in a relaxed position beside your body. Pull your belly button in toward your spine. Do not flatten or arch your back. Tighten the abdominal muscles below your belly button. Hold for 5 seconds. Begin all of your exercises with abdominal bracing. You can also practice abdominal bracing throughout the day while you are sitting or standing.           Bridging:  Lie on your back with your knees bent and feet flat on the floor. Rest your arms at your side. Tighten your buttocks, and then lift your hips 1 inch off the floor. Hold for 5 seconds. When you can do this exercise without pain for 10 seconds, increase the distance you lift your hips. A good goal is to be able to lift your hips so that your shoulders, hips, and knees are in a straight line.           Curl up:  Lie on your back with your knees bent and feet flat on the floor. Place your hands, palms down, underneath the curve in your lower back. Next, with your elbows on the floor, lift your shoulders and chest 2 to 3 inches. Keep your head in line with your shoulders. Hold this position for 5 seconds. When you can do this exercise without pain for 10 to 15 seconds, you may add a rotation. While your shoulders and chest are lifted off the ground, turn slightly to the left and hold. Repeat on the other side.           Dead bug:  Lie on your back with your knees bent and feet flat on the floor. Place your arms in a relaxed position beside your body. Begin with abdominal bracing. Next, raise one leg, keeping your knee bent. Hold for 5 seconds. Repeat with the other leg. When you can do this exercise without pain for 10 to 15 seconds, you may raise one straight leg and hold. Repeat with the  other leg.           Quadruped:  Place your hands and knees on the floor. Keep your wrists directly below your shoulders and your knees directly below your hips. Pull your belly button in toward your spine. Do not flatten or arch your back. Tighten your abdominal muscles below your belly button. Hold for 5 seconds. When you can do this exercise without pain for 10 to 15 seconds, you may extend one arm and hold. Repeat on the other side.           Side Bridge:      Standing side bridge:  Stand next to a wall and extend one arm toward the wall. Place your palm flat on the wall with your fingers pointing upward. Begin with abdominal bracing. Next, without moving your feet, slowly bend your arm to 90 degrees. Hold for 5 seconds. Repeat on the other side. When you can do this exercise without pain for 10 to 15 seconds, you may do the bent leg side bridge on the floor.           Bent leg side bridge:  Lie on one side with your legs, hips, and shoulders in a straight line. Prop yourself up onto your forearm so your elbow is directly below your shoulder. Bend your knees back to 90 degrees. Begin with abdominal bracing. Next, lift your hips and balance yourself on your forearm and knees. Hold for 5 seconds. Repeat on the other side. When you can do this exercise without pain for 10 to 15 seconds, you may do the straight leg side bridge on the floor.           Straight leg side bridge:  Lie on one side with your legs, hips, and shoulders in a straight line. Prop yourself up onto your forearm so your elbow is directly below your shoulder. Begin with abdominal bracing. Lift your hips off the floor and balance yourself on your forearm and the outside of your flexed foot. Do not let your ankle bend sideways. Hold for 5 seconds. Repeat on the other side. When you can do this exercise without pain for 10 to 15 seconds, ask your healthcare provider for more advanced exercises.       When should I contact my healthcare provider?    Your pain becomes worse.    You have new pain.    You have questions or concerns about your condition, care, or exercise program.  CARE AGREEMENT:   You have the right to help plan your care. Learn about your health condition and how it may be treated. Discuss treatment options with your caregivers to decide what care you want to receive. You always have the right to refuse treatment. The above information is an  only. It is not intended as medical advice for individual conditions or treatments. Talk to your doctor, nurse or pharmacist before following any medical regimen to see if it is safe and effective for you.  © 2016 [employer]. Information is for End User's use only and may not be sold, redistributed or otherwise used for commercial purposes. All illustrations and images included in CareNotes® are the copyrighted property of [employer] or [employer].

## 2025-03-15 NOTE — H&P (VIEW-ONLY)
Pain Medicine Follow-Up Note    Assessment:  1. Lumbar spondylosis        Plan:  Orders Placed This Encounter   Procedures   • FL spine and pain procedure     Standing Status:   Future     Expected Date:   3/14/2025     Expiration Date:   3/14/2029     Reason for Exam::   right mbb#1 L4- L5  L5-S1     Is the patient pregnant?:   No     Anticoagulant hold needed?:   no   • FL spine and pain procedure     Standing Status:   Future     Expected Date:   3/14/2025     Expiration Date:   3/14/2029     Reason for Exam::   mbb#2  right L4-L5, L5-S1     Is the patient pregnant?:   No     Anticoagulant hold needed?:   no       No orders of the defined types were placed in this encounter.      My impressions and treatment recommendations were discussed in detail with the patient who verbalized understanding and had no further questions.      The patient returns to the office with worsening bilateral low back pain.  In the past the patient has had trigger point injections which she did find very helpful.  The patient states that her pain feels slightly different, she has been following with chiropractics for greater than 6 weeks who recommend that the patient have facet joint injections.  The patient has been going to [facility] ([phone]).  On exam the patient does appear to have facet mediated pain which also correlates with the patient's lumbar x-ray.  I recommend that the patient have a medial branch block to RFA series of her bilateral L4-L5, L5-S1 due to the fact that the patient has not received significant pain relief following multiple conservative measures including chiropractics and medications. Patient understands that a medial branch block is a diagnostic tool which would need to be followed by a confirmatory medial branch block followed by a radiofrequency ablation which will provide the patient with meaningful pain relief.    Medial Branch Block Medical Necessity  The patient is to undergo  diagnostic lumbar facet and/or medial branch block injections. This is medically necessary given the patient has had this pain for more than 3 months. Their physical examination corroborates their diagnosis and the treatment to be prescribed. The patient has evidence of facet arthropathy on imaging with axial low back pain severe enough to greatly impact quality of life or function. No untreated radiculopathy or neurogenic claudication. No non facet pathology that could explain the source of patient's pain. If patient has significant pain relief (>80% relief) with 2 diagnostic blocks, will proceed with thermal radiofrequency ablation.      The patient is concurrently involved in a conservative treatment plan including:               [x] Medications              [] Physical therapy              [x] Chiropractic care              [x] Home exercise or stretching program              [] Multidisciplinary healthcare provider team      Complete risks and benefits including bleeding, infection, tissue reaction, nerve injury and allergic reaction were discussed. The approach was demonstrated using models and literature was provided. Verbal and written consent was obtained.      Follow-up is planned in 6 weeks after RFA time or sooner as warranted.  Discharge instructions were provided. I personally saw and examined the patient and I agree with the above discussed plan of care.    History of Present Illness:    Jami Gates is a 50 y.o. female who presents to [facility] for interval re-evaluation of the above stated pain complaints. The patient has a past medical and chronic pain history as outlined in the assessment section. She was last seen on 11/4/2024.    At today's visit patient states that their pain symptoms are worse with a pain score of 20/10 on the verbal numeric pain scale.  The patient's pain is worse in the morning, evening, and at night.  The patient's pain is constant in nature.   And the quality of the patient's pain is described as burning, sharp, throbbing, cramping, and pressure-like.  The patient's pain is located in the bilateral low back right greater than the left.      Other than as stated above, the patient denies any interval changes in medications, medical condition, mental condition, symptoms, or allergies since the last office visit.         Review of Systems:    Review of Systems   Respiratory:  Negative for shortness of breath.    Cardiovascular:  Negative for chest pain.   Gastrointestinal:  Negative for constipation, diarrhea, nausea and vomiting.   Musculoskeletal:  Positive for back pain and gait problem. Negative for arthralgias, joint swelling and myalgias.   Skin:  Negative for rash.   Neurological:  Negative for dizziness, seizures and weakness.   All other systems reviewed and are negative.        Past Medical History:   Diagnosis Date   • Abnormal Pap smear of cervix    • Anxiety    • Arthritis    • Asthma    • Depression    • Ear problems    • Fibromyalgia, primary    • Osteoarthritis 2021   • Osteoporosis    • Urinary tract infection    • Varicella        Past Surgical History:   Procedure Laterality Date   •  SECTION     • [place] NEUROPLASTY &/TRANSPOS MEDIAN NRV CARPAL TUNNE Right 02/10/2023    Procedure: RELEASE CARPAL TUNNEL;  Surgeon: Loni Chi MD;  [facility];  Service: Orthopedics   • [place] NEUROPLASTY &/TRANSPOSITION ULNAR NERVE ELBOW Right 02/10/2023    Procedure: RELEASE CUBITAL TUNNEL;  Surgeon: Loni Chi MD;  [facility];  Service: Orthopedics   • WA TONSILLECTOMY PRIMARY/SECONDARY AGE 14/> N/A 2024    Procedure: TONSILLECTOMY;  Surgeon: Mode Verdin MD;  [facility]: [facility];  Service: ENT   • TONSILLECTOMY         • TUBAL LIGATION         Family History   Problem Relation Age of Onset   • Cancer Mother    • Breast cancer Mother         age unknown   • Diabetes Mother    •  Coronary artery disease Mother    • Ovarian cancer Mother    • Colon cancer Mother    • Cancer Father    • Prostate cancer Father    • Colon cancer Father    • No Known Problems Sister    • No Known Problems Sister    • No Known Problems Sister    • No Known Problems Sister    • No Known Problems Sister    • Breast cancer Sister    • No Known Problems Daughter    • No Known Problems Daughter    • No Known Problems Son    • No Known Problems Maternal Grandmother    • No Known Problems Paternal Grandmother    • No Known Problems Maternal Aunt    • No Known Problems Maternal Aunt    • No Known Problems Maternal Aunt    • No Known Problems Maternal Aunt    • No Known Problems Maternal Aunt    • No Known Problems Maternal Aunt    • No Known Problems Maternal Aunt    • No Known Problems Maternal Aunt    • No Known Problems Paternal Aunt    • No Known Problems Paternal Aunt    • No Known Problems Paternal Aunt    • No Known Problems Paternal Aunt    • No Known Problems Paternal Aunt    • No Known Problems Half-Sister    • Breast cancer Half-Sister         age unknown   • No Known Problems Half-Sister        Social History     Occupational History   • Occupation: homemaker   Tobacco Use   • Smoking status: Some Days     Current packs/day: 0.00     Average packs/day: 0.5 packs/day for 14.0 years (7.0 ttl pk-yrs)     Types: Cigarettes     Start date: 2007     Last attempt to quit: 1/3/2021     Years since quittin.1   • Smokeless tobacco: Never   • Tobacco comments:     Almost 2 two years wit no smoking  patient report she vapes    Vaping Use   • Vaping status: Former   • Substances: Nicotine   Substance and Sexual Activity   • Alcohol use: Not Currently   • Drug use: Not Currently   • Sexual activity: Yes     Partners: Male     Birth control/protection: Female Sterilization         Current Outpatient Medications:   •  albuterol (PROVENTIL HFA,VENTOLIN HFA) 90 mcg/act inhaler, Inhale 2 puffs every 6 (six) hours as  "needed for wheezing (cough), Disp: 18 g, Rfl: 3  •  Cholecalciferol (Vitamin D3) 25 MCG (1000 UT) CAPS, Take 1 capsule by mouth daily, Disp: , Rfl:   •  cyanocobalamin (VITAMIN B-12) 100 MCG tablet, Take 100 mcg by mouth in the morning, Disp: , Rfl:   •  fluticasone (FLONASE) 50 mcg/act nasal spray, SPRAY 1 SPRAY INTO EACH NOSTRIL EVERY DAY, Disp: 16 mL, Rfl: 1  •  Multiple Vitamin (MULTIVITAMIN) capsule, Take 1 capsule by mouth daily, Disp: , Rfl:   •  pantoprazole (PROTONIX) 40 mg tablet, Take 1 tablet (40 mg total) by mouth daily, Disp: 31 tablet, Rfl: 6  •  tirzepatide (Zepbound) 2.5 mg/0.5 mL auto-injector, Inject 0.5 mL (2.5 mg total) under the skin once a week for 28 days, Disp: 2 mL, Rfl: 0  •  Vit C-Cholecalciferol-Alessandra Hip (Vitamin C & D3/Alessandra Hips) 500-1000-20 MG-UNIT-MG CAPS, Take 1 capsule by mouth in the morning, Disp: , Rfl:     Allergies   Allergen Reactions   • Tilactase GI Intolerance and Vomiting   • Pollen Extract Other (See Comments)     Seasonal allergies.       Physical Exam:    Ht 5' 5\" (1.651 m)   Wt 85.8 kg (189 lb 2.5 oz)   LMP 12/11/2022 (Exact Date)   BMI 31.48 kg/m²     Constitutional:normal, well developed, well nourished, alert, in no distress and non-toxic and no overt pain behavior. and obese  Eyes:anicteric  HEENT:grossly intact  Neck:supple, symmetric, trachea midline and no masses   Pulmonary:even and unlabored  Cardiovascular:No edema or pitting edema present  Skin:Normal without rashes or lesions and well hydrated  Psychiatric:Mood and affect appropriate  Neurologic:Cranial Nerves II-XII grossly intact  Musculoskeletal:antalgic and tenderness with palpation in the lumbar paraspinal area, pain with lumbar facet loading bilaterally      Imaging  FL spine and pain procedure    (Results Pending)   FL spine and pain procedure    (Results Pending)         Orders Placed This Encounter   Procedures   • FL spine and pain procedure   • FL spine and pain procedure       This document " was created using speech voice recognition software.   Grammatical errors, random word insertions, pronoun errors, and incomplete sentences are an occasional consequence of this system due to software limitations, ambient noise, and hardware issues.   Any formal questions or concerns about content, text, or information contained within the body of this dictation should be directly addressed to the provider for clarification.

## 2025-03-15 NOTE — PROGRESS NOTES
Pain Medicine Follow-Up Note    Assessment:  1. Lumbar spondylosis        Plan:  Orders Placed This Encounter   Procedures   • FL spine and pain procedure     Standing Status:   Future     Expected Date:   3/14/2025     Expiration Date:   3/14/2029     Reason for Exam::   right mbb#1 L4- L5  L5-S1     Is the patient pregnant?:   No     Anticoagulant hold needed?:   no   • FL spine and pain procedure     Standing Status:   Future     Expected Date:   3/14/2025     Expiration Date:   3/14/2029     Reason for Exam::   mbb#2  right L4-L5, L5-S1     Is the patient pregnant?:   No     Anticoagulant hold needed?:   no       No orders of the defined types were placed in this encounter.      My impressions and treatment recommendations were discussed in detail with the patient who verbalized understanding and had no further questions.      The patient returns to the office with worsening bilateral low back pain.  In the past the patient has had trigger point injections which she did find very helpful.  The patient states that her pain feels slightly different, she has been following with chiropractics for greater than 6 weeks who recommend that the patient have facet joint injections.  The patient has been going to [facility] ([phone]).  On exam the patient does appear to have facet mediated pain which also correlates with the patient's lumbar x-ray.  I recommend that the patient have a medial branch block to RFA series of her bilateral L4-L5, L5-S1 due to the fact that the patient has not received significant pain relief following multiple conservative measures including chiropractics and medications. Patient understands that a medial branch block is a diagnostic tool which would need to be followed by a confirmatory medial branch block followed by a radiofrequency ablation which will provide the patient with meaningful pain relief.    Medial Branch Block Medical Necessity  The patient is to undergo  diagnostic lumbar facet and/or medial branch block injections. This is medically necessary given the patient has had this pain for more than 3 months. Their physical examination corroborates their diagnosis and the treatment to be prescribed. The patient has evidence of facet arthropathy on imaging with axial low back pain severe enough to greatly impact quality of life or function. No untreated radiculopathy or neurogenic claudication. No non facet pathology that could explain the source of patient's pain. If patient has significant pain relief (>80% relief) with 2 diagnostic blocks, will proceed with thermal radiofrequency ablation.      The patient is concurrently involved in a conservative treatment plan including:               [x] Medications              [] Physical therapy              [x] Chiropractic care              [x] Home exercise or stretching program              [] Multidisciplinary healthcare provider team      Complete risks and benefits including bleeding, infection, tissue reaction, nerve injury and allergic reaction were discussed. The approach was demonstrated using models and literature was provided. Verbal and written consent was obtained.      Follow-up is planned in 6 weeks after RFA time or sooner as warranted.  Discharge instructions were provided. I personally saw and examined the patient and I agree with the above discussed plan of care.    History of Present Illness:    Jami Gates is a 50 y.o. female who presents to [facility] for interval re-evaluation of the above stated pain complaints. The patient has a past medical and chronic pain history as outlined in the assessment section. She was last seen on 11/4/2024.    At today's visit patient states that their pain symptoms are worse with a pain score of 20/10 on the verbal numeric pain scale.  The patient's pain is worse in the morning, evening, and at night.  The patient's pain is constant in nature.   And the quality of the patient's pain is described as burning, sharp, throbbing, cramping, and pressure-like.  The patient's pain is located in the bilateral low back right greater than the left.      Other than as stated above, the patient denies any interval changes in medications, medical condition, mental condition, symptoms, or allergies since the last office visit.         Review of Systems:    Review of Systems   Respiratory:  Negative for shortness of breath.    Cardiovascular:  Negative for chest pain.   Gastrointestinal:  Negative for constipation, diarrhea, nausea and vomiting.   Musculoskeletal:  Positive for back pain and gait problem. Negative for arthralgias, joint swelling and myalgias.   Skin:  Negative for rash.   Neurological:  Negative for dizziness, seizures and weakness.   All other systems reviewed and are negative.        Past Medical History:   Diagnosis Date   • Abnormal Pap smear of cervix    • Anxiety    • Arthritis    • Asthma    • Depression    • Ear problems    • Fibromyalgia, primary    • Osteoarthritis 2021   • Osteoporosis    • Urinary tract infection    • Varicella        Past Surgical History:   Procedure Laterality Date   •  SECTION     • [place] NEUROPLASTY &/TRANSPOS MEDIAN NRV CARPAL TUNNE Right 02/10/2023    Procedure: RELEASE CARPAL TUNNEL;  Surgeon: Loni Chi MD;  [facility];  Service: Orthopedics   • [place] NEUROPLASTY &/TRANSPOSITION ULNAR NERVE ELBOW Right 02/10/2023    Procedure: RELEASE CUBITAL TUNNEL;  Surgeon: Loni Chi MD;  [facility];  Service: Orthopedics   • AL TONSILLECTOMY PRIMARY/SECONDARY AGE 14/> N/A 2024    Procedure: TONSILLECTOMY;  Surgeon: Mode Verdin MD;  [facility]: [facility];  Service: ENT   • TONSILLECTOMY         • TUBAL LIGATION         Family History   Problem Relation Age of Onset   • Cancer Mother    • Breast cancer Mother         age unknown   • Diabetes Mother    •  Coronary artery disease Mother    • Ovarian cancer Mother    • Colon cancer Mother    • Cancer Father    • Prostate cancer Father    • Colon cancer Father    • No Known Problems Sister    • No Known Problems Sister    • No Known Problems Sister    • No Known Problems Sister    • No Known Problems Sister    • Breast cancer Sister    • No Known Problems Daughter    • No Known Problems Daughter    • No Known Problems Son    • No Known Problems Maternal Grandmother    • No Known Problems Paternal Grandmother    • No Known Problems Maternal Aunt    • No Known Problems Maternal Aunt    • No Known Problems Maternal Aunt    • No Known Problems Maternal Aunt    • No Known Problems Maternal Aunt    • No Known Problems Maternal Aunt    • No Known Problems Maternal Aunt    • No Known Problems Maternal Aunt    • No Known Problems Paternal Aunt    • No Known Problems Paternal Aunt    • No Known Problems Paternal Aunt    • No Known Problems Paternal Aunt    • No Known Problems Paternal Aunt    • No Known Problems Half-Sister    • Breast cancer Half-Sister         age unknown   • No Known Problems Half-Sister        Social History     Occupational History   • Occupation: homemaker   Tobacco Use   • Smoking status: Some Days     Current packs/day: 0.00     Average packs/day: 0.5 packs/day for 14.0 years (7.0 ttl pk-yrs)     Types: Cigarettes     Start date: 2007     Last attempt to quit: 1/3/2021     Years since quittin.1   • Smokeless tobacco: Never   • Tobacco comments:     Almost 2 two years wit no smoking  patient report she vapes    Vaping Use   • Vaping status: Former   • Substances: Nicotine   Substance and Sexual Activity   • Alcohol use: Not Currently   • Drug use: Not Currently   • Sexual activity: Yes     Partners: Male     Birth control/protection: Female Sterilization         Current Outpatient Medications:   •  albuterol (PROVENTIL HFA,VENTOLIN HFA) 90 mcg/act inhaler, Inhale 2 puffs every 6 (six) hours as  "needed for wheezing (cough), Disp: 18 g, Rfl: 3  •  Cholecalciferol (Vitamin D3) 25 MCG (1000 UT) CAPS, Take 1 capsule by mouth daily, Disp: , Rfl:   •  cyanocobalamin (VITAMIN B-12) 100 MCG tablet, Take 100 mcg by mouth in the morning, Disp: , Rfl:   •  fluticasone (FLONASE) 50 mcg/act nasal spray, SPRAY 1 SPRAY INTO EACH NOSTRIL EVERY DAY, Disp: 16 mL, Rfl: 1  •  Multiple Vitamin (MULTIVITAMIN) capsule, Take 1 capsule by mouth daily, Disp: , Rfl:   •  pantoprazole (PROTONIX) 40 mg tablet, Take 1 tablet (40 mg total) by mouth daily, Disp: 31 tablet, Rfl: 6  •  tirzepatide (Zepbound) 2.5 mg/0.5 mL auto-injector, Inject 0.5 mL (2.5 mg total) under the skin once a week for 28 days, Disp: 2 mL, Rfl: 0  •  Vit C-Cholecalciferol-Alessandra Hip (Vitamin C & D3/Alessandra Hips) 500-1000-20 MG-UNIT-MG CAPS, Take 1 capsule by mouth in the morning, Disp: , Rfl:     Allergies   Allergen Reactions   • Tilactase GI Intolerance and Vomiting   • Pollen Extract Other (See Comments)     Seasonal allergies.       Physical Exam:    Ht 5' 5\" (1.651 m)   Wt 85.8 kg (189 lb 2.5 oz)   LMP 12/11/2022 (Exact Date)   BMI 31.48 kg/m²     Constitutional:normal, well developed, well nourished, alert, in no distress and non-toxic and no overt pain behavior. and obese  Eyes:anicteric  HEENT:grossly intact  Neck:supple, symmetric, trachea midline and no masses   Pulmonary:even and unlabored  Cardiovascular:No edema or pitting edema present  Skin:Normal without rashes or lesions and well hydrated  Psychiatric:Mood and affect appropriate  Neurologic:Cranial Nerves II-XII grossly intact  Musculoskeletal:antalgic and tenderness with palpation in the lumbar paraspinal area, pain with lumbar facet loading bilaterally      Imaging  FL spine and pain procedure    (Results Pending)   FL spine and pain procedure    (Results Pending)         Orders Placed This Encounter   Procedures   • FL spine and pain procedure   • FL spine and pain procedure       This document " was created using speech voice recognition software.   Grammatical errors, random word insertions, pronoun errors, and incomplete sentences are an occasional consequence of this system due to software limitations, ambient noise, and hardware issues.   Any formal questions or concerns about content, text, or information contained within the body of this dictation should be directly addressed to the provider for clarification.

## 2025-03-28 ENCOUNTER — OFFICE VISIT (OUTPATIENT)
Dept: FAMILY MEDICINE CLINIC | Facility: CLINIC | Age: 50
End: 2025-03-28
Payer: COMMERCIAL

## 2025-03-28 VITALS
HEART RATE: 70 BPM | SYSTOLIC BLOOD PRESSURE: 112 MMHG | TEMPERATURE: 97.6 F | BODY MASS INDEX: 30.82 KG/M2 | WEIGHT: 185 LBS | DIASTOLIC BLOOD PRESSURE: 80 MMHG | OXYGEN SATURATION: 98 % | HEIGHT: 65 IN

## 2025-03-28 DIAGNOSIS — E66.811 CLASS 1 OBESITY DUE TO EXCESS CALORIES WITH SERIOUS COMORBIDITY AND BODY MASS INDEX (BMI) OF 30.0 TO 30.9 IN ADULT: ICD-10-CM

## 2025-03-28 DIAGNOSIS — H10.31 ACUTE BACTERIAL CONJUNCTIVITIS OF RIGHT EYE: Primary | ICD-10-CM

## 2025-03-28 DIAGNOSIS — E66.09 CLASS 1 OBESITY DUE TO EXCESS CALORIES WITH SERIOUS COMORBIDITY AND BODY MASS INDEX (BMI) OF 30.0 TO 30.9 IN ADULT: ICD-10-CM

## 2025-03-28 PROCEDURE — 99213 OFFICE O/P EST LOW 20 MIN: CPT | Performed by: PHYSICIAN ASSISTANT

## 2025-03-28 RX ORDER — POLYMYXIN B SULFATE AND TRIMETHOPRIM 1; 10000 MG/ML; [USP'U]/ML
1 SOLUTION OPHTHALMIC EVERY 4 HOURS
Qty: 10 ML | Refills: 0 | Status: SHIPPED | OUTPATIENT
Start: 2025-03-28 | End: 2025-04-04

## 2025-03-28 NOTE — PROGRESS NOTES
Name: Jami Gates      : 1975      MRN: [id number]  Encounter Provider: Jerome Berger PA-C  Encounter Date: 3/28/2025   Encounter department: [facility]    Assessment & Plan  Acute bacterial conjunctivitis of right eye  Polytrim ggts  Cool compress  Follow up prn  Orders:  •  polymyxin b-trimethoprim (POLYTRIM) ophthalmic solution; Administer 1 drop to the right eye every 4 (four) hours for 7 days    Class 1 obesity due to excess calories with serious comorbidity and body mass index (BMI) of 30.0 to 30.9 in adult    Congratulated weight loss, continue efforts  BMI Counseling: Body mass index is 30.79 kg/m². The BMI is above normal. Nutrition recommendations include reducing portion sizes, decreasing overall calorie intake, 3-5 servings of fruits/vegetables daily, reducing fast food intake, moderation in carbohydrate intake, increasing intake of lean protein, reducing intake of saturated fat and trans fat, and reducing intake of cholesterol. Exercise recommendations include moderate aerobic physical activity for 150 minutes/week.              History of Present Illness     Pt presents for follow up weight check  She never started zepbound,unable to obtain  Lost 8# in about a month with diet/exercise/lifestyle changes    She notes itchy/red/crusting R eye       Review of Systems   Constitutional:  Negative for chills, fatigue and fever.   HENT:  Negative for congestion, ear pain, hearing loss, nosebleeds, postnasal drip, rhinorrhea, sinus pressure, sinus pain, sneezing and sore throat.    Eyes:  Positive for discharge, redness and itching. Negative for pain and visual disturbance.   Respiratory:  Negative for cough, chest tightness, shortness of breath and wheezing.    Cardiovascular:  Negative for chest pain, palpitations and leg swelling.   Gastrointestinal:  Negative for abdominal pain, blood in stool, constipation, diarrhea, nausea and vomiting.   Genitourinary:  Negative  for frequency and urgency.   Neurological:  Negative for dizziness, light-headedness and numbness.     Past Medical History:   Diagnosis Date   • Abnormal Pap smear of cervix    • Anxiety    • Arthritis    • Asthma    • Depression    • Ear problems    • Fibromyalgia, primary    • Osteoarthritis 2021   • Osteoporosis    • Urinary tract infection    • Varicella      Past Surgical History:   Procedure Laterality Date   •  SECTION     • [place] NEUROPLASTY &/TRANSPOS MEDIAN NRV CARPAL TUNNE Right 02/10/2023    Procedure: RELEASE CARPAL TUNNEL;  Surgeon: Loni Cih MD;  [facility];  Service: Orthopedics   • [place] NEUROPLASTY &/TRANSPOSITION ULNAR NERVE ELBOW Right 02/10/2023    Procedure: RELEASE CUBITAL TUNNEL;  Surgeon: Loni Chi MD;  [facility];  Service: Orthopedics   • [place] TONSILLECTOMY PRIMARY/SECONDARY AGE 14/> N/A 2024    Procedure: TONSILLECTOMY;  Surgeon: Mode Verdin MD;  Location: [facility];  Service: ENT   • TONSILLECTOMY         • TUBAL LIGATION       Family History   Problem Relation Age of Onset   • Cancer Mother    • Breast cancer Mother         age unknown   • Diabetes Mother    • Coronary artery disease Mother    • Ovarian cancer Mother    • Colon cancer Mother    • Cancer Father    • Prostate cancer Father    • Colon cancer Father    • No Known Problems Sister    • No Known Problems Sister    • No Known Problems Sister    • No Known Problems Sister    • No Known Problems Sister    • Breast cancer Sister    • No Known Problems Daughter    • No Known Problems Daughter    • No Known Problems Son    • No Known Problems Maternal Grandmother    • No Known Problems Paternal Grandmother    • No Known Problems Maternal Aunt    • No Known Problems Maternal Aunt    • No Known Problems Maternal Aunt    • No Known Problems Maternal Aunt    • No Known Problems Maternal Aunt    • No Known Problems Maternal Aunt    • No Known Problems Maternal  Aunt    • No Known Problems Maternal Aunt    • No Known Problems Paternal Aunt    • No Known Problems Paternal Aunt    • No Known Problems Paternal Aunt    • No Known Problems Paternal Aunt    • No Known Problems Paternal Aunt    • No Known Problems Half-Sister    • Breast cancer Half-Sister         age unknown   • No Known Problems Half-Sister      Social History     Tobacco Use   • Smoking status: Some Days     Current packs/day: 0.00     Average packs/day: 0.5 packs/day for 14.0 years (7.0 ttl pk-yrs)     Types: Cigarettes     Start date: 2007     Last attempt to quit: 1/3/2021     Years since quittin.2   • Smokeless tobacco: Never   • Tobacco comments:     Almost 2 two years wit no smoking  patient report she vapes    Vaping Use   • Vaping status: Former   • Substances: Nicotine   Substance and Sexual Activity   • Alcohol use: Not Currently   • Drug use: Not Currently   • Sexual activity: Yes     Partners: Male     Birth control/protection: Female Sterilization     Current Outpatient Medications on File Prior to Visit   Medication Sig   • albuterol (PROVENTIL HFA,VENTOLIN HFA) 90 mcg/act inhaler Inhale 2 puffs every 6 (six) hours as needed for wheezing (cough)   • Cholecalciferol (Vitamin D3) 25 MCG (1000 UT) CAPS Take 1 capsule by mouth daily   • cyanocobalamin (VITAMIN B-12) 100 MCG tablet Take 100 mcg by mouth in the morning   • fluticasone (FLONASE) 50 mcg/act nasal spray SPRAY 1 SPRAY INTO EACH NOSTRIL EVERY DAY   • Multiple Vitamin (MULTIVITAMIN) capsule Take 1 capsule by mouth daily   • pantoprazole (PROTONIX) 40 mg tablet Take 1 tablet (40 mg total) by mouth daily   • Vit C-Cholecalciferol-Alessandra Hip (Vitamin C & D3/Alessandra Hips) 500-1000-20 MG-UNIT-MG CAPS Take 1 capsule by mouth in the morning   • [DISCONTINUED] tirzepatide (Zepbound) 2.5 mg/0.5 mL auto-injector Inject 0.5 mL (2.5 mg total) under the skin once a week for 28 days     Allergies   Allergen Reactions   • Tilactase GI Intolerance and  "Vomiting   • Pollen Extract Other (See Comments)     Seasonal allergies.     Immunization History   Administered Date(s) Administered   • Pneumococcal Conjugate Vaccine 20-valent (Pcv20), Polysace 07/07/2022   • Pneumococcal Polysaccharide PPV23 01/15/2020   • Tdap 10/05/2016     Objective   /80 (BP Location: Left arm, Patient Position: Sitting, Cuff Size: Large)   Pulse 70   Temp 97.6 °F (36.4 °C)   Ht 5' 5\" (1.651 m)   Wt 83.9 kg (185 lb)   LMP 12/11/2022 (Exact Date)   SpO2 98%   BMI 30.79 kg/m²     Physical Exam  Vitals and nursing note reviewed.   Constitutional:       General: She is not in acute distress.     Appearance: She is well-developed.   HENT:      Head: Normocephalic and atraumatic.   Eyes:      General:         Right eye: Discharge present.      Conjunctiva/sclera:      Right eye: Right conjunctiva is injected.   Cardiovascular:      Rate and Rhythm: Normal rate and regular rhythm.      Heart sounds: No murmur heard.  Pulmonary:      Effort: Pulmonary effort is normal. No respiratory distress.      Breath sounds: Normal breath sounds. No wheezing, rhonchi or rales.   Musculoskeletal:         General: No swelling.      Cervical back: Neck supple.   Skin:     General: Skin is warm and dry.      Capillary Refill: Capillary refill takes less than 2 seconds.   Neurological:      Mental Status: She is alert.         "

## 2025-03-28 NOTE — ASSESSMENT & PLAN NOTE
Congratulated weight loss, continue efforts  BMI Counseling: Body mass index is 30.79 kg/m². The BMI is above normal. Nutrition recommendations include reducing portion sizes, decreasing overall calorie intake, 3-5 servings of fruits/vegetables daily, reducing fast food intake, moderation in carbohydrate intake, increasing intake of lean protein, reducing intake of saturated fat and trans fat, and reducing intake of cholesterol. Exercise recommendations include moderate aerobic physical activity for 150 minutes/week.

## 2025-04-01 ENCOUNTER — HOSPITAL ENCOUNTER (OUTPATIENT)
Dept: RADIOLOGY | Facility: CLINIC | Age: 50
Discharge: HOME | End: 2025-04-01
Payer: COMMERCIAL

## 2025-04-01 VITALS
RESPIRATION RATE: 20 BRPM | DIASTOLIC BLOOD PRESSURE: 73 MMHG | SYSTOLIC BLOOD PRESSURE: 139 MMHG | HEART RATE: 60 BPM | OXYGEN SATURATION: 100 %

## 2025-04-01 DIAGNOSIS — M47.816 LUMBAR SPONDYLOSIS: ICD-10-CM

## 2025-04-01 PROCEDURE — 64494 INJ PARAVERT F JNT L/S 2 LEV: CPT | Performed by: STUDENT IN AN ORGANIZED HEALTH CARE EDUCATION/TRAINING PROGRAM

## 2025-04-01 PROCEDURE — 64493 INJ PARAVERT F JNT L/S 1 LEV: CPT | Performed by: STUDENT IN AN ORGANIZED HEALTH CARE EDUCATION/TRAINING PROGRAM

## 2025-04-01 RX ORDER — BUPIVACAINE HCL/PF 2.5 MG/ML
1.5 VIAL (ML) INJECTION ONCE
Status: COMPLETED | OUTPATIENT
Start: 2025-04-01 | End: 2025-04-01

## 2025-04-01 RX ADMIN — BUPIVACAINE HYDROCHLORIDE 1.5 ML: 2.5 INJECTION, SOLUTION EPIDURAL; INFILTRATION; INTRACAUDAL at 08:28

## 2025-04-01 NOTE — INTERVAL H&P NOTE
Update: (This section must be completed if the H&P was completed greater than 24 hrs to procedure or admission)    H&P reviewed. After examining the patient, I find no changed to the H&P since it had been written.    Patient re-evaluated. Accept as history and physical.    Carmelo Moss MD/April 1, 2025/8:16 AM

## 2025-04-01 NOTE — DISCHARGE INSTR - LAB

## 2025-04-07 ENCOUNTER — RESULTS FOLLOW-UP (OUTPATIENT)
Dept: PAIN MEDICINE | Facility: CLINIC | Age: 50
End: 2025-04-07

## 2025-04-14 ENCOUNTER — TELEPHONE (OUTPATIENT)
Age: 50
End: 2025-04-14

## 2025-04-14 NOTE — TELEPHONE ENCOUNTER
Caller: Patient    Doctor: Dr. Moss    Reason for call: Patient has the Flu and would like to reschedule her procedure    Patient would like to know if she reschedule her procedure would it affect her last and next procedure     Call back#:

## 2025-05-15 ENCOUNTER — OFFICE VISIT (OUTPATIENT)
Dept: FAMILY MEDICINE CLINIC | Facility: CLINIC | Age: 50
End: 2025-05-15
Payer: COMMERCIAL

## 2025-05-15 VITALS
OXYGEN SATURATION: 99 % | HEART RATE: 73 BPM | SYSTOLIC BLOOD PRESSURE: 124 MMHG | HEIGHT: 65 IN | WEIGHT: 187 LBS | BODY MASS INDEX: 31.16 KG/M2 | TEMPERATURE: 98.7 F | DIASTOLIC BLOOD PRESSURE: 82 MMHG

## 2025-05-15 DIAGNOSIS — K52.9 GASTROENTERITIS: ICD-10-CM

## 2025-05-15 DIAGNOSIS — R06.2 WHEEZING: ICD-10-CM

## 2025-05-15 DIAGNOSIS — J30.2 SEASONAL ALLERGIC RHINITIS, UNSPECIFIED TRIGGER: ICD-10-CM

## 2025-05-15 DIAGNOSIS — H69.93 DYSFUNCTION OF BOTH EUSTACHIAN TUBES: Primary | ICD-10-CM

## 2025-05-15 DIAGNOSIS — Z91.09 ENVIRONMENTAL ALLERGIES: ICD-10-CM

## 2025-05-15 DIAGNOSIS — R05.9 COUGH: ICD-10-CM

## 2025-05-15 PROCEDURE — 99214 OFFICE O/P EST MOD 30 MIN: CPT | Performed by: PHYSICIAN ASSISTANT

## 2025-05-15 RX ORDER — ALBUTEROL SULFATE 90 UG/1
2 INHALANT RESPIRATORY (INHALATION) EVERY 6 HOURS PRN
Qty: 18 G | Refills: 3 | Status: SHIPPED | OUTPATIENT
Start: 2025-05-15

## 2025-05-15 RX ORDER — FLUTICASONE PROPIONATE 50 MCG
2 SPRAY, SUSPENSION (ML) NASAL DAILY
Qty: 16 ML | Refills: 1 | Status: SHIPPED | OUTPATIENT
Start: 2025-05-15

## 2025-05-15 NOTE — PROGRESS NOTES
Name: Jami Gates      : 1975      MRN: [id number]  Encounter Provider: Jerome Berger PA-C  Encounter Date: 5/15/2025   Encounter department: [facility]    Assessment & Plan  Dysfunction of both eustachian tubes  Flonase provided, discussed can last several weeks  Return precautions provided  ENT if sx persist or worsen       Cough    Orders:  •  albuterol (PROVENTIL HFA,VENTOLIN HFA) 90 mcg/act inhaler; Inhale 2 puffs every 6 (six) hours as needed for wheezing (cough)    Wheezing  Prn albuterol  Orders:  •  albuterol (PROVENTIL HFA,VENTOLIN HFA) 90 mcg/act inhaler; Inhale 2 puffs every 6 (six) hours as needed for wheezing (cough)    Environmental allergies    Orders:  •  fluticasone (FLONASE) 50 mcg/act nasal spray; 2 sprays into each nostril daily    Seasonal allergic rhinitis, unspecified trigger    Orders:  •  fluticasone (FLONASE) 50 mcg/act nasal spray; 2 sprays into each nostril daily    Gastroenteritis  Suspect viral syndrome  Sx improved  Abdomen soft/non tender  Pt noting L sided cramping, previous colonoscopy without diverticular disease  Monitor, return precautions provided             History of Present Illness     Pt presents with bilat ear pain/fullness since her trip to [place].   Also notes had diarrhea, vomiting yesterday and abdomnial cramping. No V/D today. No fevers  No sick contacts       Review of Systems   Constitutional:  Negative for chills, fatigue and fever.   HENT:  Negative for congestion, ear pain, hearing loss, nosebleeds, postnasal drip, rhinorrhea, sinus pressure, sinus pain, sneezing and sore throat.    Eyes:  Negative for pain, discharge, itching and visual disturbance.   Respiratory:  Negative for cough, chest tightness, shortness of breath and wheezing.    Cardiovascular:  Negative for chest pain, palpitations and leg swelling.   Gastrointestinal:  Negative for abdominal pain, blood in stool, constipation, diarrhea, nausea and vomiting.    Genitourinary:  Negative for frequency and urgency.   Neurological:  Negative for dizziness, light-headedness and numbness.     Past Medical History:   Diagnosis Date   • Abnormal Pap smear of cervix    • Anxiety    • Arthritis    • Asthma    • Depression    • Ear problems    • Fibromyalgia, primary    • Osteoarthritis 2021   • Osteoporosis    • Urinary tract infection    • Varicella      Past Surgical History:   Procedure Laterality Date   •  SECTION     • [place] NEUROPLASTY &/TRANSPOS MEDIAN NRV CARPAL TUNNE Right 02/10/2023    Procedure: RELEASE CARPAL TUNNEL;  Surgeon: Loni Chi MD;  [facility];  Service: Orthopedics   • [place] NEUROPLASTY &/TRANSPOSITION ULNAR NERVE ELBOW Right 02/10/2023    Procedure: RELEASE CUBITAL TUNNEL;  Surgeon: Loni Chi MD;  [facility];  Service: Orthopedics   • [place] TONSILLECTOMY PRIMARY/SECONDARY AGE 14/> N/A 2024    Procedure: TONSILLECTOMY;  Surgeon: Mode Verdin MD;  [facility];  Service: ENT   • TONSILLECTOMY         • TUBAL LIGATION       Family History   Problem Relation Age of Onset   • Cancer Mother    • Breast cancer Mother         age unknown   • Diabetes Mother    • Coronary artery disease Mother    • Ovarian cancer Mother    • Colon cancer Mother    • Cancer Father    • Prostate cancer Father    • Colon cancer Father    • No Known Problems Sister    • No Known Problems Sister    • No Known Problems Sister    • No Known Problems Sister    • No Known Problems Sister    • Breast cancer Sister    • No Known Problems Daughter    • No Known Problems Daughter    • No Known Problems Son    • No Known Problems Maternal Grandmother    • No Known Problems Paternal Grandmother    • No Known Problems Maternal Aunt    • No Known Problems Maternal Aunt    • No Known Problems Maternal Aunt    • No Known Problems Maternal Aunt    • No Known Problems Maternal Aunt    • No Known Problems Maternal Aunt    •  "No Known Problems Maternal Aunt    • No Known Problems Maternal Aunt    • No Known Problems Paternal Aunt    • No Known Problems Paternal Aunt    • No Known Problems Paternal Aunt    • No Known Problems Paternal Aunt    • No Known Problems Paternal Aunt    • No Known Problems Half-Sister    • Breast cancer Half-Sister         age unknown   • No Known Problems Half-Sister      Social History     Tobacco Use   • Smoking status: Some Days     Current packs/day: 0.00     Average packs/day: 0.5 packs/day for 14.0 years (7.0 ttl pk-yrs)     Types: Cigarettes     Start date: 2007     Last attempt to quit: 1/3/2021     Years since quittin.3   • Smokeless tobacco: Never   • Tobacco comments:     Almost 2 two years wit no smoking  patient report she vapes    Vaping Use   • Vaping status: Former   • Substances: Nicotine   Substance and Sexual Activity   • Alcohol use: Not Currently   • Drug use: Not Currently   • Sexual activity: Yes     Partners: Male     Birth control/protection: Female Sterilization     Medications[1]  Allergies   Allergen Reactions   • Tilactase GI Intolerance and Vomiting   • Pollen Extract Other (See Comments)     Seasonal allergies.     Immunization History   Administered Date(s) Administered   • Pneumococcal Conjugate Vaccine 20-valent (Pcv20), Polysace 2022   • Pneumococcal Polysaccharide PPV23 01/15/2020   • Tdap 10/05/2016     Objective   /82   Pulse 73   Temp 98.7 °F (37.1 °C)   Ht 5' 5\" (1.651 m)   Wt 84.8 kg (187 lb)   LMP 2022 (Exact Date)   SpO2 99%   BMI 31.12 kg/m²     Physical Exam  Vitals and nursing note reviewed.   Constitutional:       General: She is not in acute distress.     Appearance: She is well-developed.   HENT:      Head: Normocephalic and atraumatic.      Right Ear: A middle ear effusion is present.      Left Ear: A middle ear effusion is present.      Nose: Nose normal.      Mouth/Throat:      Mouth: Mucous membranes are moist.      Pharynx: " Oropharynx is clear.     Eyes:      Conjunctiva/sclera: Conjunctivae normal.       Cardiovascular:      Rate and Rhythm: Normal rate and regular rhythm.      Heart sounds: No murmur heard.  Pulmonary:      Effort: Pulmonary effort is normal. No respiratory distress.      Breath sounds: Wheezing present. No rhonchi or rales.   Abdominal:      Palpations: Abdomen is soft.      Tenderness: There is no abdominal tenderness.     Musculoskeletal:         General: No swelling.      Cervical back: Neck supple.     Skin:     General: Skin is warm and dry.      Capillary Refill: Capillary refill takes less than 2 seconds.     Neurological:      Mental Status: She is alert.     Psychiatric:         Mood and Affect: Mood normal.                [1]  Current Outpatient Medications on File Prior to Visit   Medication Sig   • Cholecalciferol (Vitamin D3) 25 MCG (1000 UT) CAPS Take 1 capsule by mouth daily   • cyanocobalamin (VITAMIN B-12) 100 MCG tablet Take 100 mcg by mouth in the morning   • Multiple Vitamin (MULTIVITAMIN) capsule Take 1 capsule by mouth daily   • pantoprazole (PROTONIX) 40 mg tablet Take 1 tablet (40 mg total) by mouth daily   • Vit C-Cholecalciferol-Alessandra Hip (Vitamin C & D3/Alessandra Hips) 500-1000-20 MG-UNIT-MG CAPS Take 1 capsule by mouth in the morning   • [DISCONTINUED] albuterol (PROVENTIL HFA,VENTOLIN HFA) 90 mcg/act inhaler Inhale 2 puffs every 6 (six) hours as needed for wheezing (cough)   • [DISCONTINUED] fluticasone (FLONASE) 50 mcg/act nasal spray SPRAY 1 SPRAY INTO EACH NOSTRIL EVERY DAY

## 2025-06-10 ENCOUNTER — TELEPHONE (OUTPATIENT)
Dept: GYNECOLOGIC ONCOLOGY | Facility: CLINIC | Age: 50
End: 2025-06-10

## 2025-06-18 ENCOUNTER — TELEPHONE (OUTPATIENT)
Dept: BARIATRICS | Facility: CLINIC | Age: 50
End: 2025-06-18

## 2025-06-24 ENCOUNTER — OFFICE VISIT (OUTPATIENT)
Dept: GYNECOLOGIC ONCOLOGY | Facility: CLINIC | Age: 50
End: 2025-06-24
Payer: COMMERCIAL

## 2025-06-24 VITALS
TEMPERATURE: 98.3 F | BODY MASS INDEX: 31.32 KG/M2 | SYSTOLIC BLOOD PRESSURE: 118 MMHG | DIASTOLIC BLOOD PRESSURE: 64 MMHG | HEIGHT: 65 IN | HEART RATE: 83 BPM | OXYGEN SATURATION: 98 % | WEIGHT: 188 LBS

## 2025-06-24 DIAGNOSIS — N95.0 PMB (POSTMENOPAUSAL BLEEDING): Primary | ICD-10-CM

## 2025-06-24 PROCEDURE — 99213 OFFICE O/P EST LOW 20 MIN: CPT | Performed by: OBSTETRICS & GYNECOLOGY

## 2025-06-24 NOTE — PROGRESS NOTES
"Name: Jami Gates      : 1975      MRN: [id number]  Encounter Provider: Scott Chaidez MD  Encounter Date: 2025   Encounter department: [facility] [place]  :  Assessment & Plan  PMB (postmenopausal bleeding)  Patient has perimenopausal/postmenopausal bleeding now fully into the menopause with hot flashes.  The patient's bleeding and pelvic discomfort have resolved.  At this point there is no plan to move ahead with hysterectomy as the symptoms have abated due to menopause.    The patient would like to be seen in 6 months for repeat evaluation.  Will set that up.  If she has any further bleeding or pain in the meantime she will call.               History of Present Illness   Reason for Visit / CC: History of perimenopausal bleeding   Jami Gates is a 50 y.o. female   Patient is a very pleasant 50-year-old female with a history of perimenopausal bleeding.  She was last seen 6 months ago.  Since that time the patient has experienced more hot flashes and no further vaginal bleeding.  She has no discomfort with intercourse and overall is doing quite well.    Today, the patient is doing well.  She denies significant abdominal pain, pelvic pain, nausea, vomiting, constipation, diarrhea, fevers, chills, or vaginal bleeding.         Pertinent Medical History            Review of Systems   Constitutional: Negative.    HENT: Negative.     Eyes: Negative.    Respiratory: Negative.     Cardiovascular: Negative.    Gastrointestinal: Negative.    Endocrine: Negative.    Genitourinary: Negative.    Musculoskeletal: Negative.    Skin: Negative.    Neurological: Negative.    Hematological: Negative.    Psychiatric/Behavioral: Negative.      A complete review of systems is negative other than that noted above in the HPI.       Objective   /64   Pulse 83   Temp 98.3 °F (36.8 °C) (Temporal)   Ht 5' 5\" (1.651 m)   Wt 85.3 kg (188 lb)   LMP 2022 " "(Exact Date)   SpO2 98%   BMI 31.28 kg/m²     Body mass index is 31.28 kg/m².  Pain Screening:     ECOG   0  Physical Exam  Constitutional:       Appearance: She is well-developed.   HENT:      Head: Normocephalic and atraumatic.     Eyes:      Pupils: Pupils are equal, round, and reactive to light.       Cardiovascular:      Rate and Rhythm: Normal rate and regular rhythm.      Heart sounds: Normal heart sounds.   Pulmonary:      Effort: Pulmonary effort is normal. No respiratory distress.      Breath sounds: Normal breath sounds.   Abdominal:      General: Bowel sounds are normal. There is no distension.      Palpations: Abdomen is soft. Abdomen is not rigid.      Tenderness: There is no abdominal tenderness. There is no guarding or rebound.   Genitourinary:     Comments: -Normal external female genitalia, normal Bartholin's and Pantops's glands                  -Normal midline urethral meatus. No lesions notes                  -Bladder without fullness mass or tenderness                  -Vagina without lesion or discharge No significant cystocele or rectocele noted                  -Cervix normal appearing without visible lesions                  -Uterus with normal contour, mobility. No tenderness,                  -Adnexae without  mass or tenderness                  - Anus without fissure of lesion      Musculoskeletal:         General: Normal range of motion.      Cervical back: Normal range of motion and neck supple.   Lymphadenopathy:      Cervical: No cervical adenopathy.      Upper Body:      Right upper body: No supraclavicular adenopathy.      Left upper body: No supraclavicular adenopathy.     Skin:     General: Skin is warm and dry.     Neurological:      Mental Status: She is alert and oriented to person, place, and time.     Psychiatric:         Behavior: Behavior normal.          Labs: I have reviewed pertinent labs.   No results found for: \"\"  Lab Results   Component Value Date/Time    " "Potassium 4.4 02/26/2025 08:42 AM    Chloride 104 02/26/2025 08:42 AM    CO2 26 02/26/2025 08:42 AM    BUN 12 02/26/2025 08:42 AM    Creatinine 0.59 (L) 02/26/2025 08:42 AM    Glucose, Fasting 92 02/26/2025 08:42 AM    Calcium 9.4 02/26/2025 08:42 AM    AST 13 02/26/2025 08:42 AM    ALT 11 02/26/2025 08:42 AM    Alkaline Phosphatase 99 02/26/2025 08:42 AM    eGFR 108 02/26/2025 08:42 AM     No results found for: \"WBC\", \"HGB\", \"HCT\", \"MCV\", \"PLT\"  No results found for: \"NEUTROABS\"     Trend:  No results found for: \"\"            "

## 2025-06-24 NOTE — ASSESSMENT & PLAN NOTE
Patient has perimenopausal/postmenopausal bleeding now fully into the menopause with hot flashes.  The patient's bleeding and pelvic discomfort have resolved.  At this point there is no plan to move ahead with hysterectomy as the symptoms have abated due to menopause.    The patient would like to be seen in 6 months for repeat evaluation.  Will set that up.  If she has any further bleeding or pain in the meantime she will call.

## 2025-06-27 ENCOUNTER — TELEPHONE (OUTPATIENT)
Dept: PAIN MEDICINE | Facility: CLINIC | Age: 50
End: 2025-06-27

## 2025-06-27 NOTE — TELEPHONE ENCOUNTER
Caller: Patient     Doctor: Dr. Moss    Reason for call: Calling to confirm she can take the 7/1 appt @ 8 am. Spoke to  and they will move the patient up     Call back#: n/a

## 2025-06-27 NOTE — TELEPHONE ENCOUNTER
Left message for pt - have sooner procedure opening available on 7/1/25 @ 8am.  Asked for rcb by end of day on 6/27/25 if she is interested.

## 2025-07-01 ENCOUNTER — HOSPITAL ENCOUNTER (OUTPATIENT)
Dept: RADIOLOGY | Facility: CLINIC | Age: 50
Discharge: HOME | End: 2025-07-01
Payer: COMMERCIAL

## 2025-07-01 VITALS — HEART RATE: 60 BPM | DIASTOLIC BLOOD PRESSURE: 79 MMHG | RESPIRATION RATE: 20 BRPM | SYSTOLIC BLOOD PRESSURE: 138 MMHG

## 2025-07-01 DIAGNOSIS — K26.9 DUODENAL ULCER: ICD-10-CM

## 2025-07-01 DIAGNOSIS — M47.816 LUMBAR SPONDYLOSIS: ICD-10-CM

## 2025-07-01 PROCEDURE — 64494 INJ PARAVERT F JNT L/S 2 LEV: CPT | Performed by: STUDENT IN AN ORGANIZED HEALTH CARE EDUCATION/TRAINING PROGRAM

## 2025-07-01 PROCEDURE — 64493 INJ PARAVERT F JNT L/S 1 LEV: CPT | Performed by: STUDENT IN AN ORGANIZED HEALTH CARE EDUCATION/TRAINING PROGRAM

## 2025-07-01 RX ORDER — BUPIVACAINE HYDROCHLORIDE 7.5 MG/ML
1.5 INJECTION, SOLUTION EPIDURAL; RETROBULBAR ONCE
Status: COMPLETED | OUTPATIENT
Start: 2025-07-01 | End: 2025-07-01

## 2025-07-01 RX ORDER — PANTOPRAZOLE SODIUM 40 MG/1
40 TABLET, DELAYED RELEASE ORAL DAILY
Qty: 31 TABLET | Refills: 6 | Status: SHIPPED | OUTPATIENT
Start: 2025-07-01

## 2025-07-01 RX ADMIN — BUPIVACAINE HYDROCHLORIDE 1.5 ML: 7.5 INJECTION, SOLUTION EPIDURAL; RETROBULBAR at 08:21

## 2025-07-01 NOTE — H&P
History of Present Illness: The patient is a 50 y.o. female who presents with complaints of right low back pain    Past Medical History[1]    Past Surgical History[2]    Current Medications[3]    Allergies[4]    Physical Exam: There were no vitals filed for this visit.  General: Awake, Alert, Oriented x 3, Mood and affect appropriate  Respiratory: Respirations even and unlabored  Cardiovascular: Peripheral pulses intact; no edema  Musculoskeletal Exam: facet loading positive right    ASA Score: 3    Patient/Chart Verification  Patient ID Verified: Verbal  ID Band Applied: No  Consents Confirmed: To be obtained in the Procedural area  H&P( within 30 days) Verified: To be obtained in the Procedural area  Interval H&P(within 24 hr) Complete (required for Outpatients and Surgery Admit only): To be obtained in the Procedural area  Allergies Reviewed: Yes  Anticoag/NSAID held?: NA  Currently on antibiotics?: No  Pregnancy denied?: NA    Assessment:   1. Lumbar spondylosis        Plan: mbb#2  right L4-L5, L5-S1         [1]   Past Medical History:  Diagnosis Date    Abnormal Pap smear of cervix     Anxiety     Arthritis     Asthma     Depression     Ear problems     Fibromyalgia, primary     Osteoarthritis 2021    Osteoporosis     Urinary tract infection     Varicella    [2]   Past Surgical History:  Procedure Laterality Date     SECTION      CT NEUROPLASTY &/TRANSPOS MEDIAN NRV CARPAL TUNNE Right 02/10/2023    Procedure: RELEASE CARPAL TUNNEL;  Surgeon: Loni Chi MD;  Location: [facility];  Service: Orthopedics    CT NEUROPLASTY &/TRANSPOSITION ULNAR NERVE ELBOW Right 02/10/2023    Procedure: RELEASE CUBITAL TUNNEL;  Surgeon: Loni Chi MD;  Location: [facility];  Service: Orthopedics    CT TONSILLECTOMY PRIMARY/SECONDARY AGE 14/> N/A 2024    Procedure: TONSILLECTOMY;  Surgeon: Mode Verdin MD;  Location: [facility];  Service: ENT    TONSILLECTOMY          TUBAL  LIGATION     [3]   Current Outpatient Medications:     albuterol (PROVENTIL HFA,VENTOLIN HFA) 90 mcg/act inhaler, Inhale 2 puffs every 6 (six) hours as needed for wheezing (cough), Disp: 18 g, Rfl: 3    Cholecalciferol (Vitamin D3) 25 MCG (1000 UT) CAPS, Take 1 capsule by mouth in the morning., Disp: , Rfl:     cyanocobalamin (VITAMIN B-12) 100 MCG tablet, Take 100 mcg by mouth in the morning, Disp: , Rfl:     fluticasone (FLONASE) 50 mcg/act nasal spray, 2 sprays into each nostril daily, Disp: 16 mL, Rfl: 1    Multiple Vitamin (MULTIVITAMIN) capsule, Take 1 capsule by mouth in the morning., Disp: , Rfl:     pantoprazole (PROTONIX) 40 mg tablet, Take 1 tablet (40 mg total) by mouth daily, Disp: 31 tablet, Rfl: 6    Vit C-Cholecalciferol-Alessandra Hip (Vitamin C & D3/Alessandra Hips) 500-1000-20 MG-UNIT-MG CAPS, Take 1 capsule by mouth in the morning, Disp: , Rfl:   [4]   Allergies  Allergen Reactions    Tilactase GI Intolerance and Vomiting    Pollen Extract Other (See Comments)     Seasonal allergies.

## 2025-07-01 NOTE — DISCHARGE INSTR - LAB

## 2025-07-09 ENCOUNTER — OFFICE VISIT (OUTPATIENT)
Dept: OBGYN CLINIC | Facility: CLINIC | Age: 50
End: 2025-07-09
Payer: COMMERCIAL

## 2025-07-09 ENCOUNTER — PATIENT MESSAGE (OUTPATIENT)
Dept: PAIN MEDICINE | Facility: CLINIC | Age: 50
End: 2025-07-09

## 2025-07-09 VITALS — BODY MASS INDEX: 31.49 KG/M2 | HEIGHT: 65 IN | WEIGHT: 189 LBS

## 2025-07-09 DIAGNOSIS — M47.816 LUMBAR FACET ARTHROPATHY: Primary | ICD-10-CM

## 2025-07-09 DIAGNOSIS — S69.81XA TFCC (TRIANGULAR FIBROCARTILAGE COMPLEX) INJURY, RIGHT, INITIAL ENCOUNTER: ICD-10-CM

## 2025-07-09 DIAGNOSIS — S63.501A SPRAIN OF RIGHT WRIST, INITIAL ENCOUNTER: ICD-10-CM

## 2025-07-09 DIAGNOSIS — G56.21 ULNAR NEUROPATHY AT WRIST, RIGHT: Primary | ICD-10-CM

## 2025-07-09 PROCEDURE — 99214 OFFICE O/P EST MOD 30 MIN: CPT | Performed by: FAMILY MEDICINE

## 2025-07-09 RX ORDER — PERFLUOROHEXYLOCTANE 1 MG/MG
1 SOLUTION OPHTHALMIC
COMMUNITY
Start: 2025-06-25

## 2025-07-09 RX ORDER — CYCLOSPORINE 0.5 MG/ML
1 EMULSION OPHTHALMIC EVERY 12 HOURS
COMMUNITY
Start: 2025-06-29

## 2025-07-09 RX ORDER — PREDNISONE 20 MG/1
40 TABLET ORAL DAILY
COMMUNITY
Start: 2025-07-08 | End: 2025-07-13

## 2025-07-09 NOTE — PROGRESS NOTES
Name: Jami Gates      : 1975      MRN: [id number]  Encounter Provider: Lars Mcnally DO  Encounter Date: 2025   Encounter department: [facility] [place]  :  Assessment & Plan  Ulnar neuropathy at wrist, right  50-year-old right-hand-dominant female onset of right wrist pain and right hand numbness and tingling from injury on 2025.  X-rays right wrist resulted for being unremarkable for acute osseous abnormality.  Clinical impression is that she sustained sprain to the wrist and onset of ulnar neuropathy, and there is possible TFCC injury.  I discussed treatment regimen continuing with splinting, topical anti-inflammatory, and formal therapy.  Complete course of oral steroid.  Recommend she continue wearing cock-up wrist splint for 3 more weeks.  Apply topical diclofenac 3 times daily for 10 days.  Start formal therapy as soon as possible and do home exercises as directed.  Follow-up in 5 weeks for reevaluation.  Orders:    Ambulatory Referral to PT/OT Hand Therapy; Future    Diclofenac Sodium (VOLTAREN) 1 %; Apply 2 g topically 3 (three) times a day as needed (Pain)    Sprain of right wrist, initial encounter  50-year-old right-hand-dominant female onset of right wrist pain and right hand numbness and tingling from injury on 2025.  X-rays right wrist resulted for being unremarkable for acute osseous abnormality.  Clinical impression is that she sustained sprain to the wrist and onset of ulnar neuropathy, and there is possible TFCC injury.  I discussed treatment regimen continuing with splinting, topical anti-inflammatory, and formal therapy.  Complete course of oral steroid.  Recommend she continue wearing cock-up wrist splint for 3 more weeks.  Apply topical diclofenac 3 times daily for 10 days.  Start formal therapy as soon as possible and do home exercises as directed.  Follow-up in 5 weeks for reevaluation.  Orders:    Ambulatory  Referral to PT/OT Hand Therapy; Future    Diclofenac Sodium (VOLTAREN) 1 %; Apply 2 g topically 3 (three) times a day as needed (Pain)    TFCC (triangular fibrocartilage complex) injury, right, initial encounter  50-year-old right-hand-dominant female onset of right wrist pain and right hand numbness and tingling from injury on 7/7/2025.  X-rays right wrist resulted for being unremarkable for acute osseous abnormality.  Clinical impression is that she sustained sprain to the wrist and onset of ulnar neuropathy, and there is possible TFCC injury.  I discussed treatment regimen continuing with splinting, topical anti-inflammatory, and formal therapy.  Complete course of oral steroid.  Recommend she continue wearing cock-up wrist splint for 3 more weeks.  Apply topical diclofenac 3 times daily for 10 days.  Start formal therapy as soon as possible and do home exercises as directed.  Follow-up in 5 weeks for reevaluation.  Orders:    Ambulatory Referral to PT/OT Hand Therapy; Future        History of Present Illness   Chief Complaint   Patient presents with    Right Wrist - Pain    Right Hand - Numbness      HPI  Jami Gates is a 50 y.o. right-hand-dominant female who presents for evaluation right wrist pain onset from injury on 7/7/2025.  She states she was wringing out a cloth when she felt the pain.  She had pain described as sudden in onset, localized to the ulnar aspect of the wrist, sharp and burning, radiating distally to the 4th and 5th digits, radiating proximal to the forearm, associated numbness and tingling in the hand, associate with stiffness of the 4th and 5th digits, worse with gripping and bending the finger, and improved with resting.  She presented to the emergency room where x-ray evaluation was unremarkable for acute osseous abnormality.  She was prescribed a course of oral steroid and advised to follow-up with orthopedic care.  She has been wearing cock-up wrist splint.  She reports that  "since starting oral steroids symptoms are starting to improve and she is better able to move the 4th and 5th digits of the hand and she has less tingling.    History obtained from: patient    Review of Systems   Musculoskeletal:  Positive for arthralgias. Negative for joint swelling.   Neurological:  Positive for numbness. Negative for weakness.          Objective   Ht 5' 5\" (1.651 m)   Wt 85.7 kg (189 lb)   LMP 12/11/2022 (Exact Date)   BMI 31.45 kg/m²      Physical Exam  Vitals and nursing note reviewed.   Constitutional:       Appearance: Normal appearance. She is well-developed. She is not ill-appearing or diaphoretic.   HENT:      Head: Normocephalic and atraumatic.      Right Ear: External ear normal.      Left Ear: External ear normal.     Eyes:      Conjunctiva/sclera: Conjunctivae normal.     Neck:      Trachea: No tracheal deviation.   Pulmonary:      Effort: Pulmonary effort is normal. No respiratory distress.   Abdominal:      General: There is no distension.     Skin:     General: Skin is warm and dry.      Coloration: Skin is not jaundiced or pale.     Neurological:      Mental Status: She is alert and oriented to person, place, and time.     Psychiatric:         Mood and Affect: Mood normal.         Behavior: Behavior normal.         Thought Content: Thought content normal.         Judgment: Judgment normal.       Right Hand Exam     Tenderness   Right hand tenderness location: First CMC, ulnar styloid, 6th dorsal compartment, TFCC.    Range of Motion   The patient has normal right wrist ROM.     Muscle Strength   The patient has normal right wrist strength.    Tests   Finkelstein's test: positive (mild)    Other   Sensation: normal  Pulse: present    Comments:  Positive TFCC grind  Negative DRUJ stress      Right Elbow Exam     Tenderness   The patient is experiencing tenderness in the medial epicondyle.     Range of Motion   The patient has normal right elbow ROM.    Muscle Strength   The patient " has normal right elbow strength (5/5 flexion and extension).           Administrative Statements   I have spent a total time of 31 minutes in caring for this patient on the day of the visit/encounter including Diagnostic results, Prognosis, Risks and benefits of tx options, Instructions for management, Impressions, Documenting in the medical record, Reviewing/placing orders in the medical record (including tests, medications, and/or procedures), and Obtaining or reviewing history  .

## 2025-07-09 NOTE — PATIENT COMMUNICATION
Pt is scheduled for RFA with Dr Moss on 8/5/25     Pt is not diabetic and reports she does not have a pacemaker or defibrillator     Pt given instruction review via MeetMe message     Have you completed PT/HEP/Chiro in the past 6 months for dedicated area? Current chiro with [facility], has had TPI for pain relief and completed PT with moderate relief, [facility] in 2023 and 2021  If yes, how long did you complete?  What was the frequency?  Did it provide relief?  If no, reason therapy was not completed?   MBVince completed

## 2025-07-09 NOTE — ASSESSMENT & PLAN NOTE
50-year-old right-hand-dominant female onset of right wrist pain and right hand numbness and tingling from injury on 7/7/2025.  X-rays right wrist resulted for being unremarkable for acute osseous abnormality.  Clinical impression is that she sustained sprain to the wrist and onset of ulnar neuropathy, and there is possible TFCC injury.  I discussed treatment regimen continuing with splinting, topical anti-inflammatory, and formal therapy.  Complete course of oral steroid.  Recommend she continue wearing cock-up wrist splint for 3 more weeks.  Apply topical diclofenac 3 times daily for 10 days.  Start formal therapy as soon as possible and do home exercises as directed.  Follow-up in 5 weeks for reevaluation.  Orders:    Ambulatory Referral to PT/OT Hand Therapy; Future    Diclofenac Sodium (VOLTAREN) 1 %; Apply 2 g topically 3 (three) times a day as needed (Pain)

## 2025-07-09 NOTE — LETTER
July 9, 2025     Patient: Jami Gates  YOB: 1975  Date of Visit: 7/9/2025      To Whom it May Concern:    Jami Gates is under my professional care. Jami was seen in my office on 7/9/2025. Jami may work restrictions:  - No use of right upper extremity for lifting, pushing, or pulling more than 5 pounds    Jami will be re-evaluated in 5 weeks.    If you have any questions or concerns, please don't hesitate to call.         Sincerely,          Lars Mcnally,         CC: No Recipients

## 2025-07-09 NOTE — ASSESSMENT & PLAN NOTE
50-year-old right-hand-dominant female onset of right wrist pain and right hand numbness and tingling from injury on 7/7/2025.  X-rays right wrist resulted for being unremarkable for acute osseous abnormality.  Clinical impression is that she sustained sprain to the wrist and onset of ulnar neuropathy, and there is possible TFCC injury.  I discussed treatment regimen continuing with splinting, topical anti-inflammatory, and formal therapy.  Complete course of oral steroid.  Recommend she continue wearing cock-up wrist splint for 3 more weeks.  Apply topical diclofenac 3 times daily for 10 days.  Start formal therapy as soon as possible and do home exercises as directed.  Follow-up in 5 weeks for reevaluation.  Orders:    Ambulatory Referral to PT/OT Hand Therapy; Future

## 2025-07-24 ENCOUNTER — EVALUATION (OUTPATIENT)
Dept: OCCUPATIONAL THERAPY | Facility: CLINIC | Age: 50
End: 2025-07-24
Attending: FAMILY MEDICINE
Payer: COMMERCIAL

## 2025-07-24 DIAGNOSIS — S63.501A SPRAIN OF RIGHT WRIST, INITIAL ENCOUNTER: ICD-10-CM

## 2025-07-24 DIAGNOSIS — G56.21 ULNAR NEUROPATHY AT WRIST, RIGHT: ICD-10-CM

## 2025-07-24 DIAGNOSIS — S69.81XA TFCC (TRIANGULAR FIBROCARTILAGE COMPLEX) INJURY, RIGHT, INITIAL ENCOUNTER: ICD-10-CM

## 2025-07-24 PROCEDURE — 97110 THERAPEUTIC EXERCISES: CPT

## 2025-07-24 PROCEDURE — 97165 OT EVAL LOW COMPLEX 30 MIN: CPT

## 2025-07-24 NOTE — PROGRESS NOTES
OT Evaluation     Today's date: 2025  Patient name: Jami Gates  : 1975  MRN: [id number]  Referring provider: Lars Mcnally*  Dx:   Encounter Diagnosis     ICD-10-CM    1. Ulnar neuropathy at wrist, right  G56.21 Ambulatory Referral to PT/OT Hand Therapy      2. Sprain of right wrist, initial encounter  S63.501A Ambulatory Referral to PT/OT Hand Therapy      3. TFCC (triangular fibrocartilage complex) injury, right, initial encounter  S69.81XA Ambulatory Referral to PT/OT Hand Therapy          Start Time: 1750  Stop Time: 184  Total time in clinic (min): 55 minutes    Assessment  Impairments: abnormal coordination, abnormal or restricted ROM, abnormal movement, activity intolerance, impaired physical strength, lacks appropriate home exercise program, pain with function, weight-bearing intolerance, unable to perform ADL, activity limitations and endurance  Other impairment: paresthesias  Functional limitations: difficulty with work-related activities, ADLs, IADLs, secondary to pain, weakness, stiffness  Symptom irritability: moderate    Assessment details: Jami Gates is a 50 y.o., Right HD female referred to hand therapy for a R wrist sprain, TFCC injury, and ulnar neuropathy.  Onset of injury 27 due to ringing out a cloth.  Patient presents 27 with impaired ROM, strength, and coordination of the R wrist and hand.  Deficits also noted in pain, sensation and functional use of the right UE. Patient has tenderness at her ulnar and volar wrist that was provoked with palpation over her ulnar fovea and with a TFCC grind test. Her ROM and strength are limited due to pain. She was issued a TG sleeve to control swelling and provide support during activity and sleep. She may benefit from a custom wrist splint to provide support during sleep as she is unable to tolerate the OTC brace. Patient is a good candidate for OT services to decrease pain, edema, and paresthesias and restore  ROM, strength, coordination, and sensation for a return to independence in daily tasks.   Understanding of Dx/Px/POC: excellent     Prognosis: good    Goals  STGs (4 weeks)  Patient will be independent in implementing HEP prescribed by therapist  Patient will report an average pain level of 4/10  Patient will demonstrate 10# improvement in  strength for improved use of R hand in work-related activities  Patient will demonstrate active wrist extension/flexion to 65/50 for improved use of R wrist in ADLs  Decrease nighttime paresthesias by 50% at ulnar digits during nighttime  LTGs (10 weeks)  Patient will demonstrate independence in a HEP to maintain ROM, strength, and function at discharge  Patient will report an average pain level of 1-2/10 to be independent in daily tasks  Patient will demonstrate AROM of the wrist and forearm WFL to be independent in self care tasks  Patient will demonstrate 5/5 muscle strength in the wrist and forearm to be MI for meal prep  Patient will achieve goals as demonstrated by FOTO results  Patient will demonstrate 20# improvement in  strength for improved use of R hand in work-related activities  Patient will demonstrate resolution of paresthesias     Plan  Patient would benefit from: skilled occupational therapy and home program  Planned modality interventions: thermotherapy: hydrocollator packs, cryotherapy and ultrasound    Planned therapy interventions: IASTM, kinesiology taping, joint mobilization, manual therapy, massage, neuromuscular re-education, strengthening, stretching, therapeutic activities, therapeutic exercise, home exercise program, graded exercise, graded activity, functional ROM exercises, flexibility, fine motor coordination training, coordination, compression, activity modification, orthotic fitting/training, orthotic management and training and nerve gliding    Frequency: 1x week  Duration in weeks: 10  Plan of Care beginning date: 7/24/2025  Plan of Care  "expiration date: 10/2/2025  Treatment plan discussed with: patient        Subjective Evaluation    History of Present Illness  Date of onset: 2025  Mechanism of injury: trauma  Mechanism of injury: Jami Gates is a 50 y.o. female referred to OT for ulnar neuropathy of right wrist, TFCC injury, and sprain of right wrist. Onset of injury 27 from ringing out a baby cloth. She went to the ED where xrays were taken and no fractures were seen. Patient reports she felt a pop in the wrist and had subsequent pain and swelling. She reports she has been taking her wrist brace off to sleep because the brace is irritating her forearm. She has been having increased numbness at night in all of her digits, especially in her RSF. She report pain to her ulnar and dorsal wrist. She has been wearing the wrist brace during the day for support. She has a history of L CTR and CuTR in . She just finished a course of prednisone and reports good relief of symptoms with this.  Patient Goals  Patient goals for therapy: return to sport/leisure activities, increased motion, increased strength and decreased pain  Patient goal: \"Go back to how it was\"  Pain  Current pain ratin  At best pain ratin  At worst pain rating: 10  Location: throughout L wrist, palm , SF  Quality: burning and throbbing  Relieving factors: rest, support and medications  Exacerbated by: brace, heavy activity, sudden movements with R wrist and hand.  Progression: improved    Social Support    Employment status: working ()  Hand dominance: right      Diagnostic Tests  X-ray: normal  Treatments  Previous treatment: medication and immobilization  Current treatment: occupational therapy      Objective     Observations     Right Elbow   Positive for incision.     Right Wrist/Hand   Negative for atrophy and edema.     Additional Observation Details  Incision from CuTR in     Tenderness     Right Wrist/Hand   Tenderness in the first dorsal " compartment and TFCC. No tenderness in the fourth dorsal compartment and radial styloid process.     Additional Tenderness Details  Tender CT, volar wrist    Neurological Testing     Additional Neurological Details  Patient denies any N/T today but does have paresthesias into hand at nighttime when not wearing brace    Active Range of Motion     Right Elbow   Normal active range of motion    Left Wrist   Wrist flexion: 65 degrees   Wrist extension: 70 degrees   Radial deviation: 12 degrees   Ulnar deviation: 25 degrees     Right Wrist   Wrist flexion: 40 degrees   Wrist extension: 61 degrees   Radial deviation: 16 degrees   Ulnar deviation: 22 degrees     Additional Active Range of Motion Details  ROM is limited by pain  Thumb and D2-5 WNLs    Strength/Myotome Testing     Left Wrist/Hand      (2nd hand position)     Trial 1: 41.2    Thumb Strength  Key/Lateral Pinch     Trial 1: 12.7  Palmar/Three-Point Pinch     Trial 1: 8    Right Wrist/Hand      (2nd hand position)     Trial 1: 18.5    Thumb Strength   Key/Lateral Pinch     Trial 1: 6.6  Palmar/Three-Point Pinch     Trial 1: 4.5    Additional Strength Details  Strength testing of wrist deferred due to pain    Tests     Left Wrist/Hand   Positive TFCC load.   Negative distal radial-ulnar joint stress and Tinel's sign (medial nerve).     Additional Tests Details  (-) SL ballottement  (+) Tinel's guyon's canal    Swelling     Left Wrist/Hand   Circumference wrist: 14.7 cm    Right Wrist/Hand   Circumference wrist: 15 cm             Precautions: Anxiety, hx of CTR and CuTR, R wrist injury 7/7/25  POC expires Unit limit Auth  expiration date PT/OT + Visit Limit?   10/2/25   BOMN                 Visit/Unit Tracking  AUTH Status:  Date 7/24 IE              $35 first 3 treatments Used 1               Remaining                     Manuals 7/24 IE        STM ulnar and volar wrist         KT         Compression Size B TG                 Neuro Re-Ed         JENNY levy  x10                                                              Ther Ex         Wrist AROM All planes pain-free        TGEs x10        Ball rolls         Maze                                             Ther Activity         FMC, translation                  HEP                           Modalities         P 5'

## 2025-07-29 ENCOUNTER — OFFICE VISIT (OUTPATIENT)
Dept: OCCUPATIONAL THERAPY | Facility: CLINIC | Age: 50
End: 2025-07-29
Attending: FAMILY MEDICINE
Payer: COMMERCIAL

## 2025-07-29 DIAGNOSIS — S63.501A SPRAIN OF RIGHT WRIST, INITIAL ENCOUNTER: ICD-10-CM

## 2025-07-29 DIAGNOSIS — S69.81XA TFCC (TRIANGULAR FIBROCARTILAGE COMPLEX) INJURY, RIGHT, INITIAL ENCOUNTER: ICD-10-CM

## 2025-07-29 DIAGNOSIS — G56.21 ULNAR NEUROPATHY AT WRIST, RIGHT: Primary | ICD-10-CM

## 2025-07-29 PROCEDURE — 97110 THERAPEUTIC EXERCISES: CPT

## 2025-07-29 PROCEDURE — 97140 MANUAL THERAPY 1/> REGIONS: CPT

## 2025-07-31 ENCOUNTER — TELEPHONE (OUTPATIENT)
Age: 50
End: 2025-07-31

## 2025-08-13 ENCOUNTER — OFFICE VISIT (OUTPATIENT)
Dept: GASTROENTEROLOGY | Facility: CLINIC | Age: 50
End: 2025-08-13
Payer: COMMERCIAL

## 2025-08-15 ENCOUNTER — OFFICE VISIT (OUTPATIENT)
Dept: OBGYN CLINIC | Facility: CLINIC | Age: 50
End: 2025-08-15
Payer: COMMERCIAL

## 2025-08-15 PROBLEM — S46.211A BICEPS STRAIN, RIGHT, INITIAL ENCOUNTER: Status: ACTIVE | Noted: 2025-08-15

## 2025-08-15 PROBLEM — M75.81 TENDINITIS OF RIGHT ROTATOR CUFF: Status: ACTIVE | Noted: 2025-08-15

## 2025-08-19 ENCOUNTER — HOSPITAL ENCOUNTER (OUTPATIENT)
Dept: RADIOLOGY | Facility: CLINIC | Age: 50
Discharge: HOME | End: 2025-08-19
Attending: STUDENT IN AN ORGANIZED HEALTH CARE EDUCATION/TRAINING PROGRAM
Payer: COMMERCIAL

## 2025-08-19 VITALS
SYSTOLIC BLOOD PRESSURE: 127 MMHG | OXYGEN SATURATION: 98 % | TEMPERATURE: 97.8 F | RESPIRATION RATE: 20 BRPM | DIASTOLIC BLOOD PRESSURE: 71 MMHG | HEART RATE: 70 BPM

## 2025-08-19 DIAGNOSIS — M47.816 LUMBAR FACET ARTHROPATHY: ICD-10-CM

## 2025-08-19 PROCEDURE — 64636 DESTROY L/S FACET JNT ADDL: CPT | Performed by: STUDENT IN AN ORGANIZED HEALTH CARE EDUCATION/TRAINING PROGRAM

## 2025-08-19 PROCEDURE — 64635 DESTROY LUMB/SAC FACET JNT: CPT | Performed by: STUDENT IN AN ORGANIZED HEALTH CARE EDUCATION/TRAINING PROGRAM

## 2025-08-19 RX ADMIN — LIDOCAINE HYDROCHLORIDE 6 ML: 20 INJECTION, SOLUTION EPIDURAL; INFILTRATION; INTRACAUDAL; PERINEURAL at 10:57

## 2025-08-20 ENCOUNTER — TELEPHONE (OUTPATIENT)
Dept: RADIOLOGY | Facility: CLINIC | Age: 50
End: 2025-08-20

## (undated) DEVICE — 4-PORT MANIFOLD: Brand: NEPTUNE 2

## (undated) DEVICE — MINI BLADE ROUND TIP SHARP ON ONE SIDE

## (undated) DEVICE — SUT MONOCRYL 3-0 SH 27 IN Y416H

## (undated) DEVICE — NEPTUNE E-SEP SMOKE EVACUATION PENCIL, COATED, 70MM BLADE, PUSH BUTTON SWITCH: Brand: NEPTUNE E-SEP

## (undated) DEVICE — ANTI-FOG SOLUTION WITH FOAM PAD: Brand: DEVON

## (undated) DEVICE — GLOVE INDICATOR PI UNDERGLOVE SZ 7 BLUE

## (undated) DEVICE — PADDING CAST 4 IN  COTTON STRL

## (undated) DEVICE — 3M™ STERI-STRIP™ REINFORCED ADHESIVE SKIN CLOSURES, R1547, 1/2 IN X 4 IN (12 MM X 100 MM), 6 STRIPS/ENVELOPE: Brand: 3M™ STERI-STRIP™

## (undated) DEVICE — PADDING CAST 2IN COTTON STRL

## (undated) DEVICE — SPONGE PVP SCRUB WING STERILE

## (undated) DEVICE — KNIFE LIGHT 10,PK: Brand: KNIFELIGHT

## (undated) DEVICE — SUT MONOCRYL 4-0 PS-2 18 IN Y496G

## (undated) DEVICE — OCCLUSIVE GAUZE STRIP,3% BISMUTH TRIBROMOPHENATE IN PETROLATUM BLEND: Brand: XEROFORM

## (undated) DEVICE — GLOVE SRG BIOGEL ECLIPSE 7

## (undated) DEVICE — MEDI-VAC NON-CONDUCTIVE SUCTION TUBING 6MM X 1.8M (6FT.) L: Brand: CARDINAL HEALTH

## (undated) DEVICE — ELECTRODE BLADE MOD E-Z CLEAN 2.5IN 6.4CM -0012M

## (undated) DEVICE — MEDI-VAC YANK SUCT HNDL W/TPRD BULBOUS TIP: Brand: CARDINAL HEALTH

## (undated) DEVICE — STERILE BETHLEHEM T AND A PACK: Brand: CARDINAL HEALTH

## (undated) DEVICE — CATH URET 12FR RED RUBBER

## (undated) DEVICE — NEEDLE 25G X 1 1/2

## (undated) DEVICE — CUFF TOURNIQUET 18 X 5.5 IN QUICK CONNECT 2BLA

## (undated) DEVICE — DISPOSABLE EQUIPMENT COVER: Brand: SMALL TOWEL DRAPE

## (undated) DEVICE — BULB SYRINGE,IRRIGATION WITH PROTECTIVE CAP: Brand: DOVER

## (undated) DEVICE — SPONGE,TONSIL,DBL STRNG,XRAY,SM,7/8",ST: Brand: MEDLINE INDUSTRIES, INC.

## (undated) DEVICE — GAUZE SPONGES,16 PLY: Brand: CURITY

## (undated) DEVICE — INTENDED FOR TISSUE SEPARATION, AND OTHER PROCEDURES THAT REQUIRE A SHARP SURGICAL BLADE TO PUNCTURE OR CUT.: Brand: BARD-PARKER ® CARBON RIB-BACK BLADES

## (undated) DEVICE — GLOVE SRG BIOGEL 7

## (undated) DEVICE — TOURNIQUET HEMACLEAR 24-40CM YELLOW STRL

## (undated) DEVICE — DRAPE SHEET THREE QUARTER

## (undated) DEVICE — STERILE BETHLEHEM PLASTIC HAND: Brand: CARDINAL HEALTH

## (undated) DEVICE — ACE WRAP 4 IN UNSTERILE

## (undated) DEVICE — CHLORAPREP HI-LITE 26ML ORANGE

## (undated) DEVICE — SKIN MARKER DUAL TIP WITH RULER CAP, FLEXIBLE RULER AND LABELS: Brand: DEVON

## (undated) DEVICE — 3000CC GUARDIAN II: Brand: GUARDIAN

## (undated) DEVICE — SUT VICRYL 3-0 SH 27 IN J416H

## (undated) DEVICE — SUCTION BOVIE ENT

## (undated) DEVICE — SUT PROLENE 4-0 PS-2 18 IN 8682G